# Patient Record
Sex: FEMALE | Race: WHITE | NOT HISPANIC OR LATINO | Employment: UNEMPLOYED | URBAN - METROPOLITAN AREA
[De-identification: names, ages, dates, MRNs, and addresses within clinical notes are randomized per-mention and may not be internally consistent; named-entity substitution may affect disease eponyms.]

---

## 2017-01-06 DIAGNOSIS — F33.1 MAJOR DEPRESSIVE DISORDER, RECURRENT EPISODE, MODERATE (H): Primary | ICD-10-CM

## 2017-01-06 RX ORDER — LORAZEPAM 0.5 MG/1
TABLET ORAL
Qty: 30 TABLET | Refills: 0
Start: 2017-01-06 | End: 2017-07-12

## 2017-05-09 DIAGNOSIS — Z12.39 SCREENING FOR BREAST CANCER: Primary | ICD-10-CM

## 2017-05-16 ENCOUNTER — RADIANT APPOINTMENT (OUTPATIENT)
Dept: MAMMOGRAPHY | Facility: CLINIC | Age: 52
End: 2017-05-16
Attending: INTERNAL MEDICINE
Payer: COMMERCIAL

## 2017-05-16 DIAGNOSIS — Z12.39 SCREENING FOR BREAST CANCER: ICD-10-CM

## 2017-05-16 PROCEDURE — G0202 SCR MAMMO BI INCL CAD: HCPCS

## 2017-05-24 DIAGNOSIS — F33.1 MAJOR DEPRESSIVE DISORDER, RECURRENT EPISODE, MODERATE (H): ICD-10-CM

## 2017-05-24 NOTE — TELEPHONE ENCOUNTER
Medication is being filled for 1 time refill only due to:  Pt due for annual visit late August, will need PHQ9 and GAD7 then also

## 2017-07-08 ENCOUNTER — HOSPITAL ENCOUNTER (EMERGENCY)
Facility: CLINIC | Age: 52
Discharge: HOME | End: 2017-07-08
Attending: EMERGENCY MEDICINE | Admitting: EMERGENCY MEDICINE
Payer: COMMERCIAL

## 2017-07-08 VITALS
HEART RATE: 66 BPM | BODY MASS INDEX: 29.75 KG/M2 | RESPIRATION RATE: 9 BRPM | WEIGHT: 184.3 LBS | DIASTOLIC BLOOD PRESSURE: 79 MMHG | OXYGEN SATURATION: 99 % | SYSTOLIC BLOOD PRESSURE: 129 MMHG | TEMPERATURE: 98.2 F

## 2017-07-08 DIAGNOSIS — R53.83 FATIGUE, UNSPECIFIED TYPE: ICD-10-CM

## 2017-07-08 LAB
ALBUMIN SERPL-MCNC: 3.6 G/DL (ref 3.4–5)
ALBUMIN UR-MCNC: NEGATIVE MG/DL
ALP SERPL-CCNC: 64 U/L (ref 40–150)
ALT SERPL W P-5'-P-CCNC: 28 U/L (ref 0–50)
ANION GAP SERPL CALCULATED.3IONS-SCNC: 6 MMOL/L (ref 3–14)
APPEARANCE UR: CLEAR
AST SERPL W P-5'-P-CCNC: 26 U/L (ref 0–45)
BASOPHILS # BLD AUTO: 0 10E9/L (ref 0–0.2)
BASOPHILS NFR BLD AUTO: 0.4 %
BILIRUB DIRECT SERPL-MCNC: <0.1 MG/DL (ref 0–0.2)
BILIRUB SERPL-MCNC: 0.3 MG/DL (ref 0.2–1.3)
BILIRUB UR QL STRIP: NEGATIVE
BUN SERPL-MCNC: 14 MG/DL (ref 7–30)
CALCIUM SERPL-MCNC: 8.6 MG/DL (ref 8.5–10.1)
CHLORIDE SERPL-SCNC: 107 MMOL/L (ref 94–109)
CO2 SERPL-SCNC: 25 MMOL/L (ref 20–32)
COLOR UR AUTO: YELLOW
CREAT SERPL-MCNC: 0.58 MG/DL (ref 0.52–1.04)
DIFFERENTIAL METHOD BLD: NORMAL
EOSINOPHIL # BLD AUTO: 0.2 10E9/L (ref 0–0.7)
EOSINOPHIL NFR BLD AUTO: 2.2 %
ERYTHROCYTE [DISTWIDTH] IN BLOOD BY AUTOMATED COUNT: 13.8 % (ref 10–15)
GFR SERPL CREATININE-BSD FRML MDRD: NORMAL ML/MIN/1.7M2
GLUCOSE SERPL-MCNC: 80 MG/DL (ref 70–99)
GLUCOSE UR STRIP-MCNC: NEGATIVE MG/DL
HCT VFR BLD AUTO: 36.6 % (ref 35–47)
HGB BLD-MCNC: 11.8 G/DL (ref 11.7–15.7)
HGB UR QL STRIP: NEGATIVE
IMM GRANULOCYTES # BLD: 0 10E9/L (ref 0–0.4)
IMM GRANULOCYTES NFR BLD: 0.3 %
KETONES UR STRIP-MCNC: NEGATIVE MG/DL
LEUKOCYTE ESTERASE UR QL STRIP: ABNORMAL
LYMPHOCYTES # BLD AUTO: 2.1 10E9/L (ref 0.8–5.3)
LYMPHOCYTES NFR BLD AUTO: 26.6 %
MCH RBC QN AUTO: 30.1 PG (ref 26.5–33)
MCHC RBC AUTO-ENTMCNC: 32.2 G/DL (ref 31.5–36.5)
MCV RBC AUTO: 93 FL (ref 78–100)
MONOCYTES # BLD AUTO: 0.5 10E9/L (ref 0–1.3)
MONOCYTES NFR BLD AUTO: 6.7 %
MUCOUS THREADS #/AREA URNS LPF: PRESENT /LPF
NEUTROPHILS # BLD AUTO: 5 10E9/L (ref 1.6–8.3)
NEUTROPHILS NFR BLD AUTO: 63.8 %
NITRATE UR QL: NEGATIVE
NRBC # BLD AUTO: 0 10*3/UL
NRBC BLD AUTO-RTO: 0 /100
PH UR STRIP: 6.5 PH (ref 5–7)
PLATELET # BLD AUTO: 274 10E9/L (ref 150–450)
POTASSIUM SERPL-SCNC: 4.2 MMOL/L (ref 3.4–5.3)
PROT SERPL-MCNC: 7.1 G/DL (ref 6.8–8.8)
RBC # BLD AUTO: 3.92 10E12/L (ref 3.8–5.2)
RBC #/AREA URNS AUTO: 3 /HPF (ref 0–2)
SODIUM SERPL-SCNC: 138 MMOL/L (ref 133–144)
SP GR UR STRIP: 1.02 (ref 1–1.03)
SQUAMOUS #/AREA URNS AUTO: 3 /HPF (ref 0–1)
TROPONIN I SERPL-MCNC: NORMAL UG/L (ref 0–0.04)
TSH SERPL DL<=0.005 MIU/L-ACNC: 1.9 MU/L (ref 0.4–4)
URN SPEC COLLECT METH UR: ABNORMAL
UROBILINOGEN UR STRIP-MCNC: 2 MG/DL (ref 0–2)
WBC # BLD AUTO: 7.9 10E9/L (ref 4–11)
WBC #/AREA URNS AUTO: 1 /HPF (ref 0–2)

## 2017-07-08 PROCEDURE — 96360 HYDRATION IV INFUSION INIT: CPT | Performed by: EMERGENCY MEDICINE

## 2017-07-08 PROCEDURE — 84484 ASSAY OF TROPONIN QUANT: CPT | Performed by: EMERGENCY MEDICINE

## 2017-07-08 PROCEDURE — 93010 ELECTROCARDIOGRAM REPORT: CPT | Mod: Z6 | Performed by: EMERGENCY MEDICINE

## 2017-07-08 PROCEDURE — 25000128 H RX IP 250 OP 636: Performed by: EMERGENCY MEDICINE

## 2017-07-08 PROCEDURE — 93005 ELECTROCARDIOGRAM TRACING: CPT | Performed by: EMERGENCY MEDICINE

## 2017-07-08 PROCEDURE — 96361 HYDRATE IV INFUSION ADD-ON: CPT | Performed by: EMERGENCY MEDICINE

## 2017-07-08 PROCEDURE — 85025 COMPLETE CBC W/AUTO DIFF WBC: CPT | Performed by: EMERGENCY MEDICINE

## 2017-07-08 PROCEDURE — 81001 URINALYSIS AUTO W/SCOPE: CPT | Performed by: EMERGENCY MEDICINE

## 2017-07-08 PROCEDURE — 84443 ASSAY THYROID STIM HORMONE: CPT | Performed by: EMERGENCY MEDICINE

## 2017-07-08 PROCEDURE — 80048 BASIC METABOLIC PNL TOTAL CA: CPT | Performed by: EMERGENCY MEDICINE

## 2017-07-08 PROCEDURE — 99283 EMERGENCY DEPT VISIT LOW MDM: CPT | Mod: 25 | Performed by: EMERGENCY MEDICINE

## 2017-07-08 PROCEDURE — 80076 HEPATIC FUNCTION PANEL: CPT | Performed by: EMERGENCY MEDICINE

## 2017-07-08 PROCEDURE — 99284 EMERGENCY DEPT VISIT MOD MDM: CPT | Mod: Z6 | Performed by: EMERGENCY MEDICINE

## 2017-07-08 RX ADMIN — SODIUM CHLORIDE, POTASSIUM CHLORIDE, SODIUM LACTATE AND CALCIUM CHLORIDE 1000 ML: 600; 310; 30; 20 INJECTION, SOLUTION INTRAVENOUS at 18:24

## 2017-07-08 ASSESSMENT — ENCOUNTER SYMPTOMS
DYSURIA: 0
WEAKNESS: 0
FATIGUE: 1
DIZZINESS: 0
ABDOMINAL PAIN: 0

## 2017-07-08 NOTE — ED NOTES
"Arrived to ED d/t c/o fatigue and dizziness, has been feeling fatigued since yesterday, felt a little better after eating yesterday but fatigue has persisted, c/o her arms \"feeling so weak\", VSS upon arrival to triage  "

## 2017-07-08 NOTE — ED AVS SNAPSHOT
[facility], [place], Emergency Department    [address] [place] [address]    Phone:  [phone]                                       Jeanna Steel   MRN: [id number]    Department:  [facility], Emergency Department   Date of Visit:  7/8/2017           After Visit Summary Signature Page     I have received my discharge instructions, and my questions have been answered. I have discussed any challenges I see with this plan with the nurse or doctor.    ..........................................................................................................................................  Patient/Patient Representative Signature      ..........................................................................................................................................  Patient Representative Print Name and Relationship to Patient    ..................................................               ................................................  Date                                            Time    ..........................................................................................................................................  Reviewed by Signature/Title    ...................................................              ..............................................  Date                                                            Time

## 2017-07-08 NOTE — ED AVS SNAPSHOT
[facility], Emergency Department    [address] [address]    Phone:  [phone]                                       Jeanna Steel   MRN: [id number]    Department:  [facility], Emergency Department   Date of Visit:  7/8/2017           Patient Information     Date Of Birth          1965        Your diagnoses for this visit were:     Fatigue, unspecified type        You were seen by Rafiq Radford MD.        Discharge Instructions       Please make an appointment to follow up with Your Primary Care Provider if not improving.    Rest, stay well-hydrated.    Return to the emergency Department for any problems.      24 Hour Appointment Hotline       To make an appointment at any [facility] clinic, call [phone] ([phone]). If you don't have a family doctor or clinic, we will help you find one. [facility] clinics are conveniently located to serve the needs of you and your family.             Review of your medicines      Our records show that you are taking the medicines listed below. If these are incorrect, please call your family doctor or clinic.        Dose / Directions Last dose taken    LORazepam 0.5 MG tablet   Commonly known as:  ATIVAN   Quantity:  30 tablet        Take 1-2 tabs by mouth as needed for anxiety -   Refills:  0        order for DME   Quantity:  1 Device        Equipment being ordered: double strap ankle brace, med   Refills:  0        sertraline 50 MG tablet   Commonly known as:  ZOLOFT   Quantity:  225 tablet        Take 2.5 tablets po daily.  Will be due for clinic visit and updated PHQ9/GAD7 assessments for further refills   Refills:  0                Procedures and tests performed during your visit     Basic metabolic panel    CBC with platelets differential    EKG 12 lead    Hepatic panel    TSH with free T4 reflex    Troponin I    UA with Microscopic reflex to Culture      Orders Needing Specimen Collection     None      Pending Results  "    Date and Time Order Name Status Description    2017 1804 EKG 12 lead Preliminary             Pending Culture Results     No orders found from 2017 to 2017.            Pending Results Instructions     If you had any lab results that were not finalized at the time of your Discharge, you can call the ED Lab Result RN at [phone]. You will be contacted by this team for any positive Lab results or changes in treatment. The nurses are available 7 days a week from 10A to 6:30P.  You can leave a message 24 hours per day and they will return your call.        Thank you for choosing [facility]       Thank you for choosing [facility] for your care. Our goal is always to provide you with excellent care. Hearing back from our patients is one way we can continue to improve our services. Please take a few minutes to complete the written survey that you may receive in the mail after you visit with us. Thank you!        [employer] Assistant Information     [email or web address] lets you send messages to your doctor, view your test results, renew your prescriptions, schedule appointments and more. To sign up, go to [email or web address] . Click on \"Log in\" on the left side of the screen, which will take you to the Welcome page. Then click on \"Sign up Now\" on the right side of the page.     You will be asked to enter the access code listed below, as well as some personal information. Please follow the directions to create your username and password.     Your access code is: [email or web address]  Expires: 2017  8:03 AM     Your access code will  in 90 days. If you need help or a new code, please call your [facility] clinic or [phone].        Care EveryWhere ID     This is your Care EveryWhere ID. This could be used by other organizations to access your [facility] medical records  [id number]        Equal Access to Services     LAKSHMI MAST AH: Cheyenne Gusman, lenora montoya, willow andres " viviana ziegler ah. So [facility] [phone].    ATENCIÓN: Si habla español, tiene a hill disposición servicios gratuitos de asistencia lingüística. Llame al [phone].    We comply with applicable federal civil rights laws and [place] laws. We do not discriminate on the basis of race, color, national origin, age, disability sex, sexual orientation or gender identity.            After Visit Summary       This is your record. Keep this with you and show to your community pharmacist(s) and doctor(s) at your next visit.

## 2017-07-08 NOTE — ED PROVIDER NOTES
"  History     Chief Complaint   Patient presents with     Fatigue     HPI  Jeanna Steel is a 51 year old female with a history of anxiety and depression who presents for evaluation of fatigue. Patient complains of intermittent fatigue described as \"like I want to lay down and sleep\" since 5 PM yesterday. She denies dizziness or near-syncope, but rather just feels \"weak and exhausted\". She notes she has had similar symptoms in the past, but typically they resolve after eating, however, this episode has lasted longer and worsened after eating food. She did use her father's glucometer to check her blood sugar and found it was in the 160s. She denies shortness of breath, abdominal pain, dysuria, or changes in urination. She does note a new rash on her right foot which did improve with cortisone. Additionally, she reports increased stressors in her life as she is currently working four jobs, caring for both of her parents, and is having financial difficulties.     I have reviewed the Medications, Allergies, Past Medical and Surgical History, and Social History in the [facility] system.  Past Medical History:   Diagnosis Date     Anxiety      Depressive disorder      Infertility, female        Past Surgical History:   Procedure Laterality Date     BACK SURGERY  2006     ECTOPIC PREGNANCY SURGERY  2003     EYE SURGERY  1969       Family History   Problem Relation Age of Onset     Alcohol/Drug Brother      Breast Cancer Mother      Breast Cancer Maternal Grandmother 92     Breast Cancer Paternal Aunt 65     CANCER Paternal Aunt      Endometrial cancer     DIABETES Father      DIABETES Paternal Grandfather      Schizophrenia Brother      suicide in 1992     Other - See Comments Brother      Multiple Sclerosis       Social History   Substance Use Topics     Smoking status: Former Smoker     Types: Cigarettes     Smokeless tobacco: Never Used     Alcohol use 7.0 oz/week     14 drink(s) per week       No current " facility-administered medications for this encounter.      Current Outpatient Prescriptions   Medication     sertraline (ZOLOFT) 50 MG tablet     LORazepam (ATIVAN) 0.5 MG tablet     order for DME      No Known Allergies    Review of Systems   Constitutional: Positive for fatigue.   Gastrointestinal: Negative for abdominal pain.   Genitourinary: Negative for dysuria.   Neurological: Negative for dizziness, syncope and weakness.   All other systems reviewed and are negative.      Physical Exam   BP: 134/71  Pulse: 66  Temp: 98.2  F (36.8  C)  Resp: 18  Weight: 83.6 kg (184 lb 4.9 oz)  SpO2: 97 %  Physical Exam   Constitutional: She is oriented to person, place, and time. Vital signs are normal. She appears well-developed and well-nourished.  Non-toxic appearance. She does not appear ill. No distress.   HENT:   Head: Normocephalic and atraumatic.   Mouth/Throat: Oropharynx is clear and moist. No oropharyngeal exudate.   Eyes: Conjunctivae and EOM are normal. Pupils are equal, round, and reactive to light. No scleral icterus.   Neck: Normal range of motion. Neck supple. No JVD present. No tracheal deviation present. No thyromegaly present.   Cardiovascular: Normal rate, regular rhythm, normal heart sounds and intact distal pulses.  Exam reveals no gallop and no friction rub.    No murmur heard.  Pulmonary/Chest: Effort normal and breath sounds normal. No respiratory distress.   Abdominal: Soft. Bowel sounds are normal. She exhibits no distension and no mass. There is no tenderness.   Musculoskeletal: Normal range of motion. She exhibits no edema or tenderness.   Lymphadenopathy:     She has no cervical adenopathy.   Neurological: She is alert and oriented to person, place, and time. She has normal strength. No cranial nerve deficit or sensory deficit.   Skin: Skin is warm and dry. No rash noted. No erythema. No pallor.   Psychiatric: She has a normal mood and affect. Her behavior is normal.   Nursing note and vitals  reviewed.      ED Course     ED Course     Procedures             EKG Interpretation:      Interpreted by Rafiq Radford    Symptoms at time of EKG: Fatigue   Rhythm: sinus bradycardia  Rate: 58  Ectopy: none  Conduction: normal  ST Segments/ T Waves: No ST-T wave changes  Q Waves: none  Comparison to prior: No old EKG available    Clinical Impression: sinus bradycardia                Results for orders placed or performed during the hospital encounter of 07/08/17   CBC with platelets differential   Result Value Ref Range    WBC 7.9 4.0 - 11.0 10e9/L    RBC Count 3.92 3.8 - 5.2 10e12/L    Hemoglobin 11.8 11.7 - 15.7 g/dL    Hematocrit 36.6 35.0 - 47.0 %    MCV 93 78 - 100 fl    MCH 30.1 26.5 - 33.0 pg    MCHC 32.2 31.5 - 36.5 g/dL    RDW 13.8 10.0 - 15.0 %    Platelet Count 274 150 - 450 10e9/L    Diff Method Automated Method     % Neutrophils 63.8 %    % Lymphocytes 26.6 %    % Monocytes 6.7 %    % Eosinophils 2.2 %    % Basophils 0.4 %    % Immature Granulocytes 0.3 %    Nucleated RBCs 0 0 /100    Absolute Neutrophil 5.0 1.6 - 8.3 10e9/L    Absolute Lymphocytes 2.1 0.8 - 5.3 10e9/L    Absolute Monocytes 0.5 0.0 - 1.3 10e9/L    Absolute Eosinophils 0.2 0.0 - 0.7 10e9/L    Absolute Basophils 0.0 0.0 - 0.2 10e9/L    Abs Immature Granulocytes 0.0 0 - 0.4 10e9/L    Absolute Nucleated RBC 0.0    Basic metabolic panel   Result Value Ref Range    Sodium 138 133 - 144 mmol/L    Potassium 4.2 3.4 - 5.3 mmol/L    Chloride 107 94 - 109 mmol/L    Carbon Dioxide 25 20 - 32 mmol/L    Anion Gap 6 3 - 14 mmol/L    Glucose 80 70 - 99 mg/dL    Urea Nitrogen 14 7 - 30 mg/dL    Creatinine 0.58 0.52 - 1.04 mg/dL    GFR Estimate >90  Non  GFR Calc   >60 mL/min/1.7m2    GFR Estimate If Black >90   GFR Calc   >60 mL/min/1.7m2    Calcium 8.6 8.5 - 10.1 mg/dL   UA with Microscopic reflex to Culture   Result Value Ref Range    Color Urine Yellow     Appearance Urine Clear     Glucose Urine Negative  NEG mg/dL    Bilirubin Urine Negative NEG    Ketones Urine Negative NEG mg/dL    Specific Gravity Urine 1.019 1.003 - 1.035    Blood Urine Negative NEG    pH Urine 6.5 5.0 - 7.0 pH    Protein Albumin Urine Negative NEG mg/dL    Urobilinogen mg/dL 2.0 0.0 - 2.0 mg/dL    Nitrite Urine Negative NEG    Leukocyte Esterase Urine Trace (A) NEG    Source Clean catch urine     WBC Urine 1 0 - 2 /HPF    RBC Urine 3 (H) 0 - 2 /HPF    Squamous Epithelial /HPF Urine 3 (H) 0 - 1 /HPF    Mucous Urine Present (A) NEG /LPF   Hepatic panel   Result Value Ref Range    Bilirubin Direct <0.1 0.0 - 0.2 mg/dL    Bilirubin Total 0.3 0.2 - 1.3 mg/dL    Albumin 3.6 3.4 - 5.0 g/dL    Protein Total 7.1 6.8 - 8.8 g/dL    Alkaline Phosphatase 64 40 - 150 U/L    ALT 28 0 - 50 U/L    AST 26 0 - 45 U/L   Troponin I   Result Value Ref Range    Troponin I ES  0.000 - 0.045 ug/L     <0.015  The 99th percentile for upper reference range is 0.045 ug/L.  Troponin values in   the range of 0.045 - 0.120 ug/L may be associated with risks of adverse   clinical events.     TSH with free T4 reflex   Result Value Ref Range    TSH 1.90 0.40 - 4.00 mU/L   EKG 12 lead   Result Value Ref Range    Interpretation ECG Click View Image link to view waveform and result             Assessments & Plan (with Medical Decision Making)   This patient presented to the Emergency Department complaining of fatigue.  Vital signs were within normal limits. 12 lead EKG was obtained which demonstrated no acute ischemic changes and she has a negative troponin which decreases suspicion that this is an atypical presentation for acute coronary syndrome.  No evidence of urinary tract infection and the patient is otherwise afebrile, without leukocytosis or any other symptoms that would point towards infectious etiology.  Normal renal and hepatic function are noted.  Patient is not anemic and at this point it is unclear what caused her symptoms.  She feels improved, vital signs have  remained stable and at this point I m comfortable discharging her home and having her follow up with her clinic if needed.  She was discharged in good condition.      I have reviewed the nursing notes.    I have reviewed the findings, diagnosis, plan and need for follow up with the patient.    Discharge Medication List as of 7/8/2017  8:01 PM          Final diagnoses:   Fatigue, unspecified type   I, Jenniffer Maynard, am serving as a trained medical scribe to document services personally performed by Santiago Radford MD, based on the provider's statements to me.   I, Santiago Radford MD, was physically present and have reviewed and verified the accuracy of this note documented by Jenniffer Maynard.      7/8/2017   [facility], [place], EMERGENCY DEPARTMENT     Rafiq Radford MD  07/09/17 [id number]

## 2017-07-09 NOTE — DISCHARGE INSTRUCTIONS
Please make an appointment to follow up with Your Primary Care Provider if not improving.    Rest, stay well-hydrated.    Return to the emergency Department for any problems.

## 2017-07-10 ENCOUNTER — NURSE TRIAGE (OUTPATIENT)
Dept: NURSING | Facility: CLINIC | Age: 52
End: 2017-07-10

## 2017-07-10 LAB — INTERPRETATION ECG - MUSE: NORMAL

## 2017-07-10 NOTE — TELEPHONE ENCOUNTER
"  Reason for Disposition    [1] MODERATE dizziness (e.g., interferes with normal activities) AND [2] has been evaluated by physician for this    Additional Information    Negative: Severe difficulty breathing (e.g., struggling for each breath, speaks in single words)    Negative: [1] Difficulty breathing or swallowing AND [2] started suddenly after medicine, an allergic food or bee sting    Negative: Shock suspected (e.g., cold/pale/clammy skin, too weak to stand, low BP, rapid pulse)    Negative: Difficult to awaken or acting confused  (e.g., disoriented, slurred speech)    Negative: [1] Weakness (i.e., paralysis, loss of muscle strength) of the face, arm or leg on one side of the body AND [2] sudden onset AND [3] present now    Negative: [1] Numbness (i.e., loss of sensation) of the face, arm or leg on one side of the body AND [2] sudden onset AND [3] present now    Negative: [1] Loss of speech or garbled speech AND [2] sudden onset AND [3] present now    Negative: Overdose (accidental or intentional) of medications    Negative: [1] Fainted > 15 minutes ago AND [2] still feels too weak or dizzy to stand    Negative: Heart beating < 50 beats per minute OR > 140 beats per minute    Negative: Sounds like a life-threatening emergency to the triager    Negative: Chest pain    Negative: Rectal bleeding, bloody stool, or tarry-black stool    Negative: [1] Vomiting AND [2] contains red blood or black (\"coffee ground\") material    Negative: Vomiting is main symptom    Negative: Diarrhea is main symptom    Negative: Headache is main symptom    Negative: Patient states that he/she is having an anxiety/panic attack    Negative: Dizziness from low blood sugar (i.e., < 60 mg/dl or 3.5 mmol/l)    Negative: Dizziness is described as a spinning sensation (i.e., vertigo)    Negative: Heat exhaustion suspected    Negative: Difficulty breathing    Negative: SEVERE dizziness (e.g., unable to stand, requires support to walk, feels like " "passing out now)    Negative: Extra heart beats OR irregular heart beating  (i.e., \"palpitations\")    Negative: [1] Drinking very little AND [2] dehydration suspected (e.g., no urine > 12 hours, very dry mouth, very lightheaded)    Negative: Patient sounds very sick or weak to the triager    Negative: [1] Dizziness caused by heat exposure, sudden standing, or poor fluid intake AND [2] no improvement after 2 hours of rest and fluids    Negative: [1] Fever > 103 F (39.4 C) AND [2] not able to get the fever down using Fever Care Advice    Negative: [1] Fever > 101 F (38.3 C) AND [2] age > 60    Negative: [1] Fever > 101 F (38.3 C) AND [2] bedridden (e.g., nursing home patient, CVA, chronic illness, recovering from surgery)    Negative: [1] Fever > 100.5 F (38.1 C) AND [2] diabetes mellitus or weak immune system (e.g., HIV positive, cancer chemo, splenectomy, organ transplant, chronic steroids)    Negative: [1] MODERATE dizziness (e.g., interferes with normal activities) AND [2] has NOT been evaluated by physician for this  (Exception: dizziness caused by heat exposure, sudden standing, or poor fluid intake)    Negative: Fever present > 3 days (72 hours)    Negative: Taking a medicine that could cause dizziness (e.g., blood pressure medications, diuretics)    Protocols used: DIZZINESS - LIGHTHEADEDNESS-ADULT-AH    I left a voice message on the [facility] answering machine for them to call patient with availability. I then talked with Jeanna and advised the ER if her breathing worsens or something else developed because there's obviously something going on.  Cristin Mota RN-[facility]    "

## 2017-07-12 ENCOUNTER — OFFICE VISIT (OUTPATIENT)
Dept: FAMILY MEDICINE | Facility: CLINIC | Age: 52
End: 2017-07-12

## 2017-07-12 VITALS
HEART RATE: 65 BPM | OXYGEN SATURATION: 97 % | HEIGHT: 66 IN | WEIGHT: 184 LBS | BODY MASS INDEX: 29.57 KG/M2 | TEMPERATURE: 98.4 F | DIASTOLIC BLOOD PRESSURE: 83 MMHG | SYSTOLIC BLOOD PRESSURE: 124 MMHG
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
## 2017-07-12 DIAGNOSIS — R31.29 MICROSCOPIC HEMATURIA: ICD-10-CM

## 2017-07-12 DIAGNOSIS — R53.83 OTHER FATIGUE: Primary | ICD-10-CM

## 2017-07-12 DIAGNOSIS — R31.29 MICROSCOPIC HEMATURIA: Primary | ICD-10-CM

## 2017-07-12 DIAGNOSIS — F33.1 MAJOR DEPRESSIVE DISORDER, RECURRENT EPISODE, MODERATE (H): ICD-10-CM

## 2017-07-12 DIAGNOSIS — R53.83 OTHER FATIGUE: ICD-10-CM

## 2017-07-12 LAB
ALBUMIN UR-MCNC: NEGATIVE MG/DL
APPEARANCE UR: CLEAR
BILIRUB UR QL STRIP: NEGATIVE
COLOR UR AUTO: YELLOW
GLUCOSE UR STRIP-MCNC: NEGATIVE MG/DL
HCG SERPL QL: NEGATIVE
HGB UR QL STRIP: NEGATIVE
KETONES UR STRIP-MCNC: NEGATIVE MG/DL
LEUKOCYTE ESTERASE UR QL STRIP: NEGATIVE
NITRATE UR QL: NEGATIVE
PH UR STRIP: 7 PH (ref 5–7)
RBC #/AREA URNS AUTO: 5 /HPF (ref 0–2)
SP GR UR STRIP: 1.02 (ref 1–1.03)
SQUAMOUS #/AREA URNS AUTO: 1 /HPF (ref 0–1)
URN SPEC COLLECT METH UR: ABNORMAL
UROBILINOGEN UR STRIP-MCNC: 0 MG/DL (ref 0–2)
WBC #/AREA URNS AUTO: 2 /HPF (ref 0–2)

## 2017-07-12 RX ORDER — LORAZEPAM 0.5 MG/1
TABLET ORAL
Qty: 30 TABLET | Refills: 0 | Status: SHIPPED | OUTPATIENT
Start: 2017-07-12 | End: 2018-02-09

## 2017-07-12 ASSESSMENT — PAIN SCALES - GENERAL: PAINLEVEL: NO PAIN (0)

## 2017-07-12 NOTE — NURSING NOTE
"51 year old  Chief Complaint   Patient presents with     Fatigue     feeling tired and body just weak     Derm Problem     rash on foot has spreaded tp her leg for 3 weeks now       Blood pressure 124/83, pulse 65, temperature 98.4  F (36.9  C), temperature source Oral, height 5' 6.14\" (168 cm), weight 184 lb (83.5 kg), last menstrual period 07/01/2017, SpO2 97 %, not currently breastfeeding. Body mass index is 29.57 kg/(m^2).  Patient Active Problem List   Diagnosis     Major depressive disorder, recurrent episode, moderate (H)     Generalized anxiety disorder     Vitamin D deficiency       Wt Readings from Last 2 Encounters:   07/12/17 184 lb (83.5 kg)   07/08/17 184 lb 4.9 oz (83.6 kg)     BP Readings from Last 3 Encounters:   07/12/17 124/83   07/08/17 129/79   08/25/16 118/77         Current Outpatient Prescriptions   Medication     sertraline (ZOLOFT) 50 MG tablet     LORazepam (ATIVAN) 0.5 MG tablet     No current facility-administered medications for this visit.        Social History   Substance Use Topics     Smoking status: Former Smoker     Types: Cigarettes     Smokeless tobacco: Never Used     Alcohol use 7.0 oz/week     14 drink(s) per week       Health Maintenance Due   Topic Date Due     DEPRESSION ACTION PLAN Q1 YR  1965     COLON CANCER SCREEN (SYSTEM ASSIGNED)  08/13/2014     PHQ-9 Q3 MONTHS  08/02/2016       Lab Results   Component Value Date    PAP NIL 05/02/2016 July 12, 2017 10:57 AM    "

## 2017-07-12 NOTE — PROGRESS NOTES
"Jeanna Steel is a 51 year old female here for the following issues:    Fatigue  Jeanna boyce is 50 yo woman here for evaluation of fatigue. It started 5 days ago while walking her dog. She felt hypoglycemic and returned to go home, it was dinner time but she had eaten lunch. Ate some cheese. Had to lie down on the couch, \"had to rest\". Breathing felt shallow so she found herself sighing but she was not short of breath. She felt drained,  hardly able to lift her arms. She was not sleepy, just felt sheer exhaustion. Went to bed about 8 pm. Awoke 4 am and felt \"normal\" voided. Then slept till 9am. When she awoke, she still felt sleepy, kept yawning. Tried drinking coffee, had eaten breakfast and lunch. By mid afternoon felt shaky, hypoglycemic, then noted shallow breathing again and had to take deep breaths. Felt some pressure on her chest. Felt like she needed to lie down from shakiness and sheer exhaustion. Felt like she was walking stiffly. Blood sugar 168. Her mom drove her home. Family recommended she go to the ER.     Was seen in the ER on 7/8. She felt exhausted, had to lie down, fluids started. She fell asleep in the ER. EKG normal. Blood counts all normal. UA showed a few RBC and some leukocyte esterase but was not cultured. She denied urinary symptoms.     Three days ago was the worse day. She slept all day. Two days ago she returned to work but felt exhausted . Yesterday she had the day off but was able to run errands. Today she felt almost back to normal upon awakening, but as morning progresses she is feeling more fatigued. Appetite has been good. .     Rash  She has bug bites on lower right shin and foot , very itchy x 3 wk, now healing. She is asking about Lyme disease. She has no fever or headache, no swollen joints.     ROS  Gen: appetite is normal. Sometimes she overeats .no chills, fever. Sweats due to menopause  HEENT: no URI symptoms. Some itchy eyes  Cor: no palpitations, sometime chest " pressure, has to take deep breaths  Lung: mild congestion, no cough  GI: soft stools lately.   : sometimes notes that after period has urethral pain. Feels like a bladder infection and then dissipates  GYN; LMP 7/1/17, normal cycles, hx of infertility, seemed shorter than normal cycle  Some breast tenderness with menses. Growing larger  MS: metatarsal pain on R>L, started last August 2016, worse in the past 6 wks.  Now has new knee pain with going down hills, x 6 months, swelling. She is new shoes with metatarsal lift.     Situational stress, hx of anxiety and depression  She is currently working 4 jobs while trying to care for her elderly parents who have memory loss. She and her brother will need to move them to a facility but she has little time. She describes her mother as a hoarder.  Her father has demential that  Has progressed. She would like med refills today. She would like to see a counselor but is financially stressed. She previously saw a provider at the [facility] but they moved.    PHQ9 score = 4  JESSICA 7 score = 0      Current jobs   3-4 d a week--longstanding.  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  Will be due for clinic visit and updated PHQ9/GAD7 assessments for further refills 225 tablet 0     LORazepam (ATIVAN) 0.5 MG tablet Take 1-2 tabs by mouth as needed for anxiety - 30 tablet 0     order for DME Equipment being ordered: double strap ankle brace, med 1 Device 0       No Known Allergies     EXAM  BP  "124/83 (BP Location: Left arm, Patient Position: Sitting, Cuff Size: Adult Regular)  Pulse 65  Temp 98.4  F (36.9  C) (Oral)  Ht 5' 6.14\" (168 cm)  Wt 184 lb (83.5 kg)  LMP 07/01/2017  SpO2 97%  BMI 29.57 kg/m2  Gen: Appears fatigued, wishes to lie down during visit  HEENT:  Conjunctiva nl, TM normal bilaterally, OP clear, no posterior erythema  Neck : no LAD or TM  COR: S1,S2, no murmur  Lungs: CTA bilaterally, no rhonchi, wheezes or rales  Abdomen: Soft, mild tenderness with palpation over the right mid and lower abdomen, no palpable mass, normal bowel sounds, no HSM or mass  Ext: no peripheral edema, pulses full  Skin: excoriated papules over right lower shin and foot, healing, no surrounding redness      Assessment:  (R53.83) Other fatigue  (primary encounter diagnosis)  Comment: profound fatigue, exhaustion, some shallow breathing, no weight loss or constitutional symptoms. DDX is post viral infection/fatigue. She has normal comprehensive panel and CBC, negative cardiac workup, she is feeling slightly better today  Plan: Urine Culture Aerobic Bacterial, Urinalysis         ([facility]), HCG Qualitative Urine ([facility]),        Lyme Disease Lea with reflex to WB Serum        Recommend rest, healthy diet, adequate sleep, gave note to work no more than 4 hr days for the next 2 wks. I will see her in clinic on 7/21  Addendum: negative HCG, UA with microhematuria, await culture, if negative, refer to urology. Consider CT of abdomen without contrast to rule out stone given right sided abdominal discomfort    (F33.1) Major depressive disorder, recurrent episode, moderate (H)  Comment: mood has been stable on medication. She would like to find a counselor but is financially limited  Plan: LORazepam (ATIVAN) 0.5 MG tablet, sertraline         (ZOLOFT) 50 MG tablet        Will look into low cost resources for her.    (W57.XXXA) Bug bites, initial encounter  Comment: right lower shin and foot, suspect chigger bites, " no evidence for cellulitis  Plan: recommend using cortisone ointment on lesions until healed.     (R31.29) Microscopic hematuria  Comment: right sided abdominal discomfort and microscopic hematuria.  Plan: CT Abdomen pelvis w/o contrast        Obtain CT to rule out stone, also await culture.   I called and gave pt phone number to call for CT scan, she agrees with plan      Caitlyn Mcadams MD  Internal Medicine/Pediatrics

## 2017-07-12 NOTE — MR AVS SNAPSHOT
"              After Visit Summary   2017    Jeanna Steel    MRN: [id number]           Patient Information     Date Of Birth          1965        Visit Information        Provider Department      2017 11:00 AM Caitlyn Mcadams MD [facility]         Follow-ups after your visit        Who to contact     Please call your clinic at [phone] to:    Ask questions about your health    Make or cancel appointments    Discuss your medicines    Learn about your test results    Speak to your doctor   If you have compliments or concerns about an experience at your clinic, or if you wish to file a complaint, please contact [facility] Patient Relations at [phone] or email us at [email or web address]         Additional Information About Your Visit        MyChart Information     [email or web address] is an electronic gateway that provides easy, online access to your medical records. With [email or web address], you can request a clinic appointment, read your test results, renew a prescription or communicate with your care team.     To sign up for CareCentrixt visit the website at [email or web address]   You will be asked to enter the access code listed below, as well as some personal information. Please follow the directions to create your username and password.     Your access code is: [email or web address]  Expires: 2017  8:03 AM     Your access code will  in 90 days. If you need help or a new code, please contact your [facility] or call [phone] for assistance.        Care EveryWhere ID     This is your Care EveryWhere ID. This could be used by other organizations to access your [facility] medical records  [id number]        Your Vitals Were     Pulse Temperature Height Last Period Pulse Oximetry BMI (Body Mass Index)    65 98.4  F (36.9  C) (Oral) 5' 6.14\" (168 cm) 2017 97% 29.57 kg/m2       Blood Pressure from Last 3 Encounters: "   07/12/17 124/83   07/08/17 129/79   08/25/16 118/77    Weight from Last 3 Encounters:   07/12/17 184 lb (83.5 kg)   07/08/17 184 lb 4.9 oz (83.6 kg)   08/23/16 180 lb (81.6 kg)              Today, you had the following     No orders found for display       Primary Care Provider Office Phone # Fax #    Caitlyn Adeline Mcadams [phone]       [address] [address]        Equal Access to Services     [facility] KEZIA : Hadii saritha waters Sobrenda, warah montoya, qaybjuan kaalmapierre jacobson, willow ziegler . So [facility] [phone].    ATENCIÓN: Si habla español, tiene a hill disposición servicios gratuitos de asistencia lingüística. [facility] [phone].    We comply with applicable federal civil rights laws and [place] laws. We do not discriminate on the basis of race, color, national origin, age, disability sex, sexual orientation or gender identity.            Thank you!     Thank you for choosing [facility]  for your care. Our goal is always to provide you with excellent care. Hearing back from our patients is one way we can continue to improve our services. Please take a few minutes to complete the written survey that you may receive in the mail after your visit with us. Thank you!             Your Updated Medication List - Protect others around you: Learn how to safely use, store and throw away your medicines at www.disposemymeds.org.          This list is accurate as of: 7/12/17 11:37 AM.  Always use your most recent med list.                   Brand Name Dispense Instructions for use Diagnosis    LORazepam 0.5 MG tablet    ATIVAN    30 tablet    Take 1-2 tabs by mouth as needed for anxiety -    Major depressive disorder, recurrent episode, moderate (H)       sertraline 50 MG tablet    ZOLOFT    225 tablet    Take 2.5 tablets po daily.  Will be due for clinic visit and updated PHQ9/GAD7 assessments for further refills    Major  depressive disorder, recurrent episode, moderate (H)

## 2017-07-12 NOTE — LETTER
July 12, 2017    RE: Jeanna Steel  [address]  [place] [address]      To whom it may concern,    Jeanna Steel was seen at [facility] today for illness. She should not return to work until Monday, July 24.    Sincerely,          Caitlyn Mcadams MD  Internal Medicine/Pediatrics

## 2017-07-12 NOTE — LETTER
[facility]  [address]  [facility] [address]  Phone: [phone]  Fax: [phone]       Date: July 15, 2017      Jeanna Steel  [address]  [place] [facility] [address]        Dear Jeanna,    Enclosed are your recent lab results.      Your test for Lymes is negative.  Your pregnancy test is negative.      Your urine test had red blood cells present but the culture grew mixed bacteria (not a true infection).  As discussed, I would recommend you call the Urology clinic for evaluation. The number is [phone].    Sincerely,    Caitlyn Mcadams MD  Internal Medicine/Pediatrics    Resulted Orders   Routine UA with micro reflex to culture   Result Value Ref Range    Color Urine Yellow     Appearance Urine Clear     Glucose Urine Negative NEG mg/dL    Bilirubin Urine Negative NEG    Ketones Urine Negative NEG mg/dL    Specific Gravity Urine 1.016 1.003 - 1.035    Blood Urine Negative NEG    pH Urine 7.0 5.0 - 7.0 pH    Protein Albumin Urine Negative NEG mg/dL    Urobilinogen mg/dL 0.0 0.0 - 2.0 mg/dL    Nitrite Urine Negative NEG    Leukocyte Esterase Urine Negative NEG    Source Midstream Urine     WBC Urine 2 0 - 2 /HPF    RBC Urine 5 (H) 0 - 2 /HPF    Squamous Epithelial /HPF Urine 1 0 - 1 /HPF   Urine Culture Aerobic Bacterial   Result Value Ref Range    Specimen Description Midstream Urine     Special Requests Specimen received in preservative     Culture Micro       10,000 to 50,000 colonies/mL mixed urogenital jose alfredo Susceptibility testing not   routinely done      Micro Report Status FINAL 07/13/2017    Lyme Disease Lea with reflex to WB Serum   Result Value Ref Range    Lyme Disease Antibodies Serum 0.09 0.00 - 0.89      Comment:      Negative, Absence of detectable Borrelia burdorferi antibodies. A negative   result does not exclude the possibility of Borrelia burgdorferi infection. If   early Lyme  disease is suspected, a second sample should be collected and   tested   2 to 4 weeks later.     HCG qualitative   Result Value Ref Range    HCG Qualitative Serum Negative NEG

## 2017-07-12 NOTE — LETTER
July 12, 2017    RE: Jeanna Steel  [address]  [place] [address]      To whom it may concern.    Jeanna Steel was seen at the [facility] today for illness. She may return to work for no more than 4 hr a day for the next 2 wks. She will follow up in clinic next week to discuss any need for further restrictions.    Sincerely,          Caitlyn Mcadams MD  Internal Medicine/Pediatrics

## 2017-07-13 LAB
B BURGDOR IGG+IGM SER QL: 0.09 (ref 0–0.89)
BACTERIA SPEC CULT: NORMAL
Lab: NORMAL
MICRO REPORT STATUS: NORMAL
SPECIMEN SOURCE: NORMAL

## 2017-07-13 ASSESSMENT — ANXIETY QUESTIONNAIRES
7. FEELING AFRAID AS IF SOMETHING AWFUL MIGHT HAPPEN: NOT AT ALL
6. BECOMING EASILY ANNOYED OR IRRITABLE: NOT AT ALL
5. BEING SO RESTLESS THAT IT IS HARD TO SIT STILL: NOT AT ALL
1. FEELING NERVOUS, ANXIOUS, OR ON EDGE: NOT AT ALL
IF YOU CHECKED OFF ANY PROBLEMS ON THIS QUESTIONNAIRE, HOW DIFFICULT HAVE THESE PROBLEMS MADE IT FOR YOU TO DO YOUR WORK, TAKE CARE OF THINGS AT HOME, OR GET ALONG WITH OTHER PEOPLE: NOT DIFFICULT AT ALL
3. WORRYING TOO MUCH ABOUT DIFFERENT THINGS: NOT AT ALL
2. NOT BEING ABLE TO STOP OR CONTROL WORRYING: NOT AT ALL
GAD7 TOTAL SCORE: 0

## 2017-07-13 ASSESSMENT — PATIENT HEALTH QUESTIONNAIRE - PHQ9: 5. POOR APPETITE OR OVEREATING: NOT AT ALL

## 2017-07-14 ASSESSMENT — PATIENT HEALTH QUESTIONNAIRE - PHQ9: SUM OF ALL RESPONSES TO PHQ QUESTIONS 1-9: 4

## 2017-07-14 ASSESSMENT — ANXIETY QUESTIONNAIRES: GAD7 TOTAL SCORE: 0

## 2017-07-21 ENCOUNTER — OFFICE VISIT (OUTPATIENT)
Dept: FAMILY MEDICINE | Facility: CLINIC | Age: 52
End: 2017-07-21

## 2017-07-21 ENCOUNTER — OFFICE VISIT (OUTPATIENT)
Dept: UROLOGY | Facility: CLINIC | Age: 52
End: 2017-07-21

## 2017-07-21 VITALS
DIASTOLIC BLOOD PRESSURE: 76 MMHG | SYSTOLIC BLOOD PRESSURE: 123 MMHG | HEART RATE: 74 BPM | WEIGHT: 186.1 LBS | BODY MASS INDEX: 29.91 KG/M2 | HEIGHT: 66 IN

## 2017-07-21 VITALS
SYSTOLIC BLOOD PRESSURE: 124 MMHG | DIASTOLIC BLOOD PRESSURE: 74 MMHG | TEMPERATURE: 98.2 F | HEART RATE: 75 BPM | WEIGHT: 185.75 LBS | HEIGHT: 66 IN | BODY MASS INDEX: 29.85 KG/M2 | OXYGEN SATURATION: 95 %

## 2017-07-21 DIAGNOSIS — R31.9 HEMATURIA: Primary | ICD-10-CM

## 2017-07-21 DIAGNOSIS — R53.83 OTHER FATIGUE: Primary | ICD-10-CM

## 2017-07-21 LAB
ALBUMIN UR-MCNC: NEGATIVE MG/DL
APPEARANCE UR: ABNORMAL
BILIRUB UR QL STRIP: NEGATIVE
COLOR UR AUTO: YELLOW
GLUCOSE UR STRIP-MCNC: NEGATIVE MG/DL
HGB UR QL STRIP: NEGATIVE
KETONES UR STRIP-MCNC: NEGATIVE MG/DL
LEUKOCYTE ESTERASE UR QL STRIP: ABNORMAL
NITRATE UR QL: NEGATIVE
PH UR STRIP: 5 PH (ref 5–7)
RBC #/AREA URNS AUTO: 2 /HPF (ref 0–2)
SP GR UR STRIP: 1.02 (ref 1–1.03)
SQUAMOUS #/AREA URNS AUTO: 4 /HPF (ref 0–1)
URN SPEC COLLECT METH UR: ABNORMAL
UROBILINOGEN UR STRIP-MCNC: 0 MG/DL (ref 0–2)
WBC #/AREA URNS AUTO: 4 /HPF (ref 0–2)

## 2017-07-21 ASSESSMENT — PAIN SCALES - GENERAL
PAINLEVEL: NO PAIN (0)

## 2017-07-21 NOTE — PATIENT INSTRUCTIONS
Please follow up in 6 months with a  CT Urogram     It was a pleasure meeting with you today.  Thank you for allowing me and my team the privilege of caring for you today.  YOU are the reason we are here, and I truly hope we provided you with the excellent service you deserve.  Please let us know if there is anything else we can do for you so that we can be sure you are leaving completely satisfied with your care experience.

## 2017-07-21 NOTE — LETTER
[facility]  [address]  [facility] [address]  Phone: [phone]  Fax: [phone]       Date: July 27, 2017      Jeanna Caro  [address]  [place] [place] [facility] [address]      Ge Meeks are your recent lab results.       Good news in that the urine test was negative for cancer cells. Follow up with your urologist, as discussed at your last clinic visit.     Sincerely,     Sesar Mcadams MD   Internal Medicine/Pediatrics     Resulted Orders   Cytology non gyn   Result Value Ref Range    Copath Report       Patient Name: JEANNA CARO  MR#: [id number]  Specimen #: [id number]  Collected: 7/21/2017  Received: 7/21/2017  Reported: 7/25/2017 11:55  Ordering Phy(s): JEANA DE LEON  Additional Phy(s): SESAR MCADAMS    For improved result formatting, select 'View Enhanced Report Format'  under Linked Documents section.    SPECIMEN/STAIN PROCESS:  Urine-voided       Pap-Cyto x 1    ----------------------------------------------------------------    CYTOLOGIC INTERPRETATION:    Voided urine:   Negative for high grade urothelial carcinoma  Specimen Adequacy: Satisfactory for evaluation.    I have personally reviewed all specimens and/or slides, including the  listed special stains, and used them with my medical judgment to  determine the final diagnosis.    Electronically signed out by:  Christo Chacon M.D., Physicians    Processed and screened at [facility],  [facility]    CLINICAL HISTORY:  51 year old woman with microscopic hem aturia.    ,    GROSS:  Urine-voided:  Received 15 ml of yellow, clear fluid, processed as 1 Pap  stained Autocyte..    MICROSCOPIC:  Microscopic examination is performed.    Lou Lao MD, PGY3, and Christo Chacon MD    CPT Codes:  A: 02566-[id number]    TESTING LAB LOCATION:  [facility]  [facility], [address]   [address]  [phone]    COLLECTION SITE:  Client:  [facility]  Location:  [facility] ([facility])

## 2017-07-21 NOTE — NURSING NOTE
Invasive Procedure Safety Checklist:    Procedure:cysto      Action: Complete sections and checkboxes as appropriate.    Pre-procedure:  1. Patient ID Verified with 2 identifiers (Anita and  or MRN) : YES    2. Procedure and site verified with patient/designee (when able) : YES    3. Accurate consent documentation in medical record : YES    4. H&P (or appropriate assessment) documented in medical record : NO  H&P must be up to 30 days prior to procedure an updated within 24 hours of                 Procedure as applicable.     5. Relevant diagnostic and radiology test results appropriately labeled and displayed as applicable : YES    6. Blood products, implants, devices, and/or special equipment available for the procedure as applicable : YES    7. Procedure site(s) marked with provider initials [Exclusions: none] : NO    8. Marking not required. Reason : Yes  Procedure does not require site marking    Time Out:     Time-Out performed immediately prior to starting procedure, including verbal and active participation of all team members addressing: YES    1. Correct patient identity.  2. Confirmed that the correct side and site are marked.  3. An accurate procedure to be done.  4. Agreement on the procedure to be done.  5. Correct patient position.  6. Relevant images and results are properly labeled and appropriately displayed.  7. The need to administer antibiotics or fluids for irrigation purposes during the procedure as applicable.  8. Safety precautions based on patient history or medication use.    During Procedure: Verification of correct person, site, and procedure occurs any time the responsibility for care of the patient is transferred to another member of the care team.

## 2017-07-21 NOTE — MR AVS SNAPSHOT
After Visit Summary   7/21/2017    Jeanna Steel    MRN: [id number]           Patient Information     Date Of Birth          1965        Visit Information        Provider Department      7/21/2017 3:40 PM Caitlyn Mcadams MD [facility]        Today's Diagnoses     Other fatigue    -  1       Follow-ups after your visit        Your next 10 appointments already scheduled     Jan 26, 2018  9:00 AM CST   (Arrive by 8:45 AM)   Return Visit with Kelly Main MD   [facility] and [facility] ([facility])    [address] [place] [address] [phone]              Future tests that were ordered for you today     Open Future Orders        Priority Expected Expires Ordered    CT Urogram Routine  7/21/2018 7/21/2017            Who to contact     Please call your clinic at [phone] to:    Ask questions about your health    Make or cancel appointments    Discuss your medicines    Learn about your test results    Speak to your doctor   If you have compliments or concerns about an experience at your clinic, or if you wish to file a complaint, please contact [facility] Patient Relations at [phone] or email us at [email or web address]         Additional Information About Your Visit        MyChart Information     gamigo is an electronic gateway that provides easy, online access to your medical records. With gamigo, you can request a clinic appointment, read your test results, renew a prescription or communicate with your care team.     To sign up for Guzut visit the website at [email or web address]   You will be asked to enter the access code listed below, as well as some personal information. Please follow the directions to create your username and password.     Your access code is: [email or web address]  Expires: 7/24/2017  8:03 AM     Your access  "code will  in 90 days. If you need help or a new code, please contact your [facility] or call [phone] for assistance.        Care EveryWhere ID     This is your Care EveryWhere ID. This could be used by other organizations to access your [facility] medical records  [id number]        Your Vitals Were     Pulse Temperature Height Last Period Pulse Oximetry BMI (Body Mass Index)    75 98.2  F (36.8  C) (Oral) 5' 6.14\" (168 cm) 2017 95% 29.85 kg/m2       Blood Pressure from Last 3 Encounters:   17 124/74   17 123/76   17 124/83    Weight from Last 3 Encounters:   17 185 lb 12 oz (84.3 kg)   17 186 lb 1.6 oz (84.4 kg)   17 184 lb (83.5 kg)              We Performed the Following     Antinuclear antibody screen by EIA     Comprehensive metabolic panel     CRP inflammation     Ferritin     Iron and iron binding capacity     Vitamin B12     Vitamin D Deficiency        Primary Care Provider Office Phone # Fax #    Caitlyn Mcadams [phone]       [place] [address]        Equal Access to Services     LAKSHMI MAST : Hadii saritha miranda hadasho Soomaali, waaxda luqadaha, qaybta kaalmada adeegyada, willow hardin. So [facility] [phone].    ATENCIÓN: Si habla español, tiene a hill disposición servicios gratuitos de asistencia lingüística. Llame al [phone].    We comply with applicable federal civil rights laws and [place] laws. We do not discriminate on the basis of race, color, national origin, age, disability sex, sexual orientation or gender identity.            Thank you!     Thank you for choosing [facility]  for your care. Our goal is always to provide you with excellent care. Hearing back from our patients is one way we can continue to improve our services. Please take a few minutes to complete the written survey that you may receive in the mail " after your visit with us. Thank you!             Your Updated Medication List - Protect others around you: Learn how to safely use, store and throw away your medicines at www.disposemymeds.org.          This list is accurate as of: 7/21/17  4:37 PM.  Always use your most recent med list.                   Brand Name Dispense Instructions for use Diagnosis    LORazepam 0.5 MG tablet    ATIVAN    30 tablet    Take 1-2 tabs by mouth as needed for anxiety -    Major depressive disorder, recurrent episode, moderate (H)       sertraline 50 MG tablet    ZOLOFT    75 tablet    Take 2.5 tablets po daily.    Major depressive disorder, recurrent episode, moderate (H)

## 2017-07-21 NOTE — LETTER
[facility]  [address]  [facility] [address]  Phone: [phone]  Fax: [phone]       Date: July 25, 2017      Jeanna Steel  [address]  [facility] [address]      Ge Meeks are results of your recent labs.     Your vitamin D level is very low. Goal is a number >40.  I'd recommend you take 6432-7854 IU of Vitamin D daily for 2 months, then drop down to 1000 IU daily after that. I would recheck your level in 3-4 months.     Your ARLENE (test for connective tissue diseases) is negative. B12 is at the low end of normal so I'd recommend you take 1000mcg of B12 daily.     Your glucose, kidney and liver tests are normal.     Lets follow up in the next 6 wks or so to see how you are doing.     Sincerely,     Caitlyn Mcadams MD   Internal Medicine/Pediatrics     Resulted Orders   Vitamin B12   Result Value Ref Range    Vitamin B12 288 193 - 986 pg/mL   Vitamin D Deficiency   Result Value Ref Range    Vitamin D Deficiency screening 17 (L) 20 - 75 ug/L      Comment:      Season, race, dietary intake, and treatment affect the concentration of   25-hydroxy-Vitamin D. Values may decrease during winter months and increase   during summer months. Values 20-29 ug/L may indicate Vitamin D insufficiency   and values <20 ug/L may indicate Vitamin D deficiency.   Vitamin D determination is routinely performed by an immunoassay specific for   25 hydroxyvitamin D3.  If an individual is on vitamin D2 (ergocalciferol)   supplementation, please specify 25 OH vitamin D2 and D3 level determination   by   LCMSMS test VITD23.     Iron and iron binding capacity   Result Value Ref Range    Iron 85 35 - 180 ug/dL    Iron Binding Cap 431 (H) 240 - 430 ug/dL    Iron Saturation Index 20 15 - 46 %   Ferritin   Result Value Ref Range    Ferritin 14 8 - 252 ng/mL   CRP inflammation   Result Value Ref Range    CRP Inflammation  <2.9 0.0 - 8.0 mg/L   Antinuclear antibody screen by EIA   Result Value Ref Range    ARLENE Screen by EIA <1.0  Interpretation:  Negative   <1.0   Comprehensive metabolic panel   Result Value Ref Range    Sodium 138 133 - 144 mmol/L    Potassium 4.3 3.4 - 5.3 mmol/L    Chloride 107 94 - 109 mmol/L    Carbon Dioxide 27 20 - 32 mmol/L    Anion Gap 4 3 - 14 mmol/L    Glucose 92 70 - 99 mg/dL    Urea Nitrogen 16 7 - 30 mg/dL    Creatinine 0.62 0.52 - 1.04 mg/dL    GFR Estimate >90  Non  GFR Calc   >60 mL/min/1.7m2    GFR Estimate If Black >90   GFR Calc   >60 mL/min/1.7m2    Calcium 9.2 8.5 - 10.1 mg/dL    Bilirubin Total 0.2 0.2 - 1.3 mg/dL    Albumin 3.9 3.4 - 5.0 g/dL    Protein Total 7.7 6.8 - 8.8 g/dL    Alkaline Phosphatase 65 40 - 150 U/L    ALT 27 0 - 50 U/L    AST 16 0 - 45 U/L

## 2017-07-21 NOTE — LETTER
7/21/2017       RE: Jeanna Steel  [address]  [facility] [address]     Dear Colleague,    Thank you for referring your patient, Jeanna Steel, to the [facility] UROLOGY AND INST FOR PROSTATE AND UROLOGIC CANCERS at [facility]. Please see a copy of my visit note below.    SUBJECTIVE:                                                    Jeanna Steel is a 51 year old female who presents to clinic today for the following health issues: Microscopic Hematuria    Ms. Steel present to clinic today for microscopic hematuria. She presented to the ED on 7/8 for fatigue and a UA at that time was significant for trace leukocyte esterase and 3 RBC. No urine culture was performed at that time. On follow up with her PCP on 7/12, repeat UA was significant for 5 RBC. Urine culture at that time was negative. At that time, she had intermittent RLQ abdominal pain and underwent CT non-contrast which showed no hydronephrosis or stones. She presents today for further work up for her hematuria.    Today, she reports feeling well. Her fatigue symptoms have resolved along with her RLQ pain. She has no previous microscopic hematuria and never had an episode of gross hematuria. She reports 1-2 UTIs in her lifetime, none within the last 10 years. She does not endorse dysuria, flank pain, suprapubic pain, increased urinary urgency or frequency. She reports some mild, intermittent leakage that has been gradual in onset since her late 40s but does not interfere with her daily life or require pad use. She denies nocturia. She denies a history of kidney stones. She denies any  trauma but does report a previous D&C.    She denies any family history of  malignancy.    She is an intermittent smoker and goes through around 1 pack per month and has been smoking that much for the last 35 years. She endorses no new sexual partners and has been  to her  for the  "last 10 years.    ROS:  CONSTITUTIONAL:POSITIVE for fatigue and dizziness following meals  INTEGUMENTARY/SKIN: NEGATIVE for new rash  EYES: NEGATIVE for vision changes or irritation  ENT/MOUTH: NEGATIVE for ear, mouth and throat problems  RESP:NEGATIVE for significant cough or SOB  CV: chest pain/pressure following meals  GI: NEGATIVE for nausea, abdominal pain, heartburn, or change in bowel habits  : as above  MUSCULOSKELETAL: NEGATIVE for significant arthralgias or myalgia  PSYCHIATRIC: HX anxiety and HX depression    Past Medical History:   Diagnosis Date     Anxiety      Depressive disorder      Infertility, female      Past Surgical History:   Procedure Laterality Date     BACK SURGERY  2006     ECTOPIC PREGNANCY SURGERY  2003     EYE SURGERY  1969     Family History   Problem Relation Age of Onset     Alcohol/Drug Brother      Breast Cancer Mother      Breast Cancer Maternal Grandmother 92     Breast Cancer Paternal Aunt 65     CANCER Paternal Aunt      Endometrial cancer     DIABETES Father      DIABETES Paternal Grandfather      Schizophrenia Brother      suicide in 1992     Other - See Comments Brother      Multiple Sclerosis     Social History   Substance Use Topics     Smoking status: Former Smoker     Types: Cigarettes     Smokeless tobacco: Never Used     Alcohol use 7.0 oz/week     14 drink(s) per week     OBJECTIVE:                                                    /76  Pulse 74  Ht 1.676 m (5' 6\")  Wt 84.4 kg (186 lb 1.6 oz)  LMP 07/01/2017  BMI 30.04 kg/m2  Body mass index is 30.04 kg/(m^2).    EXAM:    GENERAL APPEARANCE: healthy, alert and no distress  RESP: lungs clear to auscultation - no rales, rhonchi or wheezes  CV: regular rates and rhythm, normal S1 S2, no S3 or S4 and no murmur, click or rub  Abdomen: Abdomen soft, non-tender.  BS normal.  No masses, organomegaly  CVAT: absent  : Deferred  SKIN: no suspicious lesions or rashes    U/A:  7/21/2017  2 RBC, 4 WBC  Small leuk " negative nitrites    7/12/2017  5 RBC, 2 WBC  Negative Leuk/nitrites    Diagnostic test results: I personally reviewed:  CT A/P 7/13/2017 without Contrast  IMPRESSION: No renal stones or hydronephrosis bilaterally. Partially  distended urinary bladder.    CYSTO FEMALE    Discussed with patient the alternatives, risks, and procedure. Questions were answered. Informed consent was obtained for cystoscopy.    July 21, 2017 CYSTOSCOPY:  The patient was brought to the procedures room where she was placed in the supine position.  She was prepped and draped in a sterile fashion.  A #16-French flexible cystoscope was introduced into the urinary bladder.  The anterior urethra, membranous urethra, and bladder neck were negative.   Within the urinary bladder, there was no evidence of stones, tumors, or growths.  The ureteral orifices were well visualized bilaterally and found to have clear efflux of urine. The patient had no trabeculation.  On retroflex view, no lesions were seen.                 ASSESSMENT/PLAN:                                                    No diagnosis found.     Jeanna Steel is a 51 year old woman presenting with microscopic hematuria. Current work up including CT A/P, repeat UA and urine culture were not significant for etiology of hematuria. Work up to be completed today.    Cystoscopy today-no abnormalities seen  Urine Cytology    Follow up in 6 months with CT Urogram        Francis Lynne, MS4    Scribe Disclosure:   I, Francis Lynne, am serving as a scribe; to document services personally performed by Dr. Main- -based on data collection and the provider's statements to me.     Provider Disclosure:  I agree with above History, Review of Systems, Physical exam and Plan.  I have reviewed the content of the documentation and have edited it as needed. I have personally performed the services documented here and the documentation accurately represents those services and the decisions I have  made.      Electronically signed by:    Kelly Main MD  [facility] UROLOGY AND [facility] FOR PROSTATE AND UROLOGIC CANCERS    I spent over 30 minutes with the patient.  Over half this time was spent on counseling for hematuria, this was separate from time spent performing cystoscopy.      Again, thank you for allowing me to participate in the care of your patient.      Sincerely,    Kelly Main MD

## 2017-07-21 NOTE — NURSING NOTE
"51 year old  Chief Complaint   Patient presents with     Fatigue     seems to happen about 30 mins after eating.      Physical       Blood pressure 124/74, pulse 75, temperature 98.2  F (36.8  C), temperature source Oral, height 5' 6.14\" (168 cm), weight 185 lb 12 oz (84.3 kg), last menstrual period 07/01/2017, SpO2 95 %, not currently breastfeeding. Body mass index is 29.85 kg/(m^2).  Patient Active Problem List   Diagnosis     Major depressive disorder, recurrent episode, moderate (H)     Generalized anxiety disorder     Vitamin D deficiency     Microscopic hematuria       Wt Readings from Last 2 Encounters:   07/21/17 185 lb 12 oz (84.3 kg)   07/21/17 186 lb 1.6 oz (84.4 kg)     BP Readings from Last 3 Encounters:   07/21/17 124/74   07/21/17 123/76   07/12/17 124/83         Current Outpatient Prescriptions   Medication     LORazepam (ATIVAN) 0.5 MG tablet     sertraline (ZOLOFT) 50 MG tablet     No current facility-administered medications for this visit.        Social History   Substance Use Topics     Smoking status: Former Smoker     Types: Cigarettes     Smokeless tobacco: Never Used     Alcohol use 7.0 oz/week     14 drink(s) per week       Health Maintenance Due   Topic Date Due     DEPRESSION ACTION PLAN Q1 YR  08/13/1982     COLON CANCER SCREEN (SYSTEM ASSIGNED)  08/13/2014       Lab Results   Component Value Date    PAP NIL 05/02/2016 July 21, 2017 3:49 PM    "

## 2017-07-21 NOTE — MR AVS SNAPSHOT
After Visit Summary   7/21/2017    Jeanna Steel    MRN: [id number]           Patient Information     Date Of Birth          1965        Visit Information        Provider Department      7/21/2017 8:00 AM Kelly Main MD  [facility] and [facility] for Prostate and Urologic Cancers        Today's Diagnoses     Hematuria    -  1      Care Instructions    Please follow up in 6 months with a  CT Urogram     It was a pleasure meeting with you today.  Thank you for allowing me and my team the privilege of caring for you today.  YOU are the reason we are here, and I truly hope we provided you with the excellent service you deserve.  Please let us know if there is anything else we can do for you so that we can be sure you are leaving completely satisfied with your care experience.                Follow-ups after your visit        Your next 10 appointments already scheduled     Jul 21, 2017  3:40 PM CDT   Return Visit with Caitlyn Mcadams MD   [facility] ([facility])    [address], [address]  [facility] [address]   [phone]            Jan 26, 2018  9:00 AM CST   (Arrive by 8:45 AM)   Return Visit with Kelly Main MD   [facility] Urology and [facility] for Prostate and Urologic Cancers ([facility])    [address] [place] [address] [phone]              Future tests that were ordered for you today     Open Future Orders        Priority Expected Expires Ordered    CT Urogram Routine  7/21/2018 7/21/2017            Who to contact     Please call your clinic at [phone] to:    Ask questions about your health    Make or cancel appointments    Discuss your medicines    Learn about your test results    Speak to your doctor   If you have compliments or concerns about an experience at your clinic, or if you wish to file a complaint, please contact [facility]  "[place] [facility] Patient Relations at [phone] or email us at [email or web address]         Additional Information About Your Visit        Intralignhart Information     [employer] is an electronic gateway that provides easy, online access to your medical records. With [employer]ule Game, you can request a clinic appointment, read your test results, renew a prescription or communicate with your care team.     To sign up for [employer] Game visit the website at [email or web address]   You will be asked to enter the access code listed below, as well as some personal information. Please follow the directions to create your username and password.     Your access code is: [email or web address]  Expires: 2017  8:03 AM     Your access code will  in 90 days. If you need help or a new code, please contact your [facility] or call [phone] for assistance.        Care EveryWhere ID     This is your Care EveryWhere ID. This could be used by other organizations to access your [place] medical records  [id number]        Your Vitals Were     Pulse Height Last Period BMI (Body Mass Index)          74 1.676 m (5' 6\") 2017 30.04 kg/m2         Blood Pressure from Last 3 Encounters:   17 123/76   17 124/83   17 129/79    Weight from Last 3 Encounters:   17 84.4 kg (186 lb 1.6 oz)   17 83.5 kg (184 lb)   17 83.6 kg (184 lb 4.9 oz)              We Performed the Following     Cytology non gyn     Routine UA with microscopic - No culture     Urine Culture Aerobic Bacterial        Primary Care Provider Office Phone # Fax #    Caitlyn Mcadams -[phone]       [address] [address]        Equal Access to Services     LAKSHMI MAST : Cheyenne Gusman, lenora montoya, willow andres. [facility] [phone].    ATENCIÓN: Si isabella smith " a hill disposición servicios gratuitos de asistencia lingüística. Laura drummond [phone].    We comply with applicable federal civil rights laws and [place] laws. We do not discriminate on the basis of race, color, national origin, age, disability sex, sexual orientation or gender identity.            Thank you!     Thank you for choosing [facility] UROLOGY AND [facility] FOR PROSTATE AND UROLOGIC CANCERS  for your care. Our goal is always to provide you with excellent care. Hearing back from our patients is one way we can continue to improve our services. Please take a few minutes to complete the written survey that you may receive in the mail after your visit with us. Thank you!             Your Updated Medication List - Protect others around you: Learn how to safely use, store and throw away your medicines at www.disposemymeds.org.          This list is accurate as of: 7/21/17  9:28 AM.  Always use your most recent med list.                   Brand Name Dispense Instructions for use Diagnosis    LORazepam 0.5 MG tablet    ATIVAN    30 tablet    Take 1-2 tabs by mouth as needed for anxiety -    Major depressive disorder, recurrent episode, moderate (H)       sertraline 50 MG tablet    ZOLOFT    75 tablet    Take 2.5 tablets po daily.    Major depressive disorder, recurrent episode, moderate (H)

## 2017-07-21 NOTE — PROGRESS NOTES
"Jeanna Steel is a 51 year old female here for the following issues:    Fatigue  Jeanna is a 50 yo woman, here for follow up of fatigue. I last saw her on 7/12, when she described a 5 day hx of intermittent hypoglycemia, then after eating profound fatigue, to the point of needing to lie down. She was in the ER for her symptoms at one point but nothing appeared out of line on her labs. She reports weakness in middle of her chest after eating then feeling run down. She also feels it going down her arms associated with some shortness of breath.symptoms are a somewhat less severe since last time but still happening.     She was on vacation last week and awoke from sleep feeling completely refreshed but had to nap after eating. .   Having more loose stools after eating, almost explosive and watery after first meal of the day. She has not lost weight.     Menses heavy x 1-2 days  Patient's last menstrual period was 07/01/2017.  Monthly cycle    Mental health  She reports she is doing OK. She takes Zoloft 125mg daily.     Patient Active Problem List   Diagnosis     Major depressive disorder, recurrent episode, moderate (H)     Generalized anxiety disorder     Vitamin D deficiency     Microscopic hematuria       Current Outpatient Prescriptions   Medication Sig Dispense Refill     LORazepam (ATIVAN) 0.5 MG tablet Take 1-2 tabs by mouth as needed for anxiety - 30 tablet 0     sertraline (ZOLOFT) 50 MG tablet Take 2.5 tablets po daily. 75 tablet 11       No Known Allergies     EXAM  /74 (BP Location: Right arm, Patient Position: Sitting, Cuff Size: Adult Regular)  Pulse 75  Temp 98.2  F (36.8  C) (Oral)  Ht 5' 6.14\" (168 cm)  Wt 185 lb 12 oz (84.3 kg)  LMP 07/01/2017  SpO2 95%  BMI 29.85 kg/m2  Gen: Alert, pleasant, NAD  HEENT:  Conjunctiva nl, TM normal bilaterally, OP clear, no posterior erythema  COR: S1,S2, no murmur  Lungs: CTA bilaterally, no rhonchi, wheezes or rales  Abdomen: Soft, non tender, " normal bowel sounds, no HSM or mass  Ext: no peripheral edema, pulses full      Assessment:  (R53.83) Other fatigue  (primary encounter diagnosis)  Comment: Jeanna describes postprandial fatigue, out of proportion to normal symptoms, etiology is unclear  Plan: Vitamin B12, Vitamin D Deficiency, Iron and         iron binding capacity, Ferritin, CRP         inflammation, Antinuclear antibody screen by         EIA, Comprehensive metabolic panel, CANCELED:         Comprehensive Metabolic Panel ([place])  Observe for any changes over the next 2-4 wks, consider abdominal CT vs gastric emptying study for this patient.             Caitlyn Mcadams MD  Internal Medicine/Pediatrics

## 2017-07-22 LAB
ALBUMIN SERPL-MCNC: 3.9 G/DL (ref 3.4–5)
ALP SERPL-CCNC: 65 U/L (ref 40–150)
ALT SERPL W P-5'-P-CCNC: 27 U/L (ref 0–50)
ANION GAP SERPL CALCULATED.3IONS-SCNC: 4 MMOL/L (ref 3–14)
AST SERPL W P-5'-P-CCNC: 16 U/L (ref 0–45)
BACTERIA SPEC CULT: NO GROWTH
BILIRUB SERPL-MCNC: 0.2 MG/DL (ref 0.2–1.3)
BUN SERPL-MCNC: 16 MG/DL (ref 7–30)
CALCIUM SERPL-MCNC: 9.2 MG/DL (ref 8.5–10.1)
CHLORIDE SERPL-SCNC: 107 MMOL/L (ref 94–109)
CO2 SERPL-SCNC: 27 MMOL/L (ref 20–32)
CREAT SERPL-MCNC: 0.62 MG/DL (ref 0.52–1.04)
CRP SERPL-MCNC: <2.9 MG/L (ref 0–8)
FERRITIN SERPL-MCNC: 14 NG/ML (ref 8–252)
GFR SERPL CREATININE-BSD FRML MDRD: NORMAL ML/MIN/1.7M2
GLUCOSE SERPL-MCNC: 92 MG/DL (ref 70–99)
IRON SATN MFR SERPL: 20 % (ref 15–46)
IRON SERPL-MCNC: 85 UG/DL (ref 35–180)
Lab: NORMAL
MICRO REPORT STATUS: NORMAL
POTASSIUM SERPL-SCNC: 4.3 MMOL/L (ref 3.4–5.3)
PROT SERPL-MCNC: 7.7 G/DL (ref 6.8–8.8)
SODIUM SERPL-SCNC: 138 MMOL/L (ref 133–144)
SPECIMEN SOURCE: NORMAL
TIBC SERPL-MCNC: 431 UG/DL (ref 240–430)
VIT B12 SERPL-MCNC: 288 PG/ML (ref 193–986)

## 2017-07-24 LAB
ANA SER QL IA: NORMAL
DEPRECATED CALCIDIOL+CALCIFEROL SERPL-MC: 17 UG/L (ref 20–75)

## 2017-07-25 LAB — COPATH REPORT: NORMAL

## 2018-01-16 ENCOUNTER — PRE VISIT (OUTPATIENT)
Dept: UROLOGY | Facility: CLINIC | Age: 53
End: 2018-01-16

## 2018-01-26 ENCOUNTER — RADIANT APPOINTMENT (OUTPATIENT)
Dept: CT IMAGING | Facility: CLINIC | Age: 53
End: 2018-01-26
Attending: UROLOGY
Payer: COMMERCIAL

## 2018-01-26 ENCOUNTER — OFFICE VISIT (OUTPATIENT)
Dept: UROLOGY | Facility: CLINIC | Age: 53
End: 2018-01-26
Payer: COMMERCIAL

## 2018-01-26 VITALS
WEIGHT: 184 LBS | SYSTOLIC BLOOD PRESSURE: 126 MMHG | BODY MASS INDEX: 29.57 KG/M2 | HEART RATE: 68 BPM | HEIGHT: 66 IN | DIASTOLIC BLOOD PRESSURE: 75 MMHG

## 2018-01-26 DIAGNOSIS — R31.29 MICROSCOPIC HEMATURIA: Primary | ICD-10-CM

## 2018-01-26 DIAGNOSIS — R31.9 HEMATURIA: ICD-10-CM

## 2018-01-26 LAB
ALBUMIN UR-MCNC: NEGATIVE MG/DL
APPEARANCE UR: CLEAR
BILIRUB UR QL STRIP: NEGATIVE
COLOR UR AUTO: ABNORMAL
CREAT BLD-MCNC: 0.7 MG/DL (ref 0.52–1.04)
GFR SERPL CREATININE-BSD FRML MDRD: 88 ML/MIN/1.7M2
GLUCOSE UR STRIP-MCNC: NEGATIVE MG/DL
HGB UR QL STRIP: NEGATIVE
KETONES UR STRIP-MCNC: NEGATIVE MG/DL
LEUKOCYTE ESTERASE UR QL STRIP: ABNORMAL
MUCOUS THREADS #/AREA URNS LPF: PRESENT /LPF
NITRATE UR QL: NEGATIVE
PH UR STRIP: 6 PH (ref 5–7)
RBC #/AREA URNS AUTO: 2 /HPF (ref 0–2)
SOURCE: ABNORMAL
SP GR UR STRIP: 1.01 (ref 1–1.03)
SQUAMOUS #/AREA URNS AUTO: 6 /HPF (ref 0–1)
UROBILINOGEN UR STRIP-MCNC: 0 MG/DL (ref 0–2)
WBC #/AREA URNS AUTO: 5 /HPF (ref 0–2)

## 2018-01-26 RX ORDER — IOPAMIDOL 755 MG/ML
114 INJECTION, SOLUTION INTRAVASCULAR ONCE
Status: COMPLETED | OUTPATIENT
Start: 2018-01-26 | End: 2018-01-26

## 2018-01-26 RX ADMIN — IOPAMIDOL 114 ML: 755 INJECTION, SOLUTION INTRAVASCULAR at 08:57

## 2018-01-26 ASSESSMENT — PAIN SCALES - GENERAL: PAINLEVEL: NO PAIN (0)

## 2018-01-26 NOTE — PROGRESS NOTES
UROLOGIC DIAGNOSES:        CURRENT INTERVENTIONS:        History:      Ms. Steel present to clinic today for microscopic hematuria. She presented to the ED on 7/8 for fatigue and a UA at that time was significant for trace leukocyte esterase and 3 RBC. No urine culture was performed at that time. On follow up with her PCP on 7/12, repeat UA was significant for 5 RBC. Urine culture at that time was negative. At that time, she had intermittent RLQ abdominal pain and underwent CT non-contrast which showed no hydronephrosis or stones. She presents today for further work up for her hematuria.     Today, she reports feeling well. Her fatigue symptoms have resolved along with her RLQ pain. She has no previous microscopic hematuria and never had an episode of gross hematuria. She reports 1-2 UTIs in her lifetime, none within the last 10 years. She does not endorse dysuria, flank pain, suprapubic pain, increased urinary urgency or frequency. She reports some mild, intermittent leakage that has been gradual in onset since her late 40s but does not interfere with her daily life or require pad use. She denies nocturia. She denies a history of kidney stones. She denies any  trauma but does report a previous D&C.     She denies any family history of  malignancy.     She is an intermittent smoker and goes through around 1 pack per month and has been smoking that much for the last 35 years.      Patient had cystoscopy, non-contrast CT scan at last visit that were negative.   We completed the work up today with CT Urogram that preliminary.   We discussed previous results and results from today as well.     Noted that should she have further hematuria it may warrant repeat work up but at present her work up is negative.     Imaging:      Labs:      MEDICATIONS:    Current Outpatient Prescriptions:      LORazepam (ATIVAN) 0.5 MG tablet, Take 1-2 tabs by mouth as needed for anxiety -, Disp: 30 tablet, Rfl: 0     sertraline  "(ZOLOFT) 50 MG tablet, Take 2.5 tablets po daily., Disp: 75 tablet, Rfl: 11  No current facility-administered medications for this visit.     ALLERGIES:   No Known Allergies    REVIEW OF SYSTEMS: Ten point review of systems without change as outlined in HPI    SURGICAL HISTORY:    Past Surgical History:   Procedure Laterality Date     BACK SURGERY  2006     ECTOPIC PREGNANCY SURGERY  2003     EYE SURGERY  1969         PHYSICAL EXAM:    /75  Pulse 68  Ht 1.676 m (5' 6\")  Wt 83.5 kg (184 lb)  BMI 29.7 kg/m2    HEENT: Normocephalic and atraumatic    Cardiac: Not done    Back/Flank: Not done    CNS/PNS: Alert and oriented    Respiratory: Normal nonlabored breathing    Abdomen: Soft nontender and nondistended    Peripheral Vascular: No peripheral edema    Mental Status: Normal    External Genitalia: Not done    Bladder: Not done    Urethra: Not done     Vagina: Not done    Cystoscopy:  Not Done      Urinalysis:  UA RESULTS:  Recent Labs   Lab Test  07/21/17   0838   COLOR  Yellow   APPEARANCE  Slightly Cloudy   URINEGLC  Negative   URINEBILI  Negative   URINEKETONE  Negative   SG  1.019   UBLD  Negative   URINEPH  5.0   PROTEIN  Negative   NITRITE  Negative   LEUKEST  Small*   RBCU  2   WBCU  4*         IMPRESSION:    53 y/o F with history of microhematuria     PLAN:    CT urogram preliminary negative, will contact patient if pathology noted on final reading   Will send u/a, urine culture, urine cytology today   If all the above negative, may return PRN     Total Time:  15 minutes   Time in Consultation: greater than 50%    "

## 2018-01-26 NOTE — DISCHARGE INSTRUCTIONS

## 2018-01-26 NOTE — MR AVS SNAPSHOT
"              After Visit Summary   2018    Jeanna Steel    MRN: [id number]           Patient Information     Date Of Birth          1965        Visit Information        Provider Department      2018 9:00 AM Kelly Main MD [facility] Urology and [facility] for Prostate and Urologic Cancers        Today's Diagnoses     Microscopic hematuria    -  1       Follow-ups after your visit        Who to contact     Please call your clinic at [phone] to:    Ask questions about your health    Make or cancel appointments    Discuss your medicines    Learn about your test results    Speak to your doctor   If you have compliments or concerns about an experience at your clinic, or if you wish to file a complaint, please contact [facility] Patient Relations at [phone] or email us at [email or web address]         Additional Information About Your Visit        MyChart Information     [employer] is an electronic gateway that provides easy, online access to your medical records. With [employer], you can request a clinic appointment, read your test results, renew a prescription or communicate with your care team.     To sign up for [employer] visit the website at [email or web address]   You will be asked to enter the access code listed below, as well as some personal information. Please follow the directions to create your username and password.     Your access code is: [email or web address]  Expires: 2018  6:30 AM     Your access code will  in 90 days. If you need help or a new code, please contact your [facility] Clinic or call [phone] for assistance.        Care EveryWhere ID     This is your Care EveryWhere ID. This could be used by other organizations to access your [place] medical records  [id number]        Your Vitals Were     Pulse Height BMI (Body Mass Index)             68 1.676 m (5' 6\") 29.7 kg/m2          Blood " Pressure from Last 3 Encounters:   01/26/18 126/75   07/21/17 124/74   07/21/17 123/76    Weight from Last 3 Encounters:   01/26/18 83.5 kg (184 lb)   07/21/17 84.3 kg (185 lb 12 oz)   07/21/17 84.4 kg (186 lb 1.6 oz)              We Performed the Following     Cytology non gyn     Routine UA with microscopic - No culture     Urine Culture Aerobic Bacterial        Primary Care Provider Office Phone # Fax #    Caitlyn Adeline Mcadams [phone]       [address] [place] [address]        Equal Access to Services     [facility]: Hadii saritha Gusman, warah montoya, kiarra jacobson, willow ziegler . So [facility] [phone].    ATENCIÓN: Si habla español, tiene a hill disposición servicios gratuitos de asistencia lingüística. [facility] [phone].    We comply with applicable federal civil rights laws and [place] laws. We do not discriminate on the basis of race, color, national origin, age, disability, sex, sexual orientation, or gender identity.            Thank you!     Thank you for choosing [facility] UROLOGY AND [facility] FOR PROSTATE AND UROLOGIC CANCERS  for your care. Our goal is always to provide you with excellent care. Hearing back from our patients is one way we can continue to improve our services. Please take a few minutes to complete the written survey that you may receive in the mail after your visit with us. Thank you!             Your Updated Medication List - Protect others around you: Learn how to safely use, store and throw away your medicines at www.disposemymeds.org.          This list is accurate as of 1/26/18 10:08 AM.  Always use your most recent med list.                   Brand Name Dispense Instructions for use Diagnosis    LORazepam 0.5 MG tablet    ATIVAN    30 tablet    Take 1-2 tabs by mouth as needed for anxiety -    Major depressive disorder, recurrent episode, moderate (H)       sertraline 50 MG tablet    ZOLOFT     75 tablet    Take 2.5 tablets po daily.    Major depressive disorder, recurrent episode, moderate (H)

## 2018-01-26 NOTE — LETTER
1/26/2018       RE: Jeanna Steel  [address]  [place] [address]     Dear Colleague,    Thank you for referring your patient, Jeanna Steel, to the [facility] UROLOGY AND INST FOR PROSTATE AND UROLOGIC CANCERS at [facility] Please see a copy of my visit note below.    UROLOGIC DIAGNOSES:        CURRENT INTERVENTIONS:        History:      Ms. Steel present to clinic today for microscopic hematuria. She presented to the ED on 7/8 for fatigue and a UA at that time was significant for trace leukocyte esterase and 3 RBC. No urine culture was performed at that time. On follow up with her PCP on 7/12, repeat UA was significant for 5 RBC. Urine culture at that time was negative. At that time, she had intermittent RLQ abdominal pain and underwent CT non-contrast which showed no hydronephrosis or stones. She presents today for further work up for her hematuria.     Today, she reports feeling well. Her fatigue symptoms have resolved along with her RLQ pain. She has no previous microscopic hematuria and never had an episode of gross hematuria. She reports 1-2 UTIs in her lifetime, none within the last 10 years. She does not endorse dysuria, flank pain, suprapubic pain, increased urinary urgency or frequency. She reports some mild, intermittent leakage that has been gradual in onset since her late 40s but does not interfere with her daily life or require pad use. She denies nocturia. She denies a history of kidney stones. She denies any  trauma but does report a previous D&C.     She denies any family history of  malignancy.     She is an intermittent smoker and goes through around 1 pack per month and has been smoking that much for the last 35 years.      Patient had cystoscopy, non-contrast CT scan at last visit that were negative.   We completed the work up today with CT Urogram that preliminary.   We discussed previous results and results from  "today as well.     Noted that should she have further hematuria it may warrant repeat work up but at present her work up is negative.     Imaging:      Labs:      MEDICATIONS:    Current Outpatient Prescriptions:      LORazepam (ATIVAN) 0.5 MG tablet, Take 1-2 tabs by mouth as needed for anxiety -, Disp: 30 tablet, Rfl: 0     sertraline (ZOLOFT) 50 MG tablet, Take 2.5 tablets po daily., Disp: 75 tablet, Rfl: 11  No current facility-administered medications for this visit.     ALLERGIES:   No Known Allergies    REVIEW OF SYSTEMS: Ten point review of systems without change as outlined in HPI    SURGICAL HISTORY:    Past Surgical History:   Procedure Laterality Date     BACK SURGERY  2006     ECTOPIC PREGNANCY SURGERY  2003     EYE SURGERY  1969         PHYSICAL EXAM:    /75  Pulse 68  Ht 1.676 m (5' 6\")  Wt 83.5 kg (184 lb)  BMI 29.7 kg/m2    HEENT: Normocephalic and atraumatic    Cardiac: Not done    Back/Flank: Not done    CNS/PNS: Alert and oriented    Respiratory: Normal nonlabored breathing    Abdomen: Soft nontender and nondistended    Peripheral Vascular: No peripheral edema    Mental Status: Normal    External Genitalia: Not done    Bladder: Not done    Urethra: Not done     Vagina: Not done    Cystoscopy:  Not Done      Urinalysis:  UA RESULTS:  Recent Labs   Lab Test  07/21/17   0838   COLOR  Yellow   APPEARANCE  Slightly Cloudy   URINEGLC  Negative   URINEBILI  Negative   URINEKETONE  Negative   SG  1.019   UBLD  Negative   URINEPH  5.0   PROTEIN  Negative   NITRITE  Negative   LEUKEST  Small*   RBCU  2   WBCU  4*         IMPRESSION:    51 y/o F with history of microhematuria     PLAN:    CT urogram preliminary negative, will contact patient if pathology noted on final reading   Will send u/a, urine culture, urine cytology today   If all the above negative, may return PRN     Total Time:  15 minutes   Time in Consultation: greater than 50%      Again, thank you for allowing me to participate in " the care of your patient.      Sincerely,    Kelly Main MD

## 2018-01-27 LAB
BACTERIA SPEC CULT: NORMAL
Lab: NORMAL
RADIOLOGIST FLAGS: NORMAL
SPECIMEN SOURCE: NORMAL

## 2018-01-29 LAB — COPATH REPORT: NORMAL

## 2018-02-09 DIAGNOSIS — F33.1 MAJOR DEPRESSIVE DISORDER, RECURRENT EPISODE, MODERATE (H): ICD-10-CM

## 2018-02-09 NOTE — TELEPHONE ENCOUNTER
Last seen 7/21/17,  Last filled for # 30 on 7/14/17    To MD to lillie Mcgregor RN  February 9, 2018 2:02 PM

## 2018-02-10 RX ORDER — LORAZEPAM 0.5 MG/1
TABLET ORAL
Qty: 30 TABLET | Refills: 0
Start: 2018-02-10 | End: 2018-04-02

## 2018-04-02 DIAGNOSIS — F33.1 MAJOR DEPRESSIVE DISORDER, RECURRENT EPISODE, MODERATE (H): ICD-10-CM

## 2018-04-02 RX ORDER — SERTRALINE HYDROCHLORIDE 25 MG/1
25 TABLET, FILM COATED ORAL DAILY
Qty: 30 TABLET | Refills: 1 | Status: SHIPPED | OUTPATIENT
Start: 2018-04-02 | End: 2018-07-05

## 2018-04-02 RX ORDER — SERTRALINE HYDROCHLORIDE 100 MG/1
100 TABLET, FILM COATED ORAL DAILY
Qty: 30 TABLET | Refills: 1 | Status: SHIPPED | OUTPATIENT
Start: 2018-04-02 | End: 2018-07-05

## 2018-04-02 RX ORDER — LORAZEPAM 0.5 MG/1
TABLET ORAL
Qty: 30 TABLET | Refills: 0
Start: 2018-04-02 | End: 2018-05-17

## 2018-04-02 NOTE — TELEPHONE ENCOUNTER
Last visit 7/12/17, no future visit    Medication is being filled for 1 time refill only due to:  Patient needs to be seen because needs updated PHQ9. Had been on sertraline 50 mg - take 2 and 1/2 tabs daily - insurance will not pay for more than 2 of the 50 mg tabs daily. Switching to 100 mg tab and 25 mg tab daily per Dr Mcadams's suggestion.   Regine Hemphill RN  Care Coordinator  [facility]

## 2018-04-23 ENCOUNTER — TELEPHONE (OUTPATIENT)
Dept: OTHER | Facility: CLINIC | Age: 53
End: 2018-04-23

## 2018-04-23 NOTE — TELEPHONE ENCOUNTER
4/23/2018    Call Regarding Onboarding [employer] Choices    Attempt 1    Message left with male    Comments: no dep      Outreach   SV

## 2018-05-17 DIAGNOSIS — F33.1 MAJOR DEPRESSIVE DISORDER, RECURRENT EPISODE, MODERATE (H): ICD-10-CM

## 2018-05-17 RX ORDER — LORAZEPAM 0.5 MG/1
TABLET ORAL
Qty: 30 TABLET | Refills: 0 | Status: SHIPPED | OUTPATIENT
Start: 2018-05-17 | End: 2018-07-20

## 2018-05-18 NOTE — TELEPHONE ENCOUNTER
I called this medication into [facility] on [address] [place], [place]  Approved per Dr. Mcadams.  Jayna BAIG  [facility]  May 18, 2018 9:49 AM

## 2018-06-12 NOTE — TELEPHONE ENCOUNTER
6/12/2018    Call Regarding Onboarding are Choices    Attempt 3    Message on voicemail     Comments:       Outreach   Oriana Polanco

## 2018-07-05 DIAGNOSIS — F33.1 MAJOR DEPRESSIVE DISORDER, RECURRENT EPISODE, MODERATE (H): ICD-10-CM

## 2018-07-05 RX ORDER — SERTRALINE HYDROCHLORIDE 25 MG/1
25 TABLET, FILM COATED ORAL DAILY
Qty: 30 TABLET | Refills: 0 | Status: SHIPPED | OUTPATIENT
Start: 2018-07-05 | End: 2018-07-20

## 2018-07-05 RX ORDER — SERTRALINE HYDROCHLORIDE 100 MG/1
100 TABLET, FILM COATED ORAL DAILY
Qty: 30 TABLET | Refills: 0 | Status: SHIPPED | OUTPATIENT
Start: 2018-07-05 | End: 2018-07-20

## 2018-07-05 NOTE — LETTER
Jeanna Steel  [address]  [place] [address]      Dear Jeanna,    We recently received a refill request from your pharmacy requesting a refill of :     Pending Prescriptions:                       Disp   Refills    sertraline (ZOLOFT) 100 MG tablet         30 tab*1            Sig: Take 1 tablet (100 mg) by mouth daily Take with           25 mg daily for total dose 125 mg daily    sertraline (ZOLOFT) 25 MG tablet          30 tab*1            Sig: Take 1 tablet (25 mg) by mouth daily Take with           100 mg daily for total dose 125 mg daily      A review of your chart indicates that your last office visit was on 7/21/17.  An appointment is required every 6 months to a year with your provider. We have authorized one time 30 day refill of your medication to allow time for you to schedule.     Please call the clinic at [phone], or log in to your [employer] account at [email or web address] to schedule your appointment within that time frame so there is no delay in next month's refill.    Thank you for taking an active role in your healthcare.    Sincerely,    CASIMIRO Her     If you need assistance with your [employer] log in, please call [phone].

## 2018-07-20 ENCOUNTER — OFFICE VISIT (OUTPATIENT)
Dept: FAMILY MEDICINE | Facility: CLINIC | Age: 53
End: 2018-07-20
Payer: COMMERCIAL

## 2018-07-20 DIAGNOSIS — F33.1 MAJOR DEPRESSIVE DISORDER, RECURRENT EPISODE, MODERATE (H): ICD-10-CM

## 2018-07-20 RX ORDER — SERTRALINE HYDROCHLORIDE 100 MG/1
100 TABLET, FILM COATED ORAL DAILY
Qty: 30 TABLET | Refills: 0 | Status: SHIPPED | OUTPATIENT
Start: 2018-07-20 | End: 2018-08-20

## 2018-07-20 RX ORDER — SERTRALINE HYDROCHLORIDE 25 MG/1
25 TABLET, FILM COATED ORAL DAILY
Qty: 30 TABLET | Refills: 0 | Status: SHIPPED | OUTPATIENT
Start: 2018-07-20 | End: 2018-08-20

## 2018-07-20 RX ORDER — LORAZEPAM 0.5 MG/1
TABLET ORAL
Qty: 30 TABLET | Refills: 0 | Status: SHIPPED | OUTPATIENT
Start: 2018-07-20 | End: 2018-08-20

## 2018-07-20 NOTE — MR AVS SNAPSHOT
After Visit Summary   2018    Jeanna Steel    MRN: [id number]           Patient Information     Date Of Birth          1965        Visit Information        Provider Department      2018 4:00 PM Caitlyn Mcadams MD [facility]        Today's Diagnoses     Major depressive disorder, recurrent episode, moderate (H)           Follow-ups after your visit        Your next 10 appointments already scheduled     Oct 02, 2018  8:40 AM CDT   PHYSICAL with Caitlyn Mcadams MD   [facility] ([facility])    [address]  [facility] [address]   [phone]              Who to contact     Please call your clinic at [phone] to:    Ask questions about your health    Make or cancel appointments    Discuss your medicines    Learn about your test results    Speak to your doctor            Additional Information About Your Visit        MyChart Information     [employer] is an electronic gateway that provides easy, online access to your medical records. With [employer], you can request a clinic appointment, read your test results, renew a prescription or communicate with your care team.     To sign up for [employer] visit the website at [email or web address]   You will be asked to enter the access code listed below, as well as some personal information. Please follow the directions to create your username and password.     Your access code is: [email or web address]  Expires: 10/18/2018  4:05 PM     Your access code will  in 90 days. If you need help or a new code, please contact your [facility] or call [phone] for assistance.        Care EveryWhere ID     This is your Care EveryWhere ID. This could be used by other organizations to access your [place] medical records  [id number]         Blood Pressure from Last 3 Encounters:   18 126/75   17 124/74   17 123/76    Weight  from Last 3 Encounters:   01/26/18 184 lb (83.5 kg)   07/21/17 185 lb 12 oz (84.3 kg)   07/21/17 186 lb 1.6 oz (84.4 kg)              Today, you had the following     No orders found for display         Where to get your medicines      [facility] [address] [place] [address] AT [facility]  [address] [place] [address]    Hours:  24-hours Phone:  [phone]     sertraline 100 MG tablet    sertraline 25 MG tablet         Some of these will need a paper prescription and others can be bought over the counter.  Ask your nurse if you have questions.     Bring a paper prescription for each of these medications     LORazepam 0.5 MG tablet          Primary Care Provider Office Phone # Fax #    Caitlyn Mcadams [phone]       [address] [place] [address]        Equal Access to Services     [facility]: Hadii saritha sandyo Sobrenda, waaxda luqadaha, qaybta kaalmada adeegyada, willow ziegler . So [facility] [phone].    ATENCIÓN: Si habla español, tiene a hill disposición servicios gratuitos de asistencia lingüística. Llame al [phone].    We comply with applicable federal civil rights laws and [place] laws. We do not discriminate on the basis of race, color, national origin, age, disability, sex, sexual orientation, or gender identity.            Thank you!     Thank you for choosing [facility]  for your care. Our goal is always to provide you with excellent care. Hearing back from our patients is one way we can continue to improve our services. Please take a few minutes to complete the written survey that you may receive in the mail after your visit with us. Thank you!             Your Updated Medication List - Protect others around you: Learn how to safely use, store and throw away your medicines at www.disposemymeds.org.          This list is accurate as of 7/20/18  4:05 PM.  Always  use your most recent med list.                   Brand Name Dispense Instructions for use Diagnosis    LORazepam 0.5 MG tablet    ATIVAN    30 tablet    Take 1-2 tabs by mouth as needed for anxiety -    Major depressive disorder, recurrent episode, moderate (H)       * sertraline 100 MG tablet    ZOLOFT    30 tablet    Take 1 tablet (100 mg) by mouth daily Take with 25 mg daily for total dose 125 mg daily    Major depressive disorder, recurrent episode, moderate (H)       * sertraline 25 MG tablet    ZOLOFT    30 tablet    Take 1 tablet (25 mg) by mouth daily Take with 100 mg daily for total dose 125 mg daily    Major depressive disorder, recurrent episode, moderate (H)       * Notice:  This list has 2 medication(s) that are the same as other medications prescribed for you. Read the directions carefully, and ask your doctor or other care provider to review them with you.

## 2018-07-20 NOTE — PROGRESS NOTES
Jeanna's insurance is not accepted at our clinic    Refilled meds x 1 month,  Recommend she establish with [facility] for her care as this is under her plan.    Caitlyn Mcadams MD  Internal Medicine/Pediatrics

## 2018-08-20 ENCOUNTER — OFFICE VISIT (OUTPATIENT)
Dept: FAMILY MEDICINE | Facility: CLINIC | Age: 53
End: 2018-08-20
Payer: COMMERCIAL

## 2018-08-20 VITALS
HEIGHT: 66 IN | TEMPERATURE: 98.6 F | HEART RATE: 67 BPM | BODY MASS INDEX: 29.09 KG/M2 | RESPIRATION RATE: 14 BRPM | SYSTOLIC BLOOD PRESSURE: 125 MMHG | DIASTOLIC BLOOD PRESSURE: 83 MMHG | OXYGEN SATURATION: 97 % | WEIGHT: 181 LBS

## 2018-08-20 DIAGNOSIS — Z23 NEED FOR TD VACCINE: ICD-10-CM

## 2018-08-20 DIAGNOSIS — F33.1 MAJOR DEPRESSIVE DISORDER, RECURRENT EPISODE, MODERATE (H): ICD-10-CM

## 2018-08-20 DIAGNOSIS — M25.522 PAIN IN JOINT, UPPER ARM, LEFT: ICD-10-CM

## 2018-08-20 DIAGNOSIS — N93.8 DUB (DYSFUNCTIONAL UTERINE BLEEDING): ICD-10-CM

## 2018-08-20 DIAGNOSIS — Z00.00 ROUTINE GENERAL MEDICAL EXAMINATION AT A HEALTH CARE FACILITY: Primary | ICD-10-CM

## 2018-08-20 DIAGNOSIS — M25.579 PAIN IN JOINT, ANKLE AND FOOT, UNSPECIFIED LATERALITY: ICD-10-CM

## 2018-08-20 LAB
ERYTHROCYTE [DISTWIDTH] IN BLOOD BY AUTOMATED COUNT: 13.6 % (ref 10–15)
ERYTHROCYTE [SEDIMENTATION RATE] IN BLOOD BY WESTERGREN METHOD: 9 MM/H (ref 0–30)
HCT VFR BLD AUTO: 39.4 % (ref 35–47)
HGB BLD-MCNC: 12.8 G/DL (ref 11.7–15.7)
MCH RBC QN AUTO: 30.7 PG (ref 26.5–33)
MCHC RBC AUTO-ENTMCNC: 32.5 G/DL (ref 31.5–36.5)
MCV RBC AUTO: 95 FL (ref 78–100)
PLATELET # BLD AUTO: 269 10E9/L (ref 150–450)
RBC # BLD AUTO: 4.17 10E12/L (ref 3.8–5.2)
WBC # BLD AUTO: 7.5 10E9/L (ref 4–11)

## 2018-08-20 PROCEDURE — 90714 TD VACC NO PRESV 7 YRS+ IM: CPT | Performed by: FAMILY MEDICINE

## 2018-08-20 PROCEDURE — 85027 COMPLETE CBC AUTOMATED: CPT | Performed by: FAMILY MEDICINE

## 2018-08-20 PROCEDURE — 85652 RBC SED RATE AUTOMATED: CPT | Performed by: FAMILY MEDICINE

## 2018-08-20 PROCEDURE — 86431 RHEUMATOID FACTOR QUANT: CPT | Performed by: FAMILY MEDICINE

## 2018-08-20 PROCEDURE — 84443 ASSAY THYROID STIM HORMONE: CPT | Performed by: FAMILY MEDICINE

## 2018-08-20 PROCEDURE — 99213 OFFICE O/P EST LOW 20 MIN: CPT | Mod: 25 | Performed by: FAMILY MEDICINE

## 2018-08-20 PROCEDURE — 86200 CCP ANTIBODY: CPT | Performed by: FAMILY MEDICINE

## 2018-08-20 PROCEDURE — 99396 PREV VISIT EST AGE 40-64: CPT | Mod: 25 | Performed by: FAMILY MEDICINE

## 2018-08-20 PROCEDURE — 36415 COLL VENOUS BLD VENIPUNCTURE: CPT | Performed by: FAMILY MEDICINE

## 2018-08-20 PROCEDURE — 86038 ANTINUCLEAR ANTIBODIES: CPT | Performed by: FAMILY MEDICINE

## 2018-08-20 PROCEDURE — 90471 IMMUNIZATION ADMIN: CPT | Performed by: FAMILY MEDICINE

## 2018-08-20 RX ORDER — SERTRALINE HYDROCHLORIDE 100 MG/1
100 TABLET, FILM COATED ORAL DAILY
Qty: 90 TABLET | Refills: 3 | Status: SHIPPED | OUTPATIENT
Start: 2018-08-20 | End: 2019-05-16

## 2018-08-20 RX ORDER — SERTRALINE HYDROCHLORIDE 25 MG/1
25 TABLET, FILM COATED ORAL DAILY
Qty: 90 TABLET | Refills: 3 | Status: SHIPPED | OUTPATIENT
Start: 2018-08-20 | End: 2019-04-24

## 2018-08-20 RX ORDER — LORAZEPAM 0.5 MG/1
TABLET ORAL
Qty: 30 TABLET | Refills: 0 | Status: SHIPPED | OUTPATIENT
Start: 2018-08-20 | End: 2018-11-27

## 2018-08-20 NOTE — NURSING NOTE
Chief Complaint   Patient presents with     Physical     Screening Questionnaire for Adult Immunization    Are you sick today?   No   Do you have allergies to medications, food, a vaccine component or latex?   No   Have you ever had a serious reaction after receiving a vaccination?   No   Do you have a long-term health problem with heart disease, lung disease, asthma, kidney disease, metabolic disease (e.g. diabetes), anemia, or other blood disorder?   No   Do you have cancer, leukemia, HIV/AIDS, or any other immune system problem?   No   In the past 3 months, have you taken medications that affect  your immune system, such as prednisone, other steroids, or anticancer drugs; drugs for the treatment of rheumatoid arthritis, Crohn s disease, or psoriasis; or have you had radiation treatments?   No   Have you had a seizure, or a brain or other nervous system problem?   No   During the past year, have you received a transfusion of blood or blood     products, or been given immune (gamma) globulin or antiviral drug?   No   For women: Are you pregnant or is there a chance you could become        pregnant during the next month?   No   Have you received any vaccinations in the past 4 weeks?   No     Immunization questionnaire answers were all negative.        Per orders of Dr. Queen, injection of Td given by Maryjane Zavala CMA. Patient instructed to remain in clinic for 15 minutes afterwards, and to report any adverse reaction to me immediately.       Screening performed by patient on 8/20/2018 at 1:43 PM.

## 2018-08-20 NOTE — PROGRESS NOTES
"   SUBJECTIVE:   CC: Jeanna Steel is an 53 year old woman who presents for preventive health visit.     HPI      {additional problems to add (Optional):310715}    Today's PHQ-2 Score:   PHQ-2 ( 1999 Pfizer) 8/25/2016   Q1: Little interest or pleasure in doing things 0   Q2: Feeling down, depressed or hopeless 1   PHQ-2 Score 1       Abuse: Current or Past(Physical, Sexual or Emotional)- No  Do you feel safe in your environment - Yes    Social History   Substance Use Topics     Smoking status: Former Smoker     Types: Cigarettes     Smokeless tobacco: Never Used     Alcohol use 7.0 oz/week     14 drink(s) per week     No flowsheet data found.{add AUDIT responses (Optional) (A score of 7 for adult men is an indication of hazardous drinking; a score of 8 or more is an indication of an alcohol use disorder.  A score of 7 or more for adult women is an indication of hazardous drinking or an alchohol use disorder):151206}    Reviewed orders with patient.  Reviewed health maintenance and updated orders accordingly - {Yes/No:038873::\"Yes\"}  {Chronicprobdata (Optional):046576}    {Mammo Decision Support (Optional):333134}    Pertinent mammograms are reviewed under the imaging tab.  History of abnormal Pap smear: {PAP HX:132519}  PAP / HPV Latest Ref Rng & Units 5/2/2016 5/31/2012 10/6/2009   PAP - NIL NIL NIL   HPV 16 DNA NEG Negative - -   HPV 18 DNA NEG Negative - -   OTHER HR HPV NEG Negative - -     Reviewed and updated as needed this visit by clinical staff  Tobacco  Allergies  Meds  Med Hx  Surg Hx  Fam Hx  Soc Hx        Reviewed and updated as needed this visit by Provider        {HISTORY OPTIONS (Optional):617366}    Review of Systems  {FEMALE ROS (Optional):939622}     OBJECTIVE:   There were no vitals taken for this visit.  Physical Exam  {Exam Choices (Optional):077979}    {Diagnostic Test Results (Optional):568112::\"Diagnostic Test Results:\",\"none \"}    ASSESSMENT/PLAN:   {Diag " "Picklist:941314}    COUNSELING:  {FEMALE COUNSELING MESSAGES:379146::\"Reviewed preventive health counseling, as reflected in patient instructions\"}    BP Readings from Last 1 Encounters:   01/26/18 126/75     Estimated body mass index is 29.7 kg/(m^2) as calculated from the following:    Height as of 1/26/18: 5' 6\" (1.676 m).    Weight as of 1/26/18: 184 lb (83.5 kg).    {BP Counseling- Complete if BP >= 120/80  (Optional):437415}  {Weight Management Plan (ACO) Complete if BMI is abnormal-  Ages 18-64  BMI >24.9.  Age 65+ with BMI <23 or >30 (Optional):626504}     reports that she has quit smoking. Her smoking use included Cigarettes. She has never used smokeless tobacco.  {Tobacco Cessation -- Complete if patient is a smoker (Optional):675920}    Counseling Resources:  ATP IV Guidelines  Pooled Cohorts Equation Calculator  Breast Cancer Risk Calculator  FRAX Risk Assessment  ICSI Preventive Guidelines  Dietary Guidelines for Americans, 2010  USDA's MyPlate  ASA Prophylaxis  Lung CA Screening    Karie Queen MD  [facility]  "

## 2018-08-20 NOTE — MR AVS SNAPSHOT
After Visit Summary   8/20/2018    Jeanna Steel    MRN: [id number]           Patient Information     Date Of Birth          1965        Visit Information        Provider Department      8/20/2018 12:30 PM Karie Queen MD [facility]        Today's Diagnoses     Routine general medical examination at a health care facility    -  1    Major depressive disorder, recurrent episode, moderate (H)        Need for TD vaccine        DUB (dysfunctional uterine bleeding)        Pain in joint, upper arm, left        Pain in joint, ankle and foot, unspecified laterality          Care Instructions      Preventive Health Recommendations  Female Ages 50 - 64    Yearly exam: See your health care provider every year in order to  o Review health changes.   o Discuss preventive care.    o Review your medicines if your doctor has prescribed any.      Get a Pap test every three years (unless you have an abnormal result and your provider advises testing more often).    If you get Pap tests with HPV test, you only need to test every 5 years, unless you have an abnormal result.     You do not need a Pap test if your uterus was removed (hysterectomy) and you have not had cancer.    You should be tested each year for STDs (sexually transmitted diseases) if you're at risk.     Have a mammogram every 1 to 2 years.    Have a colonoscopy at age 50, or have a yearly FIT test (stool test). These exams screen for colon cancer.      Have a cholesterol test every 5 years, or more often if advised.    Have a diabetes test (fasting glucose) every three years. If you are at risk for diabetes, you should have this test more often.     If you are at risk for osteoporosis (brittle bone disease), think about having a bone density scan (DEXA).    Shots: Get a flu shot each year. Get a tetanus shot every 10 years.    Nutrition:     Eat at least 5 servings of fruits and vegetables each day.    Eat whole-grain  bread, whole-wheat pasta and brown rice instead of white grains and rice.    Get adequate Calcium and Vitamin D.     Lifestyle    Exercise at least 150 minutes a week (30 minutes a day, 5 days a week). This will help you control your weight and prevent disease.    Limit alcohol to one drink per day.    No smoking.     Wear sunscreen to prevent skin cancer.     See your dentist every six months for an exam and cleaning.    See your eye doctor every 1 to 2 years.      PLEASE CALL TO SCHEDULE YOUR MAMMOGRAM  [facility] [place] [phone]  [facility] [place] [phone]  [place] [facility]   [phone]    Call insurance or check with pharmacy about SHINGRIX vaccine             Follow-ups after your visit        Additional Services     GASTROENTEROLOGY ADULT REF PROCEDURE ONLY Other; [facility] [phone] (Dr. Fiona Ramires)       Last Lab Result: Creatinine (mg/dL)       Date                     Value                 07/21/2017               0.62             ----------  Body mass index is 29.21 kg/(m^2).      Patient will be contacted to schedule procedure.     Please be aware that coverage of these services is subject to the terms and limitations of your health insurance plan.  Call member services at your health plan with any benefit or coverage questions.  Any procedures must be performed at a [place] facility OR coordinated by your clinic's referral office.    Please bring the following with you to your appointment:    (1) Any X-Rays, CTs or MRIs which have been performed.  Contact the facility where they were done to arrange for  prior to your scheduled appointment.    (2) List of current medications   (3) This referral request   (4) Any documents/labs given to you for this referral                  Future tests that were ordered for you today     Open Future Orders        Priority Expected Expires Ordered    US Pelvic Complete w Transvaginal Routine   "2019            Who to contact     If you have questions or need follow up information about today's clinic visit or your schedule please contact [facility] directly at [phone].  Normal or non-critical lab and imaging results will be communicated to you by [facility], letter or phone within 4 business days after the clinic has received the results. If you do not hear from us within 7 days, please contact the clinic through [facility] or phone. If you have a critical or abnormal lab result, we will notify you by phone as soon as possible.  Submit refill requests through [facility] or call your pharmacy and they will forward the refill request to us. Please allow 3 business days for your refill to be completed.          Additional Information About Your Visit        Secret Labhart Information     [facility] lets you send messages to your doctor, view your test results, renew your prescriptions, schedule appointments and more. To sign up, go to [email or web address] . Click on \"Log in\" on the left side of the screen, which will take you to the Welcome page. Then click on \"Sign up Now\" on the right side of the page.     You will be asked to enter the access code listed below, as well as some personal information. Please follow the directions to create your username and password.     Your access code is: [email or web address]  Expires: 2018 12:27 PM     Your access code will  in 90 days. If you need help or a new code, please call your [place] clinic or [phone].        Care EveryWhere ID     This is your Care EveryWhere ID. This could be used by other organizations to access your [place] medical records  [id number]        Your Vitals Were     Pulse Temperature Respirations Height Pulse Oximetry Breastfeeding?    67 98.6  F (37  C) (Oral) 14 5' 6\" (1.676 m) 97% No    BMI (Body Mass Index)                   29.21 kg/m2            Blood Pressure from Last 3 Encounters:   18 125/83   18 " 126/75   07/21/17 124/74    Weight from Last 3 Encounters:   08/20/18 181 lb (82.1 kg)   01/26/18 184 lb (83.5 kg)   07/21/17 185 lb 12 oz (84.3 kg)              We Performed the Following     Anti Nuclear Lea IgG by IFA with Reflex     CBC with platelets     Cyclic Citrullinated Peptide Antibody IgG     ESR: Erythrocyte sedimentation rate     GASTROENTEROLOGY ADULT REF PROCEDURE ONLY Other; [facility] [phone] (Dr. Fiona Ramires)     Rheumatoid factor     TD (ADULT, 7+) PRESERVE FREE     TSH with free T4 reflex          Today's Medication Changes          These changes are accurate as of 8/20/18  1:27 PM.  If you have any questions, ask your nurse or doctor.               These medicines have changed or have updated prescriptions.        Dose/Directions    LORazepam 0.5 MG tablet   Commonly known as:  ATIVAN   This may have changed:  additional instructions   Used for:  Major depressive disorder, recurrent episode, moderate (H)   Changed by:  Karie Queen MD        Take 1-2 tabs by mouth as needed for anxiety - limit to twice per week   Quantity:  30 tablet   Refills:  0            Where to get your medicines      These medications were sent to [facility] [address] [place] [address] AT [address] [address]    Hours:  24-hours Phone:  [phone]     sertraline 100 MG tablet    sertraline 25 MG tablet         Some of these will need a paper prescription and others can be bought over the counter.  Ask your nurse if you have questions.     Bring a paper prescription for each of these medications     LORazepam 0.5 MG tablet                Primary Care Provider Office Phone # Fax #    Caitlyn Mcadams [phone] [address] [place] [id number]        Equal Access to Services     LAKSHMI MAST AH: Cheyenne Gusman, lenora montoya, willow andres. So [facility]  [phone].    ATENCIÓN: Si srikanth ramirez, tiene a hill disposición servicios gratuitos de asistencia lingüística. Laura drummond [phone].    We comply with applicable federal civil rights laws and [place] laws. We do not discriminate on the basis of race, color, national origin, age, disability, sex, sexual orientation, or gender identity.            Thank you!     Thank you for choosing [facility]  for your care. Our goal is always to provide you with excellent care. Hearing back from our patients is one way we can continue to improve our services. Please take a few minutes to complete the written survey that you may receive in the mail after your visit with us. Thank you!             Your Updated Medication List - Protect others around you: Learn how to safely use, store and throw away your medicines at www.disposemymeds.org.          This list is accurate as of 8/20/18  1:27 PM.  Always use your most recent med list.                   Brand Name Dispense Instructions for use Diagnosis    LORazepam 0.5 MG tablet    ATIVAN    30 tablet    Take 1-2 tabs by mouth as needed for anxiety - limit to twice per week    Major depressive disorder, recurrent episode, moderate (H)       * sertraline 25 MG tablet    ZOLOFT    90 tablet    Take 1 tablet (25 mg) by mouth daily Take with 100 mg daily for total dose 125 mg daily    Major depressive disorder, recurrent episode, moderate (H)       * sertraline 100 MG tablet    ZOLOFT    90 tablet    Take 1 tablet (100 mg) by mouth daily Take with 25 mg daily for total dose 125 mg daily    Major depressive disorder, recurrent episode, moderate (H)       * Notice:  This list has 2 medication(s) that are the same as other medications prescribed for you. Read the directions carefully, and ask your doctor or other care provider to review them with you.

## 2018-08-20 NOTE — PROGRESS NOTES
SUBJECTIVE:   CC: Jeanna Steel is an 53 year old woman who presents for preventive health visit.     Healthy Habits:    Do you get at least three servings of calcium containing foods daily (dairy, green leafy vegetables, etc.)? yes    Amount of exercise or daily activities, outside of work: 7 day(s) per week    Problems taking medications regularly No    Medication side effects: No    Have you had an eye exam in the past two years? no    Do you see a dentist twice per year? no    Do you have sleep apnea, excessive snoring or daytime drowsiness?no    (Z00.00) Routine general medical examination at a health care facility  (primary encounter diagnosis)  Comment:   Plan: GASTROENTEROLOGY ADULT REF PROCEDURE ONLY         Other; [facility] [phone] (Dr. Fiona Ramires),         ADMIN 1st VACCINE            (F33.1) Major depressive disorder, recurrent episode, moderate (H)  Comment: meds have worked well for her  PHQ-9 SCORE 5/31/2016 7/13/2017 8/20/2018   Total Score - - -   Total Score 1 4 2       Plan: LORazepam (ATIVAN) 0.5 MG tablet, sertraline         (ZOLOFT) 25 MG tablet, sertraline (ZOLOFT) 100         MG tablet        Uses lorazepam sparingly - at most 1-2  poer month usually for stress induced by work  Has tried some stress reduction which helps as well    (Z23) Need for TD vaccine  Comment:   Plan: TD (ADULT, 7+) PRESERVE FREE, ADMIN 1st VACCINE            (N93.8) DUB (dysfunctional uterine bleeding)  Comment: heavy and painful periods persisting after age of 49 has not had evaluat for this  Is not sure when her mother went through menopause  Plan: US Pelvic Complete w Transvaginal, CBC with         platelets, TSH with free T4 reflex            (M25.522) Pain in joint, upper arm, left  Comment: wrist pain and ankle pain and swelling had a nodule here earlier in the month which was tender  Now resolving  Has had some arthritic conditions in family  Plan: ESR: Erythrocyte sedimentation rate, Cyclic          Citrullinated Peptide Antibody IgG, Anti         Nuclear Lea IgG by IFA with Reflex, Rheumatoid         factor, CBC with platelets            (M25.579) Pain in joint, ankle and foot, unspecified laterality  Comment:   Plan: ESR: Erythrocyte sedimentation rate, Cyclic         Citrullinated Peptide Antibody IgG, Anti         Nuclear Lea IgG by IFA with Reflex, Rheumatoid         factor                 Today's PHQ-2 Score:   PHQ-2 ( 1999 Pfizer) 8/25/2016 5/2/2016   Q1: Little interest or pleasure in doing things 0 0   Q2: Feeling down, depressed or hopeless 1 0   PHQ-2 Score 1 0       Abuse: Current or Past(Physical, Sexual or Emotional)- No  Do you feel safe in your environment - Yes    Social History   Substance Use Topics     Smoking status: Former Smoker     Types: Cigarettes     Smokeless tobacco: Never Used     Alcohol use 7.0 oz/week     14 Standard drinks or equivalent per week      Comment: daily -     If you drink alcohol do you typically have >3 drinks per day or >7 drinks per week? No                     Reviewed orders with patient.  Reviewed health maintenance and updated orders accordingly - Yes  Patient Active Problem List   Diagnosis     Major depressive disorder, recurrent episode, moderate (H)     Generalized anxiety disorder     Vitamin D deficiency     Microscopic hematuria     Past Surgical History:   Procedure Laterality Date     BACK SURGERY  2006     ECTOPIC PREGNANCY SURGERY  2003     EYE SURGERY  1969       Social History   Substance Use Topics     Smoking status: Former Smoker     Types: Cigarettes     Smokeless tobacco: Never Used     Alcohol use 7.0 oz/week     14 Standard drinks or equivalent per week      Comment: daily -     Family History   Problem Relation Age of Onset     Breast Cancer Mother      Diabetes Father      Cerebrovascular Disease Father      Alzheimer Disease Father      Arthritis Father      Breast Cancer Maternal Grandmother 92     Diabetes Maternal Grandfather       Diabetes Paternal Grandfather      Alcohol/Drug Brother      Breast Cancer Paternal Aunt 65     Cancer Paternal Aunt      Endometrial cancer     Schizophrenia Brother      suicide in 1992     Other - See Comments Brother      Multiple Sclerosis         Current Outpatient Prescriptions   Medication Sig Dispense Refill     LORazepam (ATIVAN) 0.5 MG tablet Take 1-2 tabs by mouth as needed for anxiety - limit to twice per week 30 tablet 0     sertraline (ZOLOFT) 100 MG tablet Take 1 tablet (100 mg) by mouth daily Take with 25 mg daily for total dose 125 mg daily 90 tablet 3     sertraline (ZOLOFT) 25 MG tablet Take 1 tablet (25 mg) by mouth daily Take with 100 mg daily for total dose 125 mg daily 90 tablet 3       Patient over age 50, mutual decision to screen reflected in health maintenance.    Pertinent mammograms are reviewed under the imaging tab.  History of abnormal Pap smear: NO - age 30-65 PAP every 5 years with negative HPV co-testing recommended  PAP / HPV Latest Ref Rng & Units 5/2/2016 5/31/2012 10/6/2009   PAP - NIL NIL NIL   HPV 16 DNA NEG Negative - -   HPV 18 DNA NEG Negative - -   OTHER HR HPV NEG Negative - -     Reviewed and updated as needed this visit by clinical staff  Tobacco  Allergies  Meds  Med Hx  Surg Hx  Fam Hx  Soc Hx        Reviewed and updated as needed this visit by Provider        Past Medical History:   Diagnosis Date     Anxiety      Depressive disorder      Infertility, female       Past Surgical History:   Procedure Laterality Date     BACK SURGERY  2006     ECTOPIC PREGNANCY SURGERY  2003     EYE SURGERY  1969       ROS:  CONSTITUTIONAL: NEGATIVE for fever, chills, change in weight  INTEGUMENTARU/SKIN: NEGATIVE for worrisome rashes, moles or lesions  EYES: NEGATIVE for vision changes or irritation  ENT: NEGATIVE for ear, mouth and throat problems  RESP: NEGATIVE for significant cough or SOB  BREAST: NEGATIVE for masses, tenderness or discharge  CV: NEGATIVE for chest pain,  "palpitations or peripheral edema  GI: NEGATIVE for nausea, abdominal pain, heartburn, or change in bowel habits  : NEGATIVE for unusual urinary or vaginal symptoms. Periods are regular.  MUSCULOSKELETAL: NEGATIVE for significant arthralgias or myalgia  NEURO: NEGATIVE for weakness, dizziness or paresthesias  PSYCHIATRIC: NEGATIVE for changes in mood or affect    OBJECTIVE:   /83  Pulse 67  Temp 98.6  F (37  C) (Oral)  Resp 14  Ht 5' 6\" (1.676 m)  Wt 181 lb (82.1 kg)  SpO2 97%  Breastfeeding? No  BMI 29.21 kg/m2  EXAM:  GENERAL: healthy, alert and no distress  EYES: Eyes grossly normal to inspection, PERRL and conjunctivae and sclerae normal  HENT: ear canals and TM's normal, nose and mouth without ulcers or lesions  NECK: no adenopathy, no asymmetry, masses, or scars and thyroid normal to palpation  RESP: lungs clear to auscultation - no rales, rhonchi or wheezes  BREAST: normal without masses, tenderness or nipple discharge and no palpable axillary masses or adenopathy  CV: regular rate and rhythm, normal S1 S2, no S3 or S4, no murmur, click or rub, no peripheral edema and peripheral pulses strong  ABDOMEN: soft, nontender, no hepatosplenomegaly, no masses and bowel sounds normal   (female): normal female external genitalia, normal urethral meatus, vaginal mucosa pink, moist, well rugated, and normal cervix/adnexa/uterus without masses or discharge  MS: no gross musculoskeletal defects noted, no edema  SKIN: no suspicious lesions or rashes  NEURO: Normal strength and tone, mentation intact and speech normal  PSYCH: mentation appears normal, affect normal/bright    Diagnostic Test Results:  none     ASSESSMENT/PLAN:   1. Routine general medical examination at a health care facility  See AVS  - GASTROENTEROLOGY ADULT REF PROCEDURE ONLY Other; [facility] [phone] (Dr. Fiona Ramires)  - ADMIN 1st VACCINE    2. Major depressive disorder, recurrent episode, moderate (H)    - LORazepam (ATIVAN) 0.5 MG " "tablet; Take 1-2 tabs by mouth as needed for anxiety - limit to twice per week  Dispense: 30 tablet; Refill: 0  - sertraline (ZOLOFT) 25 MG tablet; Take 1 tablet (25 mg) by mouth daily Take with 100 mg daily for total dose 125 mg daily  Dispense: 90 tablet; Refill: 3  - sertraline (ZOLOFT) 100 MG tablet; Take 1 tablet (100 mg) by mouth daily Take with 25 mg daily for total dose 125 mg daily  Dispense: 90 tablet; Refill: 3    3. Need for TD vaccine    - TD (ADULT, 7+) PRESERVE FREE  - ADMIN 1st VACCINE    4. DUB (dysfunctional uterine bleeding)  Prolonged and DUB after age of 49  Referred for US and f/u with ON in case EMB is needed  - US Pelvic Complete w Transvaginal; Future  - CBC with platelets  - TSH with free T4 reflex    5. Pain in joint, upper arm, left  Concern for possible CTD - has affected ankle and wrists - will draw labs to evaluate  nsaids can help at this point  - ESR: Erythrocyte sedimentation rate  - Cyclic Citrullinated Peptide Antibody IgG  - Anti Nuclear Lea IgG by IFA with Reflex  - Rheumatoid factor  - CBC with platelets    6. Pain in joint, ankle and foot, unspecified laterality    - ESR: Erythrocyte sedimentation rate  - Cyclic Citrullinated Peptide Antibody IgG  - Anti Nuclear Lea IgG by IFA with Reflex  - Rheumatoid factor    In addition to the preventive visit, 15 minutes was spent face to face with (50% or 8 min) counseling and/or coordination of care regarding addition concerns and symptoms.   COUNSELING:   Reviewed preventive health counseling, as reflected in patient instructions       Regular exercise       Healthy diet/nutrition    BP Readings from Last 1 Encounters:   01/26/18 126/75     Estimated body mass index is 29.7 kg/(m^2) as calculated from the following:    Height as of 1/26/18: 5' 6\" (1.676 m).    Weight as of 1/26/18: 184 lb (83.5 kg).           reports that she has quit smoking. Her smoking use included Cigarettes. She has never used smokeless tobacco.      Counseling " Resources:  ATP IV Guidelines  Pooled Cohorts Equation Calculator  Breast Cancer Risk Calculator  FRAX Risk Assessment  ICSI Preventive Guidelines  Dietary Guidelines for Americans, 2010  Melinta's MyPlate  ASA Prophylaxis  Lung CA Screening    Karie Queen MD  [facility]  Answers for HPI/ROS submitted by the patient on 8/20/2018   Annual Exam:  PHQ-2 Score: Incomplete

## 2018-08-20 NOTE — LETTER
August 28, 2018      Jeanna Steel  [address]  [facility] [address]        Dear ,    We are writing to inform you of your test results.    All of the labs showed no inflammation or signs of connective tissue disorder.  If you are still having symptoms we could talk about some dietary changes to try.     Resulted Orders   ESR: Erythrocyte sedimentation rate   Result Value Ref Range    Sed Rate 9 0 - 30 mm/h   Cyclic Citrullinated Peptide Antibody IgG   Result Value Ref Range    Cyclic Citrullinated Peptide Antibody, IgG 2 <7 U/mL      Comment:      Negative   Anti Nuclear Lea IgG by IFA with Reflex   Result Value Ref Range    ARLENE interpretation Negative NEG^Negative      Comment:                                         Reference range:  <1:40  NEGATIVE  1:40 - 1:80  BORDERLINE POSITIVE  >1:80 POSITIVE     Rheumatoid factor   Result Value Ref Range    Rheumatoid Factor <20 <20 IU/mL   CBC with platelets   Result Value Ref Range    WBC 7.5 4.0 - 11.0 10e9/L    RBC Count 4.17 3.8 - 5.2 10e12/L    Hemoglobin 12.8 11.7 - 15.7 g/dL    Hematocrit 39.4 35.0 - 47.0 %    MCV 95 78 - 100 fl    MCH 30.7 26.5 - 33.0 pg    MCHC 32.5 31.5 - 36.5 g/dL    RDW 13.6 10.0 - 15.0 %    Platelet Count 269 150 - 450 10e9/L   TSH with free T4 reflex   Result Value Ref Range    TSH 1.68 0.40 - 4.00 mU/L       If you have any questions or concerns, please call the clinic at the number listed above.       Sincerely,        Karie Queen MD/ms

## 2018-08-20 NOTE — NURSING NOTE
"Chief Complaint   Patient presents with     Physical       Initial /83  Pulse 67  Temp 98.6  F (37  C) (Oral)  Resp 14  Ht 5' 6\" (1.676 m)  Wt 181 lb (82.1 kg)  SpO2 97%  Breastfeeding? No  BMI 29.21 kg/m2 Estimated body mass index is 29.21 kg/(m^2) as calculated from the following:    Height as of this encounter: 5' 6\" (1.676 m).    Weight as of this encounter: 181 lb (82.1 kg).  BP completed using cuff size: large    Health Maintenance that is potentially due pending provider review:  Health Maintenance Due   Topic Date Due     DEPRESSION ACTION PLAN Q1 YR  08/13/1982     HIV SCREEN (SYSTEM ASSIGNED)  08/13/1982     COLON CANCER SCREEN (SYSTEM ASSIGNED)  08/13/2014     PHQ-9 Q3 MONTHS  10/12/2017     TETANUS IMMUNIZATION (SYSTEM ASSIGNED)  06/27/2018         PHQ/ACT/JESSICA--Gave pt questionnare  "

## 2018-08-20 NOTE — PATIENT INSTRUCTIONS
Preventive Health Recommendations  Female Ages 50 - 64    Yearly exam: See your health care provider every year in order to  o Review health changes.   o Discuss preventive care.    o Review your medicines if your doctor has prescribed any.      Get a Pap test every three years (unless you have an abnormal result and your provider advises testing more often).    If you get Pap tests with HPV test, you only need to test every 5 years, unless you have an abnormal result.     You do not need a Pap test if your uterus was removed (hysterectomy) and you have not had cancer.    You should be tested each year for STDs (sexually transmitted diseases) if you're at risk.     Have a mammogram every 1 to 2 years.    Have a colonoscopy at age 50, or have a yearly FIT test (stool test). These exams screen for colon cancer.      Have a cholesterol test every 5 years, or more often if advised.    Have a diabetes test (fasting glucose) every three years. If you are at risk for diabetes, you should have this test more often.     If you are at risk for osteoporosis (brittle bone disease), think about having a bone density scan (DEXA).    Shots: Get a flu shot each year. Get a tetanus shot every 10 years.    Nutrition:     Eat at least 5 servings of fruits and vegetables each day.    Eat whole-grain bread, whole-wheat pasta and brown rice instead of white grains and rice.    Get adequate Calcium and Vitamin D.     Lifestyle    Exercise at least 150 minutes a week (30 minutes a day, 5 days a week). This will help you control your weight and prevent disease.    Limit alcohol to one drink per day.    No smoking.     Wear sunscreen to prevent skin cancer.     See your dentist every six months for an exam and cleaning.    See your eye doctor every 1 to 2 years.      PLEASE CALL TO SCHEDULE YOUR MAMMOGRAM  [facility] [phone]  [facility] [phone]  [facility]    [phone]    Call insurance or check with pharmacy about SHINGRIX vaccine

## 2018-08-21 LAB
CCP AB SER IA-ACNC: 2 U/ML
RHEUMATOID FACT SER NEPH-ACNC: <20 IU/ML (ref 0–20)
TSH SERPL DL<=0.005 MIU/L-ACNC: 1.68 MU/L (ref 0.4–4)

## 2018-08-21 ASSESSMENT — PATIENT HEALTH QUESTIONNAIRE - PHQ9: SUM OF ALL RESPONSES TO PHQ QUESTIONS 1-9: 2

## 2018-08-23 LAB — ANA SER QL IF: NEGATIVE

## 2018-08-27 NOTE — PROGRESS NOTES
Please send this patient a normal results letter.  All of the labs showed no inflammation or signs of connective tissue disorder.  If you are still having symptoms we could talk about some dietary changes to try.    Thank you!    Karie

## 2018-10-12 ENCOUNTER — RADIANT APPOINTMENT (OUTPATIENT)
Dept: MAMMOGRAPHY | Facility: CLINIC | Age: 53
End: 2018-10-12
Payer: COMMERCIAL

## 2018-10-12 DIAGNOSIS — Z12.31 VISIT FOR SCREENING MAMMOGRAM: ICD-10-CM

## 2019-01-10 DIAGNOSIS — F33.1 MAJOR DEPRESSIVE DISORDER, RECURRENT EPISODE, MODERATE (H): ICD-10-CM

## 2019-01-10 DIAGNOSIS — F41.1 GENERALIZED ANXIETY DISORDER: ICD-10-CM

## 2019-01-10 RX ORDER — LORAZEPAM 0.5 MG/1
TABLET ORAL
Qty: 30 TABLET | Refills: 0 | Status: CANCELLED | OUTPATIENT
Start: 2019-01-10

## 2019-01-11 ENCOUNTER — MYC MEDICAL ADVICE (OUTPATIENT)
Dept: FAMILY MEDICINE | Facility: CLINIC | Age: 54
End: 2019-01-11

## 2019-01-11 NOTE — TELEPHONE ENCOUNTER
FS  Please address due to SN's absence.    Controlled Substance Refill Request for Lorazepam 0.5 mg    Last refill: 11/27/2018 - #30.  1-2 TABLETS BY MOUTH AS NEEDED FOR ANXIETY. LIMIT TO TWICE PER WEEK    Last clinic visit: 8/20/2018    Clinic visit frequency required: Not documented  Next appt: no future OV scheduled    Controlled substance agreement on file: No.    Documentation in problem list reviewed:  Partial    Processing:  Fax Rx to [facility]    [place] reviewed:  reviewed- no concerns  [place]:  [email or web address]

## 2019-01-11 NOTE — TELEPHONE ENCOUNTER
Patient called.  Detailed comments: PT called to follow up refill/ she est care with Dr Tiwari in 08/18  Would like refill / please call to advise     [facility] [employer] [id number] [place] [address] [address] [place] [address] AT [facility]

## 2019-01-11 NOTE — TELEPHONE ENCOUNTER
Reason for Call:  Other prescription    Detailed comments: PT called to follow up refill/ she est care with Dr Tiwari in 08/18  Would like refill / please call to advise    [facility] [employer] [address] [place] [address] AT [facility]      Phone Number Patient can be reached at: Cell number on file:    Telephone Information:   Mobile [phone]       Best Time:     Can we leave a detailed message on this number? YES    Call taken on 1/11/2019 at 12:03 PM by Vince Mcdaniel

## 2019-01-11 NOTE — TELEPHONE ENCOUNTER
I do not feel comfortable prescribing controlled substances without a face-to-face evaluation at least once every 3 months.  Please inform the patient to schedule a face-to-face visit either with me or any other provider.    Salim  _____________________________________________________________  FYI to team  -Controlled substance agreement needs to be signed.  Random urine toxicology needs to be done. Problem list needs to updated.

## 2019-01-11 NOTE — TELEPHONE ENCOUNTER
Last Written Prescription Date:  11/27/2018  Last Fill Quantity: 30 tablet,  # refills: 0   Last Office Visit: 8/20/2018   Future Office Visit:         Routing refill request to provider for review/approval because:  Drug not on the [facility], [facility] or [facility] refill protocol or controlled substance

## 2019-01-15 NOTE — TELEPHONE ENCOUNTER
Reason for Call:  Other returning call    Detailed comments: patient returned call and scheduled appt with dr adkins for Friday 1/18 at 10 am.     Call taken on 1/15/2019 at 3:50 PM by Jayne Londono

## 2019-01-18 ENCOUNTER — OFFICE VISIT (OUTPATIENT)
Dept: FAMILY MEDICINE | Facility: CLINIC | Age: 54
End: 2019-01-18
Payer: COMMERCIAL

## 2019-01-18 VITALS
RESPIRATION RATE: 17 BRPM | OXYGEN SATURATION: 100 % | WEIGHT: 186.5 LBS | SYSTOLIC BLOOD PRESSURE: 128 MMHG | HEIGHT: 66 IN | BODY MASS INDEX: 29.97 KG/M2 | TEMPERATURE: 98.4 F | DIASTOLIC BLOOD PRESSURE: 88 MMHG | HEART RATE: 75 BPM

## 2019-01-18 DIAGNOSIS — F41.1 GENERALIZED ANXIETY DISORDER: ICD-10-CM

## 2019-01-18 DIAGNOSIS — F33.1 MAJOR DEPRESSIVE DISORDER, RECURRENT EPISODE, MODERATE (H): Primary | ICD-10-CM

## 2019-01-18 LAB
AMPHETAMINES UR QL: NOT DETECTED NG/ML
BARBITURATES UR QL SCN: NOT DETECTED NG/ML
BENZODIAZ UR QL SCN: NOT DETECTED NG/ML
BUPRENORPHINE UR QL: NOT DETECTED NG/ML
CANNABINOIDS UR QL: NOT DETECTED NG/ML
COCAINE UR QL SCN: NOT DETECTED NG/ML
D-METHAMPHET UR QL: NOT DETECTED NG/ML
METHADONE UR QL SCN: NOT DETECTED NG/ML
OPIATES UR QL SCN: NOT DETECTED NG/ML
OXYCODONE UR QL SCN: NOT DETECTED NG/ML
PCP UR QL SCN: NOT DETECTED NG/ML
PROPOXYPH UR QL: NOT DETECTED NG/ML
TRICYCLICS UR QL SCN: NOT DETECTED NG/ML

## 2019-01-18 PROCEDURE — 99214 OFFICE O/P EST MOD 30 MIN: CPT | Performed by: FAMILY MEDICINE

## 2019-01-18 PROCEDURE — 80306 DRUG TEST PRSMV INSTRMNT: CPT | Performed by: FAMILY MEDICINE

## 2019-01-18 RX ORDER — LORAZEPAM 0.5 MG/1
TABLET ORAL
Qty: 24 TABLET | Refills: 0 | Status: SHIPPED | OUTPATIENT
Start: 2019-01-18 | End: 2019-04-24

## 2019-01-18 ASSESSMENT — PATIENT HEALTH QUESTIONNAIRE - PHQ9
SUM OF ALL RESPONSES TO PHQ QUESTIONS 1-9: 8
5. POOR APPETITE OR OVEREATING: NOT AT ALL

## 2019-01-18 ASSESSMENT — ANXIETY QUESTIONNAIRES
3. WORRYING TOO MUCH ABOUT DIFFERENT THINGS: SEVERAL DAYS
GAD7 TOTAL SCORE: 4
IF YOU CHECKED OFF ANY PROBLEMS ON THIS QUESTIONNAIRE, HOW DIFFICULT HAVE THESE PROBLEMS MADE IT FOR YOU TO DO YOUR WORK, TAKE CARE OF THINGS AT HOME, OR GET ALONG WITH OTHER PEOPLE: SOMEWHAT DIFFICULT
6. BECOMING EASILY ANNOYED OR IRRITABLE: NOT AT ALL
5. BEING SO RESTLESS THAT IT IS HARD TO SIT STILL: NOT AT ALL
7. FEELING AFRAID AS IF SOMETHING AWFUL MIGHT HAPPEN: NOT AT ALL
1. FEELING NERVOUS, ANXIOUS, OR ON EDGE: MORE THAN HALF THE DAYS
2. NOT BEING ABLE TO STOP OR CONTROL WORRYING: SEVERAL DAYS

## 2019-01-18 ASSESSMENT — MIFFLIN-ST. JEOR: SCORE: 1462.71

## 2019-01-18 NOTE — PROGRESS NOTES
SUBJECTIVE:   Jeanna Steel is a 53 year old female who presents to clinic today for the following health issues:      Depression and Anxiety Follow-Up    Status since last visit: Worsened    Other associated symptoms:None    Complicating factors:     Significant life event: Yes-  Parents moved into assisted living, still grieving about divorce     Current substance abuse: None    PHQ 7/13/2017 8/20/2018 1/18/2019   PHQ-9 Total Score 4 2 8   Q9: Suicide Ideation Not at all Not at all Not at all     JESSICA-7 SCORE 5/31/2016 7/13/2017 1/18/2019   Total Score - - -   Total Score 3 0 4       Amount of exercise or physical activity: no routine    Problems taking medications regularly: No    Medication side effects: none    Diet: regular (no restrictions)    The patient presents to clinic for follow-up visit regarding depression and anxiety.  She has been taking benzodiazepines for an extended period of time for episodes of severe anxiety/severe depressive episodes.  She is currently taking 1 tablet 2 times a week.  The patient reports a history of infertility which resulted in current depression.  She also states that both of her parents are elderly and are currently living in an assisted living facility.  Continues to feel that she is taking care of her parents and her brothers.  The patient states that one of her brothers had a liver transplant but he restarted drinking again.  Also states that one of her other brothers was thinking about suicide  Problem list and histories reviewed & adjusted, as indicated.  Additional history: as documented    Patient Active Problem List   Diagnosis     Major depressive disorder, recurrent episode, moderate (H)     Generalized anxiety disorder     Vitamin D deficiency     Microscopic hematuria     Past Surgical History:   Procedure Laterality Date     BACK SURGERY  2006     ECTOPIC PREGNANCY SURGERY  2003     EYE SURGERY  1969       Social History     Tobacco Use     Smoking  "status: Former Smoker     Types: Cigarettes     Smokeless tobacco: Never Used   Substance Use Topics     Alcohol use: Yes     Alcohol/week: 7.0 oz     Types: 14 Standard drinks or equivalent per week     Comment: daily -     Family History   Problem Relation Age of Onset     Breast Cancer Mother      Diabetes Father      Cerebrovascular Disease Father      Alzheimer Disease Father      Arthritis Father      Breast Cancer Maternal Grandmother 92     Diabetes Maternal Grandfather      Diabetes Paternal Grandfather      Alcohol/Drug Brother      Breast Cancer Paternal Aunt 65     Cancer Paternal Aunt         Endometrial cancer     Schizophrenia Brother         suicide in 1992     Other - See Comments Brother         Multiple Sclerosis           Reviewed and updated as needed this visit by clinical staff  Tobacco  Allergies  Meds  Med Hx  Surg Hx  Fam Hx  Soc Hx      Reviewed and updated as needed this visit by Provider         ROS:  Constitutional, HEENT, cardiovascular, pulmonary, gi and gu systems are negative, except as otherwise noted.    OBJECTIVE:     /88   Pulse 75   Temp 98.4  F (36.9  C) (Oral)   Resp 17   Ht 1.676 m (5' 6\")   Wt 84.6 kg (186 lb 8 oz)   SpO2 100%   BMI 30.10 kg/m    Body mass index is 30.1 kg/m .  GENERAL: healthy, alert and no distress  RESP: lungs clear to auscultation - no rales, rhonchi or wheezes  CV: regular rate and rhythm, normal S1 S2, no S3 or S4, no murmur, click or rub, no peripheral edema and peripheral pulses strong  PSYCH: Patient was very emotional crying excessively.     Diagnostic Test Results:  No results found for this or any previous visit (from the past 24 hour(s)).    ASSESSMENT/PLAN:       ICD-10-CM    1. Major depressive disorder, recurrent episode, moderate (H) F33.1 LORazepam (ATIVAN) 0.5 MG tablet     Urine Drugs of Abuse Screen Panel 13     MENTAL HEALTH REFERRAL  - Adult; Outpatient Treatment; Individual/Couples/Family/Group Therapy/Health " Psychology; [facility] [phone]; We will contact you to schedule the appointment or please call with any questions   2. Generalized anxiety disorder F41.1 LORazepam (ATIVAN) 0.5 MG tablet     Urine Drugs of Abuse Screen Panel 13     MENTAL HEALTH REFERRAL  - Adult; Outpatient Treatment; Individual/Couples/Family/Group Therapy/Health Psychology; [facility] [phone]; We will contact you to schedule the appointment or please call with any questions     The patient is very emotional crying excessively.  We had a discussion regarding her current medications and she did state that the current dose of sertraline seems to be sufficient.  She verbalized that she needs to see a counselor for counseling sessions which is something I strongly recommend as well.  We had a discussion regarding benzodiazepine use.  She states that she currently takes 1 tablet up to 2 times per week.  The patient was informed that we will need a controlled substance agreement and a random urine drug screen.  She did verbalize that she used coconut paste that had some marijuana in it.  Denies any regular use of marijuana.  Denies any other drug use.  Number of tablets prescribed today are 24.  The number of tablets were verified with the patient prior to printing the prescription and after printing the prescription. She agreed.     - Patient was sent to lab for random urine drug screening.   - She was advised to schedule follow-up visit with her PCP after 3 months.  - She was also advised to schedule a visit with psychiatrist.    Mustapha Johnson MD  [facility]

## 2019-01-18 NOTE — LETTER
[facility]  01/18/19    Patient: Jeanna Steel  YOB: 1965  Medical Record Number: [id number]  CSN: [id number]                                                                              Non-opioid Controlled Substance Agreement    I understand that my care provider has prescribed a controlled substance to help manage my condition(s). I am taking this medicine to help me function or work. I know this is strong medicine, and that it can cause serious side effects. Controlled substances can be sedating, addicting and may cause a dependency on the drug. They can affect my ability to drive or think, and cause depression. They need to be taken exactly as prescribed. Combining controlled substances with certain medicines or chemicals (such as cocaine, sedatives and tranquilizers, sleeping pills, meth) can be dangerous or even fatal. Also, if I stop controlled substances suddenly, I may have severe withdrawal symptoms.  If not helpful, I may be asked to stop them.    The risks, benefits, and side effects of these medicine(s) were explained to me. I agree that:    1. I will take part in other treatments as advised by my care team. This may be psychiatry or counseling, physical therapy, behavioral therapy, group treatment or a referral to a pain clinic. I will reduce or stop my medicine when my care team tells me to do so.  2. I will take my medicines as prescribed. I will not change the dose or schedule unless my care team tells me to. There will be no refills if I  run out early.   I may be contactedwithout warning and asked to complete a urine drug test or pill count at any time.   3. I will keep all my appointments, and understand this is part of the monitoring of controlled substances. My care team may require an office visit for EVERY controlled substance refill. If I miss appointments or don t follow instructions, my care team may stop my medicine.  4. I will not ask other providers to  prescribe controlled substances, and I will not accept controlled substances from other people. If I need another prescribed controlled substance for a new reason, I will tell my care team within 1 business day.  5. I will use one pharmacy to fill all of my controlled substance prescriptions, and it is up to me to make sure that I do not run out of my medicines on weekends or holidays. If my care team is willing to refill my controlled substance prescription without a visit, I must request refills only during office hours, refills may take up to 3 days to process, and it may take up to 5 to 7 days for my medicine to be mailed and ready at my pharmacy. Prescriptions will not be mailed anywhere except my pharmacy.    6. I am responsible for my prescriptions. If the medicine/prescription is lost or stolen, it will not be replaced. I also agree not to share controlled substance medicines with anyone.              [facility]  01/18/19  Patient:  Jeanna Steel  YOB: 1965  Medical Record Number: [id number]  CSN: [id number]    7. I agree to not use ANY illegal or recreational drugs. This includes marijuana, cocaine, bath salts or other drugs. I agree not to use alcohol unless my care team says I may. I agree to give urine samples whenever asked. If I don t give a urine sample, the care team may stop my medicine.    8. If I enroll in the [place] Medical Marijuana program, I will tell my care team. I will also sign an agreement to share my medical records with my care team.    9. I will bring in my list of medicines (or my medicine bottles) each time I come to the clinic.   10. I will tell my care team right away if I become pregnant or have a new medical problem treated outside of my regular clinic.  11. I understand that this medicine can affect my thinking and judgment. It may be unsafe for me to drive, use machinery and do dangerous tasks. I will not do any of these things until I know how  the medicine affects me. If my dose changes, I will wait to see how it affects me. I will contact my care team if I have concerns about medicine side effects.    I understand that if I do not follow any of the conditions above, my prescriptions or treatment may be stopped.      I agree that my provider, clinic care team, and pharmacy may work with any city, state or federal law enforcement agency that investigates the misuse, sale, or other diversion of my controlled medicine. I will allow my provider to discuss my care with or share a copy of this agreement with any other treating provider, pharmacy or emergency room where I receive care. I agree to give up (waive) any right of privacy or confidentiality with respect to these consents.   I have read this agreement and have asked questions about anything I did not understand.    ____________________________________________________    ____________  ________  Patient signature                                                         Date      Time    ____________________________________________________     ____________  ________  Witness                                                          Date      Time    ____________________________________________________  Provider signature

## 2019-01-19 ASSESSMENT — ANXIETY QUESTIONNAIRES: GAD7 TOTAL SCORE: 4

## 2019-03-08 ENCOUNTER — OFFICE VISIT (OUTPATIENT)
Dept: PSYCHOLOGY | Facility: CLINIC | Age: 54
End: 2019-03-08
Attending: FAMILY MEDICINE
Payer: COMMERCIAL

## 2019-03-08 DIAGNOSIS — F41.1 GENERALIZED ANXIETY DISORDER: ICD-10-CM

## 2019-03-08 DIAGNOSIS — F43.23 ADJUSTMENT DISORDER WITH MIXED ANXIETY AND DEPRESSED MOOD: ICD-10-CM

## 2019-03-08 DIAGNOSIS — F33.1 MAJOR DEPRESSIVE DISORDER, RECURRENT EPISODE, MODERATE (H): Primary | ICD-10-CM

## 2019-03-08 PROCEDURE — 90791 PSYCH DIAGNOSTIC EVALUATION: CPT | Performed by: SOCIAL WORKER

## 2019-03-08 ASSESSMENT — ANXIETY QUESTIONNAIRES
3. WORRYING TOO MUCH ABOUT DIFFERENT THINGS: SEVERAL DAYS
GAD7 TOTAL SCORE: 7
5. BEING SO RESTLESS THAT IT IS HARD TO SIT STILL: NOT AT ALL
2. NOT BEING ABLE TO STOP OR CONTROL WORRYING: SEVERAL DAYS
7. FEELING AFRAID AS IF SOMETHING AWFUL MIGHT HAPPEN: NOT AT ALL
6. BECOMING EASILY ANNOYED OR IRRITABLE: MORE THAN HALF THE DAYS
IF YOU CHECKED OFF ANY PROBLEMS ON THIS QUESTIONNAIRE, HOW DIFFICULT HAVE THESE PROBLEMS MADE IT FOR YOU TO DO YOUR WORK, TAKE CARE OF THINGS AT HOME, OR GET ALONG WITH OTHER PEOPLE: SOMEWHAT DIFFICULT
1. FEELING NERVOUS, ANXIOUS, OR ON EDGE: MORE THAN HALF THE DAYS

## 2019-03-08 ASSESSMENT — PATIENT HEALTH QUESTIONNAIRE - PHQ9
SUM OF ALL RESPONSES TO PHQ QUESTIONS 1-9: 14
5. POOR APPETITE OR OVEREATING: SEVERAL DAYS
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
## 2019-03-08 NOTE — Clinical Note
I saw this client for an intake assessment today and will continue seeing them for individual therapy.

## 2019-03-09 ASSESSMENT — ANXIETY QUESTIONNAIRES: GAD7 TOTAL SCORE: 7

## 2019-03-15 ENCOUNTER — OFFICE VISIT (OUTPATIENT)
Dept: PSYCHOLOGY | Facility: CLINIC | Age: 54
End: 2019-03-15
Attending: FAMILY MEDICINE
Payer: COMMERCIAL

## 2019-03-15 DIAGNOSIS — F33.1 MAJOR DEPRESSIVE DISORDER, RECURRENT EPISODE, MODERATE (H): Primary | ICD-10-CM

## 2019-03-15 DIAGNOSIS — F41.1 GENERALIZED ANXIETY DISORDER: ICD-10-CM

## 2019-03-15 DIAGNOSIS — F43.23 ADJUSTMENT DISORDER WITH MIXED ANXIETY AND DEPRESSED MOOD: ICD-10-CM

## 2019-03-15 PROCEDURE — 90834 PSYTX W PT 45 MINUTES: CPT | Performed by: SOCIAL WORKER

## 2019-03-15 NOTE — PROGRESS NOTES
Progress Note    Client Name: Jeanna Steel  Date: 3/15/2019         Service Type: Individual  Video Visit: No     Session Start Time: 8:05  Session End Time: 8:45     Session Length: 40 min    Session #: 2    Attendees: Client attended alone     Treatment Plan Last Reviewed: 3/15/2019  PHQ-9 / JESSICA-7 : 3/15/2019    DATA  Interactive Complexity: No  Crisis: No       Progress Since Last Session (Related to Symptoms / Goals / Homework):   Symptoms: No change client reports symptoms remain the same    Homework: Completed in session      Episode of Care Goals: Minimal progress - PREPARATION (Decided to change - considering how); Intervened by negotiating a change plan and determining options / strategies for behavior change, identifying triggers, exploring social supports, and working towards setting a date to begin behavior change     Current / Ongoing Stressors and Concerns:   Ongoing: Client reports increased depression symptoms while caring for elderly parents.              Current: Client reports that she has continued to feel stress while providing care for her parents. Discussed client's goals for therapy. Client identified wanting to improve her ability to cope with stress and reduce her use of alcohol. She identified wanting to improve her depression symptoms and have acceptance towards the fact that she does not have children.     Treatment Objective(s) Addressed in This Session:   Improve concentration, focus, and mindfulness in daily activities        Intervention:   Motivational Interviewing    MI Intervention: Co-Developed Goal: identified goals for therapy, Expressed Empathy/Understanding, Open-ended questions and Change talk (evoked)     Change Talk Expressed by the Patient: Ability to change Reasons to change Need to change    Provider Response to Change Talk: E - Evoked more info from patient about behavior change, A - Affirmed patient's thoughts,  decisions, or attempts at behavior change, R - Reflected patient's change talk and S - Summarized patient's change talk statements          ASSESSMENT: Current Emotional / Mental Status (status of significant symptoms):   Risk status (Self / Other harm or suicidal ideation)   Client denies current fears or concerns for personal safety.   Client denies current or recent suicidal ideation or behaviors.   Client denies current or recent homicidal ideation or behaviors.   Client denies current or recent self injurious behavior or ideation.   Client denies other safety concerns.   Client Client reports there has been no change in risk factors since their last session.     Client Client reports there has been no change in protective factors since their last session.     A safety and risk management plan has not been developed at this time, however client was given the after-hours number / 911 should there be a change in any of these risk factors.     Appearance:   Appropriate    Eye Contact:   Good    Psychomotor Behavior: Normal    Attitude:   Cooperative    Orientation:   All   Speech    Rate / Production: Normal     Volume:  Normal    Mood:    Depressed client presented with low mood   Affect:    Appropriate    Thought Content:  Clear    Thought Form:  Coherent  Logical    Insight:    Good      Medication Review:   No changes to current psychiatric medication(s)     Medication Compliance:   Yes     Changes in Health Issues:   None reported     Chemical Use Review:   Substance Use: Problem use continues with no change since last session, Contemplation  Reviewed concerns related to health related substance abuse risk  Provided encouragement towards sobriety  Provided support and affirmation for steps taken towards sobriety         Tobacco Use: No change in amount of tobacco use since last session.  Pre-contemplation  Provided encouragement to quit   Provided support and affirmation for steps taken towards quiting      Diagnosis:  1. Major depressive disorder, recurrent episode, moderate (H)    2. Generalized anxiety disorder    3. Adjustment disorder with mixed anxiety and depressed mood        Collateral Reports Completed:   Not Applicable    PLAN: (Client Tasks / Therapist Tasks / Other)  Client will replace one alcoholic drink an evening with carbonated water, practice paced breathing when feeling overwhelmed and return for therapy in two weeks.        Maite Cheyenne SIMMONS, [facility] 3/15/2019    Note reviewed and clinical supervision by TROY Cohen [facility] 3/20/2019 _____________________________________________________________________    Treatment Plan    Client's Name: Jeanna Steel  YOB: 1965    Date: 3/15/2019    DSM-V Diagnoses: 296.32 (F33.1) Major Depressive Disorder, Recurrent Episode, Moderate _, 300.02 (F41.1) Generalized Anxiety Disorder or Adjustment Disorders  309.28 (F43.23) With mixed anxiety and depressed mood  Psychosocial / Contextual Factors: Client reports increased symptoms of depression and anxiety while caring for her elderly parents  WHODAS: see intake    Referral / Collaboration:  Referral to another professional/service is not indicated at this time..    Anticipated number of session or this episode of care: 8-12      MeasurableTreatment Goal(s) related to diagnosis / functional impairment(s)  Goal 1: Client will reduce alcohol use from 5 standard drinks a day to 2 standard drinks a week in the next three months and client reporting use of three new coping skills to manage stress in the evenings.    I will know I've met my goal when I'm only drinking two drinks and going to bed earlier.      Objective #A (Client Action)    Client will identify at least three consequences of maladaptive drinking.  Status: New - Date: 3/15/2019     Intervention(s)  Therapist will assign homework to identify impact of drinking  provide support.    Objective #B  Client will identify three coping  skills to replace drinking .  Status: New - Date: 3/15/2019     Intervention(s)  Therapist will assign homework to practice coping skills  teach coping skills.    Objective #C  Client will identify whether she needs further support to reduce alcohol use.  Status: New - Date: 3/15/2019     Intervention(s)  Therapist will provide support and referrals as needed, education on alcohol use.      Goal 2: Client will reduce symptoms of depression as evidenced by PHQ-9 reducing from 14 to 7 and client reporting decreased hopelessness and helplessness.    I will know I've met my goal when I feel like I have my own life again.      Objective #A (Client Action)    Status: New - Date: 3/15/2019     Client will Increase interest, engagement, and pleasure in doing things  Decrease frequency and intensity of feeling down, depressed, hopeless.    Intervention(s)  Therapist will teach behavioral activation.    Objective #B  Client will Identify negative self-talk and behaviors: challenge core beliefs, myths, and actions.    Status: New - Date: 3/15/2019     Intervention(s)  Therapist will teach CBT skills.    Objective #C  Client will Improve concentration, focus, and mindfulness in daily activities .  Status: New - Date: 3/15/2019     Intervention(s)  Therapist will teach mindfulness skills.      Goal 3: Client will report increased acceptance towards her decision about having children as evidenced by client reporting use of effective coping skills when feeling distress or regret.    I will know I've met my goal when I can accept .      Objective #A (Client Action)    Status: New - Date: 3/15/2019     Client will use acceptance skills when feeling willful.    Intervention(s)  Therapist will teach acceptance DBT skills.    Objective #B  Client will identify coping skills that reduce distress when grieving.    Status: New - Date: 3/15/2019     Intervention(s)  Therapist will teach coping skills, self-soothing.      Client has  reviewed and agreed to the above plan.      Maite SIMMONS, LGSW March 15, 2019  Note reviewed and clinical supervision by TROY Cohen LICSW 3/20/2019

## 2019-03-29 ENCOUNTER — OFFICE VISIT (OUTPATIENT)
Dept: PSYCHOLOGY | Facility: CLINIC | Age: 54
End: 2019-03-29
Payer: COMMERCIAL

## 2019-03-29 DIAGNOSIS — F33.1 MAJOR DEPRESSIVE DISORDER, RECURRENT EPISODE, MODERATE (H): Primary | ICD-10-CM

## 2019-03-29 DIAGNOSIS — F41.1 GENERALIZED ANXIETY DISORDER: ICD-10-CM

## 2019-03-29 DIAGNOSIS — F43.23 ADJUSTMENT DISORDER WITH MIXED ANXIETY AND DEPRESSED MOOD: ICD-10-CM

## 2019-03-29 PROCEDURE — 90834 PSYTX W PT 45 MINUTES: CPT | Performed by: SOCIAL WORKER

## 2019-03-29 ASSESSMENT — PATIENT HEALTH QUESTIONNAIRE - PHQ9: SUM OF ALL RESPONSES TO PHQ QUESTIONS 1-9: 5
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
## 2019-04-05 DIAGNOSIS — F33.1 MAJOR DEPRESSIVE DISORDER, RECURRENT EPISODE, MODERATE (H): ICD-10-CM

## 2019-04-05 DIAGNOSIS — F41.1 GENERALIZED ANXIETY DISORDER: ICD-10-CM

## 2019-04-05 RX ORDER — LORAZEPAM 0.5 MG/1
TABLET ORAL
Qty: 24 TABLET | Refills: 0 | Status: CANCELLED | OUTPATIENT
Start: 2019-04-05

## 2019-04-08 NOTE — TELEPHONE ENCOUNTER
Note from 1/18/19 OV:  Patient was sent to lab for random urine drug screening.   - She was advised to schedule follow-up visit with her PCP after 3 months.  - She was also advised to schedule a visit with psychiatrist.    Get[employer] message sent to patient asking her to schedule an appointment.  Anastacia Pizarro RN    Requested Prescriptions   Pending Prescriptions Disp Refills     LORazepam (ATIVAN) 0.5 MG tablet 24 tablet 0     Sig: TAKE 1 TABLET BY MOUTH AS NEEDED FOR ANXIETY. LIMIT TO TWICE PER WEEK. This script is for the next 60 days.       There is no refill protocol information for this order

## 2019-04-12 ENCOUNTER — OFFICE VISIT (OUTPATIENT)
Dept: PSYCHOLOGY | Facility: CLINIC | Age: 54
End: 2019-04-12
Payer: COMMERCIAL

## 2019-04-12 DIAGNOSIS — F33.1 MAJOR DEPRESSIVE DISORDER, RECURRENT EPISODE, MODERATE (H): Primary | ICD-10-CM

## 2019-04-12 DIAGNOSIS — F41.1 GENERALIZED ANXIETY DISORDER: ICD-10-CM

## 2019-04-12 DIAGNOSIS — F43.23 ADJUSTMENT DISORDER WITH MIXED ANXIETY AND DEPRESSED MOOD: ICD-10-CM

## 2019-04-12 PROCEDURE — 90834 PSYTX W PT 45 MINUTES: CPT | Performed by: SOCIAL WORKER

## 2019-04-12 ASSESSMENT — PATIENT HEALTH QUESTIONNAIRE - PHQ9: SUM OF ALL RESPONSES TO PHQ QUESTIONS 1-9: 5

## 2019-04-24 ENCOUNTER — OFFICE VISIT (OUTPATIENT)
Dept: FAMILY MEDICINE | Facility: CLINIC | Age: 54
End: 2019-04-24
Payer: COMMERCIAL

## 2019-04-24 VITALS
OXYGEN SATURATION: 97 % | WEIGHT: 182 LBS | SYSTOLIC BLOOD PRESSURE: 114 MMHG | HEART RATE: 74 BPM | DIASTOLIC BLOOD PRESSURE: 69 MMHG | TEMPERATURE: 98.2 F | BODY MASS INDEX: 29.38 KG/M2 | RESPIRATION RATE: 12 BRPM

## 2019-04-24 DIAGNOSIS — F41.1 GENERALIZED ANXIETY DISORDER: ICD-10-CM

## 2019-04-24 DIAGNOSIS — F33.1 MAJOR DEPRESSIVE DISORDER, RECURRENT EPISODE, MODERATE (H): ICD-10-CM

## 2019-04-24 PROCEDURE — 99214 OFFICE O/P EST MOD 30 MIN: CPT | Performed by: FAMILY MEDICINE

## 2019-04-24 RX ORDER — SERTRALINE HYDROCHLORIDE 25 MG/1
50 TABLET, FILM COATED ORAL DAILY
Qty: 90 TABLET | Refills: 3 | Status: SHIPPED | OUTPATIENT
Start: 2019-04-24 | End: 2019-05-16

## 2019-04-24 RX ORDER — LORAZEPAM 0.5 MG/1
TABLET ORAL
Qty: 12 TABLET | Refills: 1 | Status: SHIPPED | OUTPATIENT
Start: 2019-04-24 | End: 2019-09-27

## 2019-04-24 NOTE — PROGRESS NOTES
SUBJECTIVE:   Jeanna Steel is a 53 year old female who presents to clinic today for the following   health issues:      Depression and Anxiety Follow-Up    Taking care of her parents - both with dementia. Has had difficulty with anxiety - is tearful  Sees a therapist - this is helpful    Has been increased alcohol use and is working on reducing this  Has been on zoloft for years - varying doses  Will see psychaitry  Has started taking melatonin  Has used lorazepam sparingly in the past has been off this for 6 weeks and feels she was able to use lifestyle changes and therapy to address panic fairly well,    Also grieving that she never had a child - still wonders about whether she can get pregnant and if this would be a healthy pregnancy  Has had recurrent loss in the past      Status since last visit: Improved     Other associated symptoms:panic attack (SOB)    Complicating factors:     Significant life event: Yes-  Both parents have dementia      Current substance abuse: Alcohol but working with therapist      PHQ 3/8/2019 3/29/2019 4/12/2019   PHQ-9 Total Score 14 5 5   Q9: Thoughts of better off dead/self-harm past 2 weeks Not at all Not at all Not at all     JESSICA-7 SCORE 7/13/2017 1/18/2019 3/8/2019   Total Score - - -   Total Score 0 4 7       PHQ-9  English  PHQ-9   Any Language  JESSICA-7  Suicide Assessment Five-step Evaluation and Treatment (SAFE-T)    Amount of exercise or physical activity: 2-3 days/week for an average of 30-45 minutes    Problems taking medications regularly: No    Medication side effects: none    Diet: regular (no restrictions)          Additional history: as documented    Reviewed  and updated as needed this visit by clinical staff  Tobacco  Allergies  Meds         Reviewed and updated as needed this visit by Provider         Patient Active Problem List   Diagnosis     Major depressive disorder, recurrent episode, moderate (H)     Generalized anxiety disorder     Vitamin D  deficiency     Microscopic hematuria     Past Surgical History:   Procedure Laterality Date     BACK SURGERY  2006     ECTOPIC PREGNANCY SURGERY  2003     EYE SURGERY  1969       Social History     Tobacco Use     Smoking status: Former Smoker     Types: Cigarettes     Smokeless tobacco: Never Used   Substance Use Topics     Alcohol use: Yes     Alcohol/week: 7.0 oz     Types: 14 Standard drinks or equivalent per week     Comment: daily -     Family History   Problem Relation Age of Onset     Breast Cancer Mother      Diabetes Father      Cerebrovascular Disease Father      Alzheimer Disease Father      Arthritis Father      Breast Cancer Maternal Grandmother 92     Diabetes Maternal Grandfather      Diabetes Paternal Grandfather      Alcohol/Drug Brother      Breast Cancer Paternal Aunt 65     Cancer Paternal Aunt         Endometrial cancer     Schizophrenia Brother         suicide in 1992     Other - See Comments Brother         Multiple Sclerosis         Current Outpatient Medications   Medication Sig Dispense Refill     LORazepam (ATIVAN) 0.5 MG tablet TAKE 1 TABLET BY MOUTH AS NEEDED FOR ANXIETY. LIMIT TO TWICE PER WEEK. 12 tablet 1     sertraline (ZOLOFT) 100 MG tablet Take 1 tablet (100 mg) by mouth daily Take with 25 mg daily for total dose 125 mg daily 90 tablet 3     sertraline (ZOLOFT) 25 MG tablet Take 2 tablets (50 mg) by mouth daily Take with 100 mg daily for total dose 150 mg daily 90 tablet 3       ROS:  Constitutional, HEENT, cardiovascular, pulmonary, gi and gu systems are negative, except as otherwise noted.    OBJECTIVE:                                                    /69 (BP Location: Left arm, Patient Position: Sitting, Cuff Size: Adult Large)   Pulse 74   Temp 98.2  F (36.8  C) (Oral)   Resp 12   Wt 82.6 kg (182 lb)   SpO2 97%   Breastfeeding? No   BMI 29.38 kg/m    Body mass index is 29.38 kg/m .  GENERAL APPEARANCE: healthy, alert, mild distress and crying  PSYCH: mentation  appears normal, crying and worried    Diagnostic test results:  Diagnostic Test Results:  none      ASSESSMENT/PLAN:                                                    1. Major depressive disorder, recurrent episode, moderate (H)  Will increase zoloft to 150  Continue therapy  Add exercise  OK for melatonin at night  She has plans to see psychiatry - if this is no better perhaps consider another agent  - LORazepam (ATIVAN) 0.5 MG tablet; TAKE 1 TABLET BY MOUTH AS NEEDED FOR ANXIETY. LIMIT TO TWICE PER WEEK.  Dispense: 12 tablet; Refill: 1  - sertraline (ZOLOFT) 25 MG tablet; Take 2 tablets (50 mg) by mouth daily Take with 100 mg daily for total dose 150 mg daily  Dispense: 90 tablet; Refill: 3    2. Generalized anxiety disorder   as noted above  Reviewed precautions to take with lorazepam - discussed drug drug interactions and interaction with alcohol - advised to avoid use together.  OK for spring use PRN  She has been learning self calming techniques which have been helping greatly   - LORazepam (ATIVAN) 0.5 MG tablet; TAKE 1 TABLET BY MOUTH AS NEEDED FOR ANXIETY. LIMIT TO TWICE PER WEEK.  Dispense: 12 tablet; Refill: 1      Follow up with Provider - will see psychaitry and then can see me afterwards     Karie Queen MD  [facility]

## 2019-04-24 NOTE — NURSING NOTE
"Chief Complaint   Patient presents with     Recheck Medication     /69 (BP Location: Left arm, Patient Position: Sitting, Cuff Size: Adult Large)   Pulse 74   Temp 98.2  F (36.8  C) (Oral)   Resp 12   Wt 82.6 kg (182 lb)   SpO2 97%   Breastfeeding? No   BMI 29.38 kg/m   Estimated body mass index is 29.38 kg/m  as calculated from the following:    Height as of 1/18/19: 1.676 m (5' 6\").    Weight as of this encounter: 82.6 kg (182 lb).  bp completed using cuff size: large       Health Maintenance addressed:  Colonoscopy/FIT    Pt will schedule colonoscopy appt.    Liban Pate MA     "

## 2019-04-26 ENCOUNTER — OFFICE VISIT (OUTPATIENT)
Dept: PSYCHOLOGY | Facility: CLINIC | Age: 54
End: 2019-04-26
Payer: COMMERCIAL

## 2019-04-26 DIAGNOSIS — F41.1 GENERALIZED ANXIETY DISORDER: ICD-10-CM

## 2019-04-26 DIAGNOSIS — F33.1 MAJOR DEPRESSIVE DISORDER, RECURRENT EPISODE, MODERATE (H): Primary | ICD-10-CM

## 2019-04-26 DIAGNOSIS — F43.23 ADJUSTMENT DISORDER WITH MIXED ANXIETY AND DEPRESSED MOOD: ICD-10-CM

## 2019-04-26 PROCEDURE — 90834 PSYTX W PT 45 MINUTES: CPT | Performed by: SOCIAL WORKER

## 2019-04-26 ASSESSMENT — PATIENT HEALTH QUESTIONNAIRE - PHQ9: SUM OF ALL RESPONSES TO PHQ QUESTIONS 1-9: 5

## 2019-04-26 NOTE — PROGRESS NOTES
Progress Note    Client Name: Jeanna Steel  Date: 4/26/2019         Service Type: Individual  Video Visit: No     Session Start Time: 12:00  Session End Time: 12:45     Session Length: 45 min    Session #: 5    Attendees: Client attended alone     Treatment Plan Last Reviewed: 3/15/2019  PHQ-9 / JESSICA-7 : 4/12/2019    DATA  Interactive Complexity: No  Crisis: No       Progress Since Last Session (Related to Symptoms / Goals / Homework):   Symptoms: No change client reports mood is the same    Homework: Partially completed client practiced effective communication skills      Episode of Care Goals: Satisfactory progress - ACTION (Actively working towards change); Intervened by reinforcing change plan / affirming steps taken     Current / Ongoing Stressors and Concerns:   Ongoing: Client reports increased depression symptoms while caring for elderly parents.              Current: Client reported that she had another family meeting with her parents and rather than arguing they were able to be productive. She explained that instead of trying to convince her parents that they could no longer live in their home, she talked to them about planning for just the next six months and they agreed they needed to be in assisted living for that time period. Client stated that she was also working to reduce her drinking but that she had a really hard time not drinking at all for an evening. Discussed how the client can begin using mindfulness techniques to better understand her drinking, be present to track how much she is drinking and understand when she has had enough that her craving is fulfilled.     Treatment Objective(s) Addressed in This Session:   Identify negative self-talk and behaviors: challenge core beliefs, myths, and actions  Improve concentration, focus, and mindfulness in daily activities        Intervention:   DBT:Reviewed how the client can be more mindful in her  consumption of drinking   Motivational Interviewing    MI Intervention: Expressed Empathy/Understanding, Supported Autonomy, Collaboration, Evocation, Permission to raise concern or advise, Open-ended questions, Reflections: simple and complex, Change talk (evoked) and Reframe     Change Talk Expressed by the Patient: Ability to change Reasons to change Need to change Committment to change    Provider Response to Change Talk: E - Evoked more info from patient about behavior change, A - Affirmed patient's thoughts, decisions, or attempts at behavior change, R - Reflected patient's change talk and S - Summarized patient's change talk statements          ASSESSMENT: Current Emotional / Mental Status (status of significant symptoms):   Risk status (Self / Other harm or suicidal ideation)   Client denies current fears or concerns for personal safety.   Client denies current or recent suicidal ideation or behaviors.   Client denies current or recent homicidal ideation or behaviors.   Client denies current or recent self injurious behavior or ideation.   Client denies other safety concerns.   Client Client reports there has been no change in risk factors since their last session.     Client Client reports there has been no change in protective factors since their last session.     A safety and risk management plan has not been developed at this time, however client was given the after-hours number / 911 should there be a change in any of these risk factors.     Appearance:   Appropriate    Eye Contact:   Good    Psychomotor Behavior: Normal    Attitude:   Cooperative    Orientation:   All   Speech    Rate / Production: Normal     Volume:  Normal    Mood:    Normalclient presented with a good mood   Affect:    Appropriate    Thought Content:  Clear    Thought Form:  Coherent  Logical    Insight:    Good      Medication Review:   No changes to current psychiatric medication(s)     Medication Compliance:   Yes     Changes in  Health Issues:   None reported     Chemical Use Review:   Substance Use: Problem use continues with no change since last session, Preparatory  Provided encouragement towards sobriety  Provided support and affirmation for steps taken towards sobriety         Tobacco Use: No change in amount of tobacco use since last session.  Contemplation  Provided encouragement to quit   Provided support and affirmation for steps taken towards quiting     Diagnosis:  1. Major depressive disorder, recurrent episode, moderate (H)    2. Generalized anxiety disorder    3. Adjustment disorder with mixed anxiety and depressed mood        Collateral Reports Completed:   Not Applicable    PLAN: (Client Tasks / Therapist Tasks / Other)  Client will practice observe and describe, have two drinks Mon-Thurs and then see what she needs and return for therapy in two weeks.        Maite SIMMONS, SW 4/26/2019   Note reviewed and clinical supervision [facility]      _____________________________________________________________________    Treatment Plan    Client's Name: Jeanna Steel  YOB: 1965    Date: 3/15/2019    DSM-V Diagnoses: 296.32 (F33.1) Major Depressive Disorder, Recurrent Episode, Moderate _, 300.02 (F41.1) Generalized Anxiety Disorder or Adjustment Disorders  309.28 (F43.23) With mixed anxiety and depressed mood  Psychosocial / Contextual Factors: Client reports increased symptoms of depression and anxiety while caring for her elderly parents  WHODAS: see intake    Referral / Collaboration:  Referral to another professional/service is not indicated at this time..    Anticipated number of session or this episode of care: 8-12      MeasurableTreatment Goal(s) related to diagnosis / functional impairment(s)  Goal 1: Client will reduce alcohol use from 5 standard drinks a day to 2 standard drinks a week in the next three months and client reporting use of three new coping skills to  manage stress in the evenings.    I will know I've met my goal when I'm only drinking two drinks and going to bed earlier.      Objective #A (Client Action)    Client will identify at least three consequences of maladaptive drinking.  Status: New - Date: 3/15/2019     Intervention(s)  Therapist will assign homework to identify impact of drinking  provide support.    Objective #B  Client will identify three coping skills to replace drinking .  Status: New - Date: 3/15/2019     Intervention(s)  Therapist will assign homework to practice coping skills  teach coping skills.    Objective #C  Client will identify whether she needs further support to reduce alcohol use.  Status: New - Date: 3/15/2019     Intervention(s)  Therapist will provide support and referrals as needed, education on alcohol use.      Goal 2: Client will reduce symptoms of depression as evidenced by PHQ-9 reducing from 14 to 7 and client reporting decreased hopelessness and helplessness.    I will know I've met my goal when I feel like I have my own life again.      Objective #A (Client Action)    Status: New - Date: 3/15/2019     Client will Increase interest, engagement, and pleasure in doing things  Decrease frequency and intensity of feeling down, depressed, hopeless.    Intervention(s)  Therapist will teach behavioral activation.    Objective #B  Client will Identify negative self-talk and behaviors: challenge core beliefs, myths, and actions.    Status: New - Date: 3/15/2019     Intervention(s)  Therapist will teach CBT skills.    Objective #C  Client will Improve concentration, focus, and mindfulness in daily activities .  Status: New - Date: 3/15/2019     Intervention(s)  Therapist will teach mindfulness skills.      Goal 3: Client will report increased acceptance towards her decision about having children as evidenced by client reporting use of effective coping skills when feeling distress or regret.    I will know I've met my goal when I can  accept .      Objective #A (Client Action)    Status: New - Date: 3/15/2019     Client will use acceptance skills when feeling willful.    Intervention(s)  Therapist will teach acceptance DBT skills.    Objective #B  Client will identify coping skills that reduce distress when grieving.    Status: New - Date: 3/15/2019     Intervention(s)  Therapist will teach coping skills, self-soothing.      Client has reviewed and agreed to the above plan.      Maite SIMMONS, LGSW March 15, 2019  Note reviewed and clinical supervision by TROY Cohen [facility] 3/20/2019

## 2019-05-10 ENCOUNTER — OFFICE VISIT (OUTPATIENT)
Dept: PSYCHOLOGY | Facility: CLINIC | Age: 54
End: 2019-05-10
Payer: COMMERCIAL

## 2019-05-10 DIAGNOSIS — F33.1 MAJOR DEPRESSIVE DISORDER, RECURRENT EPISODE, MODERATE (H): Primary | ICD-10-CM

## 2019-05-10 DIAGNOSIS — F43.23 ADJUSTMENT DISORDER WITH MIXED ANXIETY AND DEPRESSED MOOD: ICD-10-CM

## 2019-05-10 DIAGNOSIS — F41.1 GENERALIZED ANXIETY DISORDER: ICD-10-CM

## 2019-05-10 PROCEDURE — 90834 PSYTX W PT 45 MINUTES: CPT | Performed by: SOCIAL WORKER

## 2019-05-10 NOTE — PROGRESS NOTES
Progress Note    Client Name: Jeanna Steel  Date: 5/10/2019         Service Type: Individual  Video Visit: No     Session Start Time: 1:00  Session End Time: 1:45     Session Length: 45 min    Session #: 6    Attendees: Client attended alone     Treatment Plan Last Reviewed: 3/15/2019  PHQ-9 / JESSICA-7 : 5/10/2019    DATA  Interactive Complexity: No  Crisis: No       Progress Since Last Session (Related to Symptoms / Goals / Homework):   Symptoms: Worsening client reports increased anxiety    Homework: Partially completed client reported practicing mindful drinking      Episode of Care Goals: Satisfactory progress - ACTION (Actively working towards change); Intervened by reinforcing change plan / affirming steps taken     Current / Ongoing Stressors and Concerns:   Ongoing: Client reports increased depression symptoms while caring for elderly parents.              Current: Client reported that she had been practicing observing herself while she was drinking or craving a drink. She stated that by remaining curious and observing herself she did not feel the need to drink as much. She identified that she would want to drink when she was feeling lonely, bored or wanting help getting to sleep. Client discussed feeling lonely in her marriage and like her life has become very separate from her . Explored how client can increase sense of connection in her marriage. She identified having continued rumination on her relationship with her sister-in-law and feeling like she is upset with the client. Discussed how the client can maintain healthy internal boundaries to keep herself from getting overly impacted by the actions of others.     Treatment Objective(s) Addressed in This Session:   Identify negative self-talk and behaviors: challenge core beliefs, myths, and actions  Improve concentration, focus, and mindfulness in daily activities        Intervention:   DBT:Reviewed  client's use of mindfulness, how to increase connection in her marriage, how to create internal boundaries   Motivational Interviewing    MI Intervention: Expressed Empathy/Understanding, Supported Autonomy, Collaboration, Evocation, Permission to raise concern or advise, Open-ended questions, Reflections: simple and complex, Change talk (evoked) and Reframe     Change Talk Expressed by the Patient: Need to change Committment to change Activation    Provider Response to Change Talk: E - Evoked more info from patient about behavior change, A - Affirmed patient's thoughts, decisions, or attempts at behavior change, R - Reflected patient's change talk and S - Summarized patient's change talk statements          ASSESSMENT: Current Emotional / Mental Status (status of significant symptoms):   Risk status (Self / Other harm or suicidal ideation)   Client denies current fears or concerns for personal safety.   Client denies current or recent suicidal ideation or behaviors.   Client denies current or recent homicidal ideation or behaviors.   Client denies current or recent self injurious behavior or ideation.   Client denies other safety concerns.   Client Client reports there has been no change in risk factors since their last session.     Client Client reports there has been no change in protective factors since their last session.     A safety and risk management plan has not been developed at this time, however client was given the after-hours number / 911 should there be a change in any of these risk factors.     Appearance:   Appropriate    Eye Contact:   Good    Psychomotor Behavior: Normal    Attitude:   Cooperative    Orientation:   All   Speech    Rate / Production: Normal     Volume:  Normal    Mood:    Anxious client appeared anxious and tearful at times talking about her interpersonal relationships   Affect:    Appropriate    Thought Content:  Clear    Thought Form:  Coherent  Logical    Insight:    Good       Medication Review:   No changes to current psychiatric medication(s)     Medication Compliance:   Yes     Changes in Health Issues:   None reported     Chemical Use Review:   Substance Use: Problem use continues with no change since last session, Preparatory  Provided encouragement towards sobriety  Provided support and affirmation for steps taken towards sobriety         Tobacco Use: Yes, decrease.  Client reports frequency of use occasional. Preparatory  Provided encouragement to quit   Provided support and affirmation for steps taken towards quiting     Diagnosis:  1. Major depressive disorder, recurrent episode, moderate (H)    2. Generalized anxiety disorder    3. Adjustment disorder with mixed anxiety and depressed mood        Collateral Reports Completed:   Not Applicable    PLAN: (Client Tasks / Therapist Tasks / Other)  Client will continue practicing observing and describing with her alcohol consumption, challenge herself to cope with loneliness, boredom or need to sleep without alcohol, practice acceptance towards sister-in-law and differentiate her feelings from the client's and return for therapy in two weeks.        Maite SIMMONS, [facility] 5/10/2019  Note reviewed and clinical supervision by TROY Cohen [facility] 5/16/2019      _____________________________________________________________________    Treatment Plan    Client's Name: Jeanna Steel  YOB: 1965    Date: 3/15/2019    DSM-V Diagnoses: 296.32 (F33.1) Major Depressive Disorder, Recurrent Episode, Moderate _, 300.02 (F41.1) Generalized Anxiety Disorder or Adjustment Disorders  309.28 (F43.23) With mixed anxiety and depressed mood  Psychosocial / Contextual Factors: Client reports increased symptoms of depression and anxiety while caring for her elderly parents  WHODAS: see intake    Referral / Collaboration:  Referral to another professional/service is not indicated at this time..    Anticipated number of  session or this episode of care: 8-12      MeasurableTreatment Goal(s) related to diagnosis / functional impairment(s)  Goal 1: Client will reduce alcohol use from 5 standard drinks a day to 2 standard drinks a week in the next three months and client reporting use of three new coping skills to manage stress in the evenings.    I will know I've met my goal when I'm only drinking two drinks and going to bed earlier.      Objective #A (Client Action)    Client will identify at least three consequences of maladaptive drinking.  Status: New - Date: 3/15/2019     Intervention(s)  Therapist will assign homework to identify impact of drinking  provide support.    Objective #B  Client will identify three coping skills to replace drinking .  Status: New - Date: 3/15/2019     Intervention(s)  Therapist will assign homework to practice coping skills  teach coping skills.    Objective #C  Client will identify whether she needs further support to reduce alcohol use.  Status: New - Date: 3/15/2019     Intervention(s)  Therapist will provide support and referrals as needed, education on alcohol use.      Goal 2: Client will reduce symptoms of depression as evidenced by PHQ-9 reducing from 14 to 7 and client reporting decreased hopelessness and helplessness.    I will know I've met my goal when I feel like I have my own life again.      Objective #A (Client Action)    Status: New - Date: 3/15/2019     Client will Increase interest, engagement, and pleasure in doing things  Decrease frequency and intensity of feeling down, depressed, hopeless.    Intervention(s)  Therapist will teach behavioral activation.    Objective #B  Client will Identify negative self-talk and behaviors: challenge core beliefs, myths, and actions.    Status: New - Date: 3/15/2019     Intervention(s)  Therapist will teach CBT skills.    Objective #C  Client will Improve concentration, focus, and mindfulness in daily activities .  Status: New - Date: 3/15/2019      Intervention(s)  Therapist will teach mindfulness skills.      Goal 3: Client will report increased acceptance towards her decision about having children as evidenced by client reporting use of effective coping skills when feeling distress or regret.    I will know I've met my goal when I can accept .      Objective #A (Client Action)    Status: New - Date: 3/15/2019     Client will use acceptance skills when feeling willful.    Intervention(s)  Therapist will teach acceptance DBT skills.    Objective #B  Client will identify coping skills that reduce distress when grieving.    Status: New - Date: 3/15/2019     Intervention(s)  Therapist will teach coping skills, self-soothing.      Client has reviewed and agreed to the above plan.      Maite SIMMONS, [facility] March 15, 2019  Note reviewed and clinical supervision by TROY Cohen [facility] 3/20/2019

## 2019-05-16 ENCOUNTER — OFFICE VISIT (OUTPATIENT)
Dept: PSYCHIATRY | Facility: CLINIC | Age: 54
End: 2019-05-16
Attending: FAMILY MEDICINE
Payer: COMMERCIAL

## 2019-05-16 VITALS
DIASTOLIC BLOOD PRESSURE: 76 MMHG | BODY MASS INDEX: 29.21 KG/M2 | HEART RATE: 74 BPM | WEIGHT: 181 LBS | OXYGEN SATURATION: 95 % | TEMPERATURE: 98.5 F | SYSTOLIC BLOOD PRESSURE: 118 MMHG | RESPIRATION RATE: 12 BRPM

## 2019-05-16 DIAGNOSIS — F33.1 MAJOR DEPRESSIVE DISORDER, RECURRENT EPISODE, MODERATE (H): Primary | ICD-10-CM

## 2019-05-16 DIAGNOSIS — F41.1 GENERALIZED ANXIETY DISORDER: ICD-10-CM

## 2019-05-16 PROCEDURE — 90792 PSYCH DIAG EVAL W/MED SRVCS: CPT | Performed by: NURSE PRACTITIONER

## 2019-05-16 RX ORDER — SERTRALINE HYDROCHLORIDE 100 MG/1
TABLET, FILM COATED ORAL
Qty: 60 TABLET | Refills: 1 | Status: SHIPPED | OUTPATIENT
Start: 2019-05-16 | End: 2019-07-18

## 2019-05-16 ASSESSMENT — ANXIETY QUESTIONNAIRES
GAD7 TOTAL SCORE: 5
4. TROUBLE RELAXING: SEVERAL DAYS
7. FEELING AFRAID AS IF SOMETHING AWFUL MIGHT HAPPEN: NOT AT ALL
3. WORRYING TOO MUCH ABOUT DIFFERENT THINGS: SEVERAL DAYS
GAD7 TOTAL SCORE: 5
6. BECOMING EASILY ANNOYED OR IRRITABLE: SEVERAL DAYS
GAD7 TOTAL SCORE: 5
2. NOT BEING ABLE TO STOP OR CONTROL WORRYING: SEVERAL DAYS
1. FEELING NERVOUS, ANXIOUS, OR ON EDGE: SEVERAL DAYS
7. FEELING AFRAID AS IF SOMETHING AWFUL MIGHT HAPPEN: NOT AT ALL
5. BEING SO RESTLESS THAT IT IS HARD TO SIT STILL: NOT AT ALL

## 2019-05-16 ASSESSMENT — PATIENT HEALTH QUESTIONNAIRE - PHQ9
SUM OF ALL RESPONSES TO PHQ QUESTIONS 1-9: 9
10. IF YOU CHECKED OFF ANY PROBLEMS, HOW DIFFICULT HAVE THESE PROBLEMS MADE IT FOR YOU TO DO YOUR WORK, TAKE CARE OF THINGS AT HOME, OR GET ALONG WITH OTHER PEOPLE: SOMEWHAT DIFFICULT
SUM OF ALL RESPONSES TO PHQ QUESTIONS 1-9: 9

## 2019-05-16 NOTE — PROGRESS NOTES
"                                                         Outpatient Psychiatric Evaluation - Standard Adult    Name:  Jeanna Steel  : 1965    Source of Referral:  Primary Care Provider: Karie Queen MD   Last visit: 2019  Current Psychotherapist: Maite Quinn   Last visit: 5/10/2019    Identifying Data:  Patient is a 53 year old,   White American female  who presents for initial visit with me.  Patient is currently 2 part-times jobs. Patient attended the session alone. Consent to communicate signed for Danny Mccrary patient's Spouse/Partner. Consent for treatment signed and included in electronic medical record. Discussed limits of confidentiality today. My Practice Policy was reviewed and signed.     Patient prefers to be called: \"Jeanna\"    Chief Complaint:    Patient reports: \"Feeling depressed and hard to motivate.\"      HPI:    Patient reports a gradually worsening depression of the past 3 years which has been compounded by her parents' neurocognitive decline of the past couple of years, and being the primary caregiver. Found out they weren't paying bills and house was in disarray. She was successfully able to get them to move into assisted living last year, but was an arduous process. States it has felt as though she is taking care of children, and this has brought lots of unresolved grief from not having children herself earlier in life. Endorses a persistent low mood, easy crying, guilt, sense of lack of purpose, would rather stay in bed most days; \"don't understand the point of being alive\" but adamantly denies any active SI. 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 Will routinely nap in the afternoon, have drinks in the evening, and stay up late to watch TV after " " has gone to bed. Realizes this pattern is perpetuating itself.    Patient states she sought psychiatric treatment 2005 prior to her divorce from an emotional abusive . Saw a psychiatrist in [facility] at the time. Was prescribed Zoloft as was thought to be safest agent while she was still trying to conceive. Worked pretty well at first. Though at some point in 2006 Prozac and Wellbutrin were separately trialed. States Prozac worked quite well, but she had anorgasmia; discontinued after 3 months. States she did not like Wellbutrin for some reason; perhaps increased anxiety.     So she returned to Zoloft and had been taking thatconsistently from 2006 to 2011. In 2011 she weaned herself off and did not take for 1 year. States anxiety/depression \"finally caught up with me\" and she reinitiated to good benefit. Continued with Zoloft up to present. Has historically averaged 125 to 150 mg daily; at minimum taking 75 mg daily; and at maximum taking 175 mg daily. She denies any noticeable side effects. PCP recently increased dosage from 125 to 150 mg daily; not sure of additive effect. She is also prescribed Ativan 0.5 mg, but uses this rarely.    She has been seeing Maite Quinn of [facility] since 03/2019. States it's a good fit and has been very helpful. They've been working on behavioral improvements, among other things. Patient states she has been \"drinking too much\". Was drinking 5-6 drinks a night, and has through mindfulness (reading \"Mindful Drinking\") brought this down to 3-4 drinks nightly. States goal is to reduce to 1-2 drinks. Aware of destabilizing/depressing effect this has on her; happy she's been able to reduce. Denies co-use with Ativan; aware of dangers. Also efforting at earlier bedtime; having mixed success with melatonin.    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 Had very successful art career in [place] and later on in " [place]. First marriage from early 90's to mid-2000's; was emotionally wracked in part from her infertility issues. Moved to [place] in 2007; med second  who is love of life; very supportive. States she thought about adopting, but timing wasn't write;  now age 64. 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 States her art career was last active in 2012.    Gained 40 lbs since move to [place]. No medical issues otherwise.      Psychiatric Review of Symptoms:     PHQ-9 scores:   PHQ-9 SCORE 4/12/2019 4/26/2019 5/16/2019   PHQ-9 Total Score - - -   PHQ-9 Total Score MyChart - - 9 (Mild depression)   PHQ-9 Total Score 5 5 9        JESSICA-7 scores:    JESSICA-7 SCORE 1/18/2019 3/8/2019 5/16/2019   Total Score - - -   Total Score - - 5 (mild anxiety)   Total Score 4 7 5     Answers for HPI/ROS submitted by the patient on 5/16/2019   If you checked off any problems, how difficult have these problems made it for you to do your work, take care of things at home, or get along with other people?: Somewhat difficult  PHQ9 TOTAL SCORE: 9  JESSICA 7 TOTAL SCORE: 5        Psychiatric History:   No hospitalizations  Denies past suicide attempts      Substance Use History:  Reduced drinking to 3-4 drinks nightly  Edible cannabis [id number]  Quit smoking in 01/2019; nicotine lozenze    Past Medical History:  Past Medical History:   Diagnosis Date     Anxiety      Depressive disorder      Infertility, female       Surgery:   Past Surgical History:   Procedure Laterality Date     BACK SURGERY  2006     ECTOPIC PREGNANCY SURGERY  2003     EYE SURGERY  1969     Allergies:   No Known Allergies  Primary Care Provider: Karie Queen MD  Seizures or Head Injury: No      Current Medications:    Current Outpatient Medications:      LORazepam (ATIVAN) 0.5 MG tablet, TAKE 1 TABLET BY MOUTH AS NEEDED FOR ANXIETY. LIMIT TO TWICE PER WEEK., Disp: 12 tablet, Rfl: 1     sertraline (ZOLOFT) 100 MG tablet, Take 1 tablet (100 mg) by mouth daily Take  with 25 mg daily for total dose 125 mg daily, Disp: 90 tablet, Rfl: 3     sertraline (ZOLOFT) 25 MG tablet, Take 2 tablets (50 mg) by mouth daily Take with 100 mg daily for total dose 150 mg daily, Disp: 90 tablet, Rfl: 3    The [place] Prescription Monitoring Program has been reviewed and there are no concerns about diversionary activity for controlled substances at this time.    Vital Signs:  Vitals: /76 (BP Location: Right arm, Patient Position: Chair, Cuff Size: Adult Large)   Pulse 74   Temp 98.5  F (36.9  C) (Oral)   Resp 12   Wt 82.1 kg (181 lb)   SpO2 95%   BMI 29.21 kg/m      Labs:  Most recent labs reviewed and no new labs.       Review of Systems:  10 systems (general, cardiovascular, respiratory, eyes, ENT, endocrine, GI, , M/S, neurological) were reviewed. Most pertinent finding(s) is/are:  all unremarkable.    Family History:   Patient reported family history includes:   Family History   Problem Relation Age of Onset     Breast Cancer Mother      Diabetes Father      Cerebrovascular Disease Father      Alzheimer Disease Father      Arthritis Father      Breast Cancer Maternal Grandmother 92     Diabetes Maternal Grandfather      Diabetes Paternal Grandfather      Alcohol/Drug Brother      Breast Cancer Paternal Aunt 65     Cancer Paternal Aunt         Endometrial cancer     Schizophrenia Brother         suicide in      Other - See Comments Brother         Multiple Sclerosis     Brother = alcoholism, liver transplant and now drinking again  Half-bother = sertraline, naltrexone; suicide attempt in 2018; was hospitalized; now outpatient  Half-brother = schizophrenia,  by suicide    Social History:   See HPI!      Mental Status Examination:     Appearance:  awake, alert and adequately groomed  Attitude:  cooperative   Eye Contact:  good  Gait and Station: Normal  Psychomotor Behavior:  intact station, gait and muscle tone  Oriented to:  time, person, and place  Attention Span and  Concentration:  Normal  Speech:  clear, coherent  Mood:  depressed  Affect:  appropriate and in normal range  Associations:  no loose associations  Thought Process:  logical, linear and goal oriented  Thought Content:  Appropriate to Interview  Recent and Remote Memory:  intact Not formally assessed. No amnesia.  Fund of Knowledge: appropriate  Insight:  good  Judgment:  intact  Impulse Control:  intact    Suicide Risk Assessment:  Today Jeanna Steel reports occasional passive suicidal ideatoin In addition, there are notable risk factors for self-harm, including anxiety, substance abuse, suicidal ideation and purposelessness/no reason for living. However, risk is mitigated by commitment to family, cultural beliefs, absence of past attempts, ability to volunteer a safety plan, history of seeking help when needed, future oriented, identifies reasons to live including  and denies suicidal intent or plan. Therefore, based on all available evidence including the factors cited above, Jeanna Steel does not appear to be at imminent risk for self-harm, does not meet criteria for a 72-hr hold, and therefore remains appropriate for ongoing outpatient level of care.  A thorough assessment of risk factors related to suicide and self-harm have been reviewed and are noted above. The patient convincingly denies acute suicidality on several occasions. Local community safety resources reviewed and printed for patient to use if needed. There was no deceit detected, and the patient presented in a manner that was believable.     DSM5  Diagnosis:  296.32 (F33.1) Major Depressive Disorder, Recurrent Episode, Moderate _  300.00 (F41.9) Unspecified Anxiety Disorder  Substance-Related & Addictive Disorders Alcohol Use Disorder   303.90 (F10.20) Moderate active    Medical Comorbidities Include:   Patient Active Problem List    Diagnosis Date Noted     Microscopic hematuria 07/12/2017     Priority: Medium     Vitamin D  deficiency 05/19/2015     Priority: Medium     Major depressive disorder, recurrent episode, moderate (H) 06/25/2012     Priority: Medium     Generalized anxiety disorder 06/25/2012     Priority: Medium     Patient is followed by KEARA FRANCO for ongoing prescription of benzodiazepines.  All refills should be approved by this provider, or covering partner.    Medication(s): Ativan. 0.5mg   Maximum quantity per month: 8   TAKE 1 TABLETS BY MOUTH AS NEEDED FOR ANXIETY. LIMIT TO TWICE PER WEEK          Clinic visit frequency required:  Q 6 months    Controlled substance agreement on file: Yes       Date(s): 01/18/2019  Benzodiazepine use reviewed by psychiatry:  None, referral placed on 1/18/2019    Last [facility] website verification:  done on 1/18/2019   [email or web address]               A 12-item WHODAS 2.0 assessment was completed by the patient today and recorded in EPIC.    WHODAS 2.0 Total Score 3/8/2019 5/16/2019   Total Score 17 16   Total Score MyChart - 16       The Patient Activation Measure (INEZ) score was completed and recorded in [facility] [employer] [facility]. This assesses patient knowledge, skill, and confidence for self-management. No flowsheet data found.               Impression:  Jeanna Steel is a 53 year old female with a history of depression, anxiety and alcohol depenence. She has been taking Zoloft for the vast majority of the past 13 years, with variable dosages depending on the need and circumstances. 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 We agree to focus on the basics of her self-care, including continuing to reduce her alcohol use and work on her sleep hygiene. She is to continue psychotherapy with Maite Quinn.    As per her medication, we agree to  trial a maximized dosage of Zoloft, at 200 mg daily. If she is finding some added partial relief, but depressive apathy is residual, we can consider augmentation with Wellbutrin, which she might better tolerate with a full dosage of an serotonin specific reuptake inhibitor. If this were not showing promise, we could consider Prozac, thought sexual side effects have a chance of representing.    Medication side effects and alternatives reviewed. Health promotion activities recommended and reviewed today. All questions addressed. Education and counseling completed regarding risks and benefits of medications and psychotherapy options. Collaborative Care Psychiatry Service model reviewed today. Recommend therapy for additional support.       Treatment Plan:    Increase Zoloft to 200 mg daily    Continue psychotherapy with Maite Quinn at [facility]    Continue all other medical directions per primary care provider.     Continue all other medications as reviewed per electronic medical record today.     Safety plan reviewed. To the Emergency Department as needed or call after hours crisis line at [phone] or [phone]. [place] Crisis Text Line: Text [place] to 732724  or  Suicide LifeLine Chat: suicidepreventionlifeline.org/chat/    Schedule an appointment with me in 4 weeks or sooner as needed.  Call [facility] at [phone] to schedule.    Follow up with primary care provider as planned or for acute medical concerns.    Call the psychiatric nurse line with medication questions or concerns at [phone].    [employer] may be used to communicate with your provider, but this is not intended to be used for emergencies.      Community Resources:    National Suicide Prevention Lifeline: 459.831.9469 (TTY: 353.935.9695). Call anytime for help.  (www.suicidepreventionlifeline.org)  National [place] on Mental Illness (www.ruby.org): 406.682.6822 or 236-225-3170.   Mental Health Association  (www.mentalhealth.org): 290.627.6176 or 002-586-9851.  [place] Crisis Text Line: Text [place] to 932417  Suicide LifeLine Chat: suicidepreventionLombardi Softwareline.org/chat    Administrative Billing:   Time spent with patient was 60 minutes and greater than 50% of time or 40 minutes was spent in counseling and coordination of care regarding above diagnoses and treatment plan.    Patient Status:  Patient will continue to be seen for ongoing consultation and stabilization.    Signed:   Clayton Hale CNP  Psychiatry

## 2019-05-17 ASSESSMENT — PATIENT HEALTH QUESTIONNAIRE - PHQ9: SUM OF ALL RESPONSES TO PHQ QUESTIONS 1-9: 9

## 2019-05-17 ASSESSMENT — ANXIETY QUESTIONNAIRES: GAD7 TOTAL SCORE: 5

## 2019-06-13 ENCOUNTER — OFFICE VISIT (OUTPATIENT)
Dept: PSYCHIATRY | Facility: CLINIC | Age: 54
End: 2019-06-13
Payer: COMMERCIAL

## 2019-06-13 VITALS
SYSTOLIC BLOOD PRESSURE: 117 MMHG | OXYGEN SATURATION: 98 % | WEIGHT: 181 LBS | HEART RATE: 80 BPM | RESPIRATION RATE: 12 BRPM | DIASTOLIC BLOOD PRESSURE: 70 MMHG | BODY MASS INDEX: 29.21 KG/M2

## 2019-06-13 DIAGNOSIS — F41.1 GENERALIZED ANXIETY DISORDER: ICD-10-CM

## 2019-06-13 DIAGNOSIS — F33.1 MAJOR DEPRESSIVE DISORDER, RECURRENT EPISODE, MODERATE (H): Primary | ICD-10-CM

## 2019-06-13 PROCEDURE — 99214 OFFICE O/P EST MOD 30 MIN: CPT | Performed by: NURSE PRACTITIONER

## 2019-06-13 RX ORDER — SERTRALINE HYDROCHLORIDE 25 MG/1
TABLET, FILM COATED ORAL
COMMUNITY
Start: 2019-06-12 | End: 2019-07-18

## 2019-06-13 RX ORDER — NALTREXONE HYDROCHLORIDE 50 MG/1
50 TABLET, FILM COATED ORAL DAILY
Qty: 30 TABLET | Refills: 1 | Status: SHIPPED | OUTPATIENT
Start: 2019-06-13 | End: 2019-09-27

## 2019-06-13 ASSESSMENT — ANXIETY QUESTIONNAIRES
7. FEELING AFRAID AS IF SOMETHING AWFUL MIGHT HAPPEN: NOT AT ALL
1. FEELING NERVOUS, ANXIOUS, OR ON EDGE: NOT AT ALL
3. WORRYING TOO MUCH ABOUT DIFFERENT THINGS: NOT AT ALL
5. BEING SO RESTLESS THAT IT IS HARD TO SIT STILL: NOT AT ALL
4. TROUBLE RELAXING: NOT AT ALL
GAD7 TOTAL SCORE: 0
6. BECOMING EASILY ANNOYED OR IRRITABLE: NOT AT ALL
7. FEELING AFRAID AS IF SOMETHING AWFUL MIGHT HAPPEN: NOT AT ALL
GAD7 TOTAL SCORE: 0
2. NOT BEING ABLE TO STOP OR CONTROL WORRYING: NOT AT ALL
GAD7 TOTAL SCORE: 0

## 2019-06-13 ASSESSMENT — PATIENT HEALTH QUESTIONNAIRE - PHQ9
10. IF YOU CHECKED OFF ANY PROBLEMS, HOW DIFFICULT HAVE THESE PROBLEMS MADE IT FOR YOU TO DO YOUR WORK, TAKE CARE OF THINGS AT HOME, OR GET ALONG WITH OTHER PEOPLE: SOMEWHAT DIFFICULT
SUM OF ALL RESPONSES TO PHQ QUESTIONS 1-9: 6
SUM OF ALL RESPONSES TO PHQ QUESTIONS 1-9: 6

## 2019-06-13 NOTE — PROGRESS NOTES
"    Outpatient Psychiatric Progress Note    Name: Jeanna Steel   : 1965                    Primary Care Provider: Karie Queen MD   Therapist: Maite Quinn     PHQ-9 scores:  PHQ-9 SCORE 2019   PHQ-9 Total Score - - -   PHQ-9 Total Score MyChart - 9 (Mild depression) 6 (Mild depression)   PHQ-9 Total Score 5 9 6       JESSICA-7 scores:  JESSICA-7 SCORE 3/8/2019 2019 2019   Total Score - - -   Total Score - 5 (mild anxiety) 0 (minimal anxiety)   Total Score 7 5 0         tPatient Identification:  Patient is a 53 year old year old, partnered / significant other  White American female  who presents for return visit with me.  Patient is currently 2 part time jobs. Patient attended the session alone. Patient prefers to be called: \"Jeanna\".    Interim History:  I last saw Jeanna Steel for outpatient psychiatry Consultation on 19.     During that appointment, we reviewed her psychiatric history, and ongoing depression. We agreed to increase her Zoloft dosage from 150 mg to 200 mg daily, in concert with having her continue to reducer her alcohol use, and continue therapy.      Current medications include:   Current Outpatient Medications   Medication Sig     LORazepam (ATIVAN) 0.5 MG tablet TAKE 1 TABLET BY MOUTH AS NEEDED FOR ANXIETY. LIMIT TO TWICE PER WEEK.     sertraline (ZOLOFT) 100 MG tablet 2 tabs daily     sertraline (ZOLOFT) 25 MG tablet      No current facility-administered medications for this visit.        The [place] Prescription Monitoring Program has been reviewed and there are no concerns about diversionary activity for controlled substances at this time.      I was able to review most recent Primary Care Provider, specialty provider, and therapy visit notes that I have access to.     Today, patient reports she did increase Zoloft to 200 mg daily and did not like the feeling; states she felt like she was \"shaking\" though observationally " "she was not visibly shaking. No improvement to mood, so she decreased it back down to 150 mg daily. States on the whole, her mood hs gradually improved in the past month. 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 While doing so she found her old prescription bottles and now recalls when she took Wellbutrin in past it made her feel quite anxious and agitated. She's thinking she'll just continue with Zoloft at 150 mg daily. Thinks her sense of anhedonia and directionlessness are more existential in nature, and not fully responsive to medication. Has taken Ativan 0.5 mg twice in past month for episodic anxiety.    She's making more of an effort to get in a walk outdoors, such as today. 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    She has continued her nightly 2-4 ETOH drinks; \"just feels like a habit\". Asked herself about medications that can help her curb this habit.    She'll be seeing her [facility] therapist next week.          Past Medical History:   Diagnosis Date     Anxiety      Depressive disorder      Infertility, female       has a past medical history of Anxiety, Depressive disorder, and Infertility, female.    Social history updates:  Applied to a residency    Substance use updates:  Reduced drinking to 3-4 drinks nightly  Edible cannabis [id number]  Quit smoking in 01/2019; nicotine lozenze      Vital Signs:   /70 (BP Location: Right arm, Patient Position: Chair, Cuff Size: Adult Regular)   Pulse 80   Resp 12   Wt 82.1 kg (181 lb)   LMP 06/11/2019   SpO2 98%   Breastfeeding? No   BMI 29.21 kg/m      Labs:  Most recent laboratory results reviewed and no new labs.    Review of Systems:  10 systems (general, cardiovascular, respiratory, eyes, ENT, endocrine, GI, , M/S, neurological) were reviewed. Most pertinent finding(s) is/are:  " all unremarkable.    Mental Status Examination:  Appearance:  awake, alert and adequately groomed  Attitude:  cooperative   Eye Contact:  good  Gait and Station: Normal  Psychomotor Behavior:  intact station, gait and muscle tone  Oriented to:  time, person, and place  Attention Span and Concentration:  Normal  Speech:   clear, coherent  Mood:  better  Affect:  appropriate and in normal range  Associations:  no loose associations  Thought Process:  logical, linear and goal oriented  Thought Content:  Appropriate to Interview  Recent and Remote Memory:  intact Not formally assessed. No amnesia.  Fund of Knowledge: appropriate  Insight:  good  Judgment:  intact  Impulse Control:  intact    Suicide Risk Assessment:  Today Jeanna Steel denies suicidal ideation or self-harm impulses. Therefore, based on all available evidence including the factors cited above, Jeanna Steel does not appear to be at imminent risk for self-harm, does not meet criteria for a 72-hr hold, and therefore remains appropriate for ongoing outpatient level of care.  A thorough assessment of risk factors related to suicide and self-harm have been reviewed and are noted above. The patient convincingly denies suicidality on several occasions. Local community safety resources printed and reviewed for patient to use if needed. There was no deceit detected, and the patient presented in a manner that was believable.     DSM5  Diagnosis:  296.32 (F33.1) Major Depressive Disorder, Recurrent Episode, Moderate _  300.00 (F41.9) Unspecified Anxiety Disorder  Substance-Related & Addictive Disorders Alcohol Use Disorder   303.90 (F10.20) Moderate active      Medical comorbidities include:   Patient Active Problem List    Diagnosis Date Noted     Microscopic hematuria 07/12/2017     Priority: Medium     Vitamin D deficiency 05/19/2015     Priority: Medium     Major depressive disorder, recurrent episode, moderate (H) 06/25/2012     Priority: Medium      Generalized anxiety disorder 06/25/2012     Priority: Medium     Patient is followed by KEARA FRANCO for ongoing prescription of benzodiazepines.  All refills should be approved by this provider, or covering partner.    Medication(s): Ativan. 0.5mg   Maximum quantity per month: 8   TAKE 1 TABLETS BY MOUTH AS NEEDED FOR ANXIETY. LIMIT TO TWICE PER WEEK          Clinic visit frequency required:  Q 6 months    Controlled substance agreement on file: Yes       Date(s): 01/18/2019  Benzodiazepine use reviewed by psychiatry:  None, referral placed on 1/18/2019    Last [facility] website verification:  done on 1/18/2019   [email or web address]               Assessment:  Jeanna Steel reports an intolerance of maximum dosage Zoloft -- perhaps 150 mg daily is the limit. After our first visit I was thinking about possible augmentation with Wellbutrin, but given her clearer recall of her past experience with this agent, this does not seem worthwhile to pursue. We did conclude some good portion of her ongoing depression is an existential ennui. She is still quite motivated to reduce her alcohol use and improve other aspects of her health, like sleep hygiene. We discussed a trial of Naltrexone to see if this can help curb her alcohol habit and she was quite interested to pursue. She is encouraged to continue psychotherapy.    Medication side effects and alternatives were reviewed. Health promotion activities recommended and reviewed today. All questions addressed. Education and counseling completed regarding risks and benefits of medications and psychotherapy options.    Treatment Plan:    Continue Zoloft 150 mg daily    Start Naltrexone: 25 mg nightly for 3 nights, then increase to 50 mg nightly    Continue psychotherapy with Maite Quinn at [facility]    Continue all other medical directions per primary care provider.     Continue all other medications as reviewed per electronic medical record today.      Safety plan reviewed. To the Emergency Department as needed or call after hours crisis line at [phone] or [phone]. [place] Crisis Text Line: Text [place] to 589667  or  Suicide LifeLine Chat: suicide[place]ican[place].org/chat/    Schedule an appointment with me in 4 weeks or sooner as needed.  Call [facility] at [phone] to schedule.    Follow up with primary care provider as planned or for acute medical concerns.    Call the psychiatric nurse line with medication questions or concerns at [phone].    [employer] may be used to communicate with your provider, but this is not intended to be used for emergencies.          Crisis Resources:    National Suicide Prevention Lifeline: 419.327.2215 (TTY: 736.679.9892). Call anytime for help.  (www.suicidepreventionlifeline.org)  National [place] [employer] Mental Illness (www.ruby.org): 489.131.8043 or 317-426-6074.   Mental Health Association (www.mentalhealth.org): 826.481.9151 or 310-163-4967.  [place] Crisis Text Line: Text [place] to 613812  Suicide LifeLine Chat: suicide[place].org/chat    Administrative Billing:   Time spent with patient was 30 minutes and greater than 50% of time or 20 minutes was spent in counseling and coordination of care regarding above diagnoses and treatment plan.    Patient Status:  Patient will continue to be seen for ongoing consultation and stabilization.    Signed:   Clayton Hale CNP   Psychiatry

## 2019-06-14 ASSESSMENT — ANXIETY QUESTIONNAIRES: GAD7 TOTAL SCORE: 0

## 2019-06-14 ASSESSMENT — PATIENT HEALTH QUESTIONNAIRE - PHQ9: SUM OF ALL RESPONSES TO PHQ QUESTIONS 1-9: 6

## 2019-06-21 ENCOUNTER — OFFICE VISIT (OUTPATIENT)
Dept: PSYCHOLOGY | Facility: CLINIC | Age: 54
End: 2019-06-21
Payer: COMMERCIAL

## 2019-06-21 DIAGNOSIS — F41.1 GENERALIZED ANXIETY DISORDER: ICD-10-CM

## 2019-06-21 DIAGNOSIS — F33.1 MAJOR DEPRESSIVE DISORDER, RECURRENT EPISODE, MODERATE (H): Primary | ICD-10-CM

## 2019-06-21 DIAGNOSIS — F43.23 ADJUSTMENT DISORDER WITH MIXED ANXIETY AND DEPRESSED MOOD: ICD-10-CM

## 2019-06-21 PROCEDURE — 90834 PSYTX W PT 45 MINUTES: CPT | Performed by: SOCIAL WORKER

## 2019-06-21 NOTE — PROGRESS NOTES
Progress Note    Client Name: Jeanna Steel  Date: 6/21/2019         Service Type: Individual  Video Visit: No     Session Start Time: 1:00  Session End Time: 1:45     Session Length: 45 min    Session #: 7    Attendees: Client attended alone     Treatment Plan Last Reviewed: 3/15/2019; 6/21/2019  PHQ-9 / JESSICA-7 : 5/10/2019    DATA  Interactive Complexity: No  Crisis: No       Progress Since Last Session (Related to Symptoms / Goals / Homework):   Symptoms: No change client reported mood is stable    Homework: Partially completed client reported continued mindfulness      Episode of Care Goals: Satisfactory progress - ACTION (Actively working towards change); Intervened by reinforcing change plan / affirming steps taken     Current / Ongoing Stressors and Concerns:   Ongoing: Client reports increased depression symptoms while caring for elderly parents.              Current: Client reported that she had continued practicing mindful drinking and that she has decreased to 2-3 drinks a night. She stated that by observing how she feels around drinking it helps her to not drink more than she would like. Client reported that she started taking naltrexone and that she found it to be helpful in reducing her drinking and cravings. She said that she had been feeling stressed with her parent's care lately and that she is supposed to take two road trips with them regarding some of their property. Discussed whether the client's parents are able to make the trips and whether it was necessary for her parents to go. Identified possible resources client can use at her parent's assisted living facility to help her with them. Reviewed and updated treatment plan.     Treatment Objective(s) Addressed in This Session:   Identify negative self-talk and behaviors: challenge core beliefs, myths, and actions  Improve concentration, focus, and mindfulness in daily activities         Intervention:   Motivational Interviewing    MI Intervention: Expressed Empathy/Understanding, Supported Autonomy, Collaboration, Evocation, Permission to raise concern or advise, Open-ended questions, Reflections: simple and complex, Change talk (evoked) and Reframe     Change Talk Expressed by the Patient: Need to change Committment to change Activation    Provider Response to Change Talk: E - Evoked more info from patient about behavior change, A - Affirmed patient's thoughts, decisions, or attempts at behavior change, R - Reflected patient's change talk and S - Summarized patient's change talk statements          ASSESSMENT: Current Emotional / Mental Status (status of significant symptoms):   Risk status (Self / Other harm or suicidal ideation)   Client denies current fears or concerns for personal safety.   Client denies current or recent suicidal ideation or behaviors.   Client denies current or recent homicidal ideation or behaviors.   Client denies current or recent self injurious behavior or ideation.   Client denies other safety concerns.   Client Patient reports there has been no change in risk factors since their last session.     Client Patient reports there has been no change in protective factors since their last session.     Recommended that patient call 911 or go to the local ED should there be a change in any of these risk factors.     Appearance:   Appropriate    Eye Contact:   Good    Psychomotor Behavior: Normal    Attitude:   Cooperative    Orientation:   All   Speech    Rate / Production: Normal     Volume:  Normal    Mood:    Anxious client presented as anxious talking about her parents   Affect:    Appropriate    Thought Content:  Clear    Thought Form:  Coherent  Logical    Insight:    Good      Medication Review:   No changes to current psychiatric medication(s)     Medication Compliance:   Yes     Changes in Health Issues:   None reported     Chemical Use Review:   Substance Use:  Problem use continues with no change since last session, Preparatory  Provided encouragement towards sobriety  Provided support and affirmation for steps taken towards sobriety         Tobacco Use: No current tobacco use.      Diagnosis:  1. Major depressive disorder, recurrent episode, moderate (H)    2. Generalized anxiety disorder    3. Adjustment disorder with mixed anxiety and depressed mood        Collateral Reports Completed:   Not Applicable    PLAN: (Client Tasks / Therapist Tasks / Other)  Client will continue practicing mindful drinking, talk with  at her parent's assisted living facility and return for therapy in two weeks.        Maite SIMMONS, [facility] 6/21/2019  Note reviewed and clinical supervision by TROY Cohen [facility] 6/26/2019      _____________________________________________________________________    Treatment Plan    Client's Name: Jeanna Steel  YOB: 1965    Date: 3/15/2019; 6/21/2019    DSM-V Diagnoses: 296.32 (F33.1) Major Depressive Disorder, Recurrent Episode, Moderate _, 300.02 (F41.1) Generalized Anxiety Disorder or Adjustment Disorders  309.28 (F43.23) With mixed anxiety and depressed mood  Psychosocial / Contextual Factors: Client reports increased symptoms of depression and anxiety while caring for her elderly parents  WHODAS: see intake    Referral / Collaboration:  Referral to another professional/service is not indicated at this time..    Anticipated number of session or this episode of care: 8-12      MeasurableTreatment Goal(s) related to diagnosis / functional impairment(s)  Goal 1: Client will reduce alcohol use from 5 standard drinks a day to 2 standard drinks a week in the next three months and client reporting use of three new coping skills to manage stress in the evenings.    I will know I've met my goal when I'm only drinking two drinks and going to bed earlier.      Objective #A (Client Action)    Client will identify at  least three consequences of maladaptive drinking.  Status: Continued - Date(s):6/21/2019     Intervention(s)  Therapist will assign homework to identify impact of drinking  provide support.    Objective #B  Client will identify three coping skills to replace drinking .  Status: Continued - Date(s): 6/21/2019      Intervention(s)  Therapist will assign homework to practice coping skills  teach coping skills.    Objective #C  Client will identify whether she needs further support to reduce alcohol use.  Status: Continued - Date(s): 6/21/2019      Intervention(s)  Therapist will provide support and referrals as needed, education on alcohol use.      Goal 2: Client will reduce symptoms of depression as evidenced by PHQ-9 reducing from 6 to 3 and client reporting increased motivation.    I will know I've met my goal when I feel like I have my own life again.      Objective #A (Client Action)    Status: Continued - Date(s): 6/21/2019      Client will Increase interest, engagement, and pleasure in doing things  Decrease frequency and intensity of feeling down, depressed, hopeless.    Intervention(s)  Therapist will teach behavioral activation.    Objective #B  Client will Identify negative self-talk and behaviors: challenge core beliefs, myths, and actions.    Status: Continued - Date(s): 6/21/2019      Intervention(s)  Therapist will teach CBT skills.    Objective #C  Client will Improve concentration, focus, and mindfulness in daily activities .  Status: Continued - Date(s): 6/21/2019      Intervention(s)  Therapist will teach mindfulness skills.      Goal 3: Client will report increased acceptance towards her decision about having children as evidenced by client reporting use of effective coping skills when feeling distress or regret.    I will know I've met my goal when I can accept .      Objective #A (Client Action)    Status: Continued - Date(s):  6/21/2019     Client will use acceptance skills when feeling  willful.    Intervention(s)  Therapist will teach acceptance DBT skills.    Objective #B  Client will identify coping skills that reduce distress when grieving.    Status: Continued - Date(s): 6/21/2019      Intervention(s)  Therapist will teach coping skills, self-soothing.      Client has reviewed and agreed to the above plan.      Maite SIMMONS, LGSW June 21, 2019  Note reviewed and clinical supervision by TROY Cohen [facility] 6/26/2019

## 2019-06-24 ASSESSMENT — COLUMBIA-SUICIDE SEVERITY RATING SCALE - C-SSRS
2. HAVE YOU ACTUALLY HAD ANY THOUGHTS OF KILLING YOURSELF IN THE PAST MONTH?: NO
1. IN THE PAST MONTH, HAVE YOU WISHED YOU WERE DEAD OR WISHED YOU COULD GO TO SLEEP AND NOT WAKE UP?: NO
6. HAVE YOU EVER DONE ANYTHING, STARTED TO DO ANYTHING, OR PREPARED TO DO ANYTHING TO END YOUR LIFE?: NO

## 2019-07-02 ENCOUNTER — OFFICE VISIT (OUTPATIENT)
Dept: PSYCHOLOGY | Facility: CLINIC | Age: 54
End: 2019-07-02
Payer: COMMERCIAL

## 2019-07-02 DIAGNOSIS — F33.1 MAJOR DEPRESSIVE DISORDER, RECURRENT EPISODE, MODERATE (H): Primary | ICD-10-CM

## 2019-07-02 DIAGNOSIS — F41.1 GENERALIZED ANXIETY DISORDER: ICD-10-CM

## 2019-07-02 DIAGNOSIS — F43.23 ADJUSTMENT DISORDER WITH MIXED ANXIETY AND DEPRESSED MOOD: ICD-10-CM

## 2019-07-02 PROCEDURE — 90834 PSYTX W PT 45 MINUTES: CPT | Performed by: SOCIAL WORKER

## 2019-07-02 ASSESSMENT — PATIENT HEALTH QUESTIONNAIRE - PHQ9: SUM OF ALL RESPONSES TO PHQ QUESTIONS 1-9: 6

## 2019-07-02 NOTE — PROGRESS NOTES
Progress Note    Client Name: Jeanna Steel  Date: 7/2/2019         Service Type: Individual  Video Visit: No     Session Start Time: 11:00  Session End Time: 11:45     Session Length: 45 min    Session #: 8    Attendees: Client attended alone     Treatment Plan Last Reviewed: 3/15/2019; 6/21/2019  PHQ-9 / JESSICA-7 : 5/10/2019    DATA  Interactive Complexity: No  Crisis: No       Progress Since Last Session (Related to Symptoms / Goals / Homework):   Symptoms: Improving client reported slight improved mood    Homework: Partially completed client reported using mindful drinking      Episode of Care Goals: Satisfactory progress - ACTION (Actively working towards change); Intervened by reinforcing change plan / affirming steps taken     Current / Ongoing Stressors and Concerns:   Ongoing: Client reports increased depression symptoms while caring for elderly parents.              Current: Client reported that she has continued to be mindful about her drinking and that while she is no longer taking Naltrexone she feels it was beneficial. She stated that she is still drinking more than she would like she is feeling more in control. Client discussed getting more involved in her art and feeling like she is more motivated. She reported continued difficulty in getting along with her parents but is feeling more acceptance about having to on trips with them. Client described recent interactions with her sister-in-law and her anxiety about having her brother and his wife over for dinner in the coming week. Explored how the client can identify and address cognitive distortions that she has when interacting with her sister-in-law.     Treatment Objective(s) Addressed in This Session:   Identify negative self-talk and behaviors: challenge core beliefs, myths, and actions  Improve concentration, focus, and mindfulness in daily activities        Intervention:   Motivational  Interviewing    MI Intervention: Expressed Empathy/Understanding, Supported Autonomy, Collaboration, Evocation, Permission to raise concern or advise, Open-ended questions, Reflections: simple and complex, Change talk (evoked) and Reframe     Change Talk Expressed by the Patient: Need to change Committment to change Activation    Provider Response to Change Talk: E - Evoked more info from patient about behavior change, A - Affirmed patient's thoughts, decisions, or attempts at behavior change, R - Reflected patient's change talk and S - Summarized patient's change talk statements          ASSESSMENT: Current Emotional / Mental Status (status of significant symptoms):   Risk status (Self / Other harm or suicidal ideation)   Client denies current fears or concerns for personal safety.   Client denies current or recent suicidal ideation or behaviors.   Client denies current or recent homicidal ideation or behaviors.   Client denies current or recent self injurious behavior or ideation.   Client denies other safety concerns.   Client Patient reports there has been no change in risk factors since their last session.     Client Patient reports there has been no change in protective factors since their last session.     Recommended that patient call 911 or go to the local ED should there be a change in any of these risk factors.     Appearance:   Appropriate    Eye Contact:   Good    Psychomotor Behavior: Normal    Attitude:   Cooperative    Orientation:   All   Speech    Rate / Production: Normal     Volume:  Normal    Mood:    Anxious  client presented as anxious talking about her sister-in-law   Affect:    Appropriate    Thought Content:  Clear    Thought Form:  Coherent  Logical    Insight:    Good      Medication Review:   No changes to current psychiatric medication(s)     Medication Compliance:   Yes     Changes in Health Issues:   None reported     Chemical Use Review:   Substance Use: Problem use continues with no  change since last session, Preparatory  Provided encouragement towards sobriety  Provided support and affirmation for steps taken towards sobriety         Tobacco Use: No current tobacco use.      Diagnosis:  1. Major depressive disorder, recurrent episode, moderate (H)    2. Generalized anxiety disorder    3. Adjustment disorder with mixed anxiety and depressed mood        Collateral Reports Completed:   Not Applicable    PLAN: (Client Tasks / Therapist Tasks / Other)  Client will identify when engaging in cognitive distortions, practice acceptance and holding boundaries with family and return for therapy in two weeks.        Maite Quinn MSW, [facility] 7/2/2019  Note reviewed and clinical supervision by TROY Cohen [facility] 7/15/2019      _____________________________________________________________________    Treatment Plan    Client's Name: Jeanna Steel  YOB: 1965    Date: 3/15/2019; 6/21/2019    DSM-V Diagnoses: 296.32 (F33.1) Major Depressive Disorder, Recurrent Episode, Moderate _, 300.02 (F41.1) Generalized Anxiety Disorder or Adjustment Disorders  309.28 (F43.23) With mixed anxiety and depressed mood  Psychosocial / Contextual Factors: Client reports increased symptoms of depression and anxiety while caring for her elderly parents  WHODAS: see intake    Referral / Collaboration:  Referral to another professional/service is not indicated at this time..    Anticipated number of session or this episode of care: 8-12      MeasurableTreatment Goal(s) related to diagnosis / functional impairment(s)  Goal 1: Client will reduce alcohol use from 5 standard drinks a day to 2 standard drinks a week in the next three months and client reporting use of three new coping skills to manage stress in the evenings.    I will know I've met my goal when I'm only drinking two drinks and going to bed earlier.      Objective #A (Client Action)    Client will identify at least three consequences of  maladaptive drinking.  Status: Continued - Date(s):6/21/2019     Intervention(s)  Therapist will assign homework to identify impact of drinking  provide support.    Objective #B  Client will identify three coping skills to replace drinking .  Status: Continued - Date(s): 6/21/2019      Intervention(s)  Therapist will assign homework to practice coping skills  teach coping skills.    Objective #C  Client will identify whether she needs further support to reduce alcohol use.  Status: Continued - Date(s): 6/21/2019      Intervention(s)  Therapist will provide support and referrals as needed, education on alcohol use.      Goal 2: Client will reduce symptoms of depression as evidenced by PHQ-9 reducing from 6 to 3 and client reporting increased motivation.    I will know I've met my goal when I feel like I have my own life again.      Objective #A (Client Action)    Status: Continued - Date(s): 6/21/2019      Client will Increase interest, engagement, and pleasure in doing things  Decrease frequency and intensity of feeling down, depressed, hopeless.    Intervention(s)  Therapist will teach behavioral activation.    Objective #B  Client will Identify negative self-talk and behaviors: challenge core beliefs, myths, and actions.    Status: Continued - Date(s): 6/21/2019      Intervention(s)  Therapist will teach CBT skills.    Objective #C  Client will Improve concentration, focus, and mindfulness in daily activities .  Status: Continued - Date(s): 6/21/2019      Intervention(s)  Therapist will teach mindfulness skills.      Goal 3: Client will report increased acceptance towards her decision about having children as evidenced by client reporting use of effective coping skills when feeling distress or regret.    I will know I've met my goal when I can accept .      Objective #A (Client Action)    Status: Continued - Date(s):  6/21/2019     Client will use acceptance skills when feeling  willful.    Intervention(s)  Therapist will teach acceptance DBT skills.    Objective #B  Client will identify coping skills that reduce distress when grieving.    Status: Continued - Date(s): 6/21/2019      Intervention(s)  Therapist will teach coping skills, self-soothing.      Client has reviewed and agreed to the above plan.      Maite SIMMONS, LGSW June 21, 2019  Note reviewed and clinical supervision by TROY Cohen [facility] 6/26/2019

## 2019-07-07 ENCOUNTER — APPOINTMENT (OUTPATIENT)
Dept: CT IMAGING | Facility: CLINIC | Age: 54
End: 2019-07-07
Attending: EMERGENCY MEDICINE
Payer: COMMERCIAL

## 2019-07-07 ENCOUNTER — NURSE TRIAGE (OUTPATIENT)
Dept: NURSING | Facility: CLINIC | Age: 54
End: 2019-07-07

## 2019-07-07 ENCOUNTER — HOSPITAL ENCOUNTER (EMERGENCY)
Facility: CLINIC | Age: 54
Discharge: HOME | End: 2019-07-07
Attending: EMERGENCY MEDICINE | Admitting: EMERGENCY MEDICINE
Payer: COMMERCIAL

## 2019-07-07 VITALS
HEART RATE: 75 BPM | BODY MASS INDEX: 29.81 KG/M2 | WEIGHT: 184.7 LBS | TEMPERATURE: 98.4 F | OXYGEN SATURATION: 98 % | RESPIRATION RATE: 18 BRPM | SYSTOLIC BLOOD PRESSURE: 117 MMHG | DIASTOLIC BLOOD PRESSURE: 82 MMHG

## 2019-07-07 DIAGNOSIS — N83.202 CYST OF LEFT OVARY: ICD-10-CM

## 2019-07-07 DIAGNOSIS — R10.32 ABDOMINAL PAIN, LEFT LOWER QUADRANT: ICD-10-CM

## 2019-07-07 LAB
ALBUMIN SERPL-MCNC: 3.5 G/DL (ref 3.4–5)
ALBUMIN UR-MCNC: NEGATIVE MG/DL
ALP SERPL-CCNC: 64 U/L (ref 40–150)
ALT SERPL W P-5'-P-CCNC: 27 U/L (ref 0–50)
ANION GAP SERPL CALCULATED.3IONS-SCNC: 7 MMOL/L (ref 3–14)
APPEARANCE UR: CLEAR
AST SERPL W P-5'-P-CCNC: 18 U/L (ref 0–45)
BACTERIA #/AREA URNS HPF: ABNORMAL /HPF
BASOPHILS # BLD AUTO: 0.1 10E9/L (ref 0–0.2)
BASOPHILS NFR BLD AUTO: 0.8 %
BILIRUB SERPL-MCNC: 0.1 MG/DL (ref 0.2–1.3)
BILIRUB UR QL STRIP: NEGATIVE
BUN SERPL-MCNC: 14 MG/DL (ref 7–30)
CALCIUM SERPL-MCNC: 8.3 MG/DL (ref 8.5–10.1)
CHLORIDE SERPL-SCNC: 108 MMOL/L (ref 94–109)
CO2 SERPL-SCNC: 25 MMOL/L (ref 20–32)
COLOR UR AUTO: YELLOW
CREAT SERPL-MCNC: 0.64 MG/DL (ref 0.52–1.04)
DIFFERENTIAL METHOD BLD: ABNORMAL
EOSINOPHIL # BLD AUTO: 0.2 10E9/L (ref 0–0.7)
EOSINOPHIL NFR BLD AUTO: 2.4 %
ERYTHROCYTE [DISTWIDTH] IN BLOOD BY AUTOMATED COUNT: 13.8 % (ref 10–15)
GFR SERPL CREATININE-BSD FRML MDRD: >90 ML/MIN/{1.73_M2}
GLUCOSE SERPL-MCNC: 116 MG/DL (ref 70–99)
GLUCOSE UR STRIP-MCNC: NEGATIVE MG/DL
HCG UR QL: NEGATIVE
HCT VFR BLD AUTO: 37.8 % (ref 35–47)
HGB BLD-MCNC: 11.7 G/DL (ref 11.7–15.7)
HGB UR QL STRIP: NEGATIVE
IMM GRANULOCYTES # BLD: 0 10E9/L (ref 0–0.4)
IMM GRANULOCYTES NFR BLD: 0.2 %
KETONES UR STRIP-MCNC: NEGATIVE MG/DL
LEUKOCYTE ESTERASE UR QL STRIP: ABNORMAL
LYMPHOCYTES # BLD AUTO: 1.9 10E9/L (ref 0.8–5.3)
LYMPHOCYTES NFR BLD AUTO: 22.5 %
MCH RBC QN AUTO: 29.1 PG (ref 26.5–33)
MCHC RBC AUTO-ENTMCNC: 31 G/DL (ref 31.5–36.5)
MCV RBC AUTO: 94 FL (ref 78–100)
MONOCYTES # BLD AUTO: 0.6 10E9/L (ref 0–1.3)
MONOCYTES NFR BLD AUTO: 7.3 %
MUCOUS THREADS #/AREA URNS LPF: PRESENT /LPF
NEUTROPHILS # BLD AUTO: 5.7 10E9/L (ref 1.6–8.3)
NEUTROPHILS NFR BLD AUTO: 66.8 %
NITRATE UR QL: NEGATIVE
NRBC # BLD AUTO: 0 10*3/UL
NRBC BLD AUTO-RTO: 0 /100
PH UR STRIP: 5.5 PH (ref 5–7)
PLATELET # BLD AUTO: 290 10E9/L (ref 150–450)
POTASSIUM SERPL-SCNC: 3.8 MMOL/L (ref 3.4–5.3)
PROT SERPL-MCNC: 7 G/DL (ref 6.8–8.8)
RBC # BLD AUTO: 4.02 10E12/L (ref 3.8–5.2)
RBC #/AREA URNS AUTO: 1 /HPF (ref 0–2)
SODIUM SERPL-SCNC: 139 MMOL/L (ref 133–144)
SOURCE: ABNORMAL
SP GR UR STRIP: 1.02 (ref 1–1.03)
SQUAMOUS #/AREA URNS AUTO: 4 /HPF (ref 0–1)
UROBILINOGEN UR STRIP-MCNC: NORMAL MG/DL (ref 0–2)
WBC # BLD AUTO: 8.5 10E9/L (ref 4–11)
WBC #/AREA URNS AUTO: 1 /HPF (ref 0–5)

## 2019-07-07 PROCEDURE — 74177 CT ABD & PELVIS W/CONTRAST: CPT

## 2019-07-07 PROCEDURE — 81001 URINALYSIS AUTO W/SCOPE: CPT | Performed by: EMERGENCY MEDICINE

## 2019-07-07 PROCEDURE — 81025 URINE PREGNANCY TEST: CPT | Performed by: EMERGENCY MEDICINE

## 2019-07-07 PROCEDURE — 99283 EMERGENCY DEPT VISIT LOW MDM: CPT | Mod: Z6 | Performed by: EMERGENCY MEDICINE

## 2019-07-07 PROCEDURE — 85025 COMPLETE CBC W/AUTO DIFF WBC: CPT | Performed by: EMERGENCY MEDICINE

## 2019-07-07 PROCEDURE — 25000128 H RX IP 250 OP 636: Performed by: STUDENT IN AN ORGANIZED HEALTH CARE EDUCATION/TRAINING PROGRAM

## 2019-07-07 PROCEDURE — 80053 COMPREHEN METABOLIC PANEL: CPT | Performed by: EMERGENCY MEDICINE

## 2019-07-07 PROCEDURE — 99284 EMERGENCY DEPT VISIT MOD MDM: CPT | Mod: 25 | Performed by: EMERGENCY MEDICINE

## 2019-07-07 RX ORDER — IOPAMIDOL 755 MG/ML
112 INJECTION, SOLUTION INTRAVASCULAR ONCE
Status: COMPLETED | OUTPATIENT
Start: 2019-07-07 | End: 2019-07-07

## 2019-07-07 RX ADMIN — IOPAMIDOL 112 ML: 755 INJECTION, SOLUTION INTRAVENOUS at 18:53

## 2019-07-07 ASSESSMENT — ENCOUNTER SYMPTOMS
NECK STIFFNESS: 0
CONFUSION: 0
EYE REDNESS: 0
COLOR CHANGE: 0
HEADACHES: 0
SHORTNESS OF BREATH: 0
FEVER: 0
DIFFICULTY URINATING: 0
DIARRHEA: 1
ARTHRALGIAS: 0
BACK PAIN: 1
ABDOMINAL PAIN: 1

## 2019-07-07 NOTE — ED PROVIDER NOTES
History     Chief Complaint   Patient presents with     Abdominal Pain     HPI  Jeanna Steel is a 53 year old female with a history of anxiety and depression who presents to the Emergency Department today for evaluation of abdominal pain. The patient reports that she developed pain in her lower left quadrant of her abdomen yesterday. She states that her pain came on suddenly. This pain is described to be intense and radiates into her left back. She states that her pain is off and on and will usually last a few seconds at a time. The patient states that today she has been experiencing her pain every 10 minutes. She does report that she did golf on Thursday for the first time in a long time. Her pain does not radiate into her legs. The patient notes that she has had some looser stools the past few days. She also reports that for the past month she states that she will have a sudden need to have a bowel movement after her first meal of the day in the morning and states that 2 times she was not able to make it to the bathroom before having a bowel movement. She states that she has had some darker than normal urine. The patient denies having urinary frequency or dysuria. Her last menstrual period was reported to be on June 11th.  She denies a history of kidney stone or diverticulitis.    I have reviewed the Medications, Allergies, Past Medical and Surgical History, and Social History in the [employer] system.    Past Medical History:   Diagnosis Date     Anxiety      Depressive disorder      Infertility, female      Past Surgical History:   Procedure Laterality Date     BACK SURGERY  2006     ECTOPIC PREGNANCY SURGERY  2003     EYE SURGERY  1969     Family History   Problem Relation Age of Onset     Breast Cancer Mother      Diabetes Father      Cerebrovascular Disease Father      Alzheimer Disease Father      Arthritis Father      Hypertension Father      Breast Cancer Maternal Grandmother 92     Diabetes Maternal  Grandfather      Diabetes Paternal Grandfather      Alcohol/Drug Brother      Breast Cancer Paternal Aunt 65     Cancer Paternal Aunt         Endometrial cancer     Schizophrenia Brother         suicide in 1992     Other - See Comments Brother         Multiple Sclerosis     Schizophrenia Maternal Half-Brother      Social History     Tobacco Use     Smoking status: Former Smoker     Types: Cigarettes     Smokeless tobacco: Never Used   Substance Use Topics     Alcohol use: Yes     Alcohol/week: 7.0 oz     Types: 14 Standard drinks or equivalent per week     Comment: daily - 2-4     No current facility-administered medications for this encounter.      Current Outpatient Medications   Medication     LORazepam (ATIVAN) 0.5 MG tablet     naltrexone (DEPADE/REVIA) 50 MG tablet     sertraline (ZOLOFT) 100 MG tablet     sertraline (ZOLOFT) 25 MG tablet      No Known Allergies     Review of Systems   Constitutional: Negative for fever.   HENT: Negative for congestion.    Eyes: Negative for redness.   Respiratory: Negative for shortness of breath.    Cardiovascular: Negative for chest pain.   Gastrointestinal: Positive for abdominal pain (LLQ) and diarrhea.   Genitourinary: Negative for difficulty urinating.   Musculoskeletal: Positive for back pain. Negative for arthralgias and neck stiffness.   Skin: Negative for color change.   Neurological: Negative for headaches.   Psychiatric/Behavioral: Negative for confusion.     Physical Exam   BP: 125/71  Pulse: 84  Temp: 98.4  F (36.9  C)  Resp: 14  Weight: 83.8 kg (184 lb 11.2 oz)  SpO2: 94 %    Physical Exam   Constitutional: No distress.   HENT:   Head: Atraumatic.   Mouth/Throat: Oropharynx is clear and moist. No oropharyngeal exudate.   Eyes: Pupils are equal, round, and reactive to light. No scleral icterus.   Cardiovascular: Normal heart sounds and intact distal pulses.   Pulmonary/Chest: Breath sounds normal. No respiratory distress.   Abdominal: Soft. Bowel sounds are  normal. She exhibits no distension and no mass. There is tenderness in the left lower quadrant. No hernia.       Musculoskeletal: She exhibits no edema or tenderness.   Skin: Skin is warm. No rash noted. She is not diaphoretic.       ED Course   4:49 PM  The patient was seen and examined by Dr. Daigle in Room 11.       Procedures             Critical Care time:  none             Labs Ordered and Resulted from Time of ED Arrival Up to the Time of Departure from the ED   CBC WITH PLATELETS DIFFERENTIAL - Abnormal; Notable for the following components:       Result Value    MCHC 31.0 (*)     All other components within normal limits   COMPREHENSIVE METABOLIC PANEL - Abnormal; Notable for the following components:    Glucose 116 (*)     Calcium 8.3 (*)     Bilirubin Total 0.1 (*)     All other components within normal limits   ROUTINE UA WITH MICROSCOPIC - Abnormal; Notable for the following components:    Leukocyte Esterase Urine Small (*)     Bacteria Urine Few (*)     Squamous Epithelial /HPF Urine 4 (*)     Mucous Urine Present (*)     All other components within normal limits   HCG QUALITATIVE URINE   PERIPHERAL IV CATHETER            Assessments & Plan (with Medical Decision Making)   Although the patient's pain is intermittent and gone in between episodes she does have mild tenderness in the left lower quadrant.  It is concerning for diverticulitis or ovarian etiology.  She is still having regular periods but has a negative pregnancy test here.  Urinalysis is normal.  As her pain is more reproducible in the front and the back I do not think this is a kidney stone or related to her prior back pain issues.  She was therefore sent for CT scan which appears normal except for a simple cyst in the left ovary consistent with follicular cyst.  I spoke with the radiologist who did not feel that an ultrasound today would .  She will need a follow-up ultrasound to ensure that this resolves.  There is no  enlargement of the ovary in general to suggest torsion and no free fluid.  The patient will call her primary doctor to arrange for the follow-up ultrasound and agrees to return if she has any worse pain or nausea or fever.  At this time she will use ibuprofen to help manage the cyst.    I have reviewed the nursing notes.    I have reviewed the findings, diagnosis, plan and need for follow up with the patient.     Medication List      There are no discharge medications for this visit.       Final diagnoses:   Abdominal pain, left lower quadrant   Cyst of left ovary   IPhil, am serving as a trained medical scribe to document services personally performed by Harry Daigle MD, based on the provider's statements to me.   I, Harry Daigle MD, was physically present and have reviewed and verified the accuracy of this note documented by Phil Melgar.     7/7/2019   [facility], [place], EMERGENCY DEPARTMENT     Chidi Daigle MD  07/08/19 113

## 2019-07-07 NOTE — ED NOTES
"Patient reports that her pain is now \"throbbing\" instead of stabbing.  Patient states that it is tolerable at this time.  "

## 2019-07-07 NOTE — TELEPHONE ENCOUNTER
"Pt started yesterday with a sharp pain in her lower left abdomen, that would occur about once per hour, and then go away. Today now has  occurs about 3 times an hour still sharp and has a lingering dull awarness of the area. Denies vomiting. Passing stool, some looser. Able to carry out ADL's.   No fever. Will see if it progresses or new symptoms start. Will be seen in ED.     Puja Mendieta RN, [facility]      Reason for Disposition    [1] MODERATE pain (e.g., interferes with normal activities) AND [2] pain comes and goes (cramps) AND [3] present > 24 hours  (Exception: pain with Vomiting or Diarrhea - see that Guideline)    Additional Information    Negative: Shock suspected (e.g., cold/pale/clammy skin, too weak to stand, low BP, rapid pulse)    Negative: Difficult to awaken or acting confused (e.g., disoriented, slurred speech)    Negative: Passed out (i.e., lost consciousness, collapsed and was not responding)    Negative: Sounds like a life-threatening emergency to the triager    Negative: Chest pain    Negative: Pain is mainly in upper abdomen  (if needed ask: \"is it mainly above the belly button?\")    Negative: Followed an abdomen (stomach) injury    Negative: [1] Abdominal pain AND [2] pregnant < 20 weeks    Negative: [1] Abdominal pain AND [2] pregnant > 20 weeks    Negative: [1] Abdominal pain AND [2] postpartum < 1 month since delivery    Negative: [1] SEVERE pain (e.g., excruciating) AND [2] present > 1 hour    Negative: [1] SEVERE pain AND [2] age > 60    Negative: [1] Vomiting AND [2] contains red blood or black (\"coffee ground\") material  (Exception: few red streaks in vomit that only happened once)    Negative: Blood in bowel movements   (Exception: blood on surface of BM with constipation)    Negative: Black or tarry bowel movements  (Exception: chronic-unchanged  black-grey bowel movements AND is taking iron pills or Pepto-bismol)    Negative: Patient sounds very sick or weak to the " triager    Negative: [1] MILD-MODERATE pain AND [2] constant AND [3] present > 2 hours    Negative: [1] Vomiting AND [2] abdomen looks much more swollen than usual    Negative: [1] Vomiting AND [2] contains bile (green color)    Negative: White of the eyes have turned yellow (i.e., jaundice)    Negative: [1] Fever > 100.0 F (37.8 C) AND [2] bedridden (e.g., nursing home patient, CVA, chronic illness, recovering from surgery)    Negative: [1] Fever > 100.0 F (37.8 C) AND [2] diabetes mellitus or weak immune system (e.g., HIV positive, cancer chemo, splenectomy, chronic steroids)    Negative: Fever > 103 F (39.4 C)    Negative: [1] Fever > 101 F (38.3 C) AND [2] age > 60    Negative: [1] SEVERE pain AND [2] present < 1 hour    Protocols used: ABDOMINAL PAIN - FEMALE-A-AH

## 2019-07-07 NOTE — ED NOTES
Patient states that she has had stabbing left abdominal pain that would arrive intermittently for the last couple days.  Starting this morning has increased in occurences and severity.  Patient states that with the stabbing pain there is a dull ache that is associated.  Palpable tenderness to the LLQ as well.  Patient states that bowel movements have been normal along with no urinary symptoms.   Patient states that she has been drinking socially on a regular basis with two glasses of wine earlier today.  She is alert and oriented with no other complaints at this time

## 2019-07-07 NOTE — ED TRIAGE NOTES
Lower left abd pain, has had loose stools x 2 day    Pain comes in momentary stabs     VSS    Denies N +V

## 2019-07-07 NOTE — ED AVS SNAPSHOT
[facility], [place], Emergency Department  [address] [place] [id number]  Phone:  [phone]                                    Jeanna Steel   MRN: [id number]    Department:  [facility], Emergency Department   Date of Visit:  7/7/2019           After Visit Summary Signature Page    I have received my discharge instructions, and my questions have been answered. I have discussed any challenges I see with this plan with the nurse or doctor.    ..........................................................................................................................................  Patient/Patient Representative Signature      ..........................................................................................................................................  Patient Representative Print Name and Relationship to Patient    ..................................................               ................................................  Date                                   Time    ..........................................................................................................................................  Reviewed by Signature/Title    ...................................................              ..............................................  Date                                               Time          22EPIC Rev 08/18

## 2019-07-08 NOTE — ED NOTES
Patient discharged with instructions to follow up with primary care for a pelvic ultrasound.  Patient understands need for follow-up and has no questions about whom to contact.  Patient states that her pain is manageable upon discharge and leaves ambulatory and VSS

## 2019-07-08 NOTE — DISCHARGE INSTRUCTIONS
Please make an appointment to follow up with Your Primary Care Provider as soon as possible if not improving.    Follow up with your doctor in the next few weeks for a pelvic ultrasound to evaluate for resolution of the cyst.    Return if worse pain, fever or unable to keep anything down.  Tylenol or ibuprofen for pain.

## 2019-07-18 ENCOUNTER — OFFICE VISIT (OUTPATIENT)
Dept: PSYCHIATRY | Facility: CLINIC | Age: 54
End: 2019-07-18
Payer: COMMERCIAL

## 2019-07-18 VITALS
OXYGEN SATURATION: 96 % | DIASTOLIC BLOOD PRESSURE: 81 MMHG | RESPIRATION RATE: 12 BRPM | HEART RATE: 75 BPM | BODY MASS INDEX: 30.02 KG/M2 | WEIGHT: 186 LBS | SYSTOLIC BLOOD PRESSURE: 117 MMHG

## 2019-07-18 DIAGNOSIS — F41.1 GENERALIZED ANXIETY DISORDER: Primary | ICD-10-CM

## 2019-07-18 DIAGNOSIS — F33.1 MAJOR DEPRESSIVE DISORDER, RECURRENT EPISODE, MODERATE (H): ICD-10-CM

## 2019-07-18 PROCEDURE — 99214 OFFICE O/P EST MOD 30 MIN: CPT | Performed by: NURSE PRACTITIONER

## 2019-07-18 RX ORDER — SERTRALINE HYDROCHLORIDE 100 MG/1
100 TABLET, FILM COATED ORAL DAILY
Qty: 90 TABLET | Refills: 0 | Status: SHIPPED | OUTPATIENT
Start: 2019-07-18 | End: 2019-09-27

## 2019-07-18 RX ORDER — BUPROPION HYDROCHLORIDE 100 MG/1
TABLET, EXTENDED RELEASE ORAL
Qty: 30 TABLET | Refills: 2 | Status: SHIPPED | OUTPATIENT
Start: 2019-07-18 | End: 2019-09-27

## 2019-07-18 ASSESSMENT — ANXIETY QUESTIONNAIRES
5. BEING SO RESTLESS THAT IT IS HARD TO SIT STILL: NOT AT ALL
7. FEELING AFRAID AS IF SOMETHING AWFUL MIGHT HAPPEN: NOT AT ALL
GAD7 TOTAL SCORE: 0
2. NOT BEING ABLE TO STOP OR CONTROL WORRYING: NOT AT ALL
1. FEELING NERVOUS, ANXIOUS, OR ON EDGE: NOT AT ALL
6. BECOMING EASILY ANNOYED OR IRRITABLE: NOT AT ALL
GAD7 TOTAL SCORE: 0
4. TROUBLE RELAXING: NOT AT ALL
7. FEELING AFRAID AS IF SOMETHING AWFUL MIGHT HAPPEN: NOT AT ALL
3. WORRYING TOO MUCH ABOUT DIFFERENT THINGS: NOT AT ALL
GAD7 TOTAL SCORE: 0

## 2019-07-18 ASSESSMENT — PATIENT HEALTH QUESTIONNAIRE - PHQ9
SUM OF ALL RESPONSES TO PHQ QUESTIONS 1-9: 8
SUM OF ALL RESPONSES TO PHQ QUESTIONS 1-9: 8
10. IF YOU CHECKED OFF ANY PROBLEMS, HOW DIFFICULT HAVE THESE PROBLEMS MADE IT FOR YOU TO DO YOUR WORK, TAKE CARE OF THINGS AT HOME, OR GET ALONG WITH OTHER PEOPLE: SOMEWHAT DIFFICULT

## 2019-07-18 NOTE — PROGRESS NOTES
"    Outpatient Psychiatric Progress Note    Name: Jeanna Steel   : 1965                    Primary Care Provider: Karie Queen MD   Therapist: Maite Quinn     PHQ-9 scores:  PHQ-9 SCORE 2019   PHQ-9 Total Score - - -   PHQ-9 Total Score MyChart 6 (Mild depression) - 8 (Mild depression)   PHQ-9 Total Score 6 6 8       JESSICA-7 scores:  JESSICA-7 SCORE 2019   Total Score - - -   Total Score 5 (mild anxiety) 0 (minimal anxiety) 0 (minimal anxiety)   Total Score 5 0 0         Patient Identification:  Patient is a 53 year old year old, partnered / significant other  White American female  who presents for return visit with me.  Patient is currently 2 part times jobs. Patient attended the session alone. Patient prefers to be called: \"Jeanna\".    Interim History:  I last saw Jeanna Steel for outpatient psychiatry Return Visit on 19.     During that appointment, we maintained Zoloft at 150 mg daily, and agreed to a trial of Naltrexone to see if this would help her reduce her evening ETOH use. She was going to try going to walks more, and would continue psychotherapy at [facility].    Current medications include:   Current Outpatient Medications   Medication Sig     LORazepam (ATIVAN) 0.5 MG tablet TAKE 1 TABLET BY MOUTH AS NEEDED FOR ANXIETY. LIMIT TO TWICE PER WEEK.     naltrexone (DEPADE/REVIA) 50 MG tablet Take 1 tablet (50 mg) by mouth daily     sertraline (ZOLOFT) 100 MG tablet 2 tabs daily (Patient taking differently: 2 tabs daily)     sertraline (ZOLOFT) 25 MG tablet      No current facility-administered medications for this visit.        The [place] Prescription Monitoring Program has been reviewed and there are no concerns about diversionary activity for controlled substances at this time.      I was able to review most recent Primary Care Provider, specialty provider, and therapy visit notes that I have access to.     Today, patient " reports she had a mixed experience with Naltrexone, but overall glad she tried it. Took 25 mg qEvening; didn't proceed to 50 mg, as just wanted to see what lower dosage would do. States it did allow her to limit her alcohol to 1 drink with dinner; didn't feel need to continue; would just have seltzer; was shannon blown away by this. However, she felt Naltrexone made her feel a bit subdued; was also oversleeping, and feeling fatigued during day. She stopped Nalterxone, but still felt fatigued. So restarted Naltrexone 25 mg, then felt her sleep was more poor quality/light. Stopped again 10 days ago. Notes her evening drinking has gone back up again.    Patient tearfully relayed ongoing stress with her parents. 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 Sees parents daily in assisted living home, and in their neurocognitive state still believe they're returning back to their home. States she's only one taking care of this. Tends to be everyone's support system and task rabbit; feels she gets burdened with this as she has more of a free schedule. 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 She has been dutiful about [place], sometimes with friends; feels good about that. She talked about this funnel organizer calendar she's elvira using for years to organize her tasks/schedule; works really well for her.    In the end, still feels sense of directionlessness and emptiness, which leads to self-pity, which leads to shame.    She continues Zoloft 150 mg qAM. Used Ativan 0.5 mg twice in past month for episodic anxiety (usually when dealing with parents).        Past Medical History:   Diagnosis Date     Anxiety      Depressive disorder      Infertility, female       has a past medical history of Anxiety, Depressive  disorder, and Infertility, female.    Social history updates:  See above    Substance use updates:  Able to reduce EtOH with Naltrexone  Now back to 3-4 drinks nightly  Tobacco use: No      Vital Signs:   /81 (BP Location: Right arm, Patient Position: Chair, Cuff Size: Adult Large)   Pulse 75   Resp 12   Wt 84.4 kg (186 lb)   LMP 07/09/2019   SpO2 96%   Breastfeeding? No   BMI 30.02 kg/m      Labs:  Most recent laboratory results reviewed and no new labs.     Review of Systems:  10 systems (general, cardiovascular, respiratory, eyes, ENT, endocrine, GI, , M/S, neurological) were reviewed. Most pertinent finding(s) is/are:  all unremarkable.    Mental Status Examination:  Appearance:  awake, alert and adequately groomed  Attitude:  cooperative   Eye Contact:  good  Gait and Station: Normal  Psychomotor Behavior:  intact station, gait and muscle tone  Oriented to:  time, person, and place  Attention Span and Concentration:  Normal  Speech:   clear, coherent  Mood:  depressed  Affect:  appropriate and in normal range  Associations:  no loose associations  Thought Process:  logical, linear and goal oriented  Thought Content:  Appropriate to Interview  Recent and Remote Memory:  intact Not formally assessed. No amnesia.  Fund of Knowledge: appropriate  Insight:  good  Judgment:  intact  Impulse Control:  intact     Suicide Risk Assessment:  Today Jeanna Steel denies suicidal ideation or self-harm impulses. Therefore, based on all available evidence including the factors cited above, Jeanna Steel does not appear to be at imminent risk for self-harm, does not meet criteria for a 72-hr hold, and therefore remains appropriate for ongoing outpatient level of care.  A thorough assessment of risk factors related to suicide and self-harm have been reviewed and are noted above. The patient convincingly denies suicidality on several occasions. Local community safety resources printed and reviewed for  patient to use if needed. There was no deceit detected, and the patient presented in a manner that was believable.      DSM5  Diagnosis:  296.32 (F33.1) Major Depressive Disorder, Recurrent Episode, Moderate _  300.00 (F41.9) Unspecified Anxiety Disorder  Substance-Related & Addictive Disorders Alcohol Use Disorder   303.90 (F10.20) Moderate active      Medical comorbidities include:   Patient Active Problem List    Diagnosis Date Noted     Microscopic hematuria 07/12/2017     Priority: Medium     Vitamin D deficiency 05/19/2015     Priority: Medium     Major depressive disorder, recurrent episode, moderate (H) 06/25/2012     Priority: Medium     Generalized anxiety disorder 06/25/2012     Priority: Medium     Patient is followed by KEARA FRANCO for ongoing prescription of benzodiazepines.  All refills should be approved by this provider, or covering partner.    Medication(s): Ativan. 0.5mg   Maximum quantity per month: 8   TAKE 1 TABLETS BY MOUTH AS NEEDED FOR ANXIETY. LIMIT TO TWICE PER WEEK          Clinic visit frequency required:  Q 6 months    Controlled substance agreement on file: Yes       Date(s): 01/18/2019  Benzodiazepine use reviewed by psychiatry:  None, referral placed on 1/18/2019    Last [facility] website verification:  done on 1/18/2019   [email or web address]               Assessment:  Jeanna Steel reports some good utility in use of Naltrexone to reduce her evening drinking, but seems to have contributed in some ill-defined way towards fatigue and sleep disturbances. We agree, given promise of this medication, to try a quarter tablet (12.5 mg) nightly. We'll maintain Zoloft at 150 mg daily. Given her mood and motivation issues, I did reapproach Wellbutrin idea. She had used this many years ago as a sole agent and felt anxious on it, but if we were to start with a minimal dosage, in conjunction with Zoloft, she might find some therapeutic effect -- she's interested to trial.  She continues to use Ativan appropriately and responsibly.    We discussed boundaries, daily activity, daily structure. She plans to look into some social work assistance with her parents. Additionally, she might look into some professional assistance managing parents estate.    She'll be continuing psychotherapy at [facility].    Medication side effects and alternatives were reviewed. Health promotion activities recommended and reviewed today. All questions addressed. Education and counseling completed regarding risks and benefits of medications and psychotherapy options.      Treatment Plan:    Continue Zoloft 150 mg qAM    Try Naltrexone 12.5 mg qEvening    Start Wellbutrin  mg qAM    May use Ativan 0.5 mg PRN acute anxiety. Do not use with EtOH.    Continue psychotherapy with Maite Quinn at [facility]    Continue all other medical directions per primary care provider.     Continue all other medications as reviewed per electronic medical record today.     Safety plan reviewed. To the Emergency Department as needed or call after hours crisis line at [phone] or [phone]. [place] Crisis Text [place]: Text [place] to 980248  or  Suicide LifeLine Chat: suicidepreventionlifeline.org/chat/    Schedule an appointment with me in 8 weeks or sooner as needed.  Call [facility] at [phone] to schedule.    Follow up with primary care provider as planned or for acute medical concerns.    Call the psychiatric nurse line with medication questions or concerns at [phone].    Therma-Wavehart may be used to communicate with your provider, but this is not intended to be used for emergencies.      Crisis Resources:    National Suicide Prevention Lifeline: 503.145.5806 (TTY: 863.987.5201). Call anytime for help.  (www.suicidepreventionlifeline.org)  National [place] [employer] [place]ness (www.ruby.org): 239.715.2402 or 864-473-5859.   Mental Health Association (www.mentalhealth.org): 202.157.1907 or  681.878.3672.  [place] Crisis Text [place]: Text [place] to 314579  Suicide LifeLine Chat: suicidepreventionlifeline.org/chat      Administrative Billing:   Time spent with patient was 30 minutes and greater than 50% of time or 20 minutes was spent in counseling and coordination of care regarding above diagnoses and treatment plan.    Patient Status:  Patient will continue to be seen for ongoing consultation and stabilization.    Signed:   Clayton Hale CNP   Psychiatry

## 2019-07-19 ASSESSMENT — ANXIETY QUESTIONNAIRES: GAD7 TOTAL SCORE: 0

## 2019-07-19 ASSESSMENT — PATIENT HEALTH QUESTIONNAIRE - PHQ9: SUM OF ALL RESPONSES TO PHQ QUESTIONS 1-9: 8

## 2019-07-29 NOTE — PROGRESS NOTES
SUBJECTIVE:                                                    Jeanna Steel is a 51 year old female who presents to clinic today for the following health issues: Microscopic Hematuria    Ms. Steel present to clinic today for microscopic hematuria. She presented to the ED on 7/8 for fatigue and a UA at that time was significant for trace leukocyte esterase and 3 RBC. No urine culture was performed at that time. On follow up with her PCP on 7/12, repeat UA was significant for 5 RBC. Urine culture at that time was negative. At that time, she had intermittent RLQ abdominal pain and underwent CT non-contrast which showed no hydronephrosis or stones. She presents today for further work up for her hematuria.    Today, she reports feeling well. Her fatigue symptoms have resolved along with her RLQ pain. She has no previous microscopic hematuria and never had an episode of gross hematuria. She reports 1-2 UTIs in her lifetime, none within the last 10 years. She does not endorse dysuria, flank pain, suprapubic pain, increased urinary urgency or frequency. She reports some mild, intermittent leakage that has been gradual in onset since her late 40s but does not interfere with her daily life or require pad use. She denies nocturia. She denies a history of kidney stones. She denies any  trauma but does report a previous D&C.    She denies any family history of  malignancy.    She is an intermittent smoker and goes through around 1 pack per month and has been smoking that much for the last 35 years. She endorses no new sexual partners and has been  to her  for the last 10 years.    ROS:  CONSTITUTIONAL:POSITIVE for fatigue and dizziness following meals  INTEGUMENTARY/SKIN: NEGATIVE for new rash  EYES: NEGATIVE for vision changes or irritation  ENT/MOUTH: NEGATIVE for ear, mouth and throat problems  RESP:NEGATIVE for significant cough or SOB  CV: chest pain/pressure following meals  GI: NEGATIVE for  "nausea, abdominal pain, heartburn, or change in bowel habits  : as above  MUSCULOSKELETAL: NEGATIVE for significant arthralgias or myalgia  PSYCHIATRIC: HX anxiety and HX depression    Past Medical History:   Diagnosis Date     Anxiety      Depressive disorder      Infertility, female      Past Surgical History:   Procedure Laterality Date     BACK SURGERY  2006     ECTOPIC PREGNANCY SURGERY  2003     EYE SURGERY  1969     Family History   Problem Relation Age of Onset     Alcohol/Drug Brother      Breast Cancer Mother      Breast Cancer Maternal Grandmother 92     Breast Cancer Paternal Aunt 65     CANCER Paternal Aunt      Endometrial cancer     DIABETES Father      DIABETES Paternal Grandfather      Schizophrenia Brother      suicide in 1992     Other - See Comments Brother      Multiple Sclerosis     Social History   Substance Use Topics     Smoking status: Former Smoker     Types: Cigarettes     Smokeless tobacco: Never Used     Alcohol use 7.0 oz/week     14 drink(s) per week     OBJECTIVE:                                                    /76  Pulse 74  Ht 1.676 m (5' 6\")  Wt 84.4 kg (186 lb 1.6 oz)  LMP 07/01/2017  BMI 30.04 kg/m2  Body mass index is 30.04 kg/(m^2).    EXAM:    GENERAL APPEARANCE: healthy, alert and no distress  RESP: lungs clear to auscultation - no rales, rhonchi or wheezes  CV: regular rates and rhythm, normal S1 S2, no S3 or S4 and no murmur, click or rub  Abdomen: Abdomen soft, non-tender.  BS normal.  No masses, organomegaly  CVAT: absent  : Deferred  SKIN: no suspicious lesions or rashes    U/A:  7/21/2017  2 RBC, 4 WBC  Small leuk negative nitrites    7/12/2017  5 RBC, 2 WBC  Negative Leuk/nitrites    Diagnostic test results: I personally reviewed:  CT A/P 7/13/2017 without Contrast  IMPRESSION: No renal stones or hydronephrosis bilaterally. Partially  distended urinary bladder.    CYSTO FEMALE    Discussed with patient the alternatives, risks, and procedure. " Questions were answered. Informed consent was obtained for cystoscopy.    July 21, 2017 CYSTOSCOPY:  The patient was brought to the procedures room where she was placed in the supine position.  She was prepped and draped in a sterile fashion.  A #16-French flexible cystoscope was introduced into the urinary bladder.  The anterior urethra, membranous urethra, and bladder neck were negative.   Within the urinary bladder, there was no evidence of stones, tumors, or growths.  The ureteral orifices were well visualized bilaterally and found to have clear efflux of urine. The patient had no trabeculation.  On retroflex view, no lesions were seen.                 ASSESSMENT/PLAN:                                                    No diagnosis found.     Jeanna Steel is a 51 year old woman presenting with microscopic hematuria. Current work up including CT A/P, repeat UA and urine culture were not significant for etiology of hematuria. Work up to be completed today.    Cystoscopy today-no abnormalities seen  Urine Cytology    Follow up in 6 months with CT Urogram        Francis Lynne, MS4    Scribe Disclosure:   I, Francis Lynne, am serving as a scribe; to document services personally performed by Dr. Main- -based on data collection and the provider's statements to me.     Provider Disclosure:  I agree with above History, Review of Systems, Physical exam and Plan.  I have reviewed the content of the documentation and have edited it as needed. I have personally performed the services documented here and the documentation accurately represents those services and the decisions I have made.      Electronically signed by:    Kelly Main MD  [facility] UROLOGY AND [facility] PROSTATE [facility] UROLOGIC [facility]CERS    I spent over 30 minutes with the patient.  Over half this time was spent on counseling for hematuria, this was separate from time spent performing cystoscopy.     Normal rate, regular rhythm.  Heart sounds S1, S2.  No murmurs, rubs or gallops.

## 2019-08-23 ENCOUNTER — OFFICE VISIT (OUTPATIENT)
Dept: PSYCHOLOGY | Facility: CLINIC | Age: 54
End: 2019-08-23
Payer: COMMERCIAL

## 2019-08-23 DIAGNOSIS — F33.1 MAJOR DEPRESSIVE DISORDER, RECURRENT EPISODE, MODERATE (H): Primary | ICD-10-CM

## 2019-08-23 DIAGNOSIS — F41.1 GENERALIZED ANXIETY DISORDER: ICD-10-CM

## 2019-08-23 DIAGNOSIS — F43.23 ADJUSTMENT DISORDER WITH MIXED ANXIETY AND DEPRESSED MOOD: ICD-10-CM

## 2019-08-23 PROCEDURE — 90834 PSYTX W PT 45 MINUTES: CPT | Performed by: SOCIAL WORKER

## 2019-08-23 ASSESSMENT — PATIENT HEALTH QUESTIONNAIRE - PHQ9: SUM OF ALL RESPONSES TO PHQ QUESTIONS 1-9: 9

## 2019-08-23 NOTE — PROGRESS NOTES
Progress Note    Client Name: Jeanna Steel  Date: 8/23/2019         Service Type: Individual  Video Visit: No     Session Start Time: 9:05  Session End Time: 9:45     Session Length: 45 min    Session #: 8    Attendees: Client attended alone     Treatment Plan Last Reviewed: 3/15/2019; 6/21/2019  PHQ-9 / JESSICA-7 : 8/23/2019    DATA  Interactive Complexity: No  Crisis: No       Progress Since Last Session (Related to Symptoms / Goals / Homework):   Symptoms: No change client reported that her mood has been stable    Homework: Partially completed client reported practicing being patient with her parents      Episode of Care Goals: Satisfactory progress - ACTION (Actively working towards change); Intervened by reinforcing change plan / affirming steps taken     Current / Ongoing Stressors and Concerns:   Ongoing: Client reports increased depression symptoms while caring for elderly parents.              Current: Client reported that she had been doing pretty much the same, that she had maintained her level of drinking but that she wasn't feeling less loss in life. She discussed feeling dissastified with her job and wondering what steps she can take to feel more fulfilled in life. 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 She identified that she could work full time for her parents, who are paying her to take care of their home after they moved into assisted living. Client reported that she will be going on road trip with her parents to wrap up the handling of some land they own. Identified how client can get along with her parents and reduce irritability if she becomes frustrated.     Treatment Objective(s) Addressed in This Session:   Identify negative self-talk and behaviors: challenge core beliefs, myths, and actions  Improve concentration, focus, and mindfulness in daily activities         Intervention:   Motivational Interviewing    MI Intervention: Expressed Empathy/Understanding, Supported Autonomy, Collaboration, Evocation, Permission to raise concern or advise, Open-ended questions, Reflections: simple and complex, Change talk (evoked) and Reframe     Change Talk Expressed by the Patient: Need to change Committment to change Activation Taking steps    Provider Response to Change Talk: E - Evoked more info from patient about behavior change, A - Affirmed patient's thoughts, decisions, or attempts at behavior change, R - Reflected patient's change talk and S - Summarized patient's change talk statements          ASSESSMENT: Current Emotional / Mental Status (status of significant symptoms):   Risk status (Self / Other harm or suicidal ideation)   Client denies current fears or concerns for personal safety.   Client denies current or recent suicidal ideation or behaviors.   Client denies current or recent homicidal ideation or behaviors.   Client denies current or recent self injurious behavior or ideation.   Client denies other safety concerns.   Client Patient reports there has been no change in risk factors since their last session.     Client Patient reports there has been no change in protective factors since their last session.     Recommended that patient call 911 or go to the local ED should there be a change in any of these risk factors.     Appearance:   Appropriate    Eye Contact:   Fair    Psychomotor Behavior: Normal    Attitude:   Cooperative    Orientation:   All   Speech    Rate / Production: Normal     Volume:  Normal    Mood:    Anxious  client presented with anxiety considering getting along with her parents   Affect:    Appropriate    Thought Content:  Clear    Thought Form:  Coherent  Logical    Insight:    Good      Medication Review:   No changes to current psychiatric medication(s)     Medication Compliance:   Yes     Changes in Health Issues:   None  reported     Chemical Use Review:   Substance Use: Problem use continues with no change since last session, Preparatory  Provided encouragement towards sobriety  Provided support and affirmation for steps taken towards sobriety         Tobacco Use: No current tobacco use.      Diagnosis:  1. Major depressive disorder, recurrent episode, moderate (H)    2. Generalized anxiety disorder    3. Adjustment disorder with mixed anxiety and depressed mood        Collateral Reports Completed:   Not Applicable    PLAN: (Client Tasks / Therapist Tasks / Other)  Client will identify if she is beginning to demonstrate irritability, practice paced breathing to find patience, identify a date to do her sober 28 days and return for therapy in two weeks.        Maite SIMMONS, [facility] 8/23/2019  Note reviewed and clinical supervision by TROY Hunter [facility] 8/30/2019    _____________________________________________________________________    Treatment Plan    Client's Name: Jeanna Steel  YOB: 1965    Date: 3/15/2019; 6/21/2019    DSM-V Diagnoses: 296.32 (F33.1) Major Depressive Disorder, Recurrent Episode, Moderate _, 300.02 (F41.1) Generalized Anxiety Disorder or Adjustment Disorders  309.28 (F43.23) With mixed anxiety and depressed mood  Psychosocial / Contextual Factors: Client reports increased symptoms of depression and anxiety while caring for her elderly parents  WHODAS: see intake    Referral / Collaboration:  Referral to another professional/service is not indicated at this time..    Anticipated number of session or this episode of care: 8-12      MeasurableTreatment Goal(s) related to diagnosis / functional impairment(s)  Goal 1: Client will reduce alcohol use from 5 standard drinks a day to 2 standard drinks a week in the next three months and client reporting use of three new coping skills to manage stress in the evenings.    I will know I've met my goal when I'm only drinking two drinks and  going to bed earlier.      Objective #A (Client Action)    Client will identify at least three consequences of maladaptive drinking.  Status: Continued - Date(s):6/21/2019     Intervention(s)  Therapist will assign homework to identify impact of drinking  provide support.    Objective #B  Client will identify three coping skills to replace drinking .  Status: Continued - Date(s): 6/21/2019      Intervention(s)  Therapist will assign homework to practice coping skills  teach coping skills.    Objective #C  Client will identify whether she needs further support to reduce alcohol use.  Status: Continued - Date(s): 6/21/2019      Intervention(s)  Therapist will provide support and referrals as needed, education on alcohol use.      Goal 2: Client will reduce symptoms of depression as evidenced by PHQ-9 reducing from 6 to 3 and client reporting increased motivation.    I will know I've met my goal when I feel like I have my own life again.      Objective #A (Client Action)    Status: Continued - Date(s): 6/21/2019      Client will Increase interest, engagement, and pleasure in doing things  Decrease frequency and intensity of feeling down, depressed, hopeless.    Intervention(s)  Therapist will teach behavioral activation.    Objective #B  Client will Identify negative self-talk and behaviors: challenge core beliefs, myths, and actions.    Status: Continued - Date(s): 6/21/2019      Intervention(s)  Therapist will teach CBT skills.    Objective #C  Client will Improve concentration, focus, and mindfulness in daily activities .  Status: Continued - Date(s): 6/21/2019      Intervention(s)  Therapist will teach mindfulness skills.      Goal 3: Client will report increased acceptance towards her decision about having children as evidenced by client reporting use of effective coping skills when feeling distress or regret.    I will know I've met my goal when I can accept .      Objective #A (Client Action)    Status:  Continued - Date(s):  6/21/2019     Client will use acceptance skills when feeling willful.    Intervention(s)  Therapist will teach acceptance DBT skills.    Objective #B  Client will identify coping skills that reduce distress when grieving.    Status: Continued - Date(s): 6/21/2019      Intervention(s)  Therapist will teach coping skills, self-soothing.      Client has reviewed and agreed to the above plan.      Maite SIMMONS, LGSW June 21, 2019  Note reviewed and clinical supervision by TROY Cohen [facility] 6/26/2019

## 2019-09-03 ENCOUNTER — OFFICE VISIT (OUTPATIENT)
Dept: PSYCHOLOGY | Facility: CLINIC | Age: 54
End: 2019-09-03
Payer: COMMERCIAL

## 2019-09-03 DIAGNOSIS — F33.1 MAJOR DEPRESSIVE DISORDER, RECURRENT EPISODE, MODERATE (H): Primary | ICD-10-CM

## 2019-09-03 DIAGNOSIS — F43.23 ADJUSTMENT DISORDER WITH MIXED ANXIETY AND DEPRESSED MOOD: ICD-10-CM

## 2019-09-03 DIAGNOSIS — F41.1 GENERALIZED ANXIETY DISORDER: ICD-10-CM

## 2019-09-03 PROCEDURE — 90834 PSYTX W PT 45 MINUTES: CPT | Performed by: SOCIAL WORKER

## 2019-09-03 ASSESSMENT — PATIENT HEALTH QUESTIONNAIRE - PHQ9: SUM OF ALL RESPONSES TO PHQ QUESTIONS 1-9: 6

## 2019-09-03 NOTE — PROGRESS NOTES
Progress Note    Client Name: Jeanna Steel  Date: 9/3/2019         Service Type: Individual  Video Visit: No     Session Start Time: 10:00  Session End Time: 10:45     Session Length: 45 min    Session #: 9    Attendees: Client attended alone     Treatment Plan Last Reviewed: 3/15/2019; 6/21/2019  PHQ-9 / JESSICA-7 : 9/3/2019    DATA  Interactive Complexity: No  Crisis: No       Progress Since Last Session (Related to Symptoms / Goals / Homework):   Symptoms: Improving client reported less overall emotional distress    Homework: Partially completed client reported using skills while on trip with her parents, did not change drinking habits      Episode of Care Goals: Satisfactory progress - ACTION (Actively working towards change); Intervened by reinforcing change plan / affirming steps taken     Current / Ongoing Stressors and Concerns:   Ongoing: Client reports increased depression symptoms while caring for elderly parents.              Current: Client reported that she had been feeling okay lately and like she had been able to maintain regulated. She described the trip she took with her parents and how at first she had a hard time being patient, but that after the first night she was able to find a balance. 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 She described having a hard time coping with the power dynamic with her . Discussed how client can challenge irritability and be mindful when with him. Client reported continued work towards accepting that she doesn't have children and realizing that she has spent a lot of time being distressed over this fact. Identified how the client can grieve the loss of her dream while finding meaning in her life without children.     Treatment Objective(s) Addressed in This Session:   Identify negative  self-talk and behaviors: challenge core beliefs, myths, and actions  Improve concentration, focus, and mindfulness in daily activities        Intervention:   CBT: Identified cognitive distortions client was engaging in regarding her  and her lack of children. Practiced re-framing distorted thoughts.   Motivational Interviewing    MI Intervention: Expressed Empathy/Understanding, Supported Autonomy, Collaboration, Evocation, Permission to raise concern or advise, Open-ended questions, Reflections: simple and complex, Change talk (evoked) and Reframe     Change Talk Expressed by the Patient: Need to change Committment to change Activation Taking steps    Provider Response to Change Talk: E - Evoked more info from patient about behavior change, A - Affirmed patient's thoughts, decisions, or attempts at behavior change, R - Reflected patient's change talk and S - Summarized patient's change talk statements          ASSESSMENT: Current Emotional / Mental Status (status of significant symptoms):   Risk status (Self / Other harm or suicidal ideation)   Client denies current fears or concerns for personal safety.   Client denies current or recent suicidal ideation or behaviors.   Client denies current or recent homicidal ideation or behaviors.   Client denies current or recent self injurious behavior or ideation.   Client denies other safety concerns.   Client Patient reports there has been no change in risk factors since their last session.     Client Patient reports there has been no change in protective factors since their last session.     Recommended that patient call 911 or go to the local ED should there be a change in any of these risk factors.     Appearance:   Appropriate    Eye Contact:   Fair    Psychomotor Behavior: Normal    Attitude:   Cooperative    Orientation:   All   Speech    Rate / Production: Normal     Volume:  Normal    Mood:    Normal Sad  client appeared to be in a overall good mood,  sadness and tearfulness talking about lack of children   Affect:    Appropriate    Thought Content:  Clear    Thought Form:  Coherent  Logical    Insight:    Good      Medication Review:   No changes to current psychiatric medication(s)     Medication Compliance:   Yes     Changes in Health Issues:   None reported     Chemical Use Review:   Substance Use: Problem use continues with no change since last session, Contemplation  Provided encouragement towards sobriety  Provided support and affirmation for steps taken towards sobriety    Client reported that she would take a week without drinking and revisit whether she wants to continue without drinking     Tobacco Use: No current tobacco use.      Diagnosis:  1. Major depressive disorder, recurrent episode, moderate (H)    2. Generalized anxiety disorder    3. Adjustment disorder with mixed anxiety and depressed mood        Collateral Reports Completed:   Not Applicable    PLAN: (Client Tasks / Therapist Tasks / Other)  Client will practice mindfulness when with her , identify how her life is meaningful even without children, not drink for a week  and return for therapy in two weeks.        Maite SIMMONS, [facility] 9/3/2019  Note reviewed and clinical supervision by TROY Hunter [facility] 9/5/2019    _____________________________________________________________________    Treatment Plan    Client's Name: Jeanna Steel  YOB: 1965    Date: 3/15/2019; 6/21/2019    DSM-V Diagnoses: 296.32 (F33.1) Major Depressive Disorder, Recurrent Episode, Moderate _, 300.02 (F41.1) Generalized Anxiety Disorder or Adjustment Disorders  309.28 (F43.23) With mixed anxiety and depressed mood  Psychosocial / Contextual Factors: Client reports increased symptoms of depression and anxiety while caring for her elderly parents  WHODAS: see intake    Referral / Collaboration:  Referral to another professional/service is not indicated at this  time..    Anticipated number of session or this episode of care: 8-12      MeasurableTreatment Goal(s) related to diagnosis / functional impairment(s)  Goal 1: Client will reduce alcohol use from 5 standard drinks a day to 2 standard drinks a week in the next three months and client reporting use of three new coping skills to manage stress in the evenings.    I will know I've met my goal when I'm only drinking two drinks and going to bed earlier.      Objective #A (Client Action)    Client will identify at least three consequences of maladaptive drinking.  Status: Continued - Date(s):6/21/2019     Intervention(s)  Therapist will assign homework to identify impact of drinking  provide support.    Objective #B  Client will identify three coping skills to replace drinking .  Status: Continued - Date(s): 6/21/2019      Intervention(s)  Therapist will assign homework to practice coping skills  teach coping skills.    Objective #C  Client will identify whether she needs further support to reduce alcohol use.  Status: Continued - Date(s): 6/21/2019      Intervention(s)  Therapist will provide support and referrals as needed, education on alcohol use.      Goal 2: Client will reduce symptoms of depression as evidenced by PHQ-9 reducing from 6 to 3 and client reporting increased motivation.    I will know I've met my goal when I feel like I have my own life again.      Objective #A (Client Action)    Status: Continued - Date(s): 6/21/2019      Client will Increase interest, engagement, and pleasure in doing things  Decrease frequency and intensity of feeling down, depressed, hopeless.    Intervention(s)  Therapist will teach behavioral activation.    Objective #B  Client will Identify negative self-talk and behaviors: challenge core beliefs, myths, and actions.    Status: Continued - Date(s): 6/21/2019      Intervention(s)  Therapist will teach CBT skills.    Objective #C  Client will Improve concentration, focus, and  mindfulness in daily activities .  Status: Continued - Date(s): 6/21/2019      Intervention(s)  Therapist will teach mindfulness skills.      Goal 3: Client will report increased acceptance towards her decision about having children as evidenced by client reporting use of effective coping skills when feeling distress or regret.    I will know I've met my goal when I can accept .      Objective #A (Client Action)    Status: Continued - Date(s):  6/21/2019     Client will use acceptance skills when feeling willful.    Intervention(s)  Therapist will teach acceptance DBT skills.    Objective #B  Client will identify coping skills that reduce distress when grieving.    Status: Continued - Date(s): 6/21/2019      Intervention(s)  Therapist will teach coping skills, self-soothing.      Client has reviewed and agreed to the above plan.      Maite SIMMONS, SW June 21, 2019  Note reviewed and clinical supervision by TROY Cohen [facility] 6/26/2019

## 2019-09-06 ENCOUNTER — TELEPHONE (OUTPATIENT)
Dept: FAMILY MEDICINE | Facility: CLINIC | Age: 54
End: 2019-09-06

## 2019-09-06 NOTE — TELEPHONE ENCOUNTER
PN,   Please see below in SN's absence  No concerns for rabies, right?  So just monitor for s/s of infection?  Patient had TD 8/20/2018  Please advise  Thanks,  Lara OBRIEN RN

## 2019-09-06 NOTE — TELEPHONE ENCOUNTER
No risk for rabies    I think just monitor for any signs or symptoms for infection.  Redness, pus or other other symptoms  Thanks  PN

## 2019-09-06 NOTE — TELEPHONE ENCOUNTER
Reason for call:  Other   Patient called regarding (reason for call): Pt received a scratch from a turtle at 1pm today and her finger bled a little. Does she need any vaccines for this injury?  Additional comments:     Phone number to reach patient:  Cell number on file:    Telephone Information:   Mobile [phone]       Best Time:      Can we leave a detailed message on this number?  YES

## 2019-09-25 ENCOUNTER — OFFICE VISIT (OUTPATIENT)
Dept: PSYCHOLOGY | Facility: CLINIC | Age: 54
End: 2019-09-25
Payer: COMMERCIAL

## 2019-09-25 DIAGNOSIS — F41.1 GENERALIZED ANXIETY DISORDER: ICD-10-CM

## 2019-09-25 DIAGNOSIS — F43.23 ADJUSTMENT DISORDER WITH MIXED ANXIETY AND DEPRESSED MOOD: ICD-10-CM

## 2019-09-25 DIAGNOSIS — F33.1 MAJOR DEPRESSIVE DISORDER, RECURRENT EPISODE, MODERATE (H): Primary | ICD-10-CM

## 2019-09-25 PROCEDURE — 90834 PSYTX W PT 45 MINUTES: CPT | Performed by: SOCIAL WORKER

## 2019-09-25 ASSESSMENT — ANXIETY QUESTIONNAIRES
3. WORRYING TOO MUCH ABOUT DIFFERENT THINGS: NOT AT ALL
2. NOT BEING ABLE TO STOP OR CONTROL WORRYING: NOT AT ALL
5. BEING SO RESTLESS THAT IT IS HARD TO SIT STILL: NOT AT ALL
1. FEELING NERVOUS, ANXIOUS, OR ON EDGE: SEVERAL DAYS
7. FEELING AFRAID AS IF SOMETHING AWFUL MIGHT HAPPEN: NOT AT ALL
6. BECOMING EASILY ANNOYED OR IRRITABLE: SEVERAL DAYS
GAD7 TOTAL SCORE: 2

## 2019-09-25 ASSESSMENT — PATIENT HEALTH QUESTIONNAIRE - PHQ9
SUM OF ALL RESPONSES TO PHQ QUESTIONS 1-9: 5
5. POOR APPETITE OR OVEREATING: NOT AT ALL

## 2019-09-25 NOTE — PROGRESS NOTES
"                                           Progress Note    Client Name: Jeanna Steel  Date: 9/25/2019         Service Type: Individual  Video Visit: No     Session Start Time: 10:00  Session End Time: 10:45     Session Length: 45 min    Session #: 10    Attendees: Client attended alone     Treatment Plan Last Reviewed: 3/15/2019; 6/21/2019; 9/25/2019  PHQ-9 / JESSICA-7 : 9/25/2019    DATA  Interactive Complexity: No  Crisis: No       Progress Since Last Session (Related to Symptoms / Goals / Homework):   Symptoms: Improving client reported reduced depression and anxiety    Homework: Achieved / completed to satisfaction client reported not drinking for 7 days      Episode of Care Goals: Satisfactory progress - ACTION (Actively working towards change); Intervened by reinforcing change plan / affirming steps taken     Current / Ongoing Stressors and Concerns:   Ongoing: Client reports increased depression symptoms while caring for elderly parents.              Current: Client reported that she went a full week without drinking and she found it to be really helpful. She identified feeling more energized, having clearer skin, less puffiness in her face, more motivated and less irritable. Client described struggling a few times at night with not drinking and feeling angry as she felt she was \"not allowed\" to do what she wants. She reported wanting to work towards only drinking three days of the week and not having more than 7 drinks in one week. Discussed how client can maintain her motivation to reduce her overall alcohol consumption. Client reported that she and her  were having conflict while considering how to address issues they are having when intimate. Reviewed client's treatment plan and updated goals.     Treatment Objective(s) Addressed in This Session:   Identify negative self-talk and behaviors: challenge core beliefs, myths, and actions  Improve concentration, focus, and mindfulness in daily " activities        Intervention:   DBT: Identified how client's self-respect increased when she upheld her values when she wasn't drinking; discussed how client can identify new coping skills to meet her needs rather than using alcohol; Emphasized how client's physical well-being is connected to her mental health   Motivational Interviewing    MI Intervention: Expressed Empathy/Understanding, Supported Autonomy, Collaboration, Evocation, Permission to raise concern or advise, Open-ended questions, Reflections: simple and complex, Change talk (evoked) and Reframe     Change Talk Expressed by the Patient: Need to change Committment to change Activation Taking steps    Provider Response to Change Talk: E - Evoked more info from patient about behavior change, A - Affirmed patient's thoughts, decisions, or attempts at behavior change, R - Reflected patient's change talk and S - Summarized patient's change talk statements          ASSESSMENT: Current Emotional / Mental Status (status of significant symptoms):   Risk status (Self / Other harm or suicidal ideation)   Client denies current fears or concerns for personal safety.   Client denies current or recent suicidal ideation or behaviors.   Client denies current or recent homicidal ideation or behaviors.   Client denies current or recent self injurious behavior or ideation.   Client denies other safety concerns.   Client Patient reports there has been no change in risk factors since their last session.     Client Patient reports there has been a chance in protective factors since their last session.  not drinking for 7 days   Recommended that patient call 911 or go to the local ED should there be a change in any of these risk factors.     Appearance:   Appropriate    Eye Contact:   Fair    Psychomotor Behavior: Normal    Attitude:   Cooperative    Orientation:   All   Speech    Rate / Production: Normal     Volume:  Normal    Mood:    Irritable  Normal client appeared to  be hopeful, presented with some irritability talking about conflict in relationship   Affect:    Appropriate    Thought Content:  Clear    Thought Form:  Coherent  Logical    Insight:    Fair      Medication Review:   No changes to current psychiatric medication(s)     Medication Compliance:   Yes     Changes in Health Issues:   None reported     Chemical Use Review:   Substance Use: decrease in alcohol .  Patient reports frequency of use two drinks a day.  Preparatory  Patient assessed present costs and future losses as a result of substance use  Provided encouragement towards sobriety  Provided support and affirmation for steps taken towards sobriety     Client reported wanting to engage in drinking on Friday, Saturday and Sundays, not to drink the rest of the week.     Tobacco Use: No current tobacco use.      Diagnosis:  1. Major depressive disorder, recurrent episode, moderate (H)    2. Generalized anxiety disorder    3. Adjustment disorder with mixed anxiety and depressed mood        Collateral Reports Completed:   Not Applicable    PLAN: (Client Tasks / Therapist Tasks / Other)  Client will not drink for four days of the week, talk with her  about how the two of them can address the problem and return for therapy in two weeks.        Maite SIMMONS, [facility] 9/25/2019  Note reviewed and clinical supervision by TROY Hunter [facility] 10/1/2019  _____________________________________________________________________    Treatment Plan    Client's Name: Jeanna Steel  YOB: 1965    Date: 3/15/2019; 6/21/2019; 9/25/2019    DSM-V Diagnoses: 296.32 (F33.1) Major Depressive Disorder, Recurrent Episode, Moderate _, 300.02 (F41.1) Generalized Anxiety Disorder or Adjustment Disorders  309.28 (F43.23) With mixed anxiety and depressed mood  Psychosocial / Contextual Factors: Client reports continued symptoms of depression and anxiety while caring for her elderly parents and drinking on a daily  basis.  WHODAS: see intake    Referral / Collaboration:  Referral to another professional/service is not indicated at this time..    Anticipated number of session or this episode of care: 8-12      MeasurableTreatment Goal(s) related to diagnosis / functional impairment(s)  Goal 1: Client will reduce alcohol use from 5 standard drinks a day to 2 standard drinks a week in the next three months and client reporting use of three new coping skills to manage stress in the evenings.    I will know I've met my goal when I'm only drinking two drinks and going to bed earlier.      Objective #A (Client Action)    Client will identify at least three consequences of maladaptive drinking.  Status: Continued - Date(s):  9/25/2019     Intervention(s)  Therapist will assign homework to identify impact of drinking  provide support.    Objective #B  Client will identify three coping skills to replace drinking .  Status: Continued - Date(s): 9/25/2019    Intervention(s)  Therapist will assign homework to practice coping skills  teach coping skills.    Objective #C  Client will identify whether she needs further support to reduce alcohol use.  Status: Continued - Date(s): 9/25/2019     Intervention(s)  Therapist will provide support and referrals as needed, education on alcohol use.      Goal 2: Client will maintain reduced symptoms of depression as evidenced by PHQ-9 remaining under 5 and client reporting continued motivation.    I will know I've met my goal when I feel like I have my own life again.      Objective #A (Client Action)    Status: Continued - Date(s): 9/25/2019      Client will Increase interest, engagement, and pleasure in doing things  Decrease frequency and intensity of feeling down, depressed, hopeless.    Intervention(s)  Therapist will teach behavioral activation.    Objective #B  Client will Identify negative self-talk and behaviors: challenge core beliefs, myths, and actions.    Status: Continued - Date(s):  9/25/2019       Intervention(s)  Therapist will teach CBT skills.    Objective #C  Client will Improve concentration, focus, and mindfulness in daily activities .  Status: Continued - Date(s): 9/25/2019     Intervention(s)  Therapist will teach mindfulness skills.      Goal 3: Client will report increased acceptance towards her decision about having children as evidenced by client reporting use of effective coping skills when feeling distress or regret.    I will know I've met my goal when I can accept .      Objective #A (Client Action)    Status: Continued - Date(s):  9/25/2019     Client will use acceptance skills when feeling willful.    Intervention(s)  Therapist will teach acceptance DBT skills.    Objective #B  Client will identify coping skills that reduce distress when grieving.    Status: Continued - Date(s): 9/25/2019       Intervention(s)  Therapist will teach coping skills, self-soothing.      Client has reviewed and agreed to the above plan.      Maite SIMMONS, [facility] September 25, 2019  Note reviewed and clinical supervision by TROY Hunter [facility] 10/1/2019

## 2019-09-26 ASSESSMENT — ANXIETY QUESTIONNAIRES: GAD7 TOTAL SCORE: 2

## 2019-09-27 ENCOUNTER — OFFICE VISIT (OUTPATIENT)
Dept: PSYCHIATRY | Facility: CLINIC | Age: 54
End: 2019-09-27
Payer: COMMERCIAL

## 2019-09-27 VITALS
WEIGHT: 183 LBS | HEART RATE: 78 BPM | BODY MASS INDEX: 29.54 KG/M2 | DIASTOLIC BLOOD PRESSURE: 83 MMHG | RESPIRATION RATE: 12 BRPM | OXYGEN SATURATION: 98 % | SYSTOLIC BLOOD PRESSURE: 147 MMHG

## 2019-09-27 DIAGNOSIS — F41.1 GENERALIZED ANXIETY DISORDER: Primary | ICD-10-CM

## 2019-09-27 DIAGNOSIS — F33.1 MAJOR DEPRESSIVE DISORDER, RECURRENT EPISODE, MODERATE (H): ICD-10-CM

## 2019-09-27 PROCEDURE — 99213 OFFICE O/P EST LOW 20 MIN: CPT | Performed by: NURSE PRACTITIONER

## 2019-09-27 RX ORDER — BUPROPION HYDROCHLORIDE 100 MG/1
TABLET, EXTENDED RELEASE ORAL
Qty: 90 TABLET | Refills: 0 | Status: SHIPPED | OUTPATIENT
Start: 2019-09-27 | End: 2020-04-23

## 2019-09-27 RX ORDER — SERTRALINE HYDROCHLORIDE 100 MG/1
100 TABLET, FILM COATED ORAL DAILY
Qty: 90 TABLET | Refills: 0 | Status: SHIPPED | OUTPATIENT
Start: 2019-09-27 | End: 2020-02-25

## 2019-09-27 RX ORDER — LORAZEPAM 0.5 MG/1
TABLET ORAL
Qty: 30 TABLET | Refills: 0 | Status: SHIPPED | OUTPATIENT
Start: 2019-09-27 | End: 2020-01-24

## 2019-09-27 ASSESSMENT — ANXIETY QUESTIONNAIRES
4. TROUBLE RELAXING: SEVERAL DAYS
5. BEING SO RESTLESS THAT IT IS HARD TO SIT STILL: NOT AT ALL
7. FEELING AFRAID AS IF SOMETHING AWFUL MIGHT HAPPEN: NOT AT ALL
2. NOT BEING ABLE TO STOP OR CONTROL WORRYING: NOT AT ALL
GAD7 TOTAL SCORE: 2
6. BECOMING EASILY ANNOYED OR IRRITABLE: NOT AT ALL
7. FEELING AFRAID AS IF SOMETHING AWFUL MIGHT HAPPEN: NOT AT ALL
GAD7 TOTAL SCORE: 2
GAD7 TOTAL SCORE: 2
1. FEELING NERVOUS, ANXIOUS, OR ON EDGE: SEVERAL DAYS
3. WORRYING TOO MUCH ABOUT DIFFERENT THINGS: NOT AT ALL

## 2019-09-27 ASSESSMENT — PATIENT HEALTH QUESTIONNAIRE - PHQ9
SUM OF ALL RESPONSES TO PHQ QUESTIONS 1-9: 1
SUM OF ALL RESPONSES TO PHQ QUESTIONS 1-9: 1
10. IF YOU CHECKED OFF ANY PROBLEMS, HOW DIFFICULT HAVE THESE PROBLEMS MADE IT FOR YOU TO DO YOUR WORK, TAKE CARE OF THINGS AT HOME, OR GET ALONG WITH OTHER PEOPLE: NOT DIFFICULT AT ALL

## 2019-09-27 NOTE — PROGRESS NOTES
"    Outpatient Psychiatric Progress Note    Name: Jeanna Steel   : 1965                    Primary Care Provider: Karie Queen MD   Therapist: Maite Quinn     PHQ-9 scores:  PHQ-9 SCORE 9/3/2019 2019 2019   PHQ-9 Total Score - - -   PHQ-9 Total Score MyChart - - 1 (Minimal depression)   PHQ-9 Total Score 6 5 1       JESSICA-7 scores:  JESSICA-7 SCORE 2019   Total Score - - -   Total Score 0 (minimal anxiety) - 2 (minimal anxiety)   Total Score 0 2 2         Patient Identification:  Patient is a 54 year old year old, partnered / significant other  White American female  who presents for return visit with me.  Patient is currently 2 employed part time. Patient attended the session alone. Patient prefers to be called: \"Jeanna\".    Interim History:  I last saw Jeanna Steel for outpatient psychiatry Return Visit on 19.     During that appointment, we reviewed her mixed results with Naltrexone towards alcohol use reduction -- we agreed to try a very low dose of 6.25 mg nightly and see if there was still a practical application. We agreed to continue Zoloft 150 mg daily, and cautiously augment this with low-dose Wellbutrin. She was permitted to continue to use Ativan 0.5 mg PRN. She would continue psychotherapy.    Current medications include:   Current Outpatient Medications   Medication Sig     buPROPion (WELLBUTRIN SR) 100 MG 12 hr tablet 1 tab daily     LORazepam (ATIVAN) 0.5 MG tablet TAKE 1 TABLET BY MOUTH AS NEEDED FOR ANXIETY. LIMIT TO TWICE PER WEEK.     naltrexone (DEPADE/REVIA) 50 MG tablet Take 1 tablet (50 mg) by mouth daily     sertraline (ZOLOFT) 100 MG tablet Take 1 tablet (100 mg) by mouth daily Take with 50 mg daily     sertraline (ZOLOFT) 50 MG tablet Take 1 tablet (50 mg) by mouth daily Take with 100 mg     No current facility-administered medications for this visit.        The [place] Prescription Monitoring Program has been " reviewed and there are no concerns about diversionary activity for controlled substances at this time.      I was able to review most recent Primary Care Provider, specialty provider, and therapy visit notes that I have access to.     Today, patient reports she didn't really find a place to use Naltrexone. However is happy to report she's made real strides towards harm reduction with alcohol. Went an entire week without alcohol as something of an experiment. Realized how much better she felt; mood improved; physically felt better; felt more purposeful with her days; and skin cleared up. She noticed challenges of occupying self during evenings, but feels she made the adjustment.    Had since returned to ETOH use, but has come up with very practical plan to not drink Monday through Thursdays, as does not feel she can entirely abstain. So far, so good. She's feeling optimistic about this.    In decreasing her alcohol use, she's felt that her antidepressants are working more optimally as the should. She did start Wellbutrin  mg qAM 2 months ago. Felt it has helped with mood/energy, and hasn't been anxiogenic like it has in past. Using alongside Zoloft 150 mg daily. She is using Ativan quite infrequently; 1-2 times a month; helpful for episodic anxiety.    Overall, patient feeling good to conclude further psychopharm adjustments. She'll return to PCP. And continue psychotherapy at [facility] which she's find very valuable.      Past Medical History:   Diagnosis Date     Anxiety      Depressive disorder      Infertility, female       has a past medical history of Anxiety, Depressive disorder, and Infertility, female.    Social history updates:  See above    Substance use updates:  See above      Vital Signs:   BP (!) 147/83 (BP Location: Right arm, Patient Position: Chair, Cuff Size: Adult Regular)   Pulse 78   Resp 12   Wt 83 kg (183 lb)   LMP 09/09/2019   SpO2 98%   Breastfeeding? No   BMI 29.54 kg/m       Labs:  Most recent laboratory results reviewed and no new labs.    Review of Systems:  10 systems (general, cardiovascular, respiratory, eyes, ENT, endocrine, GI, , M/S, neurological) were reviewed. Most pertinent finding(s) is/are: escema. The remaining systems are all unremarkable.    Mental Status Examination:  Appearance:  awake, alert and adequately groomed  Attitude:  cooperative   Eye Contact:  good  Gait and Station: Normal  Psychomotor Behavior:  intact station, gait and muscle tone  Oriented to:  time, person, and place  Attention Span and Concentration:  Normal  Speech:   clear, coherent  Mood: good  Affect:  appropriate and in normal range  Associations:  no loose associations  Thought Process:  logical, linear and goal oriented  Thought Content:  Appropriate to Interview  Recent and Remote Memory:  intact Not formally assessed. No amnesia.  Fund of Knowledge: appropriate  Insight:  good  Judgment:  intact  Impulse Control:  intact     Suicide Risk Assessment:  Today Jeanna Steel denies suicidal ideation or self-harm impulses. Therefore, based on all available evidence including the factors cited above, Jeanna Steel does not appear to be at imminent risk for self-harm, does not meet criteria for a 72-hr hold, and therefore remains appropriate for ongoing outpatient level of care.  A thorough assessment of risk factors related to suicide and self-harm have been reviewed and are noted above. The patient convincingly denies suicidality on several occasions. Local community safety resources printed and reviewed for patient to use if needed. There was no deceit detected, and the patient presented in a manner that was believable.      DSM5  Diagnosis:  296.32 (F33.1) Major Depressive Disorder, Recurrent Episode, Moderate _  300.00 (F41.9) Unspecified Anxiety Disorder  Substance-Related & Addictive Disorders Alcohol Use Disorder   303.90 (F10.20) Moderate active      Medical comorbidities  include:   Patient Active Problem List    Diagnosis Date Noted     Microscopic hematuria 07/12/2017     Priority: Medium     Vitamin D deficiency 05/19/2015     Priority: Medium     Major depressive disorder, recurrent episode, moderate (H) 06/25/2012     Priority: Medium     Generalized anxiety disorder 06/25/2012     Priority: Medium     Patient is followed by KEARA FRANCO for ongoing prescription of benzodiazepines.  All refills should be approved by this provider, or covering partner.    Medication(s): Ativan. 0.5mg   Maximum quantity per month: 8   TAKE 1 TABLETS BY MOUTH AS NEEDED FOR ANXIETY. LIMIT TO TWICE PER WEEK          Clinic visit frequency required:  Q 6 months    Controlled substance agreement on file: Yes       Date(s): 01/18/2019  Benzodiazepine use reviewed by psychiatry:  None, referral placed on 1/18/2019    Last [facility] website verification:  done on 1/18/2019   [email or web address]               Assessment:  Jeanna Steel reports making good strides her her harm reduction with alcohol use, and has consequently noted solid benefits to her mental and physical health. She is commended in her efforts and encouraged to continue. In the end, we didn't find a practical application with Naltrexone towards these effects, so this can remain discontinued.    We'll maintain Zoloft 150 mg daily and Wellbutrin  mg daily -- they appear to be serving her well without complication. Further dosage adjustments can be made as warranted in the future. She is permitted to continue to use Ativan PRN. She has historically used this judiciously, appropriately, safely and responsibly, and I trust she will continue to do so.    She is to continue psychotherapy.    The patient is aware of my impending departure from [facility]. He'll continue follow-up with this PCP, with an opportunity to be referred back to [facility] to see one of my colleagues if the circumstances warrant.    Medication side  effects and alternatives were reviewed. Health promotion activities recommended and reviewed today. All questions addressed. Education and counseling completed regarding risks and benefits of medications and psychotherapy options.      Treatment Plan:    Continue Zoloft 150 mg qAM    Continue Wellbutrin  mg qAM    May use Ativan 0.5 mg PRN acute anxiety. Do not use with EtOH.    Continue psychotherapy with Maite Quinn at [facility]    Continue all other medical directions per primary care provider.     Continue all other medications as reviewed per electronic medical record today.     Safety plan reviewed. To the Emergency Department as needed or call after hours crisis line at [phone] or [phone]. [place] Crisis Text Line: Text [place] to 149014  or  Suicide LifeLine Chat: suicideklinify.org/chat/    Follow up with primary care provider as planned or for acute medical concerns.      Crisis Resources:    National Suicide Prevention Lifeline: 326.979.1128 (TTY: 875.575.9129). Call anytime for help.  (www.suicidepreventionlifeline.org)  National [place] on Mental Illness (www.ruby.org): 395.177.7797 or 796-864-9022.   Mental Health Association (www.mentalhealth.org): 164.242.1079 or 369-175-9231.  [place] Crisis Text Line: Text [place] to 616288  Suicide LifeLine Chat: suicideklinify.org/chat      Administrative Billing:   Time spent with patient was 30 minutes and greater than 50% of time or 20 minutes was spent in counseling and coordination of care regarding above diagnoses and treatment plan.    Patient Status:  The patient is being returned to the referring provider for ongoing care and medication prescribing.  The patient can be referred back to this service for further consultation as needed.    Signed:   Clayton Hale [facility]   Psychiatry

## 2019-09-28 ASSESSMENT — ANXIETY QUESTIONNAIRES: GAD7 TOTAL SCORE: 2

## 2019-09-28 ASSESSMENT — PATIENT HEALTH QUESTIONNAIRE - PHQ9: SUM OF ALL RESPONSES TO PHQ QUESTIONS 1-9: 1

## 2019-10-08 ENCOUNTER — OFFICE VISIT (OUTPATIENT)
Dept: PSYCHOLOGY | Facility: CLINIC | Age: 54
End: 2019-10-08
Payer: COMMERCIAL

## 2019-10-08 DIAGNOSIS — F33.1 MAJOR DEPRESSIVE DISORDER, RECURRENT EPISODE, MODERATE (H): Primary | ICD-10-CM

## 2019-10-08 DIAGNOSIS — F41.1 GENERALIZED ANXIETY DISORDER: ICD-10-CM

## 2019-10-08 PROCEDURE — 90834 PSYTX W PT 45 MINUTES: CPT | Performed by: SOCIAL WORKER

## 2019-10-08 NOTE — PROGRESS NOTES
Progress Note    Client Name: Jeanna Steel  Date: 10/8/2019         Service Type: Individual  Video Visit: No     Session Start Time: 10:00  Session End Time: 10:45     Session Length: 45 min    Session #: 10    Attendees: Client attended alone     Treatment Plan Last Reviewed: 3/15/2019; 6/21/2019; 9/25/2019  PHQ-9 / JESSICA-7 : 9/25/2019    DATA  Interactive Complexity: No  Crisis: No       Progress Since Last Session (Related to Symptoms / Goals / Homework):   Symptoms: Improving client reported continued reduced anxiety and depression    Homework: Achieved / completed to satisfaction client reported not drinking for 3-4 days during the week      Episode of Care Goals: Satisfactory progress - ACTION (Actively working towards change); Intervened by reinforcing change plan / affirming steps taken     Current / Ongoing Stressors and Concerns:   Ongoing: Client reports increased depression symptoms while caring for elderly parents.              Current: Client reported that she had continued to have reduced alcohol consumption and feeling the benefits of not drinking as much. She described having frustration at times and wanting to be able to drink as much as she wants, but stated that she has been able to validate her feelings and then accept them. She reported that she has found a new book that has helped her with this frustration. Client stated that she had set a boundary with her parents regarding a trip she is taking for her anniversary with her . She reported that at first her parents were going to join so they could check on a property they have in the area, but that the client said she wanted to spend the time celebrating with her . She reported that she was generally able to reduce personalization with people in her life rather than feeling upset or like she's done something wrong. She identified having a moment of insight when she realized maybe  she hadn't really wanted kids when she was younger, as she hadn't taken any steps towards having them until she wasn't able to anymore.       Treatment Objective(s) Addressed in This Session:   Identify negative self-talk and behaviors: challenge core beliefs, myths, and actions  Improve concentration, focus, and mindfulness in daily activities        Intervention:   DBT: Identified client's successful use of boundary setting and effective communication with her parents and    ACT: Regarding client's grief with not having children, discussed how client can feel her sadness rather than fearing the intensity of her sadness   Motivational Interviewing    MI Intervention: Expressed Empathy/Understanding, Supported Autonomy, Collaboration, Evocation, Permission to raise concern or advise, Open-ended questions, Reflections: simple and complex, Change talk (evoked) and Reframe     Change Talk Expressed by the Patient: Need to change Committment to change Activation Taking steps    Provider Response to Change Talk: E - Evoked more info from patient about behavior change, A - Affirmed patient's thoughts, decisions, or attempts at behavior change, R - Reflected patient's change talk and S - Summarized patient's change talk statements          ASSESSMENT: Current Emotional / Mental Status (status of significant symptoms):   Risk status (Self / Other harm or suicidal ideation)   Client denies current fears or concerns for personal safety.   Client denies current or recent suicidal ideation or behaviors.   Client denies current or recent homicidal ideation or behaviors.   Client denies current or recent self injurious behavior or ideation.   Client denies other safety concerns.   Client Patient reports there has been no change in risk factors since their last session.     Client Patient reports there has been no change in protective factors since their last session.     Recommended that patient call 911 or go to the local  ED should there be a change in any of these risk factors.     Appearance:   Appropriate    Eye Contact:   Fair    Psychomotor Behavior: Normal    Attitude:   Cooperative    Orientation:   All   Speech    Rate / Production: Hyperverbal Client was talkative today    Volume:  Normal    Mood:    Normal Sad  client presented with sadness around not having children   Affect:    Appropriate    Thought Content:  Clear    Thought Form:  Coherent  Logical    Insight:    Fair      Medication Review:   No changes to current psychiatric medication(s)     Medication Compliance:   Yes     Changes in Health Issues:   None reported     Chemical Use Review:   Substance Use: decrease in alcohol .  Patient reports frequency of use not drinking for three to four days a week.  Stage of Change: Preparatory  Provided encouragement towards sobriety  Provided support and affirmation for steps taken towards sobriety     Client reported she was able to reduce her drinking to 3-4 days a week     Tobacco Use: No current tobacco use.      Diagnosis:  1. Major depressive disorder, recurrent episode, moderate (H)    2. Generalized anxiety disorder        Collateral Reports Completed:   Not Applicable    PLAN: (Client Tasks / Therapist Tasks / Other)  Client will continue reading book, be curious about her emotions and return for therapy in two weeks. Therapist will prepare to address discrepancy in wanting children but not having taken steps to have a child.        Maite SIMMONS, [facility] 10/8/2019    Note reviewed and clinical supervision by TROY Hunter [facility] 10/11/2019 ___________________________________________________________________    Treatment Plan    Client's Name: Jeanna Steel  YOB: 1965    Date: 3/15/2019; 6/21/2019; 9/25/2019    DSM-V Diagnoses: 296.32 (F33.1) Major Depressive Disorder, Recurrent Episode, Moderate _, 300.02 (F41.1) Generalized Anxiety Disorder or Adjustment Disorders  309.28 (F43.23) With  mixed anxiety and depressed mood  Psychosocial / Contextual Factors: Client reports continued symptoms of depression and anxiety while caring for her elderly parents and drinking on a daily basis.  WHODAS: see intake    Referral / Collaboration:  Referral to another professional/service is not indicated at this time..    Anticipated number of session or this episode of care: 8-12      MeasurableTreatment Goal(s) related to diagnosis / functional impairment(s)  Goal 1: Client will reduce alcohol use from 5 standard drinks a day to 2 standard drinks a week in the next three months and client reporting use of three new coping skills to manage stress in the evenings.    I will know I've met my goal when I'm only drinking two drinks and going to bed earlier.      Objective #A (Client Action)    Client will identify at least three consequences of maladaptive drinking.  Status: Continued - Date(s):  9/25/2019     Intervention(s)  Therapist will assign homework to identify impact of drinking  provide support.    Objective #B  Client will identify three coping skills to replace drinking .  Status: Continued - Date(s): 9/25/2019    Intervention(s)  Therapist will assign homework to practice coping skills  teach coping skills.    Objective #C  Client will identify whether she needs further support to reduce alcohol use.  Status: Continued - Date(s): 9/25/2019     Intervention(s)  Therapist will provide support and referrals as needed, education on alcohol use.      Goal 2: Client will maintain reduced symptoms of depression as evidenced by PHQ-9 remaining under 5 and client reporting continued motivation.    I will know I've met my goal when I feel like I have my own life again.      Objective #A (Client Action)    Status: Continued - Date(s): 9/25/2019      Client will Increase interest, engagement, and pleasure in doing things  Decrease frequency and intensity of feeling down, depressed,  hopeless.    Intervention(s)  Therapist will teach behavioral activation.    Objective #B  Client will Identify negative self-talk and behaviors: challenge core beliefs, myths, and actions.    Status: Continued - Date(s): 9/25/2019       Intervention(s)  Therapist will teach CBT skills.    Objective #C  Client will Improve concentration, focus, and mindfulness in daily activities .  Status: Continued - Date(s): 9/25/2019     Intervention(s)  Therapist will teach mindfulness skills.      Goal 3: Client will report increased acceptance towards her decision about having children as evidenced by client reporting use of effective coping skills when feeling distress or regret.    I will know I've met my goal when I can accept .      Objective #A (Client Action)    Status: Continued - Date(s):  9/25/2019     Client will use acceptance skills when feeling willful.    Intervention(s)  Therapist will teach acceptance DBT skills.    Objective #B  Client will identify coping skills that reduce distress when grieving.    Status: Continued - Date(s): 9/25/2019       Intervention(s)  Therapist will teach coping skills, self-soothing.      Client has reviewed and agreed to the above plan.      Maite SIMMONS, LGSW September 25, 2019  Note reviewed and clinical supervision by TROY Hunter [facility] 10/1/2019

## 2019-10-09 ASSESSMENT — PATIENT HEALTH QUESTIONNAIRE - PHQ9: SUM OF ALL RESPONSES TO PHQ QUESTIONS 1-9: 1

## 2019-10-22 ENCOUNTER — OFFICE VISIT (OUTPATIENT)
Dept: PSYCHOLOGY | Facility: CLINIC | Age: 54
End: 2019-10-22
Payer: COMMERCIAL

## 2019-10-22 DIAGNOSIS — F33.1 MAJOR DEPRESSIVE DISORDER, RECURRENT EPISODE, MODERATE (H): ICD-10-CM

## 2019-10-22 DIAGNOSIS — F41.1 GENERALIZED ANXIETY DISORDER: Primary | ICD-10-CM

## 2019-10-22 PROCEDURE — 90832 PSYTX W PT 30 MINUTES: CPT | Performed by: SOCIAL WORKER

## 2019-10-22 ASSESSMENT — PATIENT HEALTH QUESTIONNAIRE - PHQ9: SUM OF ALL RESPONSES TO PHQ QUESTIONS 1-9: 3

## 2019-10-22 NOTE — PROGRESS NOTES
Progress Note    Client Name: Jeanna Steel  Date: 10/22/2019         Service Type: Individual  Video Visit: No     Session Start Time: 9:15  Session End Time: 9:45     Session Length: 30 min    Session #: 11    Attendees: Client attended alone     Treatment Plan Last Reviewed: 3/15/2019; 6/21/2019; 9/25/2019  PHQ-9 / JESSICA-7 : 10/22/2019    DATA  Interactive Complexity: No  Crisis: No       Progress Since Last Session (Related to Symptoms / Goals / Homework):   Symptoms: Worsening client identified increased irritability    Homework: Achieved / completed to satisfaction client reported trying to be curious about her emotions      Episode of Care Goals: Satisfactory progress - ACTION (Actively working towards change); Intervened by reinforcing change plan / affirming steps taken     Current / Ongoing Stressors and Concerns:   Ongoing: Client reports increased depression symptoms while caring for elderly parents.              Current: Client reported that she had been struggling with irritability towards her mom lately as she tries to sell some of their property. Discussed how client had been engaging in personalization and in unhelpful communication with her mom. Practiced how client can talk with her parents about decision making and not engage when her mom is argumentative. Client reported that she has resentment towards her mom and feels like her mom has been selfish throughout her life. Discussed how her mom's memory has continued getting worse which is making conversation and decision making more difficult for her. Client reported that she has not kept as strictly to her drinking plan but that she feels more motivated to get back to it.      Treatment Objective(s) Addressed in This Session:   Identify negative self-talk and behaviors: challenge core beliefs, myths, and actions  Improve concentration, focus, and mindfulness in daily activities         Intervention:   DBT: Practiced effective communication skills and how to reduce personalization   Motivational Interviewing    MI Intervention: Expressed Empathy/Understanding, Supported Autonomy, Collaboration, Evocation, Permission to raise concern or advise, Open-ended questions, Reflections: simple and complex, Change talk (evoked) and Reframe     Change Talk Expressed by the Patient: Need to change Committment to change Activation Taking steps    Provider Response to Change Talk: E - Evoked more info from patient about behavior change, A - Affirmed patient's thoughts, decisions, or attempts at behavior change, R - Reflected patient's change talk and S - Summarized patient's change talk statements          ASSESSMENT: Current Emotional / Mental Status (status of significant symptoms):   Risk status (Self / Other harm or suicidal ideation)   Client denies current fears or concerns for personal safety.   Client denies current or recent suicidal ideation or behaviors.   Client denies current or recent homicidal ideation or behaviors.   Client denies current or recent self injurious behavior or ideation.   Client denies other safety concerns.   Client Patient reports there has been no change in risk factors since their last session.     Client Patient reports there has been no change in protective factors since their last session.     Recommended that patient call 911 or go to the local ED should there be a change in any of these risk factors.     Appearance:   Appropriate    Eye Contact:   Fair    Psychomotor Behavior: Normal    Attitude:   Cooperative    Orientation:   All   Speech    Rate / Production: Hyperverbal  Client was talkative today    Volume:  Normal    Mood:    Irritable  Normal client appeared irritable talking about her mom   Affect:    Appropriate    Thought Content:  Clear    Thought Form:  Coherent  Logical    Insight:    Fair      Medication Review:   No changes to current psychiatric  medication(s)     Medication Compliance:   Yes     Changes in Health Issues:   None reported     Chemical Use Review:   Substance Use: decrease in alcohol .  Patient reports frequency of use not drinking for three to four days a week.  Stage of Change: Preparatory  Provided encouragement towards sobriety  Provided support and affirmation for steps taken towards sobriety          Tobacco Use: No current tobacco use.      Diagnosis:  1. Generalized anxiety disorder    2. Major depressive disorder, recurrent episode, moderate (H)        Collateral Reports Completed:   Not Applicable    PLAN: (Client Tasks / Therapist Tasks / Other)  Client will practice challenging personalization with mom and return for therapy in two weeks.         Maite SIMMONS, [facility] 10/22/2019  Note reviewed and clinical supervision [facility]      ___________________________________________________________________    Treatment Plan    Client's Name: Jeanna Steel  YOB: 1965    Date: 3/15/2019; 6/21/2019; 9/25/2019    DSM-V Diagnoses: 296.32 (F33.1) Major Depressive Disorder, Recurrent Episode, Moderate _, 300.02 (F41.1) Generalized Anxiety Disorder or Adjustment Disorders  309.28 (F43.23) With mixed anxiety and depressed mood  Psychosocial / Contextual Factors: Client reports continued symptoms of depression and anxiety while caring for her elderly parents and drinking on a daily basis.  WHODAS: see intake    Referral / Collaboration:  Referral to another professional/service is not indicated at this time..    Anticipated number of session or this episode of care: 8-12      MeasurableTreatment Goal(s) related to diagnosis / functional impairment(s)  Goal 1: Client will reduce alcohol use from 5 standard drinks a day to 2 standard drinks a week in the next three months and client reporting use of three new coping skills to manage stress in the evenings.    I will know I've met my goal when I'm  only drinking two drinks and going to bed earlier.      Objective #A (Client Action)    Client will identify at least three consequences of maladaptive drinking.  Status: Continued - Date(s):  9/25/2019     Intervention(s)  Therapist will assign homework to identify impact of drinking  provide support.    Objective #B  Client will identify three coping skills to replace drinking .  Status: Continued - Date(s): 9/25/2019    Intervention(s)  Therapist will assign homework to practice coping skills  teach coping skills.    Objective #C  Client will identify whether she needs further support to reduce alcohol use.  Status: Continued - Date(s): 9/25/2019     Intervention(s)  Therapist will provide support and referrals as needed, education on alcohol use.      Goal 2: Client will maintain reduced symptoms of depression as evidenced by PHQ-9 remaining under 5 and client reporting continued motivation.    I will know I've met my goal when I feel like I have my own life again.      Objective #A (Client Action)    Status: Continued - Date(s): 9/25/2019      Client will Increase interest, engagement, and pleasure in doing things  Decrease frequency and intensity of feeling down, depressed, hopeless.    Intervention(s)  Therapist will teach behavioral activation.    Objective #B  Client will Identify negative self-talk and behaviors: challenge core beliefs, myths, and actions.    Status: Continued - Date(s): 9/25/2019       Intervention(s)  Therapist will teach CBT skills.    Objective #C  Client will Improve concentration, focus, and mindfulness in daily activities .  Status: Continued - Date(s): 9/25/2019     Intervention(s)  Therapist will teach mindfulness skills.      Goal 3: Client will report increased acceptance towards her decision about having children as evidenced by client reporting use of effective coping skills when feeling distress or regret.    I will know I've met my goal when I can accept .      Objective #A  (Client Action)    Status: Continued - Date(s):  9/25/2019     Client will use acceptance skills when feeling willful.    Intervention(s)  Therapist will teach acceptance DBT skills.    Objective #B  Client will identify coping skills that reduce distress when grieving.    Status: Continued - Date(s): 9/25/2019       Intervention(s)  Therapist will teach coping skills, self-soothing.      Client has reviewed and agreed to the above plan.      Maite SIMMONS, LGSW September 25, 2019  Note reviewed and clinical supervision by TROY Hunter [facility] 10/1/2019

## 2019-11-05 ENCOUNTER — OFFICE VISIT (OUTPATIENT)
Dept: PSYCHOLOGY | Facility: CLINIC | Age: 54
End: 2019-11-05
Payer: COMMERCIAL

## 2019-11-05 DIAGNOSIS — F33.1 MAJOR DEPRESSIVE DISORDER, RECURRENT EPISODE, MODERATE (H): ICD-10-CM

## 2019-11-05 DIAGNOSIS — F41.1 GENERALIZED ANXIETY DISORDER: Primary | ICD-10-CM

## 2019-11-05 PROCEDURE — 90834 PSYTX W PT 45 MINUTES: CPT | Performed by: SOCIAL WORKER

## 2019-11-05 NOTE — PROGRESS NOTES
Progress Note    Client Name: Jeanna Steel  Date: 11/5/2019         Service Type: Individual  Video Visit: No     Session Start Time: 8:05  Session End Time: 8:50     Session Length: 45 min    Session #: 12    Attendees: Client attended alone     Treatment Plan Last Reviewed: 3/15/2019; 6/21/2019; 9/25/2019  PHQ-9 / JESSICA-7 : 10/22/2019    DATA  Interactive Complexity: No  Crisis: No       Progress Since Last Session (Related to Symptoms / Goals / Homework):   Symptoms: Worsening client reported increased depression    Homework: Achieved / completed to satisfaction client reported reducing personalization with mom      Episode of Care Goals: Satisfactory progress - ACTION (Actively working towards change); Intervened by reinforcing change plan / affirming steps taken     Current / Ongoing Stressors and Concerns:   Ongoing: Client reports increased depression symptoms while caring for elderly parents.              Current: Client reported that she had been drinking more lately and was frustrated with herself for returning to old habits. She described her thinking process and how she felt like she was punishing herself for past decisions, including not having a child. Client reported that she will think to herself she doesn't deserve happiness and that it's her own fault her life didn't turn out the way she had hoped. Discussed self-forgiveness and how the client can begin practicing acceptance with the decisions she made in her past. Reviewed client's strengths and accomplishments that occurred due to her decision making. Client reported that she is wanting to get back on track with controlling her alcohol use and that she is thinking she will do the full 28 days of sobriety.     Treatment Objective(s) Addressed in This Session:   Identify negative self-talk and behaviors: challenge core beliefs, myths, and actions  Improve concentration, focus, and mindfulness in  daily activities        Intervention:   DBT: reviewed forgiveness and acceptance skills   Motivational Interviewing    MI Intervention: Expressed Empathy/Understanding, Supported Autonomy, Collaboration, Evocation, Permission to raise concern or advise, Open-ended questions, Reflections: simple and complex, Change talk (evoked) and Reframe     Change Talk Expressed by the Patient: Need to change Committment to change Activation Taking steps    Provider Response to Change Talk: E - Evoked more info from patient about behavior change, A - Affirmed patient's thoughts, decisions, or attempts at behavior change, R - Reflected patient's change talk and S - Summarized patient's change talk statements          ASSESSMENT: Current Emotional / Mental Status (status of significant symptoms):   Risk status (Self / Other harm or suicidal ideation)   Client denies current fears or concerns for personal safety.   Client denies current or recent suicidal ideation or behaviors.   Client denies current or recent homicidal ideation or behaviors.   Client denies current or recent self injurious behavior or ideation.   Client denies other safety concerns.   Client Patient reports there has been no change in risk factors since their last session.     Client Patient reports there has been no change in protective factors since their last session.     Recommended that patient call 911 or go to the local ED should there be a change in any of these risk factors.     Appearance:   Appropriate    Eye Contact:   Fair    Psychomotor Behavior: Normal    Attitude:   Cooperative    Orientation:   All   Speech    Rate / Production: Hyperverbal  Client was talkative today    Volume:  Normal    Mood:    Anxious  Depressed  Irritable  client presented with irritability, anxiety and negativity towards herself   Affect:    Appropriate    Thought Content:  Clear    Thought Form:  Coherent  Logical    Insight:    Fair      Medication Review:   No changes to  current psychiatric medication(s)     Medication Compliance:   Yes     Changes in Health Issues:   None reported     Chemical Use Review:   Substance Use: increase in alcohol .  Patient reports frequency of use three to five drinks a night.  Stage of Change: Preparatory  Provided encouragement towards sobriety  Provided support and affirmation for steps taken towards sobriety          Tobacco Use: No current tobacco use.      Diagnosis:  1. Generalized anxiety disorder    2. Major depressive disorder, recurrent episode, moderate (H)        Collateral Reports Completed:   Not Applicable    PLAN: (Client Tasks / Therapist Tasks / Other)  Client will finish reading her book on sobriety, think about how she could practice self forgiveness for not having children and return for therapy in two weeks.         Maite SIMMONS, [facility] 11/5/2019  Note reviewed and clinical supervision by TROY Hunter [facility] 11/21/2019      ___________________________________________________________________    Treatment Plan    Client's Name: Jeanna Steel  YOB: 1965    Date: 3/15/2019; 6/21/2019; 9/25/2019    DSM-V Diagnoses: 296.32 (F33.1) Major Depressive Disorder, Recurrent Episode, Moderate _, 300.02 (F41.1) Generalized Anxiety Disorder or Adjustment Disorders  309.28 (F43.23) With mixed anxiety and depressed mood  Psychosocial / Contextual Factors: Client reports continued symptoms of depression and anxiety while caring for her elderly parents and drinking on a daily basis.  WHODAS: see intake    Referral / Collaboration:  Referral to another professional/service is not indicated at this time..    Anticipated number of session or this episode of care: 8-12      MeasurableTreatment Goal(s) related to diagnosis / functional impairment(s)  Goal 1: Client will reduce alcohol use from 5 standard drinks a day to 2 standard drinks a week in the next three months and client reporting use of three new coping skills to  manage stress in the evenings.    I will know I've met my goal when I'm only drinking two drinks and going to bed earlier.      Objective #A (Client Action)    Client will identify at least three consequences of maladaptive drinking.  Status: Continued - Date(s):  9/25/2019     Intervention(s)  Therapist will assign homework to identify impact of drinking  provide support.    Objective #B  Client will identify three coping skills to replace drinking .  Status: Continued - Date(s): 9/25/2019    Intervention(s)  Therapist will assign homework to practice coping skills  teach coping skills.    Objective #C  Client will identify whether she needs further support to reduce alcohol use.  Status: Continued - Date(s): 9/25/2019     Intervention(s)  Therapist will provide support and referrals as needed, education on alcohol use.      Goal 2: Client will maintain reduced symptoms of depression as evidenced by PHQ-9 remaining under 5 and client reporting continued motivation.    I will know I've met my goal when I feel like I have my own life again.      Objective #A (Client Action)    Status: Continued - Date(s): 9/25/2019      Client will Increase interest, engagement, and pleasure in doing things  Decrease frequency and intensity of feeling down, depressed, hopeless.    Intervention(s)  Therapist will teach behavioral activation.    Objective #B  Client will Identify negative self-talk and behaviors: challenge core beliefs, myths, and actions.    Status: Continued - Date(s): 9/25/2019       Intervention(s)  Therapist will teach CBT skills.    Objective #C  Client will Improve concentration, focus, and mindfulness in daily activities .  Status: Continued - Date(s): 9/25/2019     Intervention(s)  Therapist will teach mindfulness skills.      Goal 3: Client will report increased acceptance towards her decision about having children as evidenced by client reporting use of effective coping skills when feeling distress or  regret.    I will know I've met my goal when I can accept .      Objective #A (Client Action)    Status: Continued - Date(s):  9/25/2019     Client will use acceptance skills when feeling willful.    Intervention(s)  Therapist will teach acceptance DBT skills.    Objective #B  Client will identify coping skills that reduce distress when grieving.    Status: Continued - Date(s): 9/25/2019       Intervention(s)  Therapist will teach coping skills, self-soothing.      Client has reviewed and agreed to the above plan.      Maite SIMMONS, LGSW September 25, 2019  Note reviewed and clinical supervision by TROY Hunter LICSW 10/1/2019

## 2019-11-19 ENCOUNTER — OFFICE VISIT (OUTPATIENT)
Dept: PSYCHOLOGY | Facility: CLINIC | Age: 54
End: 2019-11-19
Payer: COMMERCIAL

## 2019-11-19 DIAGNOSIS — F41.1 GENERALIZED ANXIETY DISORDER: Primary | ICD-10-CM

## 2019-11-19 DIAGNOSIS — F33.1 MAJOR DEPRESSIVE DISORDER, RECURRENT EPISODE, MODERATE (H): ICD-10-CM

## 2019-11-19 PROCEDURE — 90834 PSYTX W PT 45 MINUTES: CPT | Performed by: SOCIAL WORKER

## 2019-11-19 ASSESSMENT — ANXIETY QUESTIONNAIRES
5. BEING SO RESTLESS THAT IT IS HARD TO SIT STILL: NOT AT ALL
7. FEELING AFRAID AS IF SOMETHING AWFUL MIGHT HAPPEN: NOT AT ALL
3. WORRYING TOO MUCH ABOUT DIFFERENT THINGS: SEVERAL DAYS
2. NOT BEING ABLE TO STOP OR CONTROL WORRYING: NOT AT ALL
GAD7 TOTAL SCORE: 3
1. FEELING NERVOUS, ANXIOUS, OR ON EDGE: SEVERAL DAYS
6. BECOMING EASILY ANNOYED OR IRRITABLE: SEVERAL DAYS

## 2019-11-19 ASSESSMENT — PATIENT HEALTH QUESTIONNAIRE - PHQ9
SUM OF ALL RESPONSES TO PHQ QUESTIONS 1-9: 7
5. POOR APPETITE OR OVEREATING: NOT AT ALL

## 2019-11-19 NOTE — PROGRESS NOTES
Progress Note    Client Name: Jeanna Steel  Date: 11/19/2019         Service Type: Individual  Video Visit: No     Session Start Time:  12:00  Session End Time: 12:45     Session Length: 45 min    Session #: 13    Attendees: Client attended alone     Treatment Plan Last Reviewed: 3/15/2019; 6/21/2019; 9/25/2019  PHQ-9 / JESSICA-7 : 10/22/2019    DATA  Interactive Complexity: No  Crisis: No       Progress Since Last Session (Related to Symptoms / Goals / Homework):   Symptoms: Improving client reported slight increase in motivation    Homework: Achieved / completed to satisfaction client reported finishing book on drinking      Episode of Care Goals: Satisfactory progress - ACTION (Actively working towards change); Intervened by reinforcing change plan / affirming steps taken     Current / Ongoing Stressors and Concerns:   Ongoing: Client reports increased depression symptoms while caring for elderly parents.              Current: Client reported that she completed her book on sobriety and will be coming up with a plan for a 28 day period of sobriety. She identifed having distressing dreams lately about her ex- and having regret that she didn't leave him earlier to have children with someone who was able. Client became tearful talking about this regret and wishing she had made different choices in the past. Discussed how the client had made the best decisions she could at the time, and how she can only control what is in her present. Client reported that she feels like she will figure things out but that she is wishing it wasn't so difficult.     Treatment Objective(s) Addressed in This Session:   Identify negative self-talk and behaviors: challenge core beliefs, myths, and actions  Improve concentration, focus, and mindfulness in daily activities        Intervention:   DBT: reviewed forgiveness and acceptance skills   Motivational Interviewing    MI Intervention:  Expressed Empathy/Understanding, Supported Autonomy, Collaboration, Evocation, Permission to raise concern or advise, Open-ended questions, Reflections: simple and complex, Change talk (evoked) and Reframe     Change Talk Expressed by the Patient: Need to change Committment to change Activation Taking steps    Provider Response to Change Talk: E - Evoked more info from patient about behavior change, A - Affirmed patient's thoughts, decisions, or attempts at behavior change, R - Reflected patient's change talk and S - Summarized patient's change talk statements          ASSESSMENT: Current Emotional / Mental Status (status of significant symptoms):   Risk status (Self / Other harm or suicidal ideation)   Client denies current fears or concerns for personal safety.   Client denies current or recent suicidal ideation or behaviors.   Client denies current or recent homicidal ideation or behaviors.   Client denies current or recent self injurious behavior or ideation.   Client denies other safety concerns.   Client Patient reports there has been no change in risk factors since their last session.     Client Patient reports there has been no change in protective factors since their last session.     Recommended that patient call 911 or go to the local ED should there be a change in any of these risk factors.     Appearance:   Appropriate    Eye Contact:   Fair    Psychomotor Behavior: Normal    Attitude:   Cooperative    Orientation:   All   Speech    Rate / Production: Hyperverbal   Client was talkative    Volume:  Normal    Mood:    Anxious  client presented anxiety considering 28 days of sobriety   Affect:    Appropriate    Thought Content:  Clear    Thought Form:  Coherent  Logical    Insight:    Fair      Medication Review:   No changes to current psychiatric medication(s)     Medication Compliance:   Yes     Changes in Health Issues:   None reported     Chemical Use Review:   Substance Use: Problem use continues  with no change since last session, Stage of Change: Preparatory  Provided encouragement towards sobriety  Provided support and affirmation for steps taken towards sobriety          Tobacco Use: No current tobacco use.      Diagnosis:  1. Generalized anxiety disorder    2. Major depressive disorder, recurrent episode, moderate (H)        Collateral Reports Completed:   Not Applicable    PLAN: (Client Tasks / Therapist Tasks / Other)  Client will identify when she is doing her period of sobriety, tell her  and return for therapy in two weeks.         Maite SIMMONS, [facility] 11/19/2019  Note reviewed and clinical supervision by TROY Hunter [facility] 12/11/2019      ___________________________________________________________________    Treatment Plan    Client's Name: Jeanna Steel  YOB: 1965    Date: 3/15/2019; 6/21/2019; 9/25/2019    DSM-V Diagnoses: 296.32 (F33.1) Major Depressive Disorder, Recurrent Episode, Moderate _, 300.02 (F41.1) Generalized Anxiety Disorder or Adjustment Disorders  309.28 (F43.23) With mixed anxiety and depressed mood  Psychosocial / Contextual Factors: Client reports continued symptoms of depression and anxiety while caring for her elderly parents and drinking on a daily basis.  WHODAS: see intake    Referral / Collaboration:  Referral to another professional/service is not indicated at this time..    Anticipated number of session or this episode of care: 8-12      MeasurableTreatment Goal(s) related to diagnosis / functional impairment(s)  Goal 1: Client will reduce alcohol use from 5 standard drinks a day to 2 standard drinks a week in the next three months and client reporting use of three new coping skills to manage stress in the evenings.    I will know I've met my goal when I'm only drinking two drinks and going to bed earlier.      Objective #A (Client Action)    Client will identify at least three consequences of maladaptive drinking.  Status:  Continued - Date(s):  9/25/2019     Intervention(s)  Therapist will assign homework to identify impact of drinking  provide support.    Objective #B  Client will identify three coping skills to replace drinking .  Status: Continued - Date(s): 9/25/2019    Intervention(s)  Therapist will assign homework to practice coping skills  teach coping skills.    Objective #C  Client will identify whether she needs further support to reduce alcohol use.  Status: Continued - Date(s): 9/25/2019     Intervention(s)  Therapist will provide support and referrals as needed, education on alcohol use.      Goal 2: Client will maintain reduced symptoms of depression as evidenced by PHQ-9 remaining under 5 and client reporting continued motivation.    I will know I've met my goal when I feel like I have my own life again.      Objective #A (Client Action)    Status: Continued - Date(s): 9/25/2019      Client will Increase interest, engagement, and pleasure in doing things  Decrease frequency and intensity of feeling down, depressed, hopeless.    Intervention(s)  Therapist will teach behavioral activation.    Objective #B  Client will Identify negative self-talk and behaviors: challenge core beliefs, myths, and actions.    Status: Continued - Date(s): 9/25/2019       Intervention(s)  Therapist will teach CBT skills.    Objective #C  Client will Improve concentration, focus, and mindfulness in daily activities .  Status: Continued - Date(s): 9/25/2019     Intervention(s)  Therapist will teach mindfulness skills.      Goal 3: Client will report increased acceptance towards her decision about having children as evidenced by client reporting use of effective coping skills when feeling distress or regret.    I will know I've met my goal when I can accept .      Objective #A (Client Action)    Status: Continued - Date(s):  9/25/2019     Client will use acceptance skills when feeling willful.    Intervention(s)  Therapist will teach acceptance  DBT skills.    Objective #B  Client will identify coping skills that reduce distress when grieving.    Status: Continued - Date(s): 9/25/2019       Intervention(s)  Therapist will teach coping skills, self-soothing.      Client has reviewed and agreed to the above plan.      Maite SIMMONS, LGSW September 25, 2019  Note reviewed and clinical supervision by TROY Hunter LICSW 10/1/2019

## 2019-11-20 ASSESSMENT — ANXIETY QUESTIONNAIRES: GAD7 TOTAL SCORE: 3

## 2019-12-03 ENCOUNTER — OFFICE VISIT (OUTPATIENT)
Dept: PSYCHOLOGY | Facility: CLINIC | Age: 54
End: 2019-12-03
Payer: COMMERCIAL

## 2019-12-03 DIAGNOSIS — F41.1 GENERALIZED ANXIETY DISORDER: Primary | ICD-10-CM

## 2019-12-03 DIAGNOSIS — F33.1 MAJOR DEPRESSIVE DISORDER, RECURRENT EPISODE, MODERATE (H): ICD-10-CM

## 2019-12-03 PROCEDURE — 90834 PSYTX W PT 45 MINUTES: CPT | Performed by: SOCIAL WORKER

## 2019-12-03 ASSESSMENT — PATIENT HEALTH QUESTIONNAIRE - PHQ9: SUM OF ALL RESPONSES TO PHQ QUESTIONS 1-9: 3

## 2019-12-03 NOTE — PROGRESS NOTES
Progress Note    Client Name: Jeanna Steel  Date: 12/3/2019         Service Type: Individual  Video Visit: No     Session Start Time:  12:00  Session End Time: 12:45     Session Length: 45 min    Session #: 14    Attendees: Client attended alone     Treatment Plan Last Reviewed: 3/15/2019; 6/21/2019; 9/25/2019  PHQ-9 / JESSICA-7 : 12/3/2019    DATA  Interactive Complexity: No  Crisis: No       Progress Since Last Session (Related to Symptoms / Goals / Homework):   Symptoms: Improving client reported slight increase in motivation    Homework: Achieved / completed to satisfaction client reported thinking further about sobriety      Episode of Care Goals: Satisfactory progress - ACTION (Actively working towards change); Intervened by reinforcing change plan / affirming steps taken     Current / Ongoing Stressors and Concerns:   Ongoing: Client reports increased depression symptoms while caring for elderly parents.              Current: Client reported that she has had a hard time deciding when to do a 28 day period of sobriety. She became tearful and said she didn't think she was ready to commit to that long, though she feels she can commit to drinking less than she was before. Client said that she wants to address other concerns before addressing her drinking. She reported that she has continued feeling guilty about her parent's house and how she has avoidance about cleaning out their things. Discussed how client can begin approaching her parent's house and feel less overwhelmed about how much she has to do.     Treatment Objective(s) Addressed in This Session:   Identify negative self-talk and behaviors: challenge core beliefs, myths, and actions  Improve concentration, focus, and mindfulness in daily activities        Intervention:   DBT: Broke down client's task with her parent's house into more managable steps   Motivational Interviewing    MI Intervention: Expressed  Empathy/Understanding, Supported Autonomy, Collaboration, Evocation, Permission to raise concern or advise, Open-ended questions, Reflections: simple and complex, Change talk (evoked) and Reframe     Change Talk Expressed by the Patient: Need to change Committment to change Activation Taking steps    Provider Response to Change Talk: E - Evoked more info from patient about behavior change, A - Affirmed patient's thoughts, decisions, or attempts at behavior change, R - Reflected patient's change talk and S - Summarized patient's change talk statements          ASSESSMENT: Current Emotional / Mental Status (status of significant symptoms):   Risk status (Self / Other harm or suicidal ideation)   Client denies current fears or concerns for personal safety.   Client denies current or recent suicidal ideation or behaviors.   Client denies current or recent homicidal ideation or behaviors.   Client denies current or recent self injurious behavior or ideation.   Client denies other safety concerns.   Client Patient reports there has been no change in risk factors since their last session.     Client Patient reports there has been no change in protective factors since their last session.     Recommended that patient call 911 or go to the local ED should there be a change in any of these risk factors.     Appearance:   Appropriate    Eye Contact:   Fair    Psychomotor Behavior: Normal    Attitude:   Cooperative    Orientation:   All   Speech    Rate / Production: Hyperverbal   Client was talkative    Volume:  Normal    Mood:    Anxious  Depressed  client presented with low mood and tearfulness   Affect:    Appropriate    Thought Content:  Clear    Thought Form:  Coherent  Logical    Insight:    Fair      Medication Review:   No changes to current psychiatric medication(s)     Medication Compliance:   Yes     Changes in Health Issues:   None reported     Chemical Use Review:   Substance Use: Problem use continues with no  change since last session, Stage of Change: Preparatory  Provided encouragement towards sobriety  Provided support and affirmation for steps taken towards sobriety          Tobacco Use: No current tobacco use.      Diagnosis:  1. Generalized anxiety disorder    2. Major depressive disorder, recurrent episode, moderate (H)        Collateral Reports Completed:   Not Applicable    PLAN: (Client Tasks / Therapist Tasks / Other)  Client will stick to two drinks a night, practice going to her parents house and doing a small task and return for therapy in two weeks.         Maite SIMMONS, [facility] 12/3/2019  Note reviewed and clinical supervision by TROY Hunter [facility] 12/17/2019      ___________________________________________________________________    Treatment Plan    Client's Name: Jeanna Steel  YOB: 1965    Date: 3/15/2019; 6/21/2019; 9/25/2019    DSM-V Diagnoses: 296.32 (F33.1) Major Depressive Disorder, Recurrent Episode, Moderate _, 300.02 (F41.1) Generalized Anxiety Disorder or Adjustment Disorders  309.28 (F43.23) With mixed anxiety and depressed mood  Psychosocial / Contextual Factors: Client reports continued symptoms of depression and anxiety while caring for her elderly parents and drinking on a daily basis.  WHODAS: see intake    Referral / Collaboration:  Referral to another professional/service is not indicated at this time..    Anticipated number of session or this episode of care: 8-12      MeasurableTreatment Goal(s) related to diagnosis / functional impairment(s)  Goal 1: Client will reduce alcohol use from 5 standard drinks a day to 2 standard drinks a week in the next three months and client reporting use of three new coping skills to manage stress in the evenings.    I will know I've met my goal when I'm only drinking two drinks and going to bed earlier.      Objective #A (Client Action)    Client will identify at least three consequences of maladaptive  drinking.  Status: Continued - Date(s):  9/25/2019     Intervention(s)  Therapist will assign homework to identify impact of drinking  provide support.    Objective #B  Client will identify three coping skills to replace drinking .  Status: Continued - Date(s): 9/25/2019    Intervention(s)  Therapist will assign homework to practice coping skills  teach coping skills.    Objective #C  Client will identify whether she needs further support to reduce alcohol use.  Status: Continued - Date(s): 9/25/2019     Intervention(s)  Therapist will provide support and referrals as needed, education on alcohol use.      Goal 2: Client will maintain reduced symptoms of depression as evidenced by PHQ-9 remaining under 5 and client reporting continued motivation.    I will know I've met my goal when I feel like I have my own life again.      Objective #A (Client Action)    Status: Continued - Date(s): 9/25/2019      Client will Increase interest, engagement, and pleasure in doing things  Decrease frequency and intensity of feeling down, depressed, hopeless.    Intervention(s)  Therapist will teach behavioral activation.    Objective #B  Client will Identify negative self-talk and behaviors: challenge core beliefs, myths, and actions.    Status: Continued - Date(s): 9/25/2019       Intervention(s)  Therapist will teach CBT skills.    Objective #C  Client will Improve concentration, focus, and mindfulness in daily activities .  Status: Continued - Date(s): 9/25/2019     Intervention(s)  Therapist will teach mindfulness skills.      Goal 3: Client will report increased acceptance towards her decision about having children as evidenced by client reporting use of effective coping skills when feeling distress or regret.    I will know I've met my goal when I can accept .      Objective #A (Client Action)    Status: Continued - Date(s):  9/25/2019     Client will use acceptance skills when feeling willful.    Intervention(s)  Therapist  will teach acceptance DBT skills.    Objective #B  Client will identify coping skills that reduce distress when grieving.    Status: Continued - Date(s): 9/25/2019       Intervention(s)  Therapist will teach coping skills, self-soothing.      Client has reviewed and agreed to the above plan.      Maite SIMMONS, LGSW September 25, 2019  Note reviewed and clinical supervision [facility]/2019

## 2019-12-17 ENCOUNTER — OFFICE VISIT (OUTPATIENT)
Dept: PSYCHOLOGY | Facility: CLINIC | Age: 54
End: 2019-12-17
Payer: COMMERCIAL

## 2019-12-17 DIAGNOSIS — F41.1 GENERALIZED ANXIETY DISORDER: Primary | ICD-10-CM

## 2019-12-17 DIAGNOSIS — F33.1 MAJOR DEPRESSIVE DISORDER, RECURRENT EPISODE, MODERATE (H): ICD-10-CM

## 2019-12-17 PROCEDURE — 90834 PSYTX W PT 45 MINUTES: CPT | Performed by: SOCIAL WORKER

## 2019-12-17 ASSESSMENT — PATIENT HEALTH QUESTIONNAIRE - PHQ9: SUM OF ALL RESPONSES TO PHQ QUESTIONS 1-9: 2

## 2019-12-17 NOTE — PROGRESS NOTES
Progress Note    Client Name: Jeanna Steel  Date: 12/17/2019         Service Type: Individual  Video Visit: No     Session Start Time:  12:00  Session End Time: 12:45     Session Length: 45 min    Session #: 14    Attendees: Client attended alone     Treatment Plan Last Reviewed: 3/15/2019; 6/21/2019; 9/25/2019; 12/17/2019  PHQ-9 / JESSICA-7 : 12/17/2019    DATA  Interactive Complexity: No  Crisis: No       Progress Since Last Session (Related to Symptoms / Goals / Homework):   Symptoms: Worsening increased negative mood    Homework: Achieved / completed to satisfaction client reported thinking about going to her parent's house      Episode of Care Goals: Satisfactory progress - ACTION (Actively working towards change); Intervened by reinforcing change plan / affirming steps taken     Current / Ongoing Stressors and Concerns:   Ongoing: Client reports increased depression symptoms while caring for elderly parents.              Current: Client reported that she had been feeling overwhelmed lately thinking about the balance between caring for herself and doing things for her parents. She explained that she recently saw her sister-in-law who hadn't understood why the client was helping her parent's so much. Client became tearful and talked about how she goes back and forth between feeling like she is doing a lot for them and feeling like they aren't doing enough. Client reviewed the facts regarding the work she does for her parents and identifed that she does in fact do a lot for them. She reported that she had returned to drinking 3-4 drinks in the evening and that she feels badly about it but doesn't fell ready to take steps.     Treatment Objective(s) Addressed in This Session:   Identify negative self-talk and behaviors: challenge core beliefs, myths, and actions  Improve concentration, focus, and mindfulness in daily activities        Intervention:   DBT: practiced  checking the facts to determine whether client's fear of not doing enough was justified or not   Motivational Interviewing    MI Intervention: Expressed Empathy/Understanding, Supported Autonomy, Collaboration, Evocation, Permission to raise concern or advise, Open-ended questions, Reflections: simple and complex, Change talk (evoked) and Reframe     Change Talk Expressed by the Patient: Desire to change Reasons to change Need to change Committment to change Activation    Provider Response to Change Talk: E - Evoked more info from patient about behavior change, A - Affirmed patient's thoughts, decisions, or attempts at behavior change, R - Reflected patient's change talk and S - Summarized patient's change talk statements          ASSESSMENT: Current Emotional / Mental Status (status of significant symptoms):   Risk status (Self / Other harm or suicidal ideation)   Client denies current fears or concerns for personal safety.   Client denies current or recent suicidal ideation or behaviors.   Client denies current or recent homicidal ideation or behaviors.   Client denies current or recent self injurious behavior or ideation.   Client denies other safety concerns.   Client Patient reports there has been no change in risk factors since their last session.     Client Patient reports there has been no change in protective factors since their last session.     Recommended that patient call 911 or go to the local ED should there be a change in any of these risk factors.     Appearance:   Appropriate    Eye Contact:   Fair    Psychomotor Behavior: Restless client was fidgeting   Attitude:   Cooperative    Orientation:   All   Speech    Rate / Production: Hyperverbal   Client was talkative    Volume:  Normal    Mood:    Anxious  Depressed  client presented with low mood and tearfulness   Affect:    Appropriate    Thought Content:  Clear    Thought Form:  Coherent  Logical    Insight:    Fair      Medication Review:   No  changes to current psychiatric medication(s)     Medication Compliance:   Yes     Changes in Health Issues:   None reported     Chemical Use Review:   Substance Use: increase in alcohol .  Patient reports frequency of use 3-4 glasses of wine a night.  Stage of Change: Contemplation  Provided encouragement towards sobriety  Provided support and affirmation for steps taken towards sobriety          Tobacco Use: No current tobacco use.      Diagnosis:  1. Generalized anxiety disorder    2. Major depressive disorder, recurrent episode, moderate (H)        Collateral Reports Completed:   Not Applicable    PLAN: (Client Tasks / Therapist Tasks / Other)  Client will continue checking the facts and return for therapy in two weeks.         Maite SIMMONS, [facility] 12/17/2019  Note reviewed and clinical supervision by TROY Hunter [facility] 12/26/2019      ___________________________________________________________________    Treatment Plan    Client's Name: Jeanna Steel  YOB: 1965    Date: 3/15/2019; 6/21/2019; 9/25/2019; 12/17/2019    DSM-V Diagnoses: 296.32 (F33.1) Major Depressive Disorder, Recurrent Episode, Moderate _, 300.02 (F41.1) Generalized Anxiety Disorder or Adjustment Disorders  309.28 (F43.23) With mixed anxiety and depressed mood  Psychosocial / Contextual Factors: Client reports continued symptoms of depression and anxiety while caring for her elderly parents and drinking on a daily basis.  WHODAS: see intake    Referral / Collaboration:  Referral to another professional/service is not indicated at this time..    Anticipated number of session or this episode of care: 8-12      MeasurableTreatment Goal(s) related to diagnosis / functional impairment(s)  Goal 1: Client will reduce alcohol use from 5 standard drinks a day to 2 standard drinks a week in the next three months and client reporting use of three new coping skills to manage stress in the evenings.    I will know I've met my  goal when I'm only drinking two drinks and going to bed earlier.      Objective #A (Client Action)    Client will identify at least three consequences of maladaptive drinking.  Status: Continued - Date(s):  12/17/2019     Intervention(s)  Therapist will assign homework to identify impact of drinking  provide support.    Objective #B  Client will identify three coping skills to replace drinking .  Status: Continued - Date(s):  12/17/2019     Intervention(s)  Therapist will assign homework to practice coping skills  teach coping skills.    Objective #C  Client will identify whether she needs further support to reduce alcohol use.  Status: Continued - Date(s):  12/17/2019      Intervention(s)  Therapist will provide support and referrals as needed, education on alcohol use.      Goal 2: Client will maintain reduced symptoms of depression as evidenced by PHQ-9 remaining under 5 and client reporting continued motivation.    I will know I've met my goal when I feel like I have my own life again.      Objective #A (Client Action)    Status: Continued - Date(s):  12/17/2019     Client will Increase interest, engagement, and pleasure in doing things  Decrease frequency and intensity of feeling down, depressed, hopeless.    Intervention(s)  Therapist will teach behavioral activation.    Objective #B  Client will Identify negative self-talk and behaviors: challenge core beliefs, myths, and actions.    Status: Continued - Date(s):  12/17/2019        Intervention(s)  Therapist will teach CBT skills.    Objective #C  Client will Improve concentration, focus, and mindfulness in daily activities .  Status: Continued - Date(s):  12/17/2019     Intervention(s)  Therapist will teach mindfulness skills.      Goal 3: Client will report increased acceptance towards her decision about having children as evidenced by client reporting use of effective coping skills when feeling distress or regret.    I will know I've met my goal when I can  accept .      Objective #A (Client Action)    Status: Continued - Date(s):   12/17/2019     Client will use acceptance skills when feeling willful.    Intervention(s)  Therapist will teach acceptance DBT skills.    Objective #B  Client will identify coping skills that reduce distress when grieving.    Status: Continued - Date(s):  12/17/2019       Intervention(s)  Therapist will teach coping skills, self-soothing.      Client has reviewed and agreed to the above plan.      Maite SIMMONS, SW December 17, 2019  Note reviewed and clinical supervision by TROY Hunter [facility] 12/26/2019

## 2020-01-07 ENCOUNTER — OFFICE VISIT (OUTPATIENT)
Dept: PSYCHOLOGY | Facility: CLINIC | Age: 55
End: 2020-01-07
Payer: COMMERCIAL

## 2020-01-07 DIAGNOSIS — F41.1 GENERALIZED ANXIETY DISORDER: ICD-10-CM

## 2020-01-07 DIAGNOSIS — F33.1 MAJOR DEPRESSIVE DISORDER, RECURRENT EPISODE, MODERATE (H): Primary | ICD-10-CM

## 2020-01-07 PROCEDURE — 90834 PSYTX W PT 45 MINUTES: CPT | Performed by: SOCIAL WORKER

## 2020-01-07 ASSESSMENT — ANXIETY QUESTIONNAIRES
1. FEELING NERVOUS, ANXIOUS, OR ON EDGE: NOT AT ALL
6. BECOMING EASILY ANNOYED OR IRRITABLE: NOT AT ALL
2. NOT BEING ABLE TO STOP OR CONTROL WORRYING: NOT AT ALL
5. BEING SO RESTLESS THAT IT IS HARD TO SIT STILL: NOT AT ALL
GAD7 TOTAL SCORE: 0
3. WORRYING TOO MUCH ABOUT DIFFERENT THINGS: NOT AT ALL
7. FEELING AFRAID AS IF SOMETHING AWFUL MIGHT HAPPEN: NOT AT ALL

## 2020-01-07 ASSESSMENT — PATIENT HEALTH QUESTIONNAIRE - PHQ9
5. POOR APPETITE OR OVEREATING: NOT AT ALL
SUM OF ALL RESPONSES TO PHQ QUESTIONS 1-9: 1

## 2020-01-07 NOTE — PROGRESS NOTES
Progress Note    Client Name: Jeanna Steel  Date: 1/7/2020         Service Type: Individual  Video Visit: No     Session Start Time:  12:00  Session End Time: 12:45     Session Length: 45 min    Session #: 15    Attendees: Client attended alone     Treatment Plan Last Reviewed: 3/15/2019; 6/21/2019; 9/25/2019; 12/17/2019  PHQ-9 / JESSICA-7 : 12/17/2019    DATA  Interactive Complexity: No  Crisis: No       Progress Since Last Session (Related to Symptoms / Goals / Homework):   Symptoms: Improving client reported reduced negative mood and anxiet    Homework: Achieved / completed to satisfaction client reported checking the facts when feeling anxious      Episode of Care Goals: Satisfactory progress - ACTION (Actively working towards change); Intervened by reinforcing change plan / affirming steps taken     Current / Ongoing Stressors and Concerns:   Ongoing: Client reports increased depression symptoms while caring for elderly parents.              Current: Client reported that she had been doing okay lately though stressed dealing with some family concerns. She stated that her brother's wife passed away from cancer, and that she was able to see her multiple times before, which gave her comfort. Client reported having concern for her brother who struggles with alcoholism but says that the family is supporting him more than they have in the past. Client described having continued guilt and anxiety about the work she does for her parents but says that when she thinks logically she feels better about it. Client reported that she was working on feeling mor confident about her self image and described how talking with her  had helped. Client stated that she had been listening to an audio book that explained how alcohol worked, and that while she didn't agree with everything it said, she was finding it helpful.      Treatment Objective(s) Addressed in This  Session:   Identify negative self-talk and behaviors: challenge core beliefs, myths, and actions  Improve concentration, focus, and mindfulness in daily activities        Intervention:   DBT: Reviewed client's ability to check the facts and place value with logic, not just emotions; explored client's experience of grief for her sister-in-law and her brother   Motivational Interviewing    MI Intervention: Expressed Empathy/Understanding, Supported Autonomy, Collaboration, Evocation, Permission to raise concern or advise, Open-ended questions, Reflections: simple and complex, Change talk (evoked) and Reframe     Change Talk Expressed by the Patient: Desire to change Ability to change Committment to change Activation Taking steps    Provider Response to Change Talk: E - Evoked more info from patient about behavior change, A - Affirmed patient's thoughts, decisions, or attempts at behavior change, R - Reflected patient's change talk and S - Summarized patient's change talk statements          ASSESSMENT: Current Emotional / Mental Status (status of significant symptoms):   Risk status (Self / Other harm or suicidal ideation)   Client denies current fears or concerns for personal safety.   Client denies current or recent suicidal ideation or behaviors.   Client denies current or recent homicidal ideation or behaviors.   Client denies current or recent self injurious behavior or ideation.   Client denies other safety concerns.   Client reports there has been no change in risk factors since their last session.     Client reports there has been no change in protective factors since their last session.     Recommended that patient call 911 or go to the local ED should there be a change in any of these risk factors.     Appearance:   Appropriate    Eye Contact:   Fair    Psychomotor Behavior: Restless  client was fidgeting   Attitude:   Cooperative    Orientation:   All   Speech    Rate / Production: Hyperverbal   Client was  talkative    Volume:  Normal    Mood:    Anxious  client presented with some anxiety about her drinking   Affect:    Appropriate    Thought Content:  Clear    Thought Form:  Coherent  Logical    Insight:    Fair      Medication Review:   No changes to current psychiatric medication(s)     Medication Compliance:   Yes     Changes in Health Issues:   None reported     Chemical Use Review:   Substance Use: Problem use continues with no change since last session, Stage of Change: Contemplation and Preparatory  Patient assessed present costs and future losses as a result of substance use  Provided encouragement towards sobriety  Provided support and affirmation for steps taken towards sobriety          Tobacco Use: No current tobacco use.      Diagnosis:  1. Major depressive disorder, recurrent episode, moderate (H)    2. Generalized anxiety disorder        Collateral Reports Completed:   Not Applicable    PLAN: (Client Tasks / Therapist Tasks / Other)  Client will finish her audiobook, continue balancing emotion and logic and return for therapy in two weeks.         Maite SIMMONS, [facility] 1/7/2020  Note reviewed and clinical supervision by TROY Hunter [facility] 1/28/2020      ___________________________________________________________________    Treatment Plan    Client's Name: Jeanna Steel  YOB: 1965    Date: 3/15/2019; 6/21/2019; 9/25/2019; 12/17/2019    DSM-V Diagnoses: 296.32 (F33.1) Major Depressive Disorder, Recurrent Episode, Moderate _, 300.02 (F41.1) Generalized Anxiety Disorder or Adjustment Disorders  309.28 (F43.23) With mixed anxiety and depressed mood  Psychosocial / Contextual Factors: Client reports continued symptoms of depression and anxiety while caring for her elderly parents and drinking on a daily basis.  WHODAS: see intake    Referral / Collaboration:  Referral to another professional/service is not indicated at this time..    Anticipated number of session or this  episode of care: 8-12      MeasurableTreatment Goal(s) related to diagnosis / functional impairment(s)  Goal 1: Client will reduce alcohol use from 5 standard drinks a day to 2 standard drinks a week in the next three months and client reporting use of three new coping skills to manage stress in the evenings.    I will know I've met my goal when I'm only drinking two drinks and going to bed earlier.      Objective #A (Client Action)    Client will identify at least three consequences of maladaptive drinking.  Status: Continued - Date(s):  12/17/2019     Intervention(s)  Therapist will assign homework to identify impact of drinking  provide support.    Objective #B  Client will identify three coping skills to replace drinking .  Status: Continued - Date(s):  12/17/2019     Intervention(s)  Therapist will assign homework to practice coping skills  teach coping skills.    Objective #C  Client will identify whether she needs further support to reduce alcohol use.  Status: Continued - Date(s):  12/17/2019      Intervention(s)  Therapist will provide support and referrals as needed, education on alcohol use.      Goal 2: Client will maintain reduced symptoms of depression as evidenced by PHQ-9 remaining under 5 and client reporting continued motivation.    I will know I've met my goal when I feel like I have my own life again.      Objective #A (Client Action)    Status: Continued - Date(s):  12/17/2019     Client will Increase interest, engagement, and pleasure in doing things  Decrease frequency and intensity of feeling down, depressed, hopeless.    Intervention(s)  Therapist will teach behavioral activation.    Objective #B  Client will Identify negative self-talk and behaviors: challenge core beliefs, myths, and actions.    Status: Continued - Date(s):  12/17/2019        Intervention(s)  Therapist will teach CBT skills.    Objective #C  Client will Improve concentration, focus, and mindfulness in daily activities  .  Status: Continued - Date(s):  12/17/2019     Intervention(s)  Therapist will teach mindfulness skills.      Goal 3: Client will report increased acceptance towards her decision about having children as evidenced by client reporting use of effective coping skills when feeling distress or regret.    I will know I've met my goal when I can accept .      Objective #A (Client Action)    Status: Continued - Date(s):   12/17/2019     Client will use acceptance skills when feeling willful.    Intervention(s)  Therapist will teach acceptance DBT skills.    Objective #B  Client will identify coping skills that reduce distress when grieving.    Status: Continued - Date(s):  12/17/2019       Intervention(s)  Therapist will teach coping skills, self-soothing.      Client has reviewed and agreed to the above plan.      Maite SIMMONS, LGSW December 17, 2019  Note reviewed and clinical supervision by TROY Hunter [facility] 12/26/2019

## 2020-01-08 ENCOUNTER — OFFICE VISIT (OUTPATIENT)
Dept: URGENT CARE | Facility: URGENT CARE | Age: 55
End: 2020-01-08
Payer: COMMERCIAL

## 2020-01-08 VITALS
DIASTOLIC BLOOD PRESSURE: 82 MMHG | HEART RATE: 71 BPM | WEIGHT: 187 LBS | OXYGEN SATURATION: 96 % | SYSTOLIC BLOOD PRESSURE: 129 MMHG | TEMPERATURE: 98.3 F | RESPIRATION RATE: 20 BRPM | BODY MASS INDEX: 30.18 KG/M2

## 2020-01-08 DIAGNOSIS — H10.32 ACUTE CONJUNCTIVITIS OF LEFT EYE, UNSPECIFIED ACUTE CONJUNCTIVITIS TYPE: Primary | ICD-10-CM

## 2020-01-08 PROCEDURE — 99213 OFFICE O/P EST LOW 20 MIN: CPT | Performed by: PHYSICIAN ASSISTANT

## 2020-01-08 RX ORDER — TOBRAMYCIN 3 MG/ML
1-2 SOLUTION/ DROPS OPHTHALMIC EVERY 4 HOURS
Qty: 3 ML | Refills: 0 | Status: SHIPPED | OUTPATIENT
Start: 2020-01-08 | End: 2020-01-13

## 2020-01-08 ASSESSMENT — ENCOUNTER SYMPTOMS
RESPIRATORY NEGATIVE: 1
EYE ITCHING: 1
EYE DISCHARGE: 1
CONSTITUTIONAL NEGATIVE: 1
EYE PAIN: 1
CARDIOVASCULAR NEGATIVE: 1
MUSCULOSKELETAL NEGATIVE: 1

## 2020-01-08 ASSESSMENT — ANXIETY QUESTIONNAIRES: GAD7 TOTAL SCORE: 0

## 2020-01-08 NOTE — PATIENT INSTRUCTIONS
Patient Education     What Is Conjunctivitis?    Conjunctivitis is an irritation or infection. It affects the membrane that covers the white of your eye and the inside of your eyelid (conjunctiva). It can happen to one or both eyes. The membrane swells and the blood vessels enlarge (dilate). This makes your eye red. That's why conjunctivitis is sometimes called red eye or pink eye.  What are the symptoms?  If you have one or more of these symptoms, see an eye healthcare provider:    Redness in and around your eye    Eyes that are puffy and sore    Itching, burning, or stinging eyes    Watery eyes or discharge from your eye    Eyelids that are crusty or stuck together when you wake up in the morning    Pink color in the whites of one or both eyes    Sensitivity to bright light  Getting treatment quickly can help prevent damage to your eyes.  How is it diagnosed?  Conjunctivitis is usually a minor eye infection. But it can sometimes become a more serious problem. Some more serious eye diseases have symptoms that look like conjunctivitis. So it's important for an eye healthcare provider to diagnose you. Your eye healthcare provider will ask about your symptoms and any medicines you take. He or she will ask about any illnesses or medical conditions you may have. The healthcare provider will also check your eyes with a hand-held light and a special microscope called a slit lamp.  Date Last Reviewed: 10/1/2017    2722-6019 [employer]. [address] [place] [address], [place] [address]. All rights reserved. This information is not intended as a substitute for professional medical care. Always follow your healthcare professional's instructions.

## 2020-01-08 NOTE — PROGRESS NOTES
SUBJECTIVE:   Jeanna Steel is a 54 year old female presenting with a chief complaint of   Chief Complaint   Patient presents with     Urgent Care     Ear Problem     Eyes has been teary, crusted and swollen for one week. Patient state that she has gotten this before and was treated at a dermatologist with some eye cream  but this treatment is no longer effective.         She is an established patient of [facility].    Eye Problem    Onset of symptoms was 7 day(s) ago.   Location: left eye   Course of illness is same.    Severity moderate  Current and Associated symptoms: discharge, pain, itching  Treatment measures tried include uses cream for eye lid - eczema  Context: none      Review of Systems   Constitutional: Negative.    HENT: Negative.    Eyes: Positive for pain, discharge and itching.   Respiratory: Negative.    Cardiovascular: Negative.    Genitourinary: Negative.    Musculoskeletal: Negative.    Skin: Negative.    All other systems reviewed and are negative.      Past Medical History:   Diagnosis Date     Anxiety      Depressive disorder      Infertility, female      Family History   Problem Relation Age of Onset     Breast Cancer Mother      Diabetes Father      Cerebrovascular Disease Father      Alzheimer Disease Father      Arthritis Father      Hypertension Father      Breast Cancer Maternal Grandmother 92     Diabetes Maternal Grandfather      Diabetes Paternal Grandfather      Alcohol/Drug Brother      Breast Cancer Paternal Aunt 65     Cancer Paternal Aunt         Endometrial cancer     Schizophrenia Brother         suicide in 1992     Other - See Comments Brother         Multiple Sclerosis     Schizophrenia Maternal Half-Brother      Current Outpatient Medications   Medication Sig Dispense Refill     buPROPion (WELLBUTRIN SR) 100 MG 12 hr tablet 1 tab daily 90 tablet 0     LORazepam (ATIVAN) 0.5 MG tablet TAKE 1 TABLET BY MOUTH AS NEEDED FOR ANXIETY. LIMIT TO TWICE PER WEEK. 30 tablet 0      sertraline (ZOLOFT) 100 MG tablet Take 1 tablet (100 mg) by mouth daily Take with 50 mg daily 90 tablet 0     sertraline (ZOLOFT) 50 MG tablet Take 1 tablet (50 mg) by mouth daily Take with 100 mg 90 tablet 0     tobramycin (TOBREX) 0.3 % ophthalmic solution Place 1-2 drops Into the left eye every 4 hours for 5 days 3 mL 0     Social History     Tobacco Use     Smoking status: Former Smoker     Packs/day: 0.00     Types: Cigarettes     Smokeless tobacco: Never Used     Tobacco comment: None   Substance Use Topics     Alcohol use: Yes     Alcohol/week: 11.7 standard drinks     Types: 14 Standard drinks or equivalent per week     Comment: 2-3 on the weekends.        OBJECTIVE  /82 (BP Location: Left arm, Patient Position: Sitting, Cuff Size: Adult Regular)   Pulse 71   Temp 98.3  F (36.8  C) (Oral)   Resp 20   Wt 84.8 kg (187 lb)   SpO2 96%   Breastfeeding No   BMI 30.18 kg/m      Physical Exam  Vitals signs and nursing note reviewed.   Constitutional:       Appearance: Normal appearance. She is normal weight.   HENT:      Head: Normocephalic and atraumatic.      Right Ear: Tympanic membrane, ear canal and external ear normal.      Left Ear: Tympanic membrane, ear canal and external ear normal.      Mouth/Throat:      Mouth: Mucous membranes are moist.      Pharynx: Oropharynx is clear.   Eyes:      General:         Left eye: Discharge present.     Extraocular Movements: Extraocular movements intact.      Comments: Very mild erythema to conjunctiva.  Discharge, light yellow present.     Neck:      Musculoskeletal: Normal range of motion and neck supple.   Cardiovascular:      Rate and Rhythm: Normal rate and regular rhythm.      Pulses: Normal pulses.      Heart sounds: Normal heart sounds.   Pulmonary:      Effort: Pulmonary effort is normal.      Breath sounds: Normal breath sounds.   Musculoskeletal: Normal range of motion.   Skin:     General: Skin is warm and dry.      Capillary Refill: Capillary  refill takes less than 2 seconds.   Neurological:      General: No focal deficit present.      Mental Status: She is alert and oriented to person, place, and time.   Psychiatric:         Mood and Affect: Mood normal.         Behavior: Behavior normal.         Labs:  No results found for this or any previous visit (from the past 24 hour(s)).    X-Ray was not done.    ASSESSMENT:      ICD-10-CM    1. Acute conjunctivitis of left eye, unspecified acute conjunctivitis type H10.32 tobramycin (TOBREX) 0.3 % ophthalmic solution        Medical Decision Making:    Differential Diagnosis:  Eye Problem: Bacterial conjunctivitis  Viral conjunctivitis  Allergic conjunctivitis    Serious Comorbid Conditions:  Adult:  None    PLAN:    Eye Problem: Hygiene measures discussed. tobramycin    Followup:    If not improving or if condition worsens, follow up with your Primary Care Provider, If not improving or if conditions worsens over the next 12-24 hours, go to the Emergency Department.  Discussed if no improvement to see opthomologist.    Patient Instructions     Patient Education     What Is Conjunctivitis?    Conjunctivitis is an irritation or infection. It affects the membrane that covers the white of your eye and the inside of your eyelid (conjunctiva). It can happen to one or both eyes. The membrane swells and the blood vessels enlarge (dilate). This makes your eye red. That's why conjunctivitis is sometimes called red eye or pink eye.  What are the symptoms?  If you have one or more of these symptoms, see an eye healthcare provider:    Redness in and around your eye    Eyes that are puffy and sore    Itching, burning, or stinging eyes    Watery eyes or discharge from your eye    Eyelids that are crusty or stuck together when you wake up in the morning    Pink color in the whites of one or both eyes    Sensitivity to bright light  Getting treatment quickly can help prevent damage to your eyes.  How is it  diagnosed?  Conjunctivitis is usually a minor eye infection. But it can sometimes become a more serious problem. Some more serious eye diseases have symptoms that look like conjunctivitis. So it's important for an eye healthcare provider to diagnose you. Your eye healthcare provider will ask about your symptoms and any medicines you take. He or she will ask about any illnesses or medical conditions you may have. The healthcare provider will also check your eyes with a hand-held light and a special microscope called a slit lamp.  Date Last Reviewed: 10/1/2017    8800-8505 [employer]. [address] [place] [address]. All rights reserved. This information is not intended as a substitute for professional medical care. Always follow your healthcare professional's instructions.

## 2020-01-21 ENCOUNTER — OFFICE VISIT (OUTPATIENT)
Dept: PSYCHOLOGY | Facility: CLINIC | Age: 55
End: 2020-01-21
Payer: COMMERCIAL

## 2020-01-21 DIAGNOSIS — F33.1 MAJOR DEPRESSIVE DISORDER, RECURRENT EPISODE, MODERATE (H): Primary | ICD-10-CM

## 2020-01-21 DIAGNOSIS — F41.1 GENERALIZED ANXIETY DISORDER: ICD-10-CM

## 2020-01-21 PROCEDURE — 90834 PSYTX W PT 45 MINUTES: CPT | Performed by: SOCIAL WORKER

## 2020-01-21 ASSESSMENT — ANXIETY QUESTIONNAIRES
GAD7 TOTAL SCORE: 0
1. FEELING NERVOUS, ANXIOUS, OR ON EDGE: NOT AT ALL
2. NOT BEING ABLE TO STOP OR CONTROL WORRYING: NOT AT ALL
5. BEING SO RESTLESS THAT IT IS HARD TO SIT STILL: NOT AT ALL
7. FEELING AFRAID AS IF SOMETHING AWFUL MIGHT HAPPEN: NOT AT ALL
6. BECOMING EASILY ANNOYED OR IRRITABLE: NOT AT ALL
3. WORRYING TOO MUCH ABOUT DIFFERENT THINGS: NOT AT ALL

## 2020-01-21 ASSESSMENT — PATIENT HEALTH QUESTIONNAIRE - PHQ9
SUM OF ALL RESPONSES TO PHQ QUESTIONS 1-9: 5
5. POOR APPETITE OR OVEREATING: NOT AT ALL

## 2020-01-21 NOTE — PROGRESS NOTES
Progress Note    Client Name: Jeanna Steel  Date: 1/21/2020         Service Type: Individual  Video Visit: No     Session Start Time:  12:00  Session End Time: 12:45     Session Length: 45 min    Session #: 16    Attendees: Client attended alone     Treatment Plan Last Reviewed: 3/15/2019; 6/21/2019; 9/25/2019; 12/17/2019; will review [place] at next session  PHQ-9 / JESSICA-7 : 1/21/2020    DATA  Interactive Complexity: No  Crisis: No       Progress Since Last Session (Related to Symptoms / Goals / Homework):   Symptoms: Worsening client reported increased stress    Homework: Achieved / completed to satisfaction client reported finishing her audiobook      Episode of Care Goals: Satisfactory progress - ACTION (Actively working towards change); Intervened by reinforcing change plan / affirming steps taken     Current / Ongoing Stressors and Concerns:   Ongoing: Client reports increased depression symptoms while caring for elderly parents.              Current: Client reported that she had been really busy lately and that a lot of emotional things have happened. She said that while finishing her audiobook she was able to not drink at all three times during the week and that she was feeling more hopeful about her approach to alcohol. Client reported that she had a difficult evening with her sister-in-law, in which she drank more than she'd have liked and her sister-in-law became very intoxicated. She explained that her nephew was in the house and seemed anxious about his mom being drunk, so she asked him if it happened often. Client reported that her nephew went to bed and she went home, but the next day her sister-in-law was upset with the client for having asked her nephew questions about her drinking. Client said that she had been feeling badly about herself as she feels her sister-in-law looks down on her. She explained that her sister-in-law tends to bring up the  client's wish for children and encourage her to adopt, while the client tries to explain that she is working towards acceptance of not having children. Client reported also feeling stressed as her father is in the hospital recovering after surgery.     Treatment Objective(s) Addressed in This Session:   Identify negative self-talk and behaviors: challenge core beliefs, myths, and actions  Improve concentration, focus, and mindfulness in daily activities        Intervention:   DBT: Identified whether client upheld her values in navigating situation with sister-in-law; client identified that she did uphold her values in caring for her nephew rather than staying with her sister-in-law, and demonstrating her concern for his well-being. Client identified that she did not uphold her values in drinking more than she wanted, not setting boundaries to leave when she wanted and driving when she was not sober enough.   Explored how client can set boundaries with her sister-in-law in the future and avoid drinking with her. Identified how client can set boundaries around how she talks about not having children.   Motivational Interviewing    MI Intervention: Expressed Empathy/Understanding, Supported Autonomy, Collaboration, Evocation, Permission to raise concern or advise, Open-ended questions, Reflections: simple and complex, Change talk (evoked) and Reframe     Change Talk Expressed by the Patient: Desire to change Ability to change Committment to change Activation Taking steps    Provider Response to Change Talk: E - Evoked more info from patient about behavior change, A - Affirmed patient's thoughts, decisions, or attempts at behavior change, R - Reflected patient's change talk and S - Summarized patient's change talk statements          ASSESSMENT: Current Emotional / Mental Status (status of significant symptoms):   Risk status (Self / Other harm or suicidal ideation)   Client denies current fears or concerns for personal  safety.   Client denies current or recent suicidal ideation or behaviors.   Client denies current or recent homicidal ideation or behaviors.   Client denies current or recent self injurious behavior or ideation.   Client denies other safety concerns.   Client reports there has been no change in risk factors since their last session.     Client reports there has been no change in protective factors since their last session.     Recommended that patient call 911 or go to the local ED should there be a change in any of these risk factors.     Appearance:   Appropriate    Eye Contact:   Fair    Psychomotor Behavior: Restless  client was fidgeting   Attitude:   Cooperative    Orientation:   All   Speech    Rate / Production: Hyperverbal   Client was talkative    Volume:  Normal    Mood:    Anxious  client appeared tearful and anxious recounting recent conflict   Affect:    Appropriate    Thought Content:  Clear    Thought Form:  Coherent  Logical    Insight:    Fair      Medication Review:   No changes to current psychiatric medication(s)     Medication Compliance:   Yes     Changes in Health Issues:   None reported     Chemical Use Review:   Substance Use: Problem use continues with no change since last session, Stage of Change: Preparatory  Patient assessed present costs and future losses as a result of substance use  Provided encouragement towards sobriety  Provided support and affirmation for steps taken towards sobriety          Tobacco Use: No change in amount of tobacco use since last session.  Contemplation  Provided encouragement to quit     Diagnosis:  1. Major depressive disorder, recurrent episode, moderate (H)    2. Generalized anxiety disorder        Collateral Reports Completed:   Not Applicable    PLAN: (Client Tasks / Therapist Tasks / Other)  Client will set boundaries with her sister-in-law and return for therapy in two weeks.         Maite SIMMONS, LGSW 1/21/2020  Note reviewed and clinical  supervision by TROY Hunter [facility] 1/30/2020      ___________________________________________________________________    Treatment Plan    Client's Name: Jeanna Steel  YOB: 1965    Date: 3/15/2019; 6/21/2019; 9/25/2019; 12/17/2019    DSM-V Diagnoses: 296.32 (F33.1) Major Depressive Disorder, Recurrent Episode, Moderate _, 300.02 (F41.1) Generalized Anxiety Disorder or Adjustment Disorders  309.28 (F43.23) With mixed anxiety and depressed mood  Psychosocial / Contextual Factors: Client reports continued symptoms of depression and anxiety while caring for her elderly parents and drinking on a daily basis.  WHODAS: see intake    Referral / Collaboration:  Referral to another professional/service is not indicated at this time..    Anticipated number of session or this episode of care: 8-12      MeasurableTreatment Goal(s) related to diagnosis / functional impairment(s)  Goal 1: Client will reduce alcohol use from 5 standard drinks a day to 2 standard drinks a week in the next three months and client reporting use of three new coping skills to manage stress in the evenings.    I will know I've met my goal when I'm only drinking two drinks and going to bed earlier.      Objective #A (Client Action)    Client will identify at least three consequences of maladaptive drinking.  Status: Continued - Date(s):  12/17/2019     Intervention(s)  Therapist will assign homework to identify impact of drinking  provide support.    Objective #B  Client will identify three coping skills to replace drinking .  Status: Continued - Date(s):  12/17/2019     Intervention(s)  Therapist will assign homework to practice coping skills  teach coping skills.    Objective #C  Client will identify whether she needs further support to reduce alcohol use.  Status: Continued - Date(s):  12/17/2019      Intervention(s)  Therapist will provide support and referrals as needed, education on alcohol use.      Goal 2: Client will  maintain reduced symptoms of depression as evidenced by PHQ-9 remaining under 5 and client reporting continued motivation.    I will know I've met my goal when I feel like I have my own life again.      Objective #A (Client Action)    Status: Continued - Date(s):  12/17/2019     Client will Increase interest, engagement, and pleasure in doing things  Decrease frequency and intensity of feeling down, depressed, hopeless.    Intervention(s)  Therapist will teach behavioral activation.    Objective #B  Client will Identify negative self-talk and behaviors: challenge core beliefs, myths, and actions.    Status: Continued - Date(s):  12/17/2019        Intervention(s)  Therapist will teach CBT skills.    Objective #C  Client will Improve concentration, focus, and mindfulness in daily activities .  Status: Continued - Date(s):  12/17/2019     Intervention(s)  Therapist will teach mindfulness skills.      Goal 3: Client will report increased acceptance towards her decision about having children as evidenced by client reporting use of effective coping skills when feeling distress or regret.    I will know I've met my goal when I can accept .      Objective #A (Client Action)    Status: Continued - Date(s):   12/17/2019     Client will use acceptance skills when feeling willful.    Intervention(s)  Therapist will teach acceptance DBT skills.    Objective #B  Client will identify coping skills that reduce distress when grieving.    Status: Continued - Date(s):  12/17/2019       Intervention(s)  Therapist will teach coping skills, self-soothing.      Client has reviewed and agreed to the above plan.      Maite SIMMONS, [facility] December 17, 2019  Note reviewed and clinical supervision by TROY Hunter [facility] 12/26/2019

## 2020-01-22 ASSESSMENT — ANXIETY QUESTIONNAIRES: GAD7 TOTAL SCORE: 0

## 2020-01-24 ENCOUNTER — OFFICE VISIT (OUTPATIENT)
Dept: FAMILY MEDICINE | Facility: CLINIC | Age: 55
End: 2020-01-24
Payer: COMMERCIAL

## 2020-01-24 VITALS
WEIGHT: 183.8 LBS | RESPIRATION RATE: 16 BRPM | HEIGHT: 66 IN | TEMPERATURE: 97.9 F | DIASTOLIC BLOOD PRESSURE: 78 MMHG | SYSTOLIC BLOOD PRESSURE: 128 MMHG | OXYGEN SATURATION: 99 % | BODY MASS INDEX: 29.54 KG/M2 | HEART RATE: 78 BPM

## 2020-01-24 DIAGNOSIS — J02.9 ACUTE PHARYNGITIS, UNSPECIFIED ETIOLOGY: Primary | ICD-10-CM

## 2020-01-24 DIAGNOSIS — F33.1 MAJOR DEPRESSIVE DISORDER, RECURRENT EPISODE, MODERATE (H): ICD-10-CM

## 2020-01-24 DIAGNOSIS — F41.1 GENERALIZED ANXIETY DISORDER: ICD-10-CM

## 2020-01-24 DIAGNOSIS — L30.9 PERIORBITAL DERMATITIS: ICD-10-CM

## 2020-01-24 LAB
DEPRECATED S PYO AG THROAT QL EIA: NORMAL
SPECIMEN SOURCE: NORMAL

## 2020-01-24 PROCEDURE — 99214 OFFICE O/P EST MOD 30 MIN: CPT | Performed by: PHYSICIAN ASSISTANT

## 2020-01-24 PROCEDURE — 87880 STREP A ASSAY W/OPTIC: CPT | Performed by: PHYSICIAN ASSISTANT

## 2020-01-24 PROCEDURE — 87081 CULTURE SCREEN ONLY: CPT | Performed by: PHYSICIAN ASSISTANT

## 2020-01-24 RX ORDER — LORAZEPAM 0.5 MG/1
TABLET ORAL
Qty: 30 TABLET | Refills: 0 | Status: SHIPPED | OUTPATIENT
Start: 2020-01-24 | End: 2020-04-23

## 2020-01-24 ASSESSMENT — MIFFLIN-ST. JEOR: SCORE: 1445.46

## 2020-01-24 NOTE — PROGRESS NOTES
Subjective     Jeanna Steel is a 54 year old female who presents to clinic today for the following health issues:    HPI   ED/UC Followup:    Facility:  [facility]  Date of visit: 1/8/2020  Reason for visit: Eye Problem  Current Status: Patient states that her eyes haven't gotten any better and now she has also been having some cold sx.  She feels that it may all be related.       Depression and Anxiety Follow-Up    How are you doing with your depression since your last visit? No change    How are you doing with your anxiety since your last visit?  No change    Are you having other symptoms that might be associated with depression or anxiety? No    Have you had a significant life event? No     Do you have any concerns with your use of alcohol or other drugs? No    Social History     Tobacco Use     Smoking status: Former Smoker     Packs/day: 0.00     Types: Cigarettes     Smokeless tobacco: Never Used     Tobacco comment: None   Substance Use Topics     Alcohol use: Yes     Alcohol/week: 11.7 standard drinks     Types: 14 Standard drinks or equivalent per week     Comment: 2-3 on the weekends.      Drug use: Yes     Types: Marijuana     Comment: edibles very rarely- friend bakes goods     PHQ 1/21/2020 2/4/2020 2/18/2020   PHQ-9 Total Score 5 3 3   Q9: Thoughts of better off dead/self-harm past 2 weeks Not at all Not at all Not at all   F/U: Thoughts of suicide or self-harm - - -   F/U: Safety concerns - - -     JESSICA-7 SCORE 1/7/2020 1/21/2020 2/18/2020   Total Score - - -   Total Score - - -   Total Score 0 0 2           Suicide Assessment Five-step Evaluation and Treatment (SAFE-T)    She is also interested in establishing with dermatology.  Gets facial rash from time to time, tends to be around mouth and sometimes around eyes.    BP Readings from Last 3 Encounters:   01/24/20 128/78   01/08/20 129/82   09/27/19 (!) 147/83    Wt Readings from Last 3 Encounters:   01/24/20 83.4 kg (183 lb 12.8 oz)  "  01/08/20 84.8 kg (187 lb)   09/27/19 83 kg (183 lb)                      Reviewed and updated as needed this visit by Provider         Review of Systems   ROS COMP: Constitutional, HEENT, cardiovascular, pulmonary, gi and gu systems are negative, except as otherwise noted.      Objective    /78   Pulse 78   Temp 97.9  F (36.6  C) (Tympanic)   Resp 16   Ht 1.676 m (5' 6\")   Wt 83.4 kg (183 lb 12.8 oz)   SpO2 99%   BMI 29.67 kg/m    Body mass index is 29.67 kg/m .  Physical Exam   GENERAL: alert and no distress  EYES: Eyes grossly normal to inspection  HENT: ear canals and TM's normal, nose and mouth without ulcers or lesions  NECK: no adenopathy, no asymmetry, masses, or scars and thyroid normal to palpation  RESP: lungs clear to auscultation - no rales, rhonchi or wheezes  CV: regular rate and rhythm, normal S1 S2, no S3 or S4, no murmur, click or rub, no peripheral edema and peripheral pulses strong  PSYCH: mentation appears normal, affect normal/bright    Diagnostic Test Results:  Labs reviewed in Epic  Results for orders placed or performed in visit on 01/24/20   Rapid strep screen     Status: None   Result Value Ref Range    Specimen Description Throat     Rapid Strep A Screen       NEGATIVE: No Group A streptococcal antigen detected by immunoassay, await culture report.   Beta strep group A culture     Status: None   Result Value Ref Range    Specimen Description Throat     Culture Micro       NEGATIVE: No Group A streptococcal antigen detected by immunoassay, await culture report.           Assessment & Plan     1. Acute pharyngitis, unspecified etiology  Most likely viral.  Supportive care  - Rapid strep screen  - Beta strep group A culture    2. Periorbital dermatitis  Discussed potential treatment options, she will hold off until derm evaluation.  - DERMATOLOGY REFERRAL    3. Major depressive disorder, recurrent episode, moderate (H)  Currently on zoloft and wellbutrin.  Follows with " "psychiatry, but we have be rx prn atian.  - LORazepam (ATIVAN) 0.5 MG tablet; TAKE 1 TABLET BY MOUTH AS NEEDED FOR ANXIETY. LIMIT TO TWICE PER WEEK.  Dispense: 30 tablet; Refill: 0    4. Generalized anxiety disorder    - LORazepam (ATIVAN) 0.5 MG tablet; TAKE 1 TABLET BY MOUTH AS NEEDED FOR ANXIETY. LIMIT TO TWICE PER WEEK.  Dispense: 30 tablet; Refill: 0     BMI:   Estimated body mass index is 29.67 kg/m  as calculated from the following:    Height as of this encounter: 1.676 m (5' 6\").    Weight as of this encounter: 83.4 kg (183 lb 12.8 oz).               Return in about 6 months (around 7/24/2020).    Mike Ye PA-C  [facility]      "

## 2020-01-27 LAB
BACTERIA SPEC CULT: NORMAL
SPECIMEN SOURCE: NORMAL

## 2020-02-04 ENCOUNTER — OFFICE VISIT (OUTPATIENT)
Dept: PSYCHOLOGY | Facility: CLINIC | Age: 55
End: 2020-02-04
Payer: COMMERCIAL

## 2020-02-04 DIAGNOSIS — F33.1 MAJOR DEPRESSIVE DISORDER, RECURRENT EPISODE, MODERATE (H): Primary | ICD-10-CM

## 2020-02-04 DIAGNOSIS — F41.1 GENERALIZED ANXIETY DISORDER: ICD-10-CM

## 2020-02-04 PROCEDURE — 90834 PSYTX W PT 45 MINUTES: CPT | Performed by: SOCIAL WORKER

## 2020-02-04 ASSESSMENT — COLUMBIA-SUICIDE SEVERITY RATING SCALE - C-SSRS
5. HAVE YOU STARTED TO WORK OUT OR WORKED OUT THE DETAILS OF HOW TO KILL YOURSELF? DO YOU INTEND TO CARRY OUT THIS PLAN?: NO
1. IN THE PAST MONTH, HAVE YOU WISHED YOU WERE DEAD OR WISHED YOU COULD GO TO SLEEP AND NOT WAKE UP?: NO
4. HAVE YOU HAD THESE THOUGHTS AND HAD SOME INTENTION OF ACTING ON THEM?: NO
ATTEMPT LIFETIME: YES
1. IN THE PAST MONTH, HAVE YOU WISHED YOU WERE DEAD OR WISHED YOU COULD GO TO SLEEP AND NOT WAKE UP?: YES
5. HAVE YOU STARTED TO WORK OUT OR WORKED OUT THE DETAILS OF HOW TO KILL YOURSELF? DO YOU INTEND TO CARRY OUT THIS PLAN?: NO
TOTAL  NUMBER OF ACTUAL ATTEMPTS LIFETIME: 1
2. HAVE YOU ACTUALLY HAD ANY THOUGHTS OF KILLING YOURSELF LIFETIME?: YES
REASONS FOR IDEATION LIFETIME: COMPLETELY TO END OR STOP THE PAIN (YOU COULDN'T GO ON LIVING WITH THE PAIN OR HOW YOU WERE FEELING)
4. HAVE YOU HAD THESE THOUGHTS AND HAD SOME INTENTION OF ACTING ON THEM?: YES
ATTEMPT PAST THREE MONTHS: NO
3. HAVE YOU BEEN THINKING ABOUT HOW YOU MIGHT KILL YOURSELF?: YES
2. HAVE YOU ACTUALLY HAD ANY THOUGHTS OF KILLING YOURSELF?: NO

## 2020-02-04 ASSESSMENT — PATIENT HEALTH QUESTIONNAIRE - PHQ9: SUM OF ALL RESPONSES TO PHQ QUESTIONS 1-9: 3

## 2020-02-04 NOTE — PROGRESS NOTES
Progress Note    Client Name: Jeanna Steel  Date: 2/4/2020         Service Type: Individual  Video Visit: No     Session Start Time:  12:00  Session End Time: 12:45     Session Length: 45 min    Session #: 17    Attendees: Client attended alone     Treatment Plan Last Reviewed: 3/15/2019; 6/21/2019; 9/25/2019; 12/17/2019  PHQ-9 / JESSICA-7 : 2/4/2020    DATA  Interactive Complexity: No  Crisis: No       Progress Since Last Session (Related to Symptoms / Goals / Homework):   Symptoms: No change client reported stress has continued    Homework: Achieved / completed to satisfaction client reported maintaining boundaries with her sister-in-law      Episode of Care Goals: Satisfactory progress - ACTION (Actively working towards change); Intervened by reinforcing change plan / affirming steps taken     Current / Ongoing Stressors and Concerns:   Ongoing: Client reports increased depression symptoms while caring for elderly parents.              Current: Client reported that her sister-in-law invited her for a drink again, and this time she held her boundary of having just one drink and leaving. She stated that she also opted to have a less alcoholic drink than what her sister-in-law offered. Client reported that she has been experiencing distress while navigating conflict between her parents. She explained that her half-brother from her mother's previous marriage is looking to enter treatment for alcoholism, but cannot afford the most effective treatment option. Client described how her parents differ in wanting to provide financial support to her brother, as her mother wants to pay for his treatment while her father does not. Client said that with her parent's dementia it has been extra complicated and she has been brought in the middle since she is in charge of their finances. Client explored her own values in the situation and identified a compromise that allows her  brother to go to treatment but with an expectation he would pay their parents back.      Treatment Objective(s) Addressed in This Session:   Identify negative self-talk and behaviors: challenge core beliefs, myths, and actions  learn & utilize at least 3 assertive communication skills weekly       Intervention:   DBT: Identified boundaries client wants to hold with her parents around their conflict with each other and with her brother. Client reported that she wants to make it clear that she is not taking sides and that she is looking to manage the financial aspect as effectively as possible. Client identified that she values supporting family in financial matters to improve their well-being but that she also values holding oneself accountable to their choices. Client practice how she wanted to be direct and assertive with her parents while not engaging if they become tangential and argumentative.   Motivational Interviewing    MI Intervention: Expressed Empathy/Understanding, Supported Autonomy, Collaboration, Evocation, Permission to raise concern or advise, Open-ended questions, Reflections: simple and complex, Change talk (evoked) and Reframe     Change Talk Expressed by the Patient: Desire to change Ability to change Reasons to change Committment to change Activation Taking steps    Provider Response to Change Talk: E - Evoked more info from patient about behavior change, A - Affirmed patient's thoughts, decisions, or attempts at behavior change, R - Reflected patient's change talk and S - Summarized patient's change talk statements          ASSESSMENT: Current Emotional / Mental Status (status of significant symptoms):   Risk status (Self / Other harm or suicidal ideation)   Client denies current fears or concerns for personal safety.   Client denies current or recent suicidal ideation or behaviors.   Client denies current or recent homicidal ideation or behaviors.   Client denies current or recent self  injurious behavior or ideation.   Client denies other safety concerns.   Client reports there has been no change in risk factors since their last session.     Client reports there has been a chance in protective factors since their last session.  held boundaries around alcohol with sister-in-law   Recommended that patient call 911 or go to the local ED should there be a change in any of these risk factors.     Appearance:   Appropriate    Eye Contact:   Fair    Psychomotor Behavior: Restless  client was fidgeting and shifting in seat   Attitude:   Cooperative    Orientation:   All   Speech    Rate / Production: Hyperverbal  Client was talkative and had a hard time pausing    Volume:  Normal    Mood:    Anxious  Irritable  client presented with tearfulness, anxiety and irritable describing conflict between her parents   Affect:    Appropriate    Thought Content:  Clear    Thought Form:  Coherent  Logical    Insight:    Fair      Medication Review:   No changes to current psychiatric medication(s)     Medication Compliance:   Yes     Changes in Health Issues:   None reported     Chemical Use Review:   Substance Use: Problem use continues with no change since last session, Stage of Change: Preparatory  Patient assessed present costs and future losses as a result of substance use  Provided encouragement towards sobriety  Provided support and affirmation for steps taken towards sobriety          Tobacco Use: No change in amount of tobacco use since last session.  Contemplation  Provided encouragement to quit     Diagnosis:  1. Major depressive disorder, recurrent episode, moderate (H)    2. Generalized anxiety disorder        Collateral Reports Completed:   Not Applicable    PLAN: (Client Tasks / Therapist Tasks / Other)  Client will set boundary with her parents, use effective communication skills to address their financial participation in her brother's treatment and return for therapy in two weeks.         Maite  [place]  MSW, [facility] 2/4/2020  Note reviewed and clinical supervision by TROY Hunter [facility] 2/12/2020      ___________________________________________________________________    Treatment Plan    Client's Name: Jeanna Steel  YOB: 1965    Date: 3/15/2019; 6/21/2019; 9/25/2019; 12/17/2019    DSM-V Diagnoses: 296.32 (F33.1) Major Depressive Disorder, Recurrent Episode, Moderate _, 300.02 (F41.1) Generalized Anxiety Disorder or Adjustment Disorders  309.28 (F43.23) With mixed anxiety and depressed mood  Psychosocial / Contextual Factors: Client reports continued symptoms of depression and anxiety while caring for her elderly parents and drinking on a daily basis.  WHODAS: see intake    Referral / Collaboration:  Referral to another professional/service is not indicated at this time..    Anticipated number of session or this episode of care: 8-12      MeasurableTreatment Goal(s) related to diagnosis / functional impairment(s)  Goal 1: Client will reduce alcohol use from 5 standard drinks a day to 2 standard drinks a week in the next three months and client reporting use of three new coping skills to manage stress in the evenings.    I will know I've met my goal when I'm only drinking two drinks and going to bed earlier.      Objective #A (Client Action)    Client will identify at least three consequences of maladaptive drinking.  Status: Continued - Date(s):  12/17/2019     Intervention(s)  Therapist will assign homework to identify impact of drinking  provide support.    Objective #B  Client will identify three coping skills to replace drinking .  Status: Continued - Date(s):  12/17/2019     Intervention(s)  Therapist will assign homework to practice coping skills  teach coping skills.    Objective #C  Client will identify whether she needs further support to reduce alcohol use.  Status: Continued - Date(s):  12/17/2019      Intervention(s)  Therapist will provide support and referrals as  needed, education on alcohol use.      Goal 2: Client will maintain reduced symptoms of depression as evidenced by PHQ-9 remaining under 5 and client reporting continued motivation.    I will know I've met my goal when I feel like I have my own life again.      Objective #A (Client Action)    Status: Continued - Date(s):  12/17/2019     Client will Increase interest, engagement, and pleasure in doing things  Decrease frequency and intensity of feeling down, depressed, hopeless.    Intervention(s)  Therapist will teach behavioral activation.    Objective #B  Client will Identify negative self-talk and behaviors: challenge core beliefs, myths, and actions.    Status: Continued - Date(s):  12/17/2019        Intervention(s)  Therapist will teach CBT skills.    Objective #C  Client will Improve concentration, focus, and mindfulness in daily activities .  Status: Continued - Date(s):  12/17/2019     Intervention(s)  Therapist will teach mindfulness skills.      Goal 3: Client will report increased acceptance towards her decision about having children as evidenced by client reporting use of effective coping skills when feeling distress or regret.    I will know I've met my goal when I can accept .      Objective #A (Client Action)    Status: Continued - Date(s):   12/17/2019     Client will use acceptance skills when feeling willful.    Intervention(s)  Therapist will teach acceptance DBT skills.    Objective #B  Client will identify coping skills that reduce distress when grieving.    Status: Continued - Date(s):  12/17/2019       Intervention(s)  Therapist will teach coping skills, self-soothing.      Client has reviewed and agreed to the above plan.      Maite SIMMONS, [facility] December 17, 2019  Note reviewed and clinical supervision by TROY Hunter [facility] 12/26/2019

## 2020-02-08 ENCOUNTER — HEALTH MAINTENANCE LETTER (OUTPATIENT)
Age: 55
End: 2020-02-08

## 2020-02-18 ENCOUNTER — OFFICE VISIT (OUTPATIENT)
Dept: PSYCHOLOGY | Facility: CLINIC | Age: 55
End: 2020-02-18
Payer: COMMERCIAL

## 2020-02-18 DIAGNOSIS — F41.1 GENERALIZED ANXIETY DISORDER: ICD-10-CM

## 2020-02-18 DIAGNOSIS — F10.20 ALCOHOL USE DISORDER, MODERATE, DEPENDENCE (H): ICD-10-CM

## 2020-02-18 DIAGNOSIS — F33.1 MAJOR DEPRESSIVE DISORDER, RECURRENT EPISODE, MODERATE (H): Primary | ICD-10-CM

## 2020-02-18 PROCEDURE — 90834 PSYTX W PT 45 MINUTES: CPT | Performed by: SOCIAL WORKER

## 2020-02-18 ASSESSMENT — ANXIETY QUESTIONNAIRES
3. WORRYING TOO MUCH ABOUT DIFFERENT THINGS: NOT AT ALL
6. BECOMING EASILY ANNOYED OR IRRITABLE: SEVERAL DAYS
2. NOT BEING ABLE TO STOP OR CONTROL WORRYING: NOT AT ALL
5. BEING SO RESTLESS THAT IT IS HARD TO SIT STILL: NOT AT ALL
1. FEELING NERVOUS, ANXIOUS, OR ON EDGE: SEVERAL DAYS
7. FEELING AFRAID AS IF SOMETHING AWFUL MIGHT HAPPEN: NOT AT ALL
GAD7 TOTAL SCORE: 2

## 2020-02-18 ASSESSMENT — PATIENT HEALTH QUESTIONNAIRE - PHQ9
5. POOR APPETITE OR OVEREATING: NOT AT ALL
SUM OF ALL RESPONSES TO PHQ QUESTIONS 1-9: 3

## 2020-02-18 NOTE — PROGRESS NOTES
Progress Note    Client Name: Jeanna Steel  Date: 2/18/2020         Service Type: Individual  Video Visit: No     Session Start Time:  12:00  Session End Time: 12:45     Session Length: 45 min    Session #: 18    Attendees: Client attended alone     Treatment Plan Last Reviewed: 3/15/2019; 6/21/2019; 9/25/2019; 12/17/2019  PHQ-9 / JESSICA-7 :2/18/2020    DATA  Interactive Complexity: No  Crisis: No       Progress Since Last Session (Related to Symptoms / Goals / Homework):   Symptoms: Improving improvment in interpersonal relationships    Homework: Achieved / completed to satisfaction client reported setting a boundary with her parents and using effective communication skills to address their financial participation in her brother's treatment      Episode of Care Goals: Satisfactory progress - ACTION (Actively working towards change); Intervened by reinforcing change plan / affirming steps taken     Current / Ongoing Stressors and Concerns:   Ongoing: Client reports increased depression symptoms while caring for elderly parents.              Current: Client reported that she had felt confident when addressing her parents and her brother regarding their financial contribution to his treatment for alcoholism. She stated that her father had another fall, resulting in an overnight stay in the hospital and increased stress for the client. She reported staying overnight with her dad due to the confusion he has from dementia, though she was woken up every hour to explain to him where he was. Client reported that her mother wouldn't stay overnight with him and that she had judgments towards her mom, though she recognized her mom is also going through her own struggle with memory loss. She stated that she didn't try to change her drinking habits due to her stress but that she also hadn't been feeling badly about it. Client described a conversation she had with her  in  which she used assertiveness and validation. She stated that they were able to find a compromise and that she felt an increased connection to him.      Treatment Objective(s) Addressed in This Session:   Identify negative self-talk and behaviors: challenge core beliefs, myths, and actions  learn & utilize at least 3 assertive communication skills weekly       Intervention:   DBT: Reviewed client's use of assertiveness and boundary setting in her relationships. Identified that client was effective in also reducing her own personalization when people responded to her boundary setting.    Identified that if client doesn't feel ready to reduce her alcohol use, we can focus on increasing her use of other stress relieving skills.   Motivational Interviewing   Brainstormed strategies for client to find stress relief without alcohol  MI Intervention: Expressed Empathy/Understanding, Supported Autonomy, Collaboration, Evocation, Permission to raise concern or advise, Open-ended questions, Reflections: simple and complex, Change talk (evoked) and Reframe     Change Talk Expressed by the Patient: Desire to change Ability to change Reasons to change Committment to change Activation Taking steps    Provider Response to Change Talk: E - Evoked more info from patient about behavior change, A - Affirmed patient's thoughts, decisions, or attempts at behavior change, R - Reflected patient's change talk and S - Summarized patient's change talk statements          ASSESSMENT: Current Emotional / Mental Status (status of significant symptoms):   Risk status (Self / Other harm or suicidal ideation)   Client denies current fears or concerns for personal safety.   Client denies current or recent suicidal ideation or behaviors.   Client denies current or recent homicidal ideation or behaviors.   Client denies current or recent self injurious behavior or ideation.   Client denies other safety concerns.   Client reports there has been no  change in risk factors since their last session.     Client reports there has been no change in protective factors since their last session.     Recommended that patient call 911 or go to the local ED should there be a change in any of these risk factors.     Appearance:   Appropriate    Eye Contact:   Fair    Psychomotor Behavior: Normal     Attitude:   Cooperative    Orientation:   All   Speech    Rate / Production: Hyperverbal  Client was talkative without many pauses    Volume:  Normal    Mood:    Depressed  Irritable  client appeared irritable describing recent navigation of issue with parents   Affect:    Appropriate    Thought Content:  Clear    Thought Form:  Coherent  Logical    Insight:    Fair      Medication Review:   No changes to current psychiatric medication(s)     Medication Compliance:   Yes     Changes in Health Issues:   None reported     Chemical Use Review:   Substance Use: Problem use continues with no change since last session, Stage of Change: Contemplation and Preparatory  Patient assessed present costs and future losses as a result of substance use  Provided encouragement towards sobriety  Provided support and affirmation for steps taken towards sobriety          Tobacco Use: No change in amount of tobacco use since last session.  Contemplation  Provided encouragement to quit     Diagnosis:  1. Major depressive disorder, recurrent episode, moderate (H)    2. Generalized anxiety disorder    3. Alcohol use disorder, moderate, dependence (H)        Collateral Reports Completed:   Not Applicable    PLAN: (Client Tasks / Therapist Tasks / Other)  Client will practice a form of exercise (either at the gym or at home) and then ask herself if she wants/needs to drink for stress relief and return for therapy in two weeks.         Maite SIMMONS, [facility] 2/18/2020  Note reviewed and clinical supervision by TROY Hunter [facility] 2/25/2020       ___________________________________________________________________    Treatment Plan    Client's Name: Jeanna Steel  YOB: 1965    Date: 3/15/2019; 6/21/2019; 9/25/2019; 12/17/2019    DSM-V Diagnoses: 296.32 (F33.1) Major Depressive Disorder, Recurrent Episode, Moderate _, 300.02 (F41.1) Generalized Anxiety Disorder or Adjustment Disorders  309.28 (F43.23) With mixed anxiety and depressed mood  Psychosocial / Contextual Factors: Client reports continued symptoms of depression and anxiety while caring for her elderly parents and drinking on a daily basis.  WHODAS: see intake    Referral / Collaboration:  Referral to another professional/service is not indicated at this time..    Anticipated number of session or this episode of care: 8-12      MeasurableTreatment Goal(s) related to diagnosis / functional impairment(s)  Goal 1: Client will reduce alcohol use from 5 standard drinks a day to 2 standard drinks a week in the next three months and client reporting use of three new coping skills to manage stress in the evenings.    I will know I've met my goal when I'm only drinking two drinks and going to bed earlier.      Objective #A (Client Action)    Client will identify at least three consequences of maladaptive drinking.  Status: Continued - Date(s):  12/17/2019     Intervention(s)  Therapist will assign homework to identify impact of drinking  provide support.    Objective #B  Client will identify three coping skills to replace drinking .  Status: Continued - Date(s):  12/17/2019     Intervention(s)  Therapist will assign homework to practice coping skills  teach coping skills.    Objective #C  Client will identify whether she needs further support to reduce alcohol use.  Status: Continued - Date(s):  12/17/2019      Intervention(s)  Therapist will provide support and referrals as needed, education on alcohol use.      Goal 2: Client will maintain reduced symptoms of depression as evidenced by  PHQ-9 remaining under 5 and client reporting continued motivation.    I will know I've met my goal when I feel like I have my own life again.      Objective #A (Client Action)    Status: Continued - Date(s):  12/17/2019     Client will Increase interest, engagement, and pleasure in doing things  Decrease frequency and intensity of feeling down, depressed, hopeless.    Intervention(s)  Therapist will teach behavioral activation.    Objective #B  Client will Identify negative self-talk and behaviors: challenge core beliefs, myths, and actions.    Status: Continued - Date(s):  12/17/2019        Intervention(s)  Therapist will teach CBT skills.    Objective #C  Client will Improve concentration, focus, and mindfulness in daily activities .  Status: Continued - Date(s):  12/17/2019     Intervention(s)  Therapist will teach mindfulness skills.      Goal 3: Client will report increased acceptance towards her decision about having children as evidenced by client reporting use of effective coping skills when feeling distress or regret.    I will know I've met my goal when I can accept .      Objective #A (Client Action)    Status: Continued - Date(s):   12/17/2019     Client will use acceptance skills when feeling willful.    Intervention(s)  Therapist will teach acceptance DBT skills.    Objective #B  Client will identify coping skills that reduce distress when grieving.    Status: Continued - Date(s):  12/17/2019       Intervention(s)  Therapist will teach coping skills, self-soothing.      Client has reviewed and agreed to the above plan.      Maite SIMMONS, [facility] December 17, 2019  Note reviewed and clinical supervision by TROY Hunter [facility] 12/26/2019

## 2020-02-19 ASSESSMENT — ANXIETY QUESTIONNAIRES: GAD7 TOTAL SCORE: 2

## 2020-02-25 DIAGNOSIS — F33.1 MAJOR DEPRESSIVE DISORDER, RECURRENT EPISODE, MODERATE (H): ICD-10-CM

## 2020-02-25 DIAGNOSIS — F41.1 GENERALIZED ANXIETY DISORDER: ICD-10-CM

## 2020-02-25 RX ORDER — SERTRALINE HYDROCHLORIDE 100 MG/1
100 TABLET, FILM COATED ORAL DAILY
Qty: 90 TABLET | Refills: 0 | Status: SHIPPED | OUTPATIENT
Start: 2020-02-25 | End: 2020-05-28

## 2020-02-25 NOTE — TELEPHONE ENCOUNTER
"Requested Prescriptions   Pending Prescriptions Disp Refills     sertraline (ZOLOFT) 100 MG tablet 90 tablet 0     Sig: Take 1 tablet (100 mg) by mouth daily Take with 50 mg daily   Last Written Prescription Date:  9/27/2019  Last Fill Quantity: 90,  # refills: 0   Last office visit: 9/27/2019 with prescribing provider   Future Office Visit:   Next 5 appointments (look out 90 days)    Mar 03, 2020 12:00 PM CST  Return Visit with [facility] [place] ([facility] [place]) [address] [place] [address] [address]  [place] [address]  [phone]   Mar 17, 2020 12:00 PM CDT  Return Visit with [facility] [place] ([facility] [place]) [address] [place] [address] [address]  [place] [address]  [phone]   Mar 31, 2020 12:30 PM CDT  Return Visit with [facility] [place] ([facility] [place]) [address]  [place] [address]  [phone]             SSRIs Protocol Passed - 2/25/2020  7:59 AM        Passed - PHQ-9 score less than 5 in past 6 months     Please review last PHQ-9 score.           Passed - Medication is active on med list        Passed - Patient is age 18 or older        Passed - No active pregnancy on record        Passed - No positive pregnancy test in last 12 months        Passed - Recent (6 mo) or future (30 days) visit within the authorizing provider's specialty     Patient had office visit in the last 6 months or has a visit in the next 30 days with authorizing provider or within the authorizing provider's specialty.  See \"Patient Info\" tab in inbasket, or \"Choose Columns\" in Meds & Orders section of the refill encounter.            sertraline (ZOLOFT) 50 MG tablet 90 tablet 0     Sig: Take 1 tablet (50 mg) by mouth daily Take with 100 mg   Last Written Prescription Date:  9/27/2019  Last Fill Quantity: 90,  # refills: 0   Last office visit: 9/27/2019 with prescribing provider   Future Office Visit:   Next 5 appointments (look out 90 days)    Mar 03, 2020 " "12:00 PM CST  Return Visit with [facility] [place] ([facility] [place]) [address]  [place] [address]  [phone]   Mar 17, 2020 12:00 PM CDT  Return Visit with [facility] [place] ([facility] [place]) [address]  [place] [address]  [phone]   Mar 31, 2020 12:30 PM CDT  Return Visit with [facility] [place] ([facility] [place]) [address]  [place] [address]  [phone]             SSRIs Protocol Passed - 2/25/2020  7:59 AM        Passed - PHQ-9 score less than 5 in past 6 months     Please review last PHQ-9 score.           Passed - Medication is active on med list        Passed - Patient is age 18 or older        Passed - No active pregnancy on record        Passed - No positive pregnancy test in last 12 months        Passed - Recent (6 mo) or future (30 days) visit within the authorizing provider's specialty     Patient had office visit in the last 6 months or has a visit in the next 30 days with authorizing provider or within the authorizing provider's specialty.  See \"Patient Info\" tab in inbasket, or \"Choose Columns\" in Meds & Orders section of the refill encounter.          Prescription approved per [facility] Refill Protocol.  Laina Nieves RN on 2/25/2020 at 12:43 PM      "

## 2020-03-03 ENCOUNTER — OFFICE VISIT (OUTPATIENT)
Dept: PSYCHOLOGY | Facility: CLINIC | Age: 55
End: 2020-03-03
Payer: COMMERCIAL

## 2020-03-03 DIAGNOSIS — F41.1 GENERALIZED ANXIETY DISORDER: ICD-10-CM

## 2020-03-03 DIAGNOSIS — F33.1 MAJOR DEPRESSIVE DISORDER, RECURRENT EPISODE, MODERATE (H): Primary | ICD-10-CM

## 2020-03-03 DIAGNOSIS — F10.20 ALCOHOL USE DISORDER, MODERATE, DEPENDENCE (H): ICD-10-CM

## 2020-03-03 PROCEDURE — 90834 PSYTX W PT 45 MINUTES: CPT | Performed by: SOCIAL WORKER

## 2020-03-03 ASSESSMENT — ANXIETY QUESTIONNAIRES
7. FEELING AFRAID AS IF SOMETHING AWFUL MIGHT HAPPEN: NOT AT ALL
1. FEELING NERVOUS, ANXIOUS, OR ON EDGE: NOT AT ALL
5. BEING SO RESTLESS THAT IT IS HARD TO SIT STILL: NOT AT ALL
2. NOT BEING ABLE TO STOP OR CONTROL WORRYING: NOT AT ALL
GAD7 TOTAL SCORE: 1
6. BECOMING EASILY ANNOYED OR IRRITABLE: SEVERAL DAYS
3. WORRYING TOO MUCH ABOUT DIFFERENT THINGS: NOT AT ALL

## 2020-03-03 ASSESSMENT — PATIENT HEALTH QUESTIONNAIRE - PHQ9
SUM OF ALL RESPONSES TO PHQ QUESTIONS 1-9: 3
5. POOR APPETITE OR OVEREATING: NOT AT ALL

## 2020-03-03 NOTE — PROGRESS NOTES
Progress Note    Client Name: Jeanna Steel  Date: 3/3/2020         Service Type: Individual  Video Visit: No     Session Start Time:  12:00  Session End Time: 12:45     Session Length: 45 min    Session #: 19    Attendees: Client attended alone     Treatment Plan Last Reviewed: 3/15/2019; 6/21/2019; 9/25/2019; 12/17/2019  PHQ-9 / JESSICA-7 : 3/3/2020    DATA  Interactive Complexity: No  Crisis: No       Progress Since Last Session (Related to Symptoms / Goals / Homework):   Symptoms: Worsening increased grief    Homework: Achieved / completed to satisfaction client reported exercising for stress relief         Episode of Care Goals: Satisfactory progress - ACTION (Actively working towards change); Intervened by reinforcing change plan / affirming steps taken     Current / Ongoing Stressors and Concerns:   Ongoing: Client reports increased depression symptoms while caring for elderly parents.              Current: Client reported that she had been working to increase her use of stress relieving skills, reporting she exercised four times in the last two weeks. She stated that she was mostly focusing on her emotional reaction to a friend becoming pregnant at 40 years old. Client reported that her friend has a history of meth abuse and she feels jealous, wondering why her body wasn't able to get pregnant even though she had treated it well. Client became tearful, reported that she was feeling badly about herself and her inability to get pregnant. She identified considering adopting but said she knows it's not necessarily going to fix her grief. Client reported that she wants to find other meaning for her life and that she knows there are things in her life she is grateful for, but that it's hard to let go of her dream of having a child.     Treatment Objective(s) Addressed in This Session:   Identify negative self-talk and behaviors: challenge core beliefs, myths, and  actions       Intervention:   DBT: Reviewed the stages of grief and how client may cycle through them when faced with triggering events. Identified how client's ability to cope with he rgrief has improved with time and she feels more confident in her ability to reach acceptance.   Motivational Interviewing     MI Intervention: Expressed Empathy/Understanding, Supported Autonomy, Collaboration, Evocation, Permission to raise concern or advise, Open-ended questions, Reflections: simple and complex, Change talk (evoked) and Reframe     Change Talk Expressed by the Patient: Desire to change Ability to change Reasons to change Committment to change Activation Taking steps    Provider Response to Change Talk: E - Evoked more info from patient about behavior change, A - Affirmed patient's thoughts, decisions, or attempts at behavior change, R - Reflected patient's change talk and S - Summarized patient's change talk statements          ASSESSMENT: Current Emotional / Mental Status (status of significant symptoms):   Risk status (Self / Other harm or suicidal ideation)   Client denies current fears or concerns for personal safety.   Client denies current or recent suicidal ideation or behaviors.   Client denies current or recent homicidal ideation or behaviors.   Client denies current or recent self injurious behavior or ideation.   Client denies other safety concerns.   Client reports there has been a change in risk factors since their last session.  increased grief   Client reports there has been no change in protective factors since their last session.     Recommended that patient call 911 or go to the local ED should there be a change in any of these risk factors.     Appearance:   Appropriate    Eye Contact:   Fair    Psychomotor Behavior: Agitated  Normal  client shifted throughout session   Attitude:   Cooperative    Orientation:   All   Speech    Rate / Production: Hyperverbal  Client was  talkative    Volume:  Normal    Mood:    Depressed  Irritable  client presented as tearful and with low mood   Affect:    Appropriate    Thought Content:  Clear    Thought Form:  Coherent  Logical    Insight:    Fair      Medication Review:   No changes to current psychiatric medication(s)     Medication Compliance:   Yes     Changes in Health Issues:   None reported     Chemical Use Review:   Substance Use: Problem use continues with no change since last session, Stage of Change: Contemplation and Preparatory  Patient assessed present costs and future losses as a result of substance use  Provided encouragement towards sobriety  Provided support and affirmation for steps taken towards sobriety          Tobacco Use: No change in amount of tobacco use since last session.  Contemplation  Provided encouragement to quit     Diagnosis:  1. Major depressive disorder, recurrent episode, moderate (H)    2. Generalized anxiety disorder    3. Alcohol use disorder, moderate, dependence (H)        Collateral Reports Completed:   Not Applicable    PLAN: (Client Tasks / Therapist Tasks / Other)  Client will consider making art about her grief, focus on what meaning she has in her life without having a child and return for therapy in two weeks.         Maite SIMMONS, [facility] 3/3/2020  Note reviewed and clinical supervision by TROY Hunter [facility] 3/11/2020    ___________________________________________________________________    Treatment Plan    Client's Name: Jeanna Steel  YOB: 1965    Date: 3/15/2019; 6/21/2019; 9/25/2019; 12/17/2019    DSM-V Diagnoses: 296.32 (F33.1) Major Depressive Disorder, Recurrent Episode, Moderate _, 300.02 (F41.1) Generalized Anxiety Disorder or Adjustment Disorders  309.28 (F43.23) With mixed anxiety and depressed mood  Psychosocial / Contextual Factors: Client reports continued symptoms of depression and anxiety while caring for her elderly parents and drinking on a  daily basis.  WHODAS: see intake    Referral / Collaboration:  Referral to another professional/service is not indicated at this time..    Anticipated number of session or this episode of care: 8-12      MeasurableTreatment Goal(s) related to diagnosis / functional impairment(s)  Goal 1: Client will reduce alcohol use from 5 standard drinks a day to 2 standard drinks a week in the next three months and client reporting use of three new coping skills to manage stress in the evenings.    I will know I've met my goal when I'm only drinking two drinks and going to bed earlier.      Objective #A (Client Action)    Client will identify at least three consequences of maladaptive drinking.  Status: Continued - Date(s):  12/17/2019     Intervention(s)  Therapist will assign homework to identify impact of drinking  provide support.    Objective #B  Client will identify three coping skills to replace drinking .  Status: Continued - Date(s):  12/17/2019     Intervention(s)  Therapist will assign homework to practice coping skills  teach coping skills.    Objective #C  Client will identify whether she needs further support to reduce alcohol use.  Status: Continued - Date(s):  12/17/2019      Intervention(s)  Therapist will provide support and referrals as needed, education on alcohol use.      Goal 2: Client will maintain reduced symptoms of depression as evidenced by PHQ-9 remaining under 5 and client reporting continued motivation.    I will know I've met my goal when I feel like I have my own life again.      Objective #A (Client Action)    Status: Continued - Date(s):  12/17/2019     Client will Increase interest, engagement, and pleasure in doing things  Decrease frequency and intensity of feeling down, depressed, hopeless.    Intervention(s)  Therapist will teach behavioral activation.    Objective #B  Client will Identify negative self-talk and behaviors: challenge core beliefs, myths, and actions.    Status: Continued -  Date(s):  12/17/2019        Intervention(s)  Therapist will teach CBT skills.    Objective #C  Client will Improve concentration, focus, and mindfulness in daily activities .  Status: Continued - Date(s):  12/17/2019     Intervention(s)  Therapist will teach mindfulness skills.      Goal 3: Client will report increased acceptance towards her decision about having children as evidenced by client reporting use of effective coping skills when feeling distress or regret.    I will know I've met my goal when I can accept .      Objective #A (Client Action)    Status: Continued - Date(s):   12/17/2019     Client will use acceptance skills when feeling willful.    Intervention(s)  Therapist will teach acceptance DBT skills.    Objective #B  Client will identify coping skills that reduce distress when grieving.    Status: Continued - Date(s):  12/17/2019       Intervention(s)  Therapist will teach coping skills, self-soothing.      Client has reviewed and agreed to the above plan.      Maite SIMMONS, [facility] December 17, 2019  Note reviewed and clinical supervision by TROY Hunter [facility] 12/26/2019

## 2020-03-04 ASSESSMENT — ANXIETY QUESTIONNAIRES: GAD7 TOTAL SCORE: 1

## 2020-03-14 ENCOUNTER — NURSE TRIAGE (OUTPATIENT)
Dept: NURSING | Facility: CLINIC | Age: 55
End: 2020-03-14

## 2020-03-15 NOTE — TELEPHONE ENCOUNTER
Back issues in the past - 12 years ago had a ruptured disc. Back was tight all day, then she stubbed toe, super sharp pain. Reports she is unable to walk. Per protocol, advised ED evaluation.    Liat Santacruz RN on 3/14/2020 at 9:52 PM     Reason for Disposition    Unable to walk    Additional Information    Negative: Passed out (i.e., lost consciousness, collapsed and was not responding)    Negative: Shock suspected (e.g., cold/pale/clammy skin, too weak to stand, low BP, rapid pulse)    Negative: Sounds like a life-threatening emergency to the triager    Negative: Major injury to the back (e.g., MVA, fall > 10 feet or 3 meters, penetrating injury, etc.)    Negative: Followed a tailbone injury    Negative: [1] Pain in the upper back over the ribs (rib cage) AND [2] radiates (travels, goes) into chest    Negative: [1] Pain in the upper back over the ribs (rib cage) AND [2] worsened by coughing (or clearly increases with breathing)    Negative: Back pain during pregnancy    Negative: Pain mainly in flank (i.e., in the side, over the lower ribs or just below the ribs)    Negative: [1] SEVERE back pain (e.g., excruciating) AND [2] sudden onset AND [3] age > 60    Negative: [1] Unable to urinate (or only a few drops) > 4 hours AND     [2] bladder feels very full (e.g., palpable bladder or strong urge to urinate)    Negative: [1] Urinary or bowel incontinence (i.e., loss of bladder or bowel control) AND [2] new onset    Negative: Numbness in groin or rectal area (i.e., loss of sensation)    Negative: [1] SEVERE abdominal pain AND [2] present > 1 hour    Negative: [1] Abdominal pain AND [2] age > 60    Negative: Weakness of a leg or foot (e.g., unable to bear weight, dragging foot)    Protocols used: BACK PAIN-A-

## 2020-03-16 ENCOUNTER — TELEPHONE (OUTPATIENT)
Dept: PSYCHOLOGY | Facility: CLINIC | Age: 55
End: 2020-03-16

## 2020-03-16 NOTE — TELEPHONE ENCOUNTER
Talked with patient and informed them their in-person visit is canceled tomorrow. Client agreed to having a telephone visit at their scheduled appointment time.

## 2020-03-17 ENCOUNTER — OFFICE VISIT (OUTPATIENT)
Dept: PSYCHOLOGY | Facility: CLINIC | Age: 55
End: 2020-03-17
Payer: COMMERCIAL

## 2020-03-17 DIAGNOSIS — F33.1 MAJOR DEPRESSIVE DISORDER, RECURRENT EPISODE, MODERATE (H): Primary | ICD-10-CM

## 2020-03-17 DIAGNOSIS — F10.20 ALCOHOL USE DISORDER, MODERATE, DEPENDENCE (H): ICD-10-CM

## 2020-03-17 DIAGNOSIS — F43.23 ADJUSTMENT DISORDER WITH MIXED ANXIETY AND DEPRESSED MOOD: ICD-10-CM

## 2020-03-17 DIAGNOSIS — F41.1 GENERALIZED ANXIETY DISORDER: ICD-10-CM

## 2020-03-17 PROCEDURE — 99207 ZZC NON-BILLABLE SERV PER CHARTING: CPT | Performed by: SOCIAL WORKER

## 2020-03-17 NOTE — PROGRESS NOTES
"Jeanna Steel is a 54 year old female who is being evaluated via a billable telephone visit.      The patient has been notified of following:     \"This telephone visit will be conducted via a call between you and your physician/provider. We have found that certain health care needs can be provided without the need for a physical exam.  This service lets us provide the care you need with a short phone conversation.  If a prescription is necessary we can send it directly to your pharmacy.  If lab work is needed we can place an order for that and you can then stop by our lab to have the test done at a later time.    If during the course of the call the physician/provider feels a telephone visit is not appropriate, you will not be charged for this service.\"       Jeanna Steel complains of  No chief complaint on file.      I have reviewed and updated the patient's Past Medical History, Social History, Family History and Medication List.    ALLERGIES  Patient has no known allergies.        Assessment/Plan:  (F33.1) Major depressive disorder, recurrent episode, moderate (H)  (primary encounter diagnosis)  Comment: Client denied experiencing any SI or other risk concerns. She stated that she was feeling more motivated around her home recently and was going to start a new art project.  Plan: Client will continue monitoring symptoms and completing tasks to continue behavioral activation.    (F41.1) Generalized anxiety disorder  Comment: Client reported that she was anxious about the pandemic as she has two vulnerable parents. She said that she is spending a lot of time trying to take care of their needs from afar but knowing there is only so much she can do.   Plan: Client will focus on what is within her control, identify if she begins engaging in catastrophic thinking    (F10.20) Alcohol use disorder, moderate, dependence (H)  Comment: Client reported that she had reduced her alcohol use for a week but then " after a friend came to visit she began drinking again. Client said she was in a new online group that she is hoping will provide helpful support. She stated that while the group prefers abstinence from alcohol they understand that people have their own journey to get there.  Plan: Client will tell the group about her recent use, be vulnerable and open with the support group    (F43.23) Adjustment disorder with mixed anxiety and depressed mood  Comment: Client reported feeling badly about her father being isolated as he will get confused with his dementia. Client identified having fear that without seeing her regularly he will forget who she is. She said that knowing how much both of her parents have declined makes her feel very sad.  Plan: Client will talk with her parent's assisted living facility to learn about their coronavirus policy, practice acceptance with what she cannot do and focus on what is within her control.    I have reviewed the note as documented above.  This accurately captures the substance of my conversation with the patient.        Phone call contact time  Call Started at 12:00  Call Ended at 12:35    Maite SIMMONS, SW 3/17/2020   Note reviewed and clinical supervision by TROY Hunter [facility] 4/29/2020

## 2020-03-31 ENCOUNTER — VIRTUAL VISIT (OUTPATIENT)
Dept: PSYCHOLOGY | Facility: CLINIC | Age: 55
End: 2020-03-31
Payer: COMMERCIAL

## 2020-03-31 DIAGNOSIS — F10.20 ALCOHOL USE DISORDER, MODERATE, DEPENDENCE (H): ICD-10-CM

## 2020-03-31 DIAGNOSIS — F33.1 MAJOR DEPRESSIVE DISORDER, RECURRENT EPISODE, MODERATE (H): Primary | ICD-10-CM

## 2020-03-31 DIAGNOSIS — F41.1 GENERALIZED ANXIETY DISORDER: ICD-10-CM

## 2020-03-31 PROCEDURE — 90832 PSYTX W PT 30 MINUTES: CPT | Mod: 95 | Performed by: SOCIAL WORKER

## 2020-03-31 NOTE — PROGRESS NOTES
Progress Note    Client Name: Jeanna Steel  Date: 3/31/2020     Service Type: Individual         Session Start Time:  12:30  Session End Time: 1:00     Session Length: 30 min    Session #: 20    Attendees: Client attended alone    Telemedicine Visit: The patient's condition can be safely assessed and treated via synchronous audio and visual telemedicine encounter.      Reason for Telemedicine Visit: Services only offered telehealth    Originating Site (Patient Location): Patient's home    Distant Site (Provider Location): Provider Remote Setting- home office    Consent:  The patient/guardian has verbally consented to: the potential risks and benefits of telemedicine (video visit) versus in person care; bill my insurance or make self-payment for services provided; and responsibility for payment of non-covered services.        Treatment Plan Last Reviewed: 3/15/2019; 6/21/2019; 9/25/2019; 12/17/2019  PHQ-9 / JESSICA-7 : 3/3/2020    DATA  Interactive Complexity: No  Crisis: No       Progress Since Last Session (Related to Symptoms / Goals / Homework):   Symptoms: Improving reduced distress    Homework: Achieved / completed to satisfaction client reported not drinking for 8 days and being patient with her parents      Episode of Care Goals: Satisfactory progress - ACTION (Actively working towards change); Intervened by reinforcing change plan / affirming steps taken     Current / Ongoing Stressors and Concerns:   Ongoing: Client reports increased depression symptoms while caring for elderly parents.              Current: Client reported that she had been experimenting further with her alcohol use and that she didn't use for 8 days, which she felt was easier than she expected. Client reported feeling more in control of her alcohol use and while she enjoys it, she no longer feels like she needs alcohol to relax. Client reported that her biggest stressor right now is her  parents, as they are in isolation in their assisted living facility but continue leaving their apartment. She stated feeling like her mom was being selfish and putting her needs before their health. Client said she has fear for her parents getting ill as they are in the vulnerable population and their memory isn't great so they keep forgetting.     Treatment Objective(s) Addressed in This Session:   Identify negative self-talk and behaviors: challenge core beliefs, myths, and actions       Intervention:   DBT: Checked the facts and identified how client is taking all of the necessary steps to keep her parents safe, has talked with their facility, has hid their car keys and told them many times what they need to do to remain protected.   Motivational Interviewing     MI Intervention: Expressed Empathy/Understanding, Supported Autonomy, Collaboration, Evocation, Permission to raise concern or advise, Open-ended questions, Reflections: simple and complex, Change talk (evoked) and Reframe     Change Talk Expressed by the Patient: Desire to change Ability to change Reasons to change Committment to change Activation Taking steps    Provider Response to Change Talk: E - Evoked more info from patient about behavior change, A - Affirmed patient's thoughts, decisions, or attempts at behavior change, R - Reflected patient's change talk and S - Summarized patient's change talk statements          ASSESSMENT: Current Emotional / Mental Status (status of significant symptoms):   Risk status (Self / Other harm or suicidal ideation)   Client denies current fears or concerns for personal safety.   Client denies current or recent suicidal ideation or behaviors.   Client denies current or recent homicidal ideation or behaviors.   Client denies current or recent self injurious behavior or ideation.   Client denies other safety concerns.   Client reports there has been no change in risk factors since their last session.     Client  reports there has been no change in protective factors since their last session.     Recommended that patient call 911 or go to the local ED should there be a change in any of these risk factors.     Appearance:   Appropriate    Eye Contact:   Fair    Psychomotor Behavior: Normal     Attitude:   Cooperative  Attentive   Orientation:   All   Speech    Rate / Production: Talkative     Volume:  Normal    Mood:    Anxious  Agitated client appeared nervous and irritable regarding her parents   Affect:    Appropriate    Thought Content:  Clear    Thought Form:  Coherent  Logical    Insight:    Fair      Medication Review:   No changes to current psychiatric medication(s)     Medication Compliance:   Yes     Changes in Health Issues:   None reported     Chemical Use Review:   Substance Use: decrease in alcohol .  Patient reports frequency of use only a handful of times in last few weeks.  Stage of Change: Preparatory and Action  Provided encouragement towards sobriety  Provided support and affirmation for steps taken towards sobriety          Tobacco Use: No change in amount of tobacco use since last session.  Contemplation  Provided encouragement to quit     Diagnosis:  1. Major depressive disorder, recurrent episode, moderate (H)    2. Generalized anxiety disorder    3. Alcohol use disorder, moderate, dependence (H)        Collateral Reports Completed:   Not Applicable    PLAN: (Client Tasks / Therapist Tasks / Other)  Client will continue pursuing improved use of alcohol, focus on what is within her control with her parents and return for therapy in two weeks.         Maite SIMMONS, [facility] 3/31/2020  Note reviewed and clinical supervision by TROY Hunter [facility] 4/17/2020    ___________________________________________________________________    Treatment Plan    Client's Name: Jeanna Steel  YOB: 1965    Date: 3/15/2019; 6/21/2019; 9/25/2019; 12/17/2019    DSM-V Diagnoses: 296.32 (F33.1)  Major Depressive Disorder, Recurrent Episode, Moderate _, 300.02 (F41.1) Generalized Anxiety Disorder or Adjustment Disorders  309.28 (F43.23) With mixed anxiety and depressed mood  Psychosocial / Contextual Factors: Client reports continued symptoms of depression and anxiety while caring for her elderly parents and drinking on a daily basis.  WHODAS: see intake    Referral / Collaboration:  Referral to another professional/service is not indicated at this time..    Anticipated number of session or this episode of care: 8-12      MeasurableTreatment Goal(s) related to diagnosis / functional impairment(s)  Goal 1: Client will reduce alcohol use from 5 standard drinks a day to 2 standard drinks a week in the next three months and client reporting use of three new coping skills to manage stress in the evenings.    I will know I've met my goal when I'm only drinking two drinks and going to bed earlier.      Objective #A (Client Action)    Client will identify at least three consequences of maladaptive drinking.  Status: Continued - Date(s):  12/17/2019     Intervention(s)  Therapist will assign homework to identify impact of drinking  provide support.    Objective #B  Client will identify three coping skills to replace drinking .  Status: Continued - Date(s):  12/17/2019     Intervention(s)  Therapist will assign homework to practice coping skills  teach coping skills.    Objective #C  Client will identify whether she needs further support to reduce alcohol use.  Status: Continued - Date(s):  12/17/2019      Intervention(s)  Therapist will provide support and referrals as needed, education on alcohol use.      Goal 2: Client will maintain reduced symptoms of depression as evidenced by PHQ-9 remaining under 5 and client reporting continued motivation.    I will know I've met my goal when I feel like I have my own life again.      Objective #A (Client Action)    Status: Continued - Date(s):  12/17/2019     Client will  Increase interest, engagement, and pleasure in doing things  Decrease frequency and intensity of feeling down, depressed, hopeless.    Intervention(s)  Therapist will teach behavioral activation.    Objective #B  Client will Identify negative self-talk and behaviors: challenge core beliefs, myths, and actions.    Status: Continued - Date(s):  12/17/2019        Intervention(s)  Therapist will teach CBT skills.    Objective #C  Client will Improve concentration, focus, and mindfulness in daily activities .  Status: Continued - Date(s):  12/17/2019     Intervention(s)  Therapist will teach mindfulness skills.      Goal 3: Client will report increased acceptance towards her decision about having children as evidenced by client reporting use of effective coping skills when feeling distress or regret.    I will know I've met my goal when I can accept .      Objective #A (Client Action)    Status: Continued - Date(s):   12/17/2019     Client will use acceptance skills when feeling willful.    Intervention(s)  Therapist will teach acceptance DBT skills.    Objective #B  Client will identify coping skills that reduce distress when grieving.    Status: Continued - Date(s):  12/17/2019       Intervention(s)  Therapist will teach coping skills, self-soothing.      Client has reviewed and agreed to the above plan.      Maite SIMMONS, SW December 17, 2019  Note reviewed and clinical supervision by TROY Hunter [facility] 12/26/2019

## 2020-04-14 ENCOUNTER — VIRTUAL VISIT (OUTPATIENT)
Dept: PSYCHOLOGY | Facility: CLINIC | Age: 55
End: 2020-04-14
Payer: COMMERCIAL

## 2020-04-14 DIAGNOSIS — F43.23 ADJUSTMENT DISORDER WITH MIXED ANXIETY AND DEPRESSED MOOD: ICD-10-CM

## 2020-04-14 DIAGNOSIS — F33.1 MAJOR DEPRESSIVE DISORDER, RECURRENT EPISODE, MODERATE (H): Primary | ICD-10-CM

## 2020-04-14 DIAGNOSIS — F10.20 ALCOHOL USE DISORDER, MODERATE, DEPENDENCE (H): ICD-10-CM

## 2020-04-14 DIAGNOSIS — F41.1 GENERALIZED ANXIETY DISORDER: ICD-10-CM

## 2020-04-14 PROCEDURE — 90832 PSYTX W PT 30 MINUTES: CPT | Mod: 95 | Performed by: SOCIAL WORKER

## 2020-04-14 NOTE — PROGRESS NOTES
Progress Note    Client Name: Jeanna Steel  Date: 4/14/2020         Service Type: Individual     Session Start Time:  12:30  Session End Time: 1:00     Session Length: 30 min    Session #: 20    Attendees: Client attended alone    Telemedicine Visit: The patient's condition can be safely assessed and treated via synchronous audio and visual telemedicine encounter.      Reason for Telemedicine Visit: [facility]    Originating Site (Patient Location): Patient's home    Distant Site (Provider Location): Provider Remote Setting home office    Consent:  The patient/guardian has verbally consented to: the potential risks and benefits of telemedicine (video visit) versus in person care; bill my insurance or make self-payment for services provided; and responsibility for payment of non-covered services.      Treatment Plan Last Reviewed: 3/15/2019; 6/21/2019; 9/25/2019; 12/17/2019; to be reviewed, client declined today  PHQ-9 / JESSICA-7 : 3/3/2020    DATA  Interactive Complexity: No  Crisis: No       Progress Since Last Session (Related to Symptoms / Goals / Homework):   Symptoms: Worsening increased depression    Homework: Did not complete client reported return to drinking and struggling to cope      Episode of Care Goals: Satisfactory progress - ACTION (Actively working towards change); Intervened by reinforcing change plan / affirming steps taken     Current / Ongoing Stressors and Concerns:   Ongoing: Client reports increased depression symptoms while caring for elderly parents.              Current: Client reported that she was experiencing increased symptoms of depression the past week and that she had also drank every day. She described feeling stuck in a negative spiral and that everything felt hard. Client reported having a lot of negative thoughts about herself in her head, feeling like she makes mistakes  and is letting other's down. Client practiced checking the facts around guilt that she has not been present with her brother who is recently out of [facility] and who's wife  recently. She identified that she has called him a few times when she has felt like she had the capacity but that the emotional load of talking with her brother often makes her mental health worse after their conversations. Client said that she also was being very supportive of their parents which was also emotionally draining. She reported that she was unsure whether her mood worsened before or after she started drinking daily.      Treatment Objective(s) Addressed in This Session:   Identify negative self-talk and behaviors: challenge core beliefs, myths, and actions       Intervention:   CBT: identified client was engaging in filtering and not seeing the positive things she has done   DBT: Practiced checking the facts to demonstrate client has been doing her best and has accomplished more than she realized.    Motivational Interviewing     MI Intervention: Expressed Empathy/Understanding, Supported Autonomy, Collaboration, Evocation, Permission to raise concern or advise, Open-ended questions, Reflections: simple and complex, Change talk (evoked) and Reframe     Change Talk Expressed by the Patient: Desire to change Reasons to change Need to change Committment to change Activation    Provider Response to Change Talk: E - Evoked more info from patient about behavior change, A - Affirmed patient's thoughts, decisions, or attempts at behavior change, R - Reflected patient's change talk and S - Summarized patient's change talk statements          ASSESSMENT: Current Emotional / Mental Status (status of significant symptoms):   Risk status (Self / Other harm or suicidal ideation)   Client denies current fears or concerns for personal safety.   Client denies current or recent suicidal ideation or behaviors.   Client denies current or  recent homicidal ideation or behaviors.   Client denies current or recent self injurious behavior or ideation.   Client denies other safety concerns.   Client reports there has been a change in risk factors since their last session.  increase in drinking   Client reports there has been no change in protective factors since their last session.     Recommended that patient call 911 or go to the local ED should there be a change in any of these risk factors.     Appearance:   Appropriate  Disheveled    Eye Contact:   Fair    Psychomotor Behavior: Normal     Attitude:   Cooperative  Attentive   Orientation:   All   Speech    Rate / Production: Emotional Talkative     Volume:  Normal    Mood:    Depressed  Sad  Fearful client presented with low mood and sadness   Affect:    Appropriate  Tearful   Thought Content:  Clear    Thought Form:  Coherent  Logical    Insight:    Fair      Medication Review:   No changes to current psychiatric medication(s)     Medication Compliance:   Yes     Changes in Health Issues:   None reported     Chemical Use Review:   Substance Use: increase in alcohol .  Patient reports frequency of use every day.  Stage of Change: Preparatory and Action  Provided encouragement towards sobriety  Provided support and affirmation for steps taken towards sobriety          Tobacco Use: No change in amount of tobacco use since last session.  Contemplation  Provided encouragement to quit     Diagnosis:  1. Major depressive disorder, recurrent episode, moderate (H)    2. Generalized anxiety disorder    3. Alcohol use disorder, moderate, dependence (H)    4. Adjustment disorder with mixed anxiety and depressed mood        Collateral Reports Completed:   Not Applicable    PLAN: (Client Tasks / Therapist Tasks / Other)  Client will identify when she is engaging in filtering, emphasize when she makes progress and return for therapy in two weeks.         Maite SIMMONS, LGSW 4/14/2020  Note reviewed and  clinical supervision by TROY Hunter [facility] 4/21/2020    ___________________________________________________________________    Treatment Plan    Client's Name: Jeanna Steel  YOB: 1965    Date: 3/15/2019; 6/21/2019; 9/25/2019; 12/17/2019; 4/14/2020    DSM-V Diagnoses: 296.32 (F33.1) Major Depressive Disorder, Recurrent Episode, Moderate _, 300.02 (F41.1) Generalized Anxiety Disorder or Adjustment Disorders  309.28 (F43.23) With mixed anxiety and depressed mood  Psychosocial / Contextual Factors: Client reports continued symptoms of depression and anxiety while caring for her elderly parents and drinking on a daily basis.  WHODAS: see intake    Referral / Collaboration:  Referral to another professional/service is not indicated at this time..    Anticipated number of session or this episode of care: 8-12      MeasurableTreatment Goal(s) related to diagnosis / functional impairment(s)  Goal 1: Client will reduce alcohol use from 5 standard drinks a day to 2 standard drinks a week in the next three months and client reporting use of three new coping skills to manage stress in the evenings.    I will know I've met my goal when I'm only drinking two drinks and going to bed earlier.      Objective #A (Client Action)    Client will identify at least three consequences of maladaptive drinking.  Status: Continued - Date(s):  4/14/2020    Intervention(s)  Therapist will assign homework to identify impact of drinking  provide support.    Objective #B  Client will identify three coping skills to replace drinking .  Status: Continued - Date(s):  4/14/2020    Intervention(s)  Therapist will assign homework to practice coping skills  teach coping skills.    Objective #C  Client will identify whether she needs further support to reduce alcohol use.  Status: Continued - Date(s):   4/14/2020    Intervention(s)  Therapist will provide support and referrals as needed, education on alcohol use.      Goal 2:  Client will maintain reduced symptoms of depression as evidenced by PHQ-9 remaining under 5 and client reporting continued motivation.    I will know I've met my goal when I feel more productive rather than paralyzed.      Objective #A (Client Action)    Status: Continued - Date(s):   4/14/2020    Client will Increase interest, engagement, and pleasure in doing things  Decrease frequency and intensity of feeling down, depressed, hopeless.    Intervention(s)  Therapist will teach behavioral activation.    Objective #B  Client will Identify negative self-talk and behaviors: challenge core beliefs, myths, and actions.    Status: Continued - Date(s):  4/14/2020    Intervention(s)  Therapist will teach CBT skills.    Objective #C  Client will Improve concentration, focus, and mindfulness in daily activities .  Status: Continued - Date(s):   4/14/2020    Intervention(s)  Therapist will teach mindfulness skills.      Goal 3: Client will report continued increased acceptance towards her decision about having children as evidenced by client reporting use of effective coping skills when feeling distress or regret.    I will know I've met my goal when I can accept .      Objective #A (Client Action)    Status: Continued - Date(s):    4/14/2020    Client will use acceptance skills when feeling willful.    Intervention(s)  Therapist will teach acceptance DBT skills.    Objective #B  Client will identify coping skills that reduce distress when grieving.    Status: Continued - Date(s):   4/14/2020     Intervention(s)  Therapist will teach coping skills, self-soothing.      Client has reviewed and agreed to the above plan.      Maite SIMMONS, [facility] April 14, 2020  Note reviewed and clinical supervision by TROY Hunter [facility] 4/21/2020

## 2020-04-20 ASSESSMENT — ANXIETY QUESTIONNAIRES
1. FEELING NERVOUS, ANXIOUS, OR ON EDGE: NOT AT ALL
5. BEING SO RESTLESS THAT IT IS HARD TO SIT STILL: NOT AT ALL
7. FEELING AFRAID AS IF SOMETHING AWFUL MIGHT HAPPEN: SEVERAL DAYS
2. NOT BEING ABLE TO STOP OR CONTROL WORRYING: NOT AT ALL
6. BECOMING EASILY ANNOYED OR IRRITABLE: SEVERAL DAYS
GAD7 TOTAL SCORE: 2
3. WORRYING TOO MUCH ABOUT DIFFERENT THINGS: NOT AT ALL

## 2020-04-20 ASSESSMENT — PATIENT HEALTH QUESTIONNAIRE - PHQ9
5. POOR APPETITE OR OVEREATING: NOT AT ALL
SUM OF ALL RESPONSES TO PHQ QUESTIONS 1-9: 7

## 2020-04-21 ASSESSMENT — ANXIETY QUESTIONNAIRES: GAD7 TOTAL SCORE: 2

## 2020-04-23 ENCOUNTER — VIRTUAL VISIT (OUTPATIENT)
Dept: FAMILY MEDICINE | Facility: CLINIC | Age: 55
End: 2020-04-23
Payer: COMMERCIAL

## 2020-04-23 DIAGNOSIS — F33.1 MAJOR DEPRESSIVE DISORDER, RECURRENT EPISODE, MODERATE (H): Primary | ICD-10-CM

## 2020-04-23 DIAGNOSIS — F41.1 GENERALIZED ANXIETY DISORDER: ICD-10-CM

## 2020-04-23 PROCEDURE — 99214 OFFICE O/P EST MOD 30 MIN: CPT | Mod: 95 | Performed by: FAMILY MEDICINE

## 2020-04-23 RX ORDER — LORAZEPAM 0.5 MG/1
TABLET ORAL
Qty: 30 TABLET | Refills: 0 | Status: SHIPPED | OUTPATIENT
Start: 2020-04-23 | End: 2021-06-28

## 2020-04-23 RX ORDER — BUPROPION HYDROCHLORIDE 150 MG/1
150 TABLET ORAL EVERY MORNING
Qty: 30 TABLET | Refills: 1 | Status: SHIPPED | OUTPATIENT
Start: 2020-04-23 | End: 2020-06-19

## 2020-04-23 ASSESSMENT — ANXIETY QUESTIONNAIRES
GAD7 TOTAL SCORE: 7
IF YOU CHECKED OFF ANY PROBLEMS ON THIS QUESTIONNAIRE, HOW DIFFICULT HAVE THESE PROBLEMS MADE IT FOR YOU TO DO YOUR WORK, TAKE CARE OF THINGS AT HOME, OR GET ALONG WITH OTHER PEOPLE: SOMEWHAT DIFFICULT
7. FEELING AFRAID AS IF SOMETHING AWFUL MIGHT HAPPEN: MORE THAN HALF THE DAYS
2. NOT BEING ABLE TO STOP OR CONTROL WORRYING: SEVERAL DAYS
1. FEELING NERVOUS, ANXIOUS, OR ON EDGE: SEVERAL DAYS
5. BEING SO RESTLESS THAT IT IS HARD TO SIT STILL: NOT AT ALL
6. BECOMING EASILY ANNOYED OR IRRITABLE: SEVERAL DAYS
3. WORRYING TOO MUCH ABOUT DIFFERENT THINGS: SEVERAL DAYS

## 2020-04-23 ASSESSMENT — PATIENT HEALTH QUESTIONNAIRE - PHQ9
5. POOR APPETITE OR OVEREATING: SEVERAL DAYS
SUM OF ALL RESPONSES TO PHQ QUESTIONS 1-9: 9

## 2020-04-23 NOTE — PROGRESS NOTES
"Jeanna Steel is a 54 year old female who is being evaluated via a billable video visit.      The patient has been notified of following:     \"This video visit will be conducted via a call between you and your physician/provider. We have found that certain health care needs can be provided without the need for an in-person physical exam.  This service lets us provide the care you need with a video conversation.  If a prescription is necessary we can send it directly to your pharmacy.  If lab work is needed we can place an order for that and you can then stop by our lab to have the test done at a later time.    Video visits are billed at different rates depending on your insurance coverage.  Please reach out to your insurance provider with any questions.    If during the course of the call the physician/provider feels a video visit is not appropriate, you will not be charged for this service.\"    Patient has given verbal consent for Video visit? Yes    How would you like to obtain your AVS? Sharonda    Patient would like the video invitation sent by: Text to cell phone: [phone]    Will anyone else be joining your video visit? No      Subjective     Jeanna Steel is a 54 year old female who presents to clinic today for the following health issues:    HPI  Depression and Anxiety Follow-Up    Needs refills  Was seen here about 1.5 year ago - tearful and very depressed  Started seeing a therapist and this has helped  She advised med review  Saw psychiatrist   Has stopped using alcohol - still has some use  Anxiety is increased  Not able to connect with family  Parents are living assisted living  She is not able to see them regularly  Anxiety due to concerns for her parents  Trying not to use alcohol    Takes 150 mg dose of sertraline  Feels the sertraline  Was also prescribed wellbutrin in the past not sure that this helped may have helped some  Stopped it due to difficulty renewing the meds    Also " notes some difficulty with sleep - has tried melatonin      How are you doing with your depression since your last visit? Worsened     How are you doing with your anxiety since your last visit?  Worsened     Are you having other symptoms that might be associated with depression or anxiety? Yes:  patient is having panic attacks    Have you had a significant life event? OTHER: parents are in assisted living and pt cannot help them     Do you have any concerns with your use of alcohol or other drugs? yes    Social History     Tobacco Use     Smoking status: Former Smoker     Packs/day: 0.00     Types: Cigarettes     Smokeless tobacco: Never Used     Tobacco comment: None   Substance Use Topics     Alcohol use: Yes     Alcohol/week: 11.7 standard drinks     Types: 14 Standard drinks or equivalent per week     Comment: 2-3 on the weekends.      Drug use: Yes     Types: Marijuana     Comment: edibles very rarely- friend bakes goods     PHQ 3/3/2020 4/20/2020 4/23/2020   PHQ-9 Total Score 3 7 9   Q9: Thoughts of better off dead/self-harm past 2 weeks Not at all Not at all Not at all   F/U: Thoughts of suicide or self-harm - - -   F/U: Safety concerns - - -     JESSICA-7 SCORE 3/3/2020 4/20/2020 4/23/2020   Total Score - - -   Total Score - - -   Total Score 1 2 7           Suicide Assessment Five-step Evaluation and Treatment (SAFE-T)      How many servings of fruits and vegetables do you eat daily?  2-3    On average, how many sweetened beverages do you drink each day (Examples: soda, juice, sweet tea, etc.  Do NOT count diet or artificially sweetened beverages)?   limited    How many days per week do you exercise enough to make your heart beat faster? 7    How many minutes a day do you exercise enough to make your heart beat faster? 20 - 29    How many days per week do you miss taking your medication? 0         Video Start Time: 1:55        Patient Active Problem List   Diagnosis     Major depressive disorder, recurrent  episode, moderate (H)     Generalized anxiety disorder     Vitamin D deficiency     Microscopic hematuria     Past Surgical History:   Procedure Laterality Date     BACK SURGERY  2006     ECTOPIC PREGNANCY SURGERY  2003     EYE SURGERY  1969     GYN SURGERY  2003       Social History     Tobacco Use     Smoking status: Former Smoker     Packs/day: 0.00     Years: 0.00     Pack years: 0.00     Types: Cigarettes     Smokeless tobacco: Never Used     Tobacco comment: None   Substance Use Topics     Alcohol use: Yes     Alcohol/week: 11.7 standard drinks     Comment: 2-3 on the weekends.      Family History   Problem Relation Age of Onset     Breast Cancer Mother      Hypertension Mother      Diabetes Father      Cerebrovascular Disease Father      Alzheimer Disease Father      Arthritis Father      Hypertension Father      Breast Cancer Maternal Grandmother 92     Diabetes Maternal Grandfather      Diabetes Paternal Grandfather      Alcohol/Drug Brother      Breast Cancer Paternal Aunt 65     Cancer Paternal Aunt         Endometrial cancer     Schizophrenia Brother         suicide in 1992     Other - See Comments Brother         Multiple Sclerosis     Schizophrenia Maternal Half-Brother      Mental Illness Maternal Half-Brother      Diabetes Other      Substance Abuse Maternal Half-Brother      Substance Abuse Maternal Half-Brother          Current Outpatient Medications   Medication Sig Dispense Refill     buPROPion (WELLBUTRIN XL) 150 MG 24 hr tablet Take 1 tablet (150 mg) by mouth every morning 30 tablet 1     LORazepam (ATIVAN) 0.5 MG tablet TAKE 1 TABLET BY MOUTH AS NEEDED FOR ANXIETY. LIMIT TO TWICE PER WEEK. 30 tablet 0     sertraline (ZOLOFT) 100 MG tablet Take 1 tablet (100 mg) by mouth daily Take with 50 mg daily 90 tablet 0     sertraline (ZOLOFT) 50 MG tablet Take 1 tablet (50 mg) by mouth daily Take with 100 mg 90 tablet 0       Reviewed and updated as needed this visit by Provider         Review of  "Systems   ROS COMP: Constitutional, HEENT, cardiovascular, pulmonary, gi and gu systems are negative, except as otherwise noted.      Objective    There were no vitals taken for this visit.  Estimated body mass index is 29.67 kg/m  as calculated from the following:    Height as of 1/24/20: 1.676 m (5' 6\").    Weight as of 1/24/20: 83.4 kg (183 lb 12.8 oz).  Physical Exam     GENERAL: healthy, alert and no distress  EYES: Eyes grossly normal to inspection, conjunctivae and sclerae normal  RESP: no audible wheeze, cough, or visible cyanosis.  No visible retractions or increased work of breathing.  Able to speak fully in complete sentences.  NEURO: Cranial nerves grossly intact, mentation intact and speech normal  PSYCH: mentation appears normal, affect normal/bright, judgement and insight intact, normal speech and appearance well-groomed      Diagnostic Test Results:  Labs reviewed in Epic        Assessment & Plan     1. Major depressive disorder, recurrent episode, moderate (H)  Trial of adding wellbutrin again  Discussed reducing anxiety - talk therapy exercise stress reduction  Can use melatonin or unasom for sleep prn  - buPROPion (WELLBUTRIN XL) 150 MG 24 hr tablet; Take 1 tablet (150 mg) by mouth every morning  Dispense: 30 tablet; Refill: 1  - LORazepam (ATIVAN) 0.5 MG tablet; TAKE 1 TABLET BY MOUTH AS NEEDED FOR ANXIETY. LIMIT TO TWICE PER WEEK.  Dispense: 30 tablet; Refill: 0    2. Generalized anxiety disorder  PRN use  Advised to avoid alcohol  - LORazepam (ATIVAN) 0.5 MG tablet; TAKE 1 TABLET BY MOUTH AS NEEDED FOR ANXIETY. LIMIT TO TWICE PER WEEK.  Dispense: 30 tablet; Refill: 0     BMI:   Estimated body mass index is 29.67 kg/m  as calculated from the following:    Height as of 1/24/20: 1.676 m (5' 6\").    Weight as of 1/24/20: 83.4 kg (183 lb 12.8 oz).           4 weeks    No follow-ups on file.    Karie Queen MD  [facility]      Video-Visit Details    Type of service:  Video " Visit    Video End Time:2:13    Originating Location (pt. Location): Home    Distant Location (provider location):  [facility]     Mode of Communication:  Video Conference via [facility]    No follow-ups on file.       Karie Queen MD

## 2020-04-24 ASSESSMENT — ANXIETY QUESTIONNAIRES: GAD7 TOTAL SCORE: 7

## 2020-04-27 ENCOUNTER — VIRTUAL VISIT (OUTPATIENT)
Dept: PSYCHOLOGY | Facility: CLINIC | Age: 55
End: 2020-04-27
Payer: COMMERCIAL

## 2020-04-27 DIAGNOSIS — F41.1 GENERALIZED ANXIETY DISORDER: ICD-10-CM

## 2020-04-27 DIAGNOSIS — F43.23 ADJUSTMENT DISORDER WITH MIXED ANXIETY AND DEPRESSED MOOD: ICD-10-CM

## 2020-04-27 DIAGNOSIS — F33.1 MAJOR DEPRESSIVE DISORDER, RECURRENT EPISODE, MODERATE (H): Primary | ICD-10-CM

## 2020-04-27 PROCEDURE — 90834 PSYTX W PT 45 MINUTES: CPT | Mod: 95 | Performed by: SOCIAL WORKER

## 2020-04-27 NOTE — PROGRESS NOTES
"                                           Progress Note    Client Name: Jeanna Steel  Date: 4/27/2020         Service Type: Individual     Session Start Time:  10:00  Session End Time: 10:46     Session Length: 46 min    Session #: 21    Attendees: Client attended alone    The patient has been notified of the following:      \"We have found that certain health care needs can be provided without the need for a face to face visit.  This service lets us provide the care you need with a phone conversation.       I will have full access to your [place] medical record during this entire phone call.   I will be taking notes for your medical record.      Since this is like an office visit, we will bill your insurance company for this service.       There are potential benefits and risks of telephone visits (e.g. limits to patient confidentiality) that differ from in-person visits.?  Confidentiality still applies for telephone services, and nobody will record the visit.  It is important to be in a quiet, private space that is free of distractions (including cell phone or other devices) during the visit.??      If during the course of the call I believe a telephone visit is not appropriate, you will not be charged for this service\"     Consent has been obtained for this service by care team member: Yes     Started at video visit, switched to phone due to video delay     Treatment Plan Last Reviewed: 3/15/2019; 6/21/2019; 9/25/2019; 12/17/2019; 4/14/2020  PHQ-9 / JESSICA-7 : 4/23/2020 at virtual doc visit    DATA  Interactive Complexity: No  Crisis: No       Progress Since Last Session (Related to Symptoms / Goals / Homework):   Symptoms: Improving reduced depression    Homework: Partially completed client reported struggling to identify distorted thoughts in the moment      Episode of Care Goals: Satisfactory progress - ACTION (Actively working towards change); Intervened by reinforcing change plan / affirming steps " taken     Current / Ongoing Stressors and Concerns:   Ongoing: Client reports increased depression symptoms while caring for elderly parents.              Current: Client reported that she had been feeling increased anxiety and irritability lately as she continues experiencing stress with caretaking. She described having a frustrating experience trying to navigate prescription refills and how she feels tired of having to to virtual visits. Client stated that she had also been feeling defensive with her partner, though she explained how they were able to communicate and work through conflict. She reported also struggling with anxious thoughts about an interaction with another member of the board the client is on. Identified increased engagement in personalization and how client can begin reducing her automatic thinking that makes her feel like she is being judged.      Treatment Objective(s) Addressed in This Session:   Identify negative self-talk and behaviors: challenge core beliefs, myths, and actions       Intervention:   CBT: Reviewed how client was returning to some maladaptive coping skills with her increased stress, including increased personalization in her relationships. Reviewed how client has been able to identify and challenge personalization with hindsight, how she can do so in the moment.     Motivational Interviewing     MI Intervention: Expressed Empathy/Understanding, Supported Autonomy, Collaboration, Evocation, Permission to raise concern or advise, Open-ended questions, Reflections: simple and complex, Change talk (evoked) and Reframe     Change Talk Expressed by the Patient: Desire to change Reasons to change Need to change Committment to change Activation    Provider Response to Change Talk: E - Evoked more info from patient about behavior change, A - Affirmed patient's thoughts, decisions, or attempts at behavior change, R - Reflected patient's change talk and S - Summarized patient's change  talk statements          ASSESSMENT: Current Emotional / Mental Status (status of significant symptoms):   Risk status (Self / Other harm or suicidal ideation)   Client denies current fears or concerns for personal safety.   Client denies current or recent suicidal ideation or behaviors.   Client denies current or recent homicidal ideation or behaviors.   Client denies current or recent self injurious behavior or ideation.   Client denies other safety concerns.   Client reports there has been no change in risk factors since their last session.     Client reports there has been a chance in protective factors since their last session.  reduced depression and drinking   Recommended that patient call 911 or go to the local ED should there be a change in any of these risk factors.     Appearance:   could not assess over phone    Eye Contact:   could not assess over phone    Psychomotor Behavior: could not assess over phone     Attitude:   Cooperative  Attentive   Orientation:   All   Speech    Rate / Production: Normal/ Responsive Talkative     Volume:  Normal    Mood:    Irritable  Fearful client sounded worried and irritable   Affect:    could not assess over phone    Thought Content:  Clear    Thought Form:  Coherent  Logical    Insight:    Fair      Medication Review:   Changes to psychiatric medications, see updated Medication List in EPIC.      Medication Compliance:   Yes     Changes in Health Issues:   None reported     Chemical Use Review:   Substance Use: decrease in alcohol .  Patient reports frequency of use just on Friday, Saturday and Sunday.  Stage of Change: Action  Provided encouragement towards sobriety  Provided support and affirmation for steps taken towards sobriety          Tobacco Use: No change in amount of tobacco use since last session.  Contemplation  Provided encouragement to quit     Diagnosis:  1. Major depressive disorder, recurrent episode, moderate (H)    2. Generalized anxiety disorder     3. Adjustment disorder with mixed anxiety and depressed mood        Collateral Reports Completed:   Not Applicable    PLAN: (Client Tasks / Therapist Tasks / Other)  Client will identify when she she is personalizing in the moment, practice increasing empathy and identifying other perspectives to explain someone's behavior and return for therapy in two weeks.         Maite Cheyenne  MSW, [facility] 4/27/2020  Note reviewed and clinical supervision by TROY Hunter [facility] 4/27/2020  ___________________________________________________________________    Treatment Plan    Client's Name: Jeanna Steel  YOB: 1965    Date: 3/15/2019; 6/21/2019; 9/25/2019; 12/17/2019; 4/14/2020    DSM-V Diagnoses: 296.32 (F33.1) Major Depressive Disorder, Recurrent Episode, Moderate _, 300.02 (F41.1) Generalized Anxiety Disorder or Adjustment Disorders  309.28 (F43.23) With mixed anxiety and depressed mood  Psychosocial / Contextual Factors: Client reports continued symptoms of depression and anxiety while caring for her elderly parents and drinking on a daily basis.  WHODAS: see intake    Referral / Collaboration:  Referral to another professional/service is not indicated at this time..    Anticipated number of session or this episode of care: 8-12      MeasurableTreatment Goal(s) related to diagnosis / functional impairment(s)  Goal 1: Client will reduce alcohol use from 5 standard drinks a day to 2 standard drinks a week in the next three months and client reporting use of three new coping skills to manage stress in the evenings.    I will know I've met my goal when I'm only drinking two drinks and going to bed earlier.      Objective #A (Client Action)    Client will identify at least three consequences of maladaptive drinking.  Status: Continued - Date(s):  4/14/2020    Intervention(s)  Therapist will assign homework to identify impact of drinking  provide support.    Objective #B  Client will identify three  coping skills to replace drinking .  Status: Continued - Date(s):  4/14/2020    Intervention(s)  Therapist will assign homework to practice coping skills  teach coping skills.    Objective #C  Client will identify whether she needs further support to reduce alcohol use.  Status: Continued - Date(s):   4/14/2020    Intervention(s)  Therapist will provide support and referrals as needed, education on alcohol use.      Goal 2: Client will maintain reduced symptoms of depression as evidenced by PHQ-9 remaining under 5 and client reporting continued motivation.    I will know I've met my goal when I feel more productive rather than paralyzed.      Objective #A (Client Action)    Status: Continued - Date(s):   4/14/2020    Client will Increase interest, engagement, and pleasure in doing things  Decrease frequency and intensity of feeling down, depressed, hopeless.    Intervention(s)  Therapist will teach behavioral activation.    Objective #B  Client will Identify negative self-talk and behaviors: challenge core beliefs, myths, and actions.    Status: Continued - Date(s):  4/14/2020    Intervention(s)  Therapist will teach CBT skills.    Objective #C  Client will Improve concentration, focus, and mindfulness in daily activities .  Status: Continued - Date(s):   4/14/2020    Intervention(s)  Therapist will teach mindfulness skills.      Goal 3: Client will report continued increased acceptance towards her decision about having children as evidenced by client reporting use of effective coping skills when feeling distress or regret.    I will know I've met my goal when I can accept .      Objective #A (Client Action)    Status: Continued - Date(s):    4/14/2020    Client will use acceptance skills when feeling willful.    Intervention(s)  Therapist will teach acceptance DBT skills.    Objective #B  Client will identify coping skills that reduce distress when grieving.    Status: Continued - Date(s):   4/14/2020      Intervention(s)  Therapist will teach coping skills, self-soothing.      Client has reviewed and agreed to the above plan.      Maite SIMMONS, LGSW April 14, 2020  Note reviewed and clinical supervision by TROY Hunter LICSW 4/21/2020

## 2020-05-12 ENCOUNTER — VIRTUAL VISIT (OUTPATIENT)
Dept: PSYCHOLOGY | Facility: CLINIC | Age: 55
End: 2020-05-12
Payer: COMMERCIAL

## 2020-05-12 DIAGNOSIS — F41.1 GENERALIZED ANXIETY DISORDER: ICD-10-CM

## 2020-05-12 DIAGNOSIS — F33.1 MAJOR DEPRESSIVE DISORDER, RECURRENT EPISODE, MODERATE (H): Primary | ICD-10-CM

## 2020-05-12 PROCEDURE — 90834 PSYTX W PT 45 MINUTES: CPT | Mod: 95 | Performed by: SOCIAL WORKER

## 2020-05-12 NOTE — PROGRESS NOTES
Progress Note    Client Name: Jeanna Steel  Date: 5/12/2020         Service Type: Individual     Session Start Time:  11:00  Session End Time: 11:45     Session Length: 45 min    Session #: 22    Attendees: Client attended alone  Telemedicine Visit: The patient's condition can be safely assessed and treated via synchronous audio and visual telemedicine encounter.      Reason for Telemedicine Visit: Services only offered telehealth    Originating Site (Patient Location): Patient's home    Distant Site (Provider Location): Provider Remote Setting home office    Consent:  The patient/guardian has verbally consented to: the potential risks and benefits of telemedicine (video visit) versus in person care; bill my insurance or make self-payment for services provided; and responsibility for payment of non-covered services.     Mode of Communication:  Video Conference via [employer]    As the provider I attest to compliance with applicable laws and regulations related to telemedicine.     Treatment Plan Last Reviewed: 4/14/2020  PHQ-9 / JESSICA-7 : 4/23/2020 at virtual doc visit    DATA  Interactive Complexity: No  Crisis: No       Progress Since Last Session (Related to Symptoms / Goals / Homework):   Symptoms: Worsening increased sadness    Homework: Partially completed client reported reducing personalization      Episode of Care Goals: Satisfactory progress - ACTION (Actively working towards change); Intervened by reinforcing change plan / affirming steps taken     Current / Ongoing Stressors and Concerns:   Ongoing: Client reports increased depression symptoms while caring for elderly parents.              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 Client said she feels good about her decision as she realized this person has  continuously made her feel badly about herself. Client reported experiencing stressors with her parents after her father suddenly declined and had to have emergency surgery. She said her father was now in transitional housing and has not returned to his apartment in the memory care unit, where his wife has remained. Client said it was stressful to be unable to visit her father as he cannot remember where he is or why he had to have surgery. She stated that her mom's decline was more pronounced now that she was on her own as she kept forgetting there was a pandemic. Client reported that she had felt overwhelmed but she was really focusing on what she could do things about and accept her limits. She identified going to work on her parents house for a while and feeling proud that she worked through her avoidance. 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     Treatment Objective(s) Addressed in This Session:   Identify negative self-talk and behaviors: challenge core beliefs, myths, and actions       Intervention:   DBT: Identified client's effective use of assertiveness to set boundaries and how she maintained her relationships while upholding her self-respect.   Validated and reviewed client's grief with slowly losing parents to dementia, sadness at her inability to visit the to provide comfort      Motivational Interviewing     MI Intervention: Expressed Empathy/Understanding, Supported Autonomy, Collaboration, Evocation, Permission to raise concern or advise, Open-ended questions, Reflections: simple and complex, Change talk (evoked) and Reframe     Change Talk Expressed by the Patient: Desire to change Reasons to change Need to change Committment to change Activation    Provider Response to Change Talk: E - Evoked more info from patient about behavior change, A - Affirmed patient's thoughts, decisions, or attempts at  behavior change, R - Reflected patient's change talk and S - Summarized patient's change talk statements          ASSESSMENT: Current Emotional / Mental Status (status of significant symptoms):   Risk status (Self / Other harm or suicidal ideation)   Client denies current fears or concerns for personal safety.   Client denies current or recent suicidal ideation or behaviors.   Client denies current or recent homicidal ideation or behaviors.   Client denies current or recent self injurious behavior or ideation.   Client denies other safety concerns.   Client reports there has been no change in risk factors since their last session.     Client reports there has been no change in protective factors since their last session.     Recommended that patient call 911 or go to the local ED should there be a change in any of these risk factors.     Appearance:   Appropriate    Eye Contact:   Fair    Psychomotor Behavior: Normal     Attitude:   Cooperative  Pleasant Attentive   Orientation:   All   Speech    Rate / Production: Normal/ Responsive Talkative     Volume:  Normal    Mood:    Sad  Grieving Fearful client appeared worried and sad for her parents   Affect:    Appropriate    Thought Content:  Clear    Thought Form:  Coherent  Goal Directed  Logical    Insight:    Fair      Medication Review:   No changes to current psychiatric medication(s)     Medication Compliance:   Yes     Changes in Health Issues:   None reported     Chemical Use Review:   Substance Use: Problem use continues with no change since last session, Stage of Change: Action  Provided encouragement towards sobriety  Provided support and affirmation for steps taken towards sobriety     client reported having one drink daily this past week, before only drank three days in the week     Tobacco Use: No change in amount of tobacco use since last session.  Contemplation  Provided encouragement to quit     Diagnosis:  1. Major depressive disorder, recurrent  episode, moderate (H)    2. Generalized anxiety disorder        Collateral Reports Completed:   Not Applicable    PLAN: (Client Tasks / Therapist Tasks / Other)  Client will validate sadness and grief, remind herself what she can control with her parents and return for therapy in two weeks.         Maite Quinn MSW, SW 5/12/2020   Note reviewed and clinical supervision by TROY Hunter [facility] ___________________________________________________________________    Treatment Plan    Client's Name: Jeanna Steel  YOB: 1965    Date: 3/15/2019; 6/21/2019; 9/25/2019; 12/17/2019; 4/14/2020    DSM-V Diagnoses: 296.32 (F33.1) Major Depressive Disorder, Recurrent Episode, Moderate _, 300.02 (F41.1) Generalized Anxiety Disorder or Adjustment Disorders  309.28 (F43.23) With mixed anxiety and depressed mood  Psychosocial / Contextual Factors: Client reports continued symptoms of depression and anxiety while caring for her elderly parents and drinking on a daily basis.  WHODAS: see intake    Referral / Collaboration:  Referral to another professional/service is not indicated at this time..    Anticipated number of session or this episode of care: 8-12      MeasurableTreatment Goal(s) related to diagnosis / functional impairment(s)  Goal 1: Client will reduce alcohol use from 5 standard drinks a day to 2 standard drinks a week in the next three months and client reporting use of three new coping skills to manage stress in the evenings.    I will know I've met my goal when I'm only drinking two drinks and going to bed earlier.      Objective #A (Client Action)    Client will identify at least three consequences of maladaptive drinking.  Status: Continued - Date(s):  4/14/2020    Intervention(s)  Therapist will assign homework to identify impact of drinking  provide support.    Objective #B  Client will identify three coping skills to replace drinking .  Status: Continued - Date(s):   4/14/2020    Intervention(s)  Therapist will assign homework to practice coping skills  teach coping skills.    Objective #C  Client will identify whether she needs further support to reduce alcohol use.  Status: Continued - Date(s):   4/14/2020    Intervention(s)  Therapist will provide support and referrals as needed, education on alcohol use.      Goal 2: Client will maintain reduced symptoms of depression as evidenced by PHQ-9 remaining under 5 and client reporting continued motivation.    I will know I've met my goal when I feel more productive rather than paralyzed.      Objective #A (Client Action)    Status: Continued - Date(s):   4/14/2020    Client will Increase interest, engagement, and pleasure in doing things  Decrease frequency and intensity of feeling down, depressed, hopeless.    Intervention(s)  Therapist will teach behavioral activation.    Objective #B  Client will Identify negative self-talk and behaviors: challenge core beliefs, myths, and actions.    Status: Continued - Date(s):  4/14/2020    Intervention(s)  Therapist will teach CBT skills.    Objective #C  Client will Improve concentration, focus, and mindfulness in daily activities .  Status: Continued - Date(s):   4/14/2020    Intervention(s)  Therapist will teach mindfulness skills.      Goal 3: Client will report continued increased acceptance towards her decision about having children as evidenced by client reporting use of effective coping skills when feeling distress or regret.    I will know I've met my goal when I can accept .      Objective #A (Client Action)    Status: Continued - Date(s):    4/14/2020    Client will use acceptance skills when feeling willful.    Intervention(s)  Therapist will teach acceptance DBT skills.    Objective #B  Client will identify coping skills that reduce distress when grieving.    Status: Continued - Date(s):   4/14/2020     Intervention(s)  Therapist will teach coping skills,  self-soothing.      Client has reviewed and agreed to the above plan.      Maite SIMMONS, LGSW April 14, 2020  Note reviewed and clinical supervision by TROY Hunter [facility] 4/21/2020

## 2020-05-26 ENCOUNTER — VIRTUAL VISIT (OUTPATIENT)
Dept: PSYCHOLOGY | Facility: CLINIC | Age: 55
End: 2020-05-26
Payer: COMMERCIAL

## 2020-05-26 DIAGNOSIS — F33.1 MAJOR DEPRESSIVE DISORDER, RECURRENT EPISODE, MODERATE (H): Primary | ICD-10-CM

## 2020-05-26 DIAGNOSIS — F41.1 GENERALIZED ANXIETY DISORDER: ICD-10-CM

## 2020-05-26 PROCEDURE — 90832 PSYTX W PT 30 MINUTES: CPT | Mod: 95 | Performed by: SOCIAL WORKER

## 2020-05-26 NOTE — PROGRESS NOTES
Progress Note    Client Name: Jeanna Steel  Date: 5/26/2020         Service Type: Individual     Session Start Time:  11:00  Session End Time: 11:35     Session Length: 35 min    Session #: 23    Attendees: Client attended alone  Telemedicine Visit: The patient's condition can be safely assessed and treated via synchronous audio and visual telemedicine encounter.      Reason for Telemedicine Visit: Services only offered telehealth    Originating Site (Patient Location): Patient's home    Distant Site (Provider Location): Provider Remote Setting home office    Consent:  The patient/guardian has verbally consented to: the potential risks and benefits of telemedicine (video visit) versus in person care; bill my insurance or make self-payment for services provided; and responsibility for payment of non-covered services.     Mode of Communication:  Video Conference via [employer]    As the provider I attest to compliance with applicable laws and regulations related to telemedicine.     Treatment Plan Last Reviewed:  4/14/2020  PHQ-9 / JESSICA-7 : 4/23/2020 at virtual doc visit    DATA  Interactive Complexity: No  Crisis: No       Progress Since Last Session (Related to Symptoms / Goals / Homework):   Symptoms: Improving reduced distress    Homework: Achieved / completed to satisfaction client reported validating her emotions      Episode of Care Goals: Satisfactory progress - ACTION (Actively working towards change); Intervened by reinforcing change plan / affirming steps taken     Current / Ongoing Stressors and Concerns:   Ongoing: Client reports increased depression symptoms while caring for elderly parents.              Current: Client reported that she had been feeling better about what is going on with her parents as they are adjusting to the pandemic. She said her mom keeps forgetting it's a thing but that she's trying not to stress over it as she can only control  them to a certain extent. Client reported she was having this session from the art [employer] as she is installing her newest piece that will be observed from the sidewalk. She stated that it felt good to be creating and that she was proud of this particular piece. Client reported having increased awareness when she tranfers emotions from her ex- to the [employer] owner as she said they are similar. She said she was feeling more confident in her ability to use skills and let go of things she doesn't need to worry over.      Treatment Objective(s) Addressed in This Session:   Identify negative self-talk and behaviors: challenge core beliefs, myths, and actions       Intervention:   DBT: Reviewed client's use of acceptance skills   Motivational Interviewing     MI Intervention: Expressed Empathy/Understanding, Supported Autonomy, Collaboration, Evocation, Permission to raise concern or advise, Open-ended questions, Reflections: simple and complex, Change talk (evoked) and Reframe     Change Talk Expressed by the Patient: Desire to change Reasons to change Need to change Committment to change Activation Taking steps    Provider Response to Change Talk: E - Evoked more info from patient about behavior change, A - Affirmed patient's thoughts, decisions, or attempts at behavior change, R - Reflected patient's change talk and S - Summarized patient's change talk statements          ASSESSMENT: Current Emotional / Mental Status (status of significant symptoms):   Risk status (Self / Other harm or suicidal ideation)   Client denies current fears or concerns for personal safety.   Client denies current or recent suicidal ideation or behaviors.   Client denies current or recent homicidal ideation or behaviors.   Client denies current or recent self injurious behavior or ideation.   Client denies other safety concerns.   Client reports there has been no change in risk factors since their last session.     Client reports there  has been no change in protective factors since their last session.     Recommended that patient call 911 or go to the local ED should there be a change in any of these risk factors.     Appearance:   Appropriate    Eye Contact:   Fair    Psychomotor Behavior: Normal     Attitude:   Cooperative  Pleasant Attentive   Orientation:   All   Speech    Rate / Production: Normal/ Responsive Talkative     Volume:  Normal    Mood:    Anxious  Normal client presented with a positive mood   Affect:    Appropriate    Thought Content:  Clear    Thought Form:  Coherent  Goal Directed  Logical    Insight:    Fair      Medication Review:   No changes to current psychiatric medication(s)     Medication Compliance:   Yes     Changes in Health Issues:   None reported     Chemical Use Review:   Substance Use: Problem use continues with no change since last session, Stage of Change: Action  Provided encouragement towards sobriety  Provided support and affirmation for steps taken towards sobriety          Tobacco Use: No change in amount of tobacco use since last session.  Contemplation  Provided encouragement to quit     Diagnosis:  1. Major depressive disorder, recurrent episode, moderate (H)    2. Generalized anxiety disorder        Collateral Reports Completed:   Not Applicable    PLAN: (Client Tasks / Therapist Tasks / Other)  Client will continue focusing on what is within her control and return for therapy in two weeks.         Maite SIMMONS, [facility] 5/26/2020   Note reviewed and clinical supervision by TROY Hunter [facility] 6/8/2020    ___________________________________________________________________    Treatment Plan    Client's Name: Jenana Steel  YOB: 1965    Date: 3/15/2019; 6/21/2019; 9/25/2019; 12/17/2019; 4/14/2020    DSM-V Diagnoses: 296.32 (F33.1) Major Depressive Disorder, Recurrent Episode, Moderate _, 300.02 (F41.1) Generalized Anxiety Disorder or Adjustment Disorders  309.28 (F43.23)  With mixed anxiety and depressed mood  Psychosocial / Contextual Factors: Client reports continued symptoms of depression and anxiety while caring for her elderly parents and drinking on a daily basis.  WHODAS: see intake    Referral / Collaboration:  Referral to another professional/service is not indicated at this time..    Anticipated number of session or this episode of care: 8-12      MeasurableTreatment Goal(s) related to diagnosis / functional impairment(s)  Goal 1: Client will reduce alcohol use from 5 standard drinks a day to 2 standard drinks a week in the next three months and client reporting use of three new coping skills to manage stress in the evenings.    I will know I've met my goal when I'm only drinking two drinks and going to bed earlier.      Objective #A (Client Action)    Client will identify at least three consequences of maladaptive drinking.  Status: Continued - Date(s):  4/14/2020    Intervention(s)  Therapist will assign homework to identify impact of drinking  provide support.    Objective #B  Client will identify three coping skills to replace drinking .  Status: Continued - Date(s):  4/14/2020    Intervention(s)  Therapist will assign homework to practice coping skills  teach coping skills.    Objective #C  Client will identify whether she needs further support to reduce alcohol use.  Status: Continued - Date(s):   4/14/2020    Intervention(s)  Therapist will provide support and referrals as needed, education on alcohol use.      Goal 2: Client will maintain reduced symptoms of depression as evidenced by PHQ-9 remaining under 5 and client reporting continued motivation.    I will know I've met my goal when I feel more productive rather than paralyzed.      Objective #A (Client Action)    Status: Continued - Date(s):   4/14/2020    Client will Increase interest, engagement, and pleasure in doing things  Decrease frequency and intensity of feeling down, depressed,  hopeless.    Intervention(s)  Therapist will teach behavioral activation.    Objective #B  Client will Identify negative self-talk and behaviors: challenge core beliefs, myths, and actions.    Status: Continued - Date(s):  4/14/2020    Intervention(s)  Therapist will teach CBT skills.    Objective #C  Client will Improve concentration, focus, and mindfulness in daily activities .  Status: Continued - Date(s):   4/14/2020    Intervention(s)  Therapist will teach mindfulness skills.      Goal 3: Client will report continued increased acceptance towards her decision about having children as evidenced by client reporting use of effective coping skills when feeling distress or regret.    I will know I've met my goal when I can accept .      Objective #A (Client Action)    Status: Continued - Date(s):    4/14/2020    Client will use acceptance skills when feeling willful.    Intervention(s)  Therapist will teach acceptance DBT skills.    Objective #B  Client will identify coping skills that reduce distress when grieving.    Status: Continued - Date(s):   4/14/2020     Intervention(s)  Therapist will teach coping skills, self-soothing.      Client has reviewed and agreed to the above plan.      Maite SIMMONS, LGSW April 14, 2020  Note reviewed and clinical supervision by TROY Hunter [facility] 4/21/2020

## 2020-05-28 ENCOUNTER — MYC MEDICAL ADVICE (OUTPATIENT)
Dept: FAMILY MEDICINE | Facility: CLINIC | Age: 55
End: 2020-05-28

## 2020-05-28 DIAGNOSIS — F41.1 GENERALIZED ANXIETY DISORDER: ICD-10-CM

## 2020-05-28 DIAGNOSIS — F33.1 MAJOR DEPRESSIVE DISORDER, RECURRENT EPISODE, MODERATE (H): ICD-10-CM

## 2020-05-28 RX ORDER — SERTRALINE HYDROCHLORIDE 100 MG/1
100 TABLET, FILM COATED ORAL DAILY
Qty: 90 TABLET | Refills: 0 | Status: SHIPPED | OUTPATIENT
Start: 2020-05-28 | End: 2020-05-28

## 2020-05-28 RX ORDER — SERTRALINE HYDROCHLORIDE 100 MG/1
100 TABLET, FILM COATED ORAL DAILY
Qty: 90 TABLET | Refills: 0 | Status: SHIPPED | OUTPATIENT
Start: 2020-05-28 | End: 2020-08-31

## 2020-05-28 ASSESSMENT — ANXIETY QUESTIONNAIRES
GAD7 TOTAL SCORE: 1
3. WORRYING TOO MUCH ABOUT DIFFERENT THINGS: NOT AT ALL
7. FEELING AFRAID AS IF SOMETHING AWFUL MIGHT HAPPEN: NOT AT ALL
1. FEELING NERVOUS, ANXIOUS, OR ON EDGE: SEVERAL DAYS
4. TROUBLE RELAXING: NOT AT ALL
6. BECOMING EASILY ANNOYED OR IRRITABLE: NOT AT ALL
2. NOT BEING ABLE TO STOP OR CONTROL WORRYING: NOT AT ALL
5. BEING SO RESTLESS THAT IT IS HARD TO SIT STILL: NOT AT ALL
GAD7 TOTAL SCORE: 1
7. FEELING AFRAID AS IF SOMETHING AWFUL MIGHT HAPPEN: NOT AT ALL

## 2020-05-28 ASSESSMENT — PATIENT HEALTH QUESTIONNAIRE - PHQ9
SUM OF ALL RESPONSES TO PHQ QUESTIONS 1-9: 3
10. IF YOU CHECKED OFF ANY PROBLEMS, HOW DIFFICULT HAVE THESE PROBLEMS MADE IT FOR YOU TO DO YOUR WORK, TAKE CARE OF THINGS AT HOME, OR GET ALONG WITH OTHER PEOPLE: SOMEWHAT DIFFICULT
SUM OF ALL RESPONSES TO PHQ QUESTIONS 1-9: 3

## 2020-05-28 NOTE — TELEPHONE ENCOUNTER
[facility]  Current [facility] closed d/t protesters  Prescription approved per [facility] Refill Protocol.  Lara OBRIEN RN

## 2020-05-28 NOTE — TELEPHONE ENCOUNTER
Reason for Call:  Medication or medication refill:    Do you use a [facility]?  Name of the pharmacy and phone number for the current request:       [id number] [address]    Name of the medication requested:   sertraline (ZOLOFT) 50 MG tablet     Other request: Pt reports that [facility] was supposed to send in a refill request. There is no encounter about this. Pt is out of this medication and is going through a rough time in her life and is needing this ASAP. Her normal pharmacy is nonoperational at this time, so the pt is requesting it be sent to the pharmacy above.      Can we leave a detailed message on this number? YES    Phone number patient can be reached at: Cell number on file:    Telephone Information:   Mobile [phone]       Best Time: Any    Call taken on 5/28/2020 at 1:30 PM by Tameka Damon

## 2020-05-28 NOTE — TELEPHONE ENCOUNTER
PHQ9 and JESSICA-7 sent to patient via [employer].    Note from 4/23 virtual visit:  1. Major depressive disorder, recurrent episode, moderate (H)  Trial of adding wellbutrin again  Discussed reducing anxiety - talk therapy exercise stress reduction    Anastacia Pizarro RN

## 2020-05-28 NOTE — TELEPHONE ENCOUNTER
PHQ9 = 3  JESSICA-7 = 1    Prescription approved per [facility] Refill Protocol.  Anastacia Pizarro RN

## 2020-05-29 ASSESSMENT — PATIENT HEALTH QUESTIONNAIRE - PHQ9: SUM OF ALL RESPONSES TO PHQ QUESTIONS 1-9: 3

## 2020-05-29 ASSESSMENT — ANXIETY QUESTIONNAIRES: GAD7 TOTAL SCORE: 1

## 2020-06-03 ENCOUNTER — APPOINTMENT (OUTPATIENT)
Dept: GENERAL RADIOLOGY | Facility: CLINIC | Age: 55
End: 2020-06-03
Attending: EMERGENCY MEDICINE
Payer: COMMERCIAL

## 2020-06-03 ENCOUNTER — HOSPITAL ENCOUNTER (EMERGENCY)
Facility: CLINIC | Age: 55
Discharge: HOME | End: 2020-06-03
Attending: EMERGENCY MEDICINE | Admitting: EMERGENCY MEDICINE
Payer: COMMERCIAL

## 2020-06-03 VITALS
WEIGHT: 181 LBS | TEMPERATURE: 98.7 F | OXYGEN SATURATION: 98 % | BODY MASS INDEX: 28.41 KG/M2 | RESPIRATION RATE: 16 BRPM | DIASTOLIC BLOOD PRESSURE: 80 MMHG | SYSTOLIC BLOOD PRESSURE: 142 MMHG | HEIGHT: 67 IN | HEART RATE: 73 BPM

## 2020-06-03 DIAGNOSIS — S86.229A LACERATION OF TIBIALIS ANTERIOR TENDON: ICD-10-CM

## 2020-06-03 PROCEDURE — 29515 APPLICATION SHORT LEG SPLINT: CPT | Mod: RT

## 2020-06-03 PROCEDURE — 25000125 ZZHC RX 250: Performed by: EMERGENCY MEDICINE

## 2020-06-03 PROCEDURE — 12002 RPR S/N/AX/GEN/TRNK2.6-7.5CM: CPT

## 2020-06-03 PROCEDURE — 25000128 H RX IP 250 OP 636: Performed by: EMERGENCY MEDICINE

## 2020-06-03 PROCEDURE — 99284 EMERGENCY DEPT VISIT MOD MDM: CPT | Mod: 25

## 2020-06-03 PROCEDURE — 90715 TDAP VACCINE 7 YRS/> IM: CPT | Performed by: EMERGENCY MEDICINE

## 2020-06-03 PROCEDURE — 73590 X-RAY EXAM OF LOWER LEG: CPT | Mod: RT

## 2020-06-03 PROCEDURE — 90471 IMMUNIZATION ADMIN: CPT

## 2020-06-03 PROCEDURE — 25000132 ZZH RX MED GY IP 250 OP 250 PS 637: Performed by: EMERGENCY MEDICINE

## 2020-06-03 RX ORDER — LIDOCAINE 40 MG/G
CREAM TOPICAL
Status: COMPLETED | OUTPATIENT
Start: 2020-06-03 | End: 2020-06-03

## 2020-06-03 RX ORDER — CEPHALEXIN 500 MG/1
500 CAPSULE ORAL 4 TIMES DAILY
Qty: 28 CAPSULE | Refills: 0 | Status: SHIPPED | OUTPATIENT
Start: 2020-06-03 | End: 2020-06-10

## 2020-06-03 RX ORDER — OXYCODONE AND ACETAMINOPHEN 5; 325 MG/1; MG/1
1-2 TABLET ORAL EVERY 4 HOURS PRN
Qty: 12 TABLET | Refills: 0 | Status: SHIPPED | OUTPATIENT
Start: 2020-06-03 | End: 2020-09-06

## 2020-06-03 RX ORDER — CEPHALEXIN 500 MG/1
500 CAPSULE ORAL ONCE
Status: COMPLETED | OUTPATIENT
Start: 2020-06-03 | End: 2020-06-03

## 2020-06-03 RX ADMIN — CLOSTRIDIUM TETANI TOXOID ANTIGEN (FORMALDEHYDE INACTIVATED), CORYNEBACTERIUM DIPHTHERIAE TOXOID ANTIGEN (FORMALDEHYDE INACTIVATED), BORDETELLA PERTUSSIS TOXOID ANTIGEN (GLUTARALDEHYDE INACTIVATED), BORDETELLA PERTUSSIS FILAMENTOUS HEMAGGLUTININ ANTIGEN (FORMALDEHYDE INACTIVATED), BORDETELLA PERTUSSIS PERTACTIN ANTIGEN, AND BORDETELLA PERTUSSIS FIMBRIAE 2/3 ANTIGEN 0.5 ML: 5; 2; 2.5; 5; 3; 5 INJECTION, SUSPENSION INTRAMUSCULAR at 12:16

## 2020-06-03 RX ADMIN — LIDOCAINE: 40 CREAM TOPICAL at 12:15

## 2020-06-03 RX ADMIN — CEPHALEXIN 500 MG: 500 CAPSULE ORAL at 13:53

## 2020-06-03 ASSESSMENT — ENCOUNTER SYMPTOMS
COUGH: 0
WEAKNESS: 0
WOUND: 1
NUMBNESS: 0
SHORTNESS OF BREATH: 0
FEVER: 0
DIARRHEA: 0

## 2020-06-03 ASSESSMENT — MIFFLIN-ST. JEOR: SCORE: 1440.7

## 2020-06-03 NOTE — PROGRESS NOTES
Orthopedics:  Call received from ED with concerns regarding tibialis anterior laceration    The patient reports she was at her parent's house this morning and while taking a shower, a glass soap dish fell. The glass dish broke as it hit the anterior aspect of her right tibia and shattered, lacerating her leg. The patient denies any numbness or weakness in the right lower leg. She does have some trouble lifting the leg due to pain.    On exam, there is a laceration along the distal aspect of the right low leg, just proximal to the ankle joint.  The bleeding is controlled.  Difficult to visualize tendons.  Patient is able to flex and extend her toes.  She is unable to actively dorsi flex. States sensation is equal bilateral.  Strong dorsal pedal pulse.     Plan:  Discussed findings with Dr Smith  Laceration will be closed in the ED and ankle to be splinted in max dorsi flexion.    Dr Thao at [facility] was made aware of the patient and will have his team contact her to arrange scheduling.

## 2020-06-03 NOTE — ED AVS SNAPSHOT
Emergency Department  [address] [place] [id number]  Phone:  [phone]  Fax:  [phone]                                    Jeanna Steel   MRN: [id number]    Department:   Emergency Department   Date of Visit:  6/3/2020           After Visit Summary Signature Page    I have received my discharge instructions, and my questions have been answered. I have discussed any challenges I see with this plan with the nurse or doctor.    ..........................................................................................................................................  Patient/Patient Representative Signature      ..........................................................................................................................................  Patient Representative Print Name and Relationship to Patient    ..................................................               ................................................  Date                                   Time    ..........................................................................................................................................  Reviewed by Signature/Title    ...................................................              ..............................................  Date                                               Time          22EPIC Rev 08/18

## 2020-06-03 NOTE — ED NOTES
Bed: ED14  Expected date:   Expected time:   Means of arrival:   Comments:  christy - 54 F leg lac no covid eta 1158

## 2020-06-03 NOTE — ED TRIAGE NOTES
A glass soap dispenser broken apart landed on anterior right ankle, sustained laceration. Bleeding controlled.

## 2020-06-03 NOTE — ED PROVIDER NOTES
"  History   Chief Complaint:  Laceration    HPI   Jeanna Steel is a 54 year old female, who presents to the ED for evaluation of laceration. The patient reports she was at her parent's house this morning, taking a shower, when a glass soap dish fell. Upon falling, the glass dish broke as it hit the anterior aspect of her right tibia and shattered, lacerating her leg. The patient denies any numbness or weakness in the right lower leg. She does have some trouble lifting the leg due to pain. The patient denies any fever, cough, shortness of breath, diarrhea, or any other acute symptoms. Patient's Tetanus will be updated here.     Allergies:  No known drug allergies    Medications:    Wellbutrin  Zoloft  Ativan    Past Medical History:    Anxiety  Major depressive disorder  Infertility    Past Surgical History:    Back surgery  GYN surgery  Ectopic pregnancy surgery  Eye surgery    Family History:    Breast cancer  Hypertension  Diabetes  Cerebrovascular disease  Alzheimer disease  Arthritis  Schizophrenia  MS    Social History:  Smoking status: Former  Alcohol use: Yes  Drug use: Yes-Marijuana  PCP: Karie Queen  Marital Status:   [2]    Review of Systems   Constitutional: Negative for fever.   Respiratory: Negative for cough and shortness of breath.    Cardiovascular: Negative for chest pain.   Gastrointestinal: Negative for diarrhea.   Skin: Positive for wound.   Neurological: Negative for weakness and numbness.   All other systems reviewed and are negative.    Physical Exam     Patient Vitals for the past 24 hrs:   BP Temp Temp src Pulse Resp SpO2 Height Weight   06/03/20 1354 (!) 142/80 -- -- 73 -- 98 % -- --   06/03/20 1205 (!) 146/86 98.7  F (37.1  C) Oral 68 16 97 % 1.689 m (5' 6.5\") 82.1 kg (181 lb)     Physical Exam  Nursing note and vitals reviewed.  Constitutional:  Appears well-developed and well-nourished.   HENT:   Head:    Atraumatic.   Mouth/Throat:   Oropharynx is clear and " moist. No oropharyngeal exudate.   Eyes:    Pupils are equal, round, and reactive to light.   Neck:    Normal range of motion. Neck supple.      No tracheal deviation present. No thyromegaly present.   Cardiovascular:  Normal rate, regular rhythm, no murmur   Pulmonary/Chest: Breath sounds are clear and equal without wheezes or crackles.  Abdominal:   Soft. Bowel sounds are normal. Exhibits no distension and      no mass. There is no tenderness.      There is no rebound and no guarding.   Musculoskeletal:  Defect in the region of the left tibialis tendon. Weak right dorsal flexions and strength of foot.  Lymphadenopathy:  No cervical adenopathy.   Neurological:   Alert and oriented to person, place, and time. Sensation intact distally to the right anterior tibia.  Skin:    Skin is warm and dry. No rash noted. No pallor. 3.0 cm jagged laceration to the anterior right tibia, proximal to the ankle. No active bleeding.       Emergency Department Course   Imaging:  Radiology findings were communicated with the patient who voiced understanding of the findings.    XR Tibia & Fibula, 2 views, Right:  No evidence of fracture or radiopaque foreign body.     Imaging independently reviewed and agree with radiologist interpretation.      Procedures    Laceration Repair        LACERATION:  A simple clean 3 cm laceration.      LOCATION:  Right Anterior Tibialis       FUNCTION:  Distally sensation, circulation and motor are intact.      ANESTHESIA:  Local using 1.0% Lidocaine total of 3 mLs      PREPARATION:  Irrigation and Scrubbing with Normal Saline      DEBRIDEMENT:  No debridement      CLOSURE:  Wound was closed with One Layer.  Skin closed with 5 x 5.0 Ethylon using interrupted sutures.      Posterior Short Leg and Extension with Ortho Glass Splint Placement     Splint was applied and after placement I checked and adjusted the fit to ensure proper positioning. Patient was more comfortable with splint in place. Sensation and  circulation are intact after splint placement.    Interventions:  1216: Tetanus-diptheria 0.5 mL IM  1353: Keflex 500 mg PO    Emergency Department Course:  Past medical records, nursing notes, and vitals reviewed.    1216 I performed an exam of the patient as documented above.     The patient was sent for a tibia x-ray while in the emergency department, results above.     1310 I spoke to GABRIELA Wise on-call for Dr. Smith of Ortho Trauma.    1435 I rechecked the patient and discussed the results of her workup thus far.     1310 I performed the laceration repair per the above procedure note.     Findings and plan explained to the Patient. Patient discharged home with instructions regarding supportive care, medications, and reasons to return. The importance of close follow-up was reviewed.    I personally reviewed the imaging results with the Patient and answered all related questions prior to discharge.     Impression & Plan   Medical Decision Making:  Jeanna Steel is a 54 year old female who was found to have an extensor tendon laceration of the tibialis anterior tendon. I consulted [facility] the GABRIELA Wies, who consulted with Dr. Smith, and based on our discussion a wound irrigation was performed and I sutured the skin. She was then splinted in extension and placed on Keflex and Percocet and told not to drive a car until after her surgery. She was also told to avoid alcohol on the Percocet. She has an appointment at the [facility] for surgical repair of the tendon. She will wear the splint until surgery.     Diagnosis:    ICD-10-CM    1. Laceration of tibialis anterior tendon  S86.229A     right side       Disposition:  Discharged to home.    Discharge Medications:  New Prescriptions    CEPHALEXIN (KEFLEX) 500 MG CAPSULE    Take 1 capsule (500 mg) by mouth 4 times daily for 7 days    OXYCODONE-ACETAMINOPHEN (PERCOCET) 5-325 MG TABLET    Take 1-2 tablets by mouth  every 4 hours as needed for pain No driving a car or drinking alcohol for 8 hours after taking this medication.       Scribe Disclosure:  I, Homero Avilez, am serving as a scribe at 12:16 PM on 6/3/2020 to document services personally performed by Jessica Goldberg MD based on my observations and the provider's statements to me.        Jessica Goldberg MD  06/03/20 [id number]

## 2020-06-05 ENCOUNTER — TRANSFERRED RECORDS (OUTPATIENT)
Dept: HEALTH INFORMATION MANAGEMENT | Facility: CLINIC | Age: 55
End: 2020-06-05

## 2020-06-09 ENCOUNTER — VIRTUAL VISIT (OUTPATIENT)
Dept: PSYCHOLOGY | Facility: CLINIC | Age: 55
End: 2020-06-09
Payer: COMMERCIAL

## 2020-06-09 DIAGNOSIS — F41.1 GENERALIZED ANXIETY DISORDER: ICD-10-CM

## 2020-06-09 DIAGNOSIS — F33.1 MAJOR DEPRESSIVE DISORDER, RECURRENT EPISODE, MODERATE (H): Primary | ICD-10-CM

## 2020-06-09 PROCEDURE — 90834 PSYTX W PT 45 MINUTES: CPT | Mod: 95 | Performed by: SOCIAL WORKER

## 2020-06-09 NOTE — PROGRESS NOTES
Progress Note    Client Name: Jeanna Steel  Date: 6/9/2020         Service Type: Individual     Session Start Time:  11:00  Session End Time: 11:45     Session Length: 45 min    Session #: 24    Attendees: Client attended alone  Telemedicine Visit: The patient's condition can be safely assessed and treated via synchronous audio and visual telemedicine encounter.      Reason for Telemedicine Visit: Services only offered telehealth    Originating Site (Patient Location): Patient's home    Distant Site (Provider Location): Provider Remote Setting home office    Consent:  The patient/guardian has verbally consented to: the potential risks and benefits of telemedicine (video visit) versus in person care; bill my insurance or make self-payment for services provided; and responsibility for payment of non-covered services.     Mode of Communication:  Video Conference via [employer]    As the provider I attest to compliance with applicable laws and regulations related to telemedicine.     Treatment Plan Last Reviewed:  4/14/2020  PHQ-9 / JESSICA-7 : 4/23/2020    DATA  Interactive Complexity: No  Crisis: No       Progress Since Last Session (Related to Symptoms / Goals / Homework):   Symptoms: Worsening increased stress    Homework: Achieved / completed to satisfaction client reported focusing on what is in her control      Episode of Care Goals: Satisfactory progress - ACTION (Actively working towards change); Intervened by reinforcing change plan / affirming steps taken     Current / Ongoing Stressors and Concerns:   Ongoing: Client reports increased depression symptoms while caring for elderly parents.              Current: Client reported that she had been going through a lot lately. Her father was released from transitional care this past Sunday and the client had stayed at their apartment through the adjustment. She explained that when showering at their place a glass  soap dish fell and cut into her leg, resulting in an ambulance ride and needing to get a tendon repaired in surgery yesterday. Client described the resulting events and how she had been struggling with the stress. She identified feeling grateful that it happened to her and not to one of her parents, and upset with her mom who put the glass soap dish in an unsteady place. Client reported she hadn't cried until now but that she had felt hysterical when she had been in the ambulance.          Treatment Objective(s) Addressed in This Session:   Identify negative self-talk and behaviors: challenge core beliefs, myths, and actions       Intervention:   Provided psychoeducation around shock and discussed how client had first reacted to the cut. Discussed how client can remain present when reminded of what happened to reduce panic response. Provided space and acceptance for client to described what happened while grounded.   Motivational Interviewing     MI Intervention: Expressed Empathy/Understanding, Supported Autonomy, Collaboration, Evocation, Permission to raise concern or advise, Open-ended questions, Reflections: simple and complex, Change talk (evoked) and Reframe     Change Talk Expressed by the Patient: Desire to change Reasons to change Need to change Committment to change Activation Taking steps    Provider Response to Change Talk: E - Evoked more info from patient about behavior change, A - Affirmed patient's thoughts, decisions, or attempts at behavior change, R - Reflected patient's change talk and S - Summarized patient's change talk statements          ASSESSMENT: Current Emotional / Mental Status (status of significant symptoms):   Risk status (Self / Other harm or suicidal ideation)   Client denies current fears or concerns for personal safety.   Client denies current or recent suicidal ideation or behaviors.   Client denies current or recent homicidal ideation or behaviors.   Client denies current or  recent self injurious behavior or ideation.   Client denies other safety concerns.   Client reports there has been no change in risk factors since their last session.     Client reports there has been no change in protective factors since their last session.     Recommended that patient call 911 or go to the local ED should there be a change in any of these risk factors.     Appearance:   Appropriate    Eye Contact:   Fair    Psychomotor Behavior: Normal     Attitude:   Cooperative  Attentive   Orientation:   All   Speech    Rate / Production: Emotional Talkative     Volume:  Normal    Mood:    Panicked Agitated client appeared panicked describing what happened   Affect:    Appropriate  Tearful   Thought Content:  Perservative    Thought Form:  Coherent  Goal Directed  Logical    Insight:    Fair      Medication Review:   No changes to current psychiatric medication(s)     Medication Compliance:   Yes     Changes in Health Issues:   None reported     Chemical Use Review:   Substance Use: Problem use continues with no change since last session, Stage of Change: Preparatory and Action  Provided encouragement towards sobriety  Provided support and affirmation for steps taken towards sobriety          Tobacco Use: No change in amount of tobacco use since last session.  Contemplation  Provided encouragement to quit     Diagnosis:  1. Major depressive disorder, recurrent episode, moderate (H)    2. Generalized anxiety disorder        Collateral Reports Completed:   Not Applicable    PLAN: (Client Tasks / Therapist Tasks / Other)  Client will use grounding skills when feeling fear, remind herself she is home and safe and return for therapy in two weeks.         Maite SIMMONS, [facility] 6/9/2020   Note reviewed and clinical supervision by TROY Hunter [facility] 6/22/2020     ___________________________________________________________________    Treatment Plan    Client's Name: Jeanna Steel  Date Of  Birth: 1965    Date: 3/15/2019; 6/21/2019; 9/25/2019; 12/17/2019; 4/14/2020    DSM-V Diagnoses: 296.32 (F33.1) Major Depressive Disorder, Recurrent Episode, Moderate _, 300.02 (F41.1) Generalized Anxiety Disorder or Adjustment Disorders  309.28 (F43.23) With mixed anxiety and depressed mood  Psychosocial / Contextual Factors: Client reports continued symptoms of depression and anxiety while caring for her elderly parents and drinking on a daily basis.  WHODAS: see intake    Referral / Collaboration:  Referral to another professional/service is not indicated at this time..    Anticipated number of session or this episode of care: 8-12      MeasurableTreatment Goal(s) related to diagnosis / functional impairment(s)  Goal 1: Client will reduce alcohol use from 5 standard drinks a day to 2 standard drinks a week in the next three months and client reporting use of three new coping skills to manage stress in the evenings.    I will know I've met my goal when I'm only drinking two drinks and going to bed earlier.      Objective #A (Client Action)    Client will identify at least three consequences of maladaptive drinking.  Status: Continued - Date(s):  4/14/2020    Intervention(s)  Therapist will assign homework to identify impact of drinking  provide support.    Objective #B  Client will identify three coping skills to replace drinking .  Status: Continued - Date(s):  4/14/2020    Intervention(s)  Therapist will assign homework to practice coping skills  teach coping skills.    Objective #C  Client will identify whether she needs further support to reduce alcohol use.  Status: Continued - Date(s):   4/14/2020    Intervention(s)  Therapist will provide support and referrals as needed, education on alcohol use.      Goal 2: Client will maintain reduced symptoms of depression as evidenced by PHQ-9 remaining under 5 and client reporting continued motivation.    I will know I've met my goal when I feel more productive  rather than paralyzed.      Objective #A (Client Action)    Status: Continued - Date(s):   4/14/2020    Client will Increase interest, engagement, and pleasure in doing things  Decrease frequency and intensity of feeling down, depressed, hopeless.    Intervention(s)  Therapist will teach behavioral activation.    Objective #B  Client will Identify negative self-talk and behaviors: challenge core beliefs, myths, and actions.    Status: Continued - Date(s):  4/14/2020    Intervention(s)  Therapist will teach CBT skills.    Objective #C  Client will Improve concentration, focus, and mindfulness in daily activities .  Status: Continued - Date(s):   4/14/2020    Intervention(s)  Therapist will teach mindfulness skills.      Goal 3: Client will report continued increased acceptance towards her decision about having children as evidenced by client reporting use of effective coping skills when feeling distress or regret.    I will know I've met my goal when I can accept .      Objective #A (Client Action)    Status: Continued - Date(s):    4/14/2020    Client will use acceptance skills when feeling willful.    Intervention(s)  Therapist will teach acceptance DBT skills.    Objective #B  Client will identify coping skills that reduce distress when grieving.    Status: Continued - Date(s):   4/14/2020     Intervention(s)  Therapist will teach coping skills, self-soothing.      Client has reviewed and agreed to the above plan.      Maite SIMMONS, LGSW April 14, 2020  Note reviewed and clinical supervision by TROY Hunter [facility] 4/21/2020

## 2020-06-19 DIAGNOSIS — F33.1 MAJOR DEPRESSIVE DISORDER, RECURRENT EPISODE, MODERATE (H): ICD-10-CM

## 2020-06-19 RX ORDER — BUPROPION HYDROCHLORIDE 150 MG/1
TABLET ORAL
Qty: 90 TABLET | Refills: 0 | Status: SHIPPED | OUTPATIENT
Start: 2020-06-19 | End: 2020-09-21

## 2020-06-19 NOTE — TELEPHONE ENCOUNTER
Prescription approved per [facility] Refill Protocol.  Lara OBRIEN RN

## 2020-06-22 ENCOUNTER — TRANSFERRED RECORDS (OUTPATIENT)
Dept: HEALTH INFORMATION MANAGEMENT | Facility: CLINIC | Age: 55
End: 2020-06-22

## 2020-06-23 ENCOUNTER — VIRTUAL VISIT (OUTPATIENT)
Dept: PSYCHOLOGY | Facility: CLINIC | Age: 55
End: 2020-06-23
Payer: COMMERCIAL

## 2020-06-23 DIAGNOSIS — F33.1 MAJOR DEPRESSIVE DISORDER, RECURRENT EPISODE, MODERATE (H): Primary | ICD-10-CM

## 2020-06-23 DIAGNOSIS — F41.1 GENERALIZED ANXIETY DISORDER: ICD-10-CM

## 2020-06-23 PROCEDURE — 90832 PSYTX W PT 30 MINUTES: CPT | Mod: 95 | Performed by: SOCIAL WORKER

## 2020-06-23 NOTE — PROGRESS NOTES
Progress Note    Client Name: Jeanna Steel  Date: 6/23/2020         Service Type: Individual     Session Start Time:  12:45  Session End Time: 1:15     Session Length: 30 min    Session #: 25    Attendees: Client attended alone  Telemedicine Visit: The patient's condition can be safely assessed and treated via synchronous audio and visual telemedicine encounter.      Reason for Telemedicine Visit: Services only offered telehealth    Originating Site (Patient Location): Patient's home    Distant Site (Provider Location): Provider Remote Setting home office    Consent:  The patient/guardian has verbally consented to: the potential risks and benefits of telemedicine (video visit) versus in person care; bill my insurance or make self-payment for services provided; and responsibility for payment of non-covered services.     Mode of Communication:  Video Conference via [employer]    As the provider I attest to compliance with applicable laws and regulations related to telemedicine.     Treatment Plan Last Reviewed:  4/14/2020  PHQ-9 / JESSICA-7 : 4/23/2020    DATA  Interactive Complexity: No  Crisis: No       Progress Since Last Session (Related to Symptoms / Goals / Homework):   Symptoms: No change mood is stable    Homework: Achieved / completed to satisfaction client reported focusing on what is in her control      Episode of Care Goals: Satisfactory progress - ACTION (Actively working towards change); Intervened by reinforcing change plan / affirming steps taken     Current / Ongoing Stressors and Concerns:   Ongoing: Client reports increased depression symptoms while caring for elderly parents.              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 Client reported that she does  think she associates the gallery owner with her ex- and that it contributes to her irritability. She said she had been dreaming about her ex- and sometimes feel sad about how their relationship ended. She said that it's likely more related to her chance to have children rather than her ex-himself as she is really happy and in love with her current .         Treatment Objective(s) Addressed in This Session:   Identify negative self-talk and behaviors: challenge core beliefs, myths, and actions       Intervention:   DBT: Reviewed how client can navigate getting what she wants using [employer], GIVE and FAST.    Motivational Interviewing     MI Intervention: Expressed Empathy/Understanding, Supported Autonomy, Collaboration, Evocation, Permission to raise concern or advise, Open-ended questions, Reflections: simple and complex, Change talk (evoked) and Reframe     Change Talk Expressed by the Patient: Desire to change Reasons to change Need to change Committment to change Activation Taking steps    Provider Response to Change Talk: E - Evoked more info from patient about behavior change, A - Affirmed patient's thoughts, decisions, or attempts at behavior change, R - Reflected patient's change talk and S - Summarized patient's change talk statements          ASSESSMENT: Current Emotional / Mental Status (status of significant symptoms):   Risk status (Self / Other harm or suicidal ideation)   Client denies current fears or concerns for personal safety.   Client denies current or recent suicidal ideation or behaviors.   Client denies current or recent homicidal ideation or behaviors.   Client denies current or recent self injurious behavior or ideation.   Client denies other safety concerns.   Client reports there has been no change in risk factors since their last session.     Client reports there has been no change in protective factors since their last session.     Recommended that patient call 911  or go to the local ED should there be a change in any of these risk factors.     Appearance:   Appropriate    Eye Contact:   Fair    Psychomotor Behavior: Normal     Attitude:   Cooperative  Attentive Espinoza   Orientation:   All   Speech    Rate / Production: Emotional Talkative     Volume:  Normal    Mood:    Anxious  Agitated client appeared irritable   Affect:    Appropriate  Tearful   Thought Content:  Perservative    Thought Form:  Coherent  Goal Directed  Logical    Insight:    Fair      Medication Review:   No changes to current psychiatric medication(s)     Medication Compliance:   Yes     Changes in Health Issues:   None reported     Chemical Use Review:   Substance Use: Problem use continues with no change since last session, Stage of Change: Preparatory and Action  Provided encouragement towards sobriety  Provided support and affirmation for steps taken towards sobriety          Tobacco Use: No change in amount of tobacco use since last session.  Contemplation  Provided encouragement to quit     Diagnosis:  1. Major depressive disorder, recurrent episode, moderate (H)    2. Generalized anxiety disorder        Collateral Reports Completed:   Not Applicable    PLAN: (Client Tasks / Therapist Tasks / Other)  Client will use effective communication skills, practice empathy to reduce irritability and return for therapy in two weeks.         Maite SIMMONS, [facility] 6/23/2020   Note reviewed and clinical supervision by TROY Hunter [facility] 7/7/2020     ___________________________________________________________________    Treatment Plan    Client's Name: Jeanna Steel  YOB: 1965    Date: 3/15/2019; 6/21/2019; 9/25/2019; 12/17/2019; 4/14/2020    DSM-V Diagnoses: 296.32 (F33.1) Major Depressive Disorder, Recurrent Episode, Moderate _, 300.02 (F41.1) Generalized Anxiety Disorder or Adjustment Disorders  309.28 (F43.23) With mixed anxiety and depressed mood  Psychosocial / Contextual  Factors: Client reports continued symptoms of depression and anxiety while caring for her elderly parents and drinking on a daily basis.  WHODAS: see intake    Referral / Collaboration:  Referral to another professional/service is not indicated at this time..    Anticipated number of session or this episode of care: 8-12      MeasurableTreatment Goal(s) related to diagnosis / functional impairment(s)  Goal 1: Client will reduce alcohol use from 5 standard drinks a day to 2 standard drinks a week in the next three months and client reporting use of three new coping skills to manage stress in the evenings.    I will know I've met my goal when I'm only drinking two drinks and going to bed earlier.      Objective #A (Client Action)    Client will identify at least three consequences of maladaptive drinking.  Status: Continued - Date(s):  4/14/2020    Intervention(s)  Therapist will assign homework to identify impact of drinking  provide support.    Objective #B  Client will identify three coping skills to replace drinking .  Status: Continued - Date(s):  4/14/2020    Intervention(s)  Therapist will assign homework to practice coping skills  teach coping skills.    Objective #C  Client will identify whether she needs further support to reduce alcohol use.  Status: Continued - Date(s):   4/14/2020    Intervention(s)  Therapist will provide support and referrals as needed, education on alcohol use.      Goal 2: Client will maintain reduced symptoms of depression as evidenced by PHQ-9 remaining under 5 and client reporting continued motivation.    I will know I've met my goal when I feel more productive rather than paralyzed.      Objective #A (Client Action)    Status: Continued - Date(s):   4/14/2020    Client will Increase interest, engagement, and pleasure in doing things  Decrease frequency and intensity of feeling down, depressed, hopeless.    Intervention(s)  Therapist will teach behavioral activation.    Objective  #B  Client will Identify negative self-talk and behaviors: challenge core beliefs, myths, and actions.    Status: Continued - Date(s):  4/14/2020    Intervention(s)  Therapist will teach CBT skills.    Objective #C  Client will Improve concentration, focus, and mindfulness in daily activities .  Status: Continued - Date(s):   4/14/2020    Intervention(s)  Therapist will teach mindfulness skills.      Goal 3: Client will report continued increased acceptance towards her decision about having children as evidenced by client reporting use of effective coping skills when feeling distress or regret.    I will know I've met my goal when I can accept .      Objective #A (Client Action)    Status: Continued - Date(s):    4/14/2020    Client will use acceptance skills when feeling willful.    Intervention(s)  Therapist will teach acceptance DBT skills.    Objective #B  Client will identify coping skills that reduce distress when grieving.    Status: Continued - Date(s):   4/14/2020     Intervention(s)  Therapist will teach coping skills, self-soothing.      Client has reviewed and agreed to the above plan.      Maite SIMMONS, LGSW April 14, 2020  Note reviewed and clinical supervision by TROY Hunter [facility] 4/21/2020

## 2020-07-07 ENCOUNTER — VIRTUAL VISIT (OUTPATIENT)
Dept: PSYCHOLOGY | Facility: CLINIC | Age: 55
End: 2020-07-07
Payer: COMMERCIAL

## 2020-07-07 DIAGNOSIS — F41.1 GENERALIZED ANXIETY DISORDER: ICD-10-CM

## 2020-07-07 DIAGNOSIS — F33.1 MAJOR DEPRESSIVE DISORDER, RECURRENT EPISODE, MODERATE (H): Primary | ICD-10-CM

## 2020-07-07 PROCEDURE — 90834 PSYTX W PT 45 MINUTES: CPT | Mod: 95 | Performed by: SOCIAL WORKER

## 2020-07-07 NOTE — PROGRESS NOTES
Progress Note    Client Name: Jeanna Steel  Date: 7/7/2020         Service Type: Individual     Session Start Time: 11:00  Session End Time: 11:40     Session Length: 40 min    Session #: 26    Attendees: Client attended alone  Telemedicine Visit: The patient's condition can be safely assessed and treated via synchronous audio and visual telemedicine encounter.      Reason for Telemedicine Visit: Services only offered telehealth    Originating Site (Patient Location): Patient's home    Distant Site (Provider Location): Provider Remote Setting home office    Consent:  The patient/guardian has verbally consented to: the potential risks and benefits of telemedicine (video visit) versus in person care; bill my insurance or make self-payment for services provided; and responsibility for payment of non-covered services.     Mode of Communication:  Video Conference via [employer]    As the provider I attest to compliance with applicable laws and regulations related to telemedicine.     Treatment Plan Last Reviewed:  4/14/2020  PHQ-9 / JESSICA-7 : 4/23/2020    DATA  Interactive Complexity: No  Crisis: No       Progress Since Last Session (Related to Symptoms / Goals / Homework):   Symptoms: Improving reduced stress    Homework: Achieved / completed to satisfaction client reported increased empathy       Episode of Care Goals: Satisfactory progress - ACTION (Actively working towards change); Intervened by reinforcing change plan / affirming steps taken     Current / Ongoing Stressors and Concerns:   Ongoing: Client reports increased depression symptoms while caring for elderly parents.              Current: Client reported that she had been doing well the past two weeks and was feeling confident about her ability to cope with stress. She described recent events with her parents and how her dad ended up back in the hospital again. She stated that this time she had been more  amused by their various confusion and questions rather than frustrated at their inability to remember what was going on. 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 She stated that she will continue thinking about what is best over time.         Treatment Objective(s) Addressed in This Session:   Identify negative self-talk and behaviors: challenge core beliefs, myths, and actions       Intervention:   Reviewed how client has effectively coped with stress   Motivational Interviewing     MI Intervention: Expressed Empathy/Understanding, Supported Autonomy, Collaboration, Evocation, Permission to raise concern or advise, Open-ended questions, Reflections: simple and complex, Change talk (evoked) and Reframe     Change Talk Expressed by the Patient: Desire to change Reasons to change Need to change Committment to change Activation Taking steps    Provider Response to Change Talk: E - Evoked more info from patient about behavior change, A - Affirmed patient's thoughts, decisions, or attempts at behavior change, R - Reflected patient's change talk and S - Summarized patient's change talk statements          ASSESSMENT: Current Emotional / Mental Status (status of significant symptoms):   Risk status (Self / Other harm or suicidal ideation)   Client denies current fears or concerns for personal safety.   Client denies current or recent suicidal ideation or behaviors.   Client denies current or recent homicidal ideation or behaviors.   Client denies current or recent self injurious behavior or ideation.   Client denies other safety concerns.   Client reports there has been no change in risk factors since their last session.     Client reports there has been a chance in protective factors since their last session.  improved mood   Recommended that patient call 911 or go to the local ED should there be a change in any  of these risk factors.     Appearance:   Appropriate    Eye Contact:   Fair    Psychomotor Behavior: Normal     Attitude:   Cooperative  Attentive Espinoza   Orientation:   All   Speech    Rate / Production: Normal/ Responsive     Volume:  Normal    Mood:    Normal client presented with a good mood   Affect:    Appropriate    Thought Content:  Clear    Thought Form:  Coherent  Goal Directed  Logical    Insight:    Fair      Medication Review:   No changes to current psychiatric medication(s)     Medication Compliance:   Yes     Changes in Health Issues:   None reported     Chemical Use Review:   Substance Use: Problem use continues with no change since last session, Stage of Change: Preparatory  Patient declined discussion at this time         Tobacco Use: No change in amount of tobacco use since last session.  Contemplation  Provided encouragement to quit     Diagnosis:  1. Major depressive disorder, recurrent episode, moderate (H)    2. Generalized anxiety disorder        Collateral Reports Completed:   Not Applicable    PLAN: (Client Tasks / Therapist Tasks / Other)  Client will continue using coping skills and return for therapy in two weeks.         Maite SIMMONS, [facility] 7/7/2020   Note reviewed and clinical supervision by TROY Hunter [facility] 7/7/2020     ___________________________________________________________________    Treatment Plan    Client's Name: Jeanna Steel  YOB: 1965    Date: 3/15/2019; 6/21/2019; 9/25/2019; 12/17/2019; 4/14/2020    DSM-V Diagnoses: 296.32 (F33.1) Major Depressive Disorder, Recurrent Episode, Moderate _, 300.02 (F41.1) Generalized Anxiety Disorder or Adjustment Disorders  309.28 (F43.23) With mixed anxiety and depressed mood  Psychosocial / Contextual Factors: Client reports continued symptoms of depression and anxiety while caring for her elderly parents and drinking on a daily basis.  WHODAS: see intake    Referral / Collaboration:  Referral to  another professional/service is not indicated at this time..    Anticipated number of session or this episode of care: 8-12      MeasurableTreatment Goal(s) related to diagnosis / functional impairment(s)  Goal 1: Client will reduce alcohol use from 5 standard drinks a day to 2 standard drinks a week in the next three months and client reporting use of three new coping skills to manage stress in the evenings.    I will know I've met my goal when I'm only drinking two drinks and going to bed earlier.      Objective #A (Client Action)    Client will identify at least three consequences of maladaptive drinking.  Status: Continued - Date(s):  4/14/2020    Intervention(s)  Therapist will assign homework to identify impact of drinking  provide support.    Objective #B  Client will identify three coping skills to replace drinking .  Status: Continued - Date(s):  4/14/2020    Intervention(s)  Therapist will assign homework to practice coping skills  teach coping skills.    Objective #C  Client will identify whether she needs further support to reduce alcohol use.  Status: Continued - Date(s):   4/14/2020    Intervention(s)  Therapist will provide support and referrals as needed, education on alcohol use.      Goal 2: Client will maintain reduced symptoms of depression as evidenced by PHQ-9 remaining under 5 and client reporting continued motivation.    I will know I've met my goal when I feel more productive rather than paralyzed.      Objective #A (Client Action)    Status: Continued - Date(s):   4/14/2020    Client will Increase interest, engagement, and pleasure in doing things  Decrease frequency and intensity of feeling down, depressed, hopeless.    Intervention(s)  Therapist will teach behavioral activation.    Objective #B  Client will Identify negative self-talk and behaviors: challenge core beliefs, myths, and actions.    Status: Continued - Date(s):  4/14/2020    Intervention(s)  Therapist will teach CBT  skills.    Objective #C  Client will Improve concentration, focus, and mindfulness in daily activities .  Status: Continued - Date(s):   4/14/2020    Intervention(s)  Therapist will teach mindfulness skills.      Goal 3: Client will report continued increased acceptance towards her decision about having children as evidenced by client reporting use of effective coping skills when feeling distress or regret.    I will know I've met my goal when I can accept .      Objective #A (Client Action)    Status: Continued - Date(s):    4/14/2020    Client will use acceptance skills when feeling willful.    Intervention(s)  Therapist will teach acceptance DBT skills.    Objective #B  Client will identify coping skills that reduce distress when grieving.    Status: Continued - Date(s):   4/14/2020     Intervention(s)  Therapist will teach coping skills, self-soothing.      Client has reviewed and agreed to the above plan.      Maite SIMMONS, [facility] April 14, 2020  Note reviewed and clinical supervision by TROY Hunter [facility] 4/21/2020

## 2020-07-20 ENCOUNTER — TRANSFERRED RECORDS (OUTPATIENT)
Dept: HEALTH INFORMATION MANAGEMENT | Facility: CLINIC | Age: 55
End: 2020-07-20

## 2020-07-21 ENCOUNTER — VIRTUAL VISIT (OUTPATIENT)
Dept: PSYCHOLOGY | Facility: CLINIC | Age: 55
End: 2020-07-21
Payer: COMMERCIAL

## 2020-07-21 DIAGNOSIS — F33.1 MAJOR DEPRESSIVE DISORDER, RECURRENT EPISODE, MODERATE (H): Primary | ICD-10-CM

## 2020-07-21 DIAGNOSIS — F41.1 GENERALIZED ANXIETY DISORDER: ICD-10-CM

## 2020-07-21 PROCEDURE — 90834 PSYTX W PT 45 MINUTES: CPT | Mod: 95 | Performed by: SOCIAL WORKER

## 2020-07-21 NOTE — PROGRESS NOTES
Progress Note    Client Name: Jeanna Steel  Date: 7/21/2020         Service Type: Individual     Session Start Time: 11:00  Session End Time: 11:45     Session Length: 45 min    Session #: 27    Attendees: Client attended alone  Telemedicine Visit: The patient's condition can be safely assessed and treated via synchronous audio and visual telemedicine encounter.      Reason for Telemedicine Visit: Services only offered telehealth    Originating Site (Patient Location): Patient's home    Distant Site (Provider Location): Provider Remote Setting home office    Consent:  The patient/guardian has verbally consented to: the potential risks and benefits of telemedicine (video visit) versus in person care; bill my insurance or make self-payment for services provided; and responsibility for payment of non-covered services.     Mode of Communication:  Video Conference via [employer]    As the provider I attest to compliance with applicable laws and regulations related to telemedicine.     Treatment Plan Last Reviewed:  7/21/2020  PHQ-9 / JESSICA-7 : 4/23/2020    DATA  Interactive Complexity: No  Crisis: No       Progress Since Last Session (Related to Symptoms / Goals / Homework):   Symptoms: No change continued improved mood    Homework: Achieved / completed to satisfaction client reported using coping skills      Episode of Care Goals: Satisfactory progress - ACTION (Actively working towards change); Intervened by reinforcing change plan / affirming steps taken     Current / Ongoing Stressors and Concerns:   Ongoing: Client reports increased depression symptoms while caring for elderly parents.              Current: Client reported that she had been coping well with things lately and feeling good about herself. Reviewed client's goal for therapy and updated treatment plan. She identified that she is not only having three drinks a day on average, and that she feels confident  that she can drink less as she continues working on it. She reported that she hasn't felt depressed, saying the buproprion has helped increase her motivation and she is feeling productive. Client reviewed having guilt and anger towards her brother who is in treatment for alcoholism. Created goal to address client's hope to have better communication skills.        Treatment Objective(s) Addressed in This Session:   Identify negative self-talk and behaviors: challenge core beliefs, myths, and actions       Intervention:   Reviewed how client can address anger and guilt towards brother   Motivational Interviewing   Updated treatment plan  MI Intervention: Expressed Empathy/Understanding, Supported Autonomy, Collaboration, Evocation, Permission to raise concern or advise, Open-ended questions, Reflections: simple and complex, Change talk (evoked) and Reframe     Change Talk Expressed by the Patient: Desire to change Reasons to change Need to change Committment to change Activation Taking steps    Provider Response to Change Talk: E - Evoked more info from patient about behavior change, A - Affirmed patient's thoughts, decisions, or attempts at behavior change, R - Reflected patient's change talk and S - Summarized patient's change talk statements          ASSESSMENT: Current Emotional / Mental Status (status of significant symptoms):   Risk status (Self / Other harm or suicidal ideation)   Client denies current fears or concerns for personal safety.   Client denies current or recent suicidal ideation or behaviors.   Client denies current or recent homicidal ideation or behaviors.   Client denies current or recent self injurious behavior or ideation.   Client denies other safety concerns.   Client reports there has been no change in risk factors since their last session.     Client reports there has been no change in protective factors since their last session.     Recommended that patient call 911 or go to the local ED  should there be a change in any of these risk factors.     Appearance:   Appropriate    Eye Contact:   Fair    Psychomotor Behavior: Normal     Attitude:   Cooperative  Attentive Espinoza   Orientation:   All   Speech    Rate / Production: Normal/ Responsive     Volume:  Normal    Mood:    Irritable  Normal client appeared irritable towards brother, otherwise good mood   Affect:    Appropriate    Thought Content:  Clear    Thought Form:  Coherent  Goal Directed  Logical    Insight:    Fair      Medication Review:   Changes to psychiatric medications, see updated Medication List in EPIC.      Medication Compliance:   Yes     Changes in Health Issues:   None reported     Chemical Use Review:   Substance Use: Problem use continues with no change since last session, Stage of Change: Preparatory and Action  Provided encouragement towards sobriety  Provided support and affirmation for steps taken towards sobriety          Tobacco Use: No change in amount of tobacco use since last session.  Contemplation  Provided encouragement to quit     Diagnosis:  1. Major depressive disorder, recurrent episode, moderate (H)    2. Generalized anxiety disorder        Collateral Reports Completed:   Not Applicable    PLAN: (Client Tasks / Therapist Tasks / Other)  Client will follow up with family members, validate her emotions towards her brother and return for therapy in two weeks.         Maite SIMMONS, [facility] 7/21/2020   Note reviewed and clinical supervision by TROY Hunter [facility] 7/27/2020     ___________________________________________________________________    Treatment Plan    Client's Name: Jeanna Steel  YOB: 1965    Date: 7/21/2020    DSM-V Diagnoses: 296.32 (F33.1) Major Depressive Disorder, Recurrent Episode, Moderate _, 300.02 (F41.1) Generalized Anxiety Disorder or Adjustment Disorders  309.28 (F43.23) With mixed anxiety and depressed mood  Psychosocial / Contextual Factors: Client reports  continued symptoms of depression and anxiety while caring for her elderly parents and drinking on a daily basis.  WHODAS: see intake    Referral / Collaboration:  Referral to another professional/service is not indicated at this time..    Anticipated number of session or this episode of care: 8-12      MeasurableTreatment Goal(s) related to diagnosis / functional impairment(s)  Goal 1: Client will reduce alcohol use from 5 standard drinks a day to 2 standard drinks a week in the next three months and client reporting use of three new coping skills to manage stress in the evenings.    I will know I've met my goal when I'm only drinking two drinks and going to bed earlier.      Objective #A (Client Action)    Client will identify at least three consequences of maladaptive drinking.  Status: Continued - Date(s):  7/21/2020    Intervention(s)  Therapist will assign homework to identify impact of drinking  provide support.    Objective #B  Client will identify three coping skills to replace drinking .  Status: Continued - Date(s):  7/21/2020    Intervention(s)  Therapist will assign homework to practice coping skills  teach coping skills.    Objective #C  Client will identify whether she needs further support to reduce alcohol use.  Status: Continued - Date(s):   7/21/2020    Intervention(s)  Therapist will provide support and referrals as needed, education on alcohol use.      Goal 2: Client will maintain reduced symptoms of depression as evidenced by PHQ-9 remaining under 5 and client reporting continued motivation.    I will know I've met my goal when I feel more productive rather than paralyzed.      Objective #A (Client Action)    Status: Completed - Date: 7/21/2020       Client will Increase interest, engagement, and pleasure in doing things  Decrease frequency and intensity of feeling down, depressed, hopeless.    Intervention(s)  Therapist will teach behavioral activation.    Objective #B  Client will Identify  negative self-talk and behaviors: challenge core beliefs, myths, and actions.    Status: Completed - Date: 7/21/2020      Intervention(s)  Therapist will teach CBT skills.    Objective #C  Client will Improve concentration, focus, and mindfulness in daily activities .  Status: Completed - Date: 7/21/2020       Intervention(s)  Therapist will teach mindfulness skills.      Goal 3: Client will report continued increased acceptance towards her decision about having children as evidenced by client reporting use of effective coping skills when feeling distress or regret.    I will know I've met my goal when I can accept .      Objective #A (Client Action)    Status: Continued - Date(s):    7/21/2020    Client will use acceptance skills when feeling willful.    Intervention(s)  Therapist will teach acceptance DBT skills.    Objective #B  Client will identify coping skills that reduce distress when grieving.    Status: Continued - Date(s):   7/21/2020    Intervention(s)  Therapist will teach coping skills, self-soothing.    Goal 4: Client will improve confidence with difficult conversations as evidenced by client reporting reduce avoidance and use of three effective communication skills over the next three months.     I will know I've met my goal when I know how to talk about things.      Objective #A (Client Action)    Client will learn & utilize at least 3 assertive communication skills weekly.  Status: New - Date: 7/21/2020     Intervention(s)  Therapist will teach assertiveness skills. DBT DEAR MAN skills.    Objective #B  Client will Identify negative self-talk and behaviors: challenge core beliefs, myths, and actions.    Status: New - Date: 7/21/2020     Intervention(s)  Therapist will guide client in exploring how core belief system influences her ability to communicate and cope with conflict.        Client has reviewed and agreed to the above plan.      Maite Quinn MSW, LGSW July 21, 2020  Note reviewed and  clinical supervision by TROY Hunter [facility] 7/27/2020

## 2020-08-04 ENCOUNTER — VIRTUAL VISIT (OUTPATIENT)
Dept: PSYCHOLOGY | Facility: CLINIC | Age: 55
End: 2020-08-04
Payer: COMMERCIAL

## 2020-08-04 DIAGNOSIS — F41.1 GENERALIZED ANXIETY DISORDER: ICD-10-CM

## 2020-08-04 DIAGNOSIS — F33.1 MAJOR DEPRESSIVE DISORDER, RECURRENT EPISODE, MODERATE (H): Primary | ICD-10-CM

## 2020-08-04 PROCEDURE — 90834 PSYTX W PT 45 MINUTES: CPT | Mod: 95 | Performed by: SOCIAL WORKER

## 2020-08-04 NOTE — PROGRESS NOTES
Progress Note    Client Name: Jeanna Steel  Date: 8/4/2020         Service Type: Individual     Session Start Time: 11:00  Session End Time: 11:45     Session Length: 45 min    Session #: 28    Attendees: Client attended alone  Telemedicine Visit: The patient's condition can be safely assessed and treated via synchronous audio and visual telemedicine encounter.      Reason for Telemedicine Visit: Services only offered telehealth    Originating Site (Patient Location): Patient's home    Distant Site (Provider Location): Provider Remote Setting home office    Consent:  The patient/guardian has verbally consented to: the potential risks and benefits of telemedicine (video visit) versus in person care; bill my insurance or make self-payment for services provided; and responsibility for payment of non-covered services.     Mode of Communication:  Video Conference via [employer]    As the provider I attest to compliance with applicable laws and regulations related to telemedicine.     Treatment Plan Last Reviewed:  7/21/2020  PHQ-9 / JESSICA-7 : 4/23/2020    DATA  Interactive Complexity: No  Crisis: No       Progress Since Last Session (Related to Symptoms / Goals / Homework):   Symptoms: Worsening increased stress    Homework: Achieved / completed to satisfaction client reported working through her emotions towards her brother      Episode of Care Goals: Satisfactory progress - ACTION (Actively working towards change); Intervened by reinforcing change plan / affirming steps taken     Current / Ongoing Stressors and Concerns:   Ongoing: Client reports increased depression symptoms while caring for elderly parents.              Current: Client reported that she was having a hard time while taking care of her parent's cat, who was very badly injured. She described how the cat ran away, returned and has needed extensive treatment. Client said she has a hard time providing the  needed care at home as it is really gross and hard for her to see an animal in distress. Practiced observe and describe skill so client can tend to the cat's injuries without such intense distress. She reported that she set boundaries with her brother and she felt really proud of how she communicated. Client explained that her brother wanted her to get more involved in his treatment for his alcoholism, but that she didn't feel she had the capacity for it. Client reported that she spoke with him and that he had reacted well to her boundary setting. She said she has also been successful following her [employer] weight-loss program and that considering her alcohol-use as liquid calories has helped her restrict her intake. Client reported she has yet to speak to her  about wanting more intimacy in their marriage as she feels he will be upset. Practiced how client will talk with her .        Treatment Objective(s) Addressed in This Session:   Identify negative self-talk and behaviors: challenge core beliefs, myths, and actions       Intervention:   DBT: Practiced grounding with observe and describe for client to use while caring for cat. Role played using DEAR MAN for client to talk with .   Motivational Interviewing     MI Intervention: Expressed Empathy/Understanding, Supported Autonomy, Collaboration, Evocation, Permission to raise concern or advise, Open-ended questions, Reflections: simple and complex, Change talk (evoked) and Reframe     Change Talk Expressed by the Patient: Desire to change Reasons to change Need to change Committment to change Activation Taking steps    Provider Response to Change Talk: E - Evoked more info from patient about behavior change, A - Affirmed patient's thoughts, decisions, or attempts at behavior change, R - Reflected patient's change talk and S - Summarized patient's change talk statements          ASSESSMENT: Current Emotional / Mental Status (status of significant  symptoms):   Risk status (Self / Other harm or suicidal ideation)   Client denies current fears or concerns for personal safety.   Client denies current or recent suicidal ideation or behaviors.   Client denies current or recent homicidal ideation or behaviors.   Client denies current or recent self injurious behavior or ideation.   Client denies other safety concerns.   Client reports there has been no change in risk factors since their last session.     Client reports there has been no change in protective factors since their last session.     Recommended that patient call 911 or go to the local ED should there be a change in any of these risk factors.     Appearance:   Appropriate    Eye Contact:   Fair    Psychomotor Behavior: Agitated  client shifted throughout session   Attitude:   Cooperative  Attentive Espinoza   Orientation:   All   Speech    Rate / Production: Emotional     Volume:  Normal    Mood:    Anxious  Irritable  Agitated client presented as frustrated and distressed   Affect:    Appropriate  Tearful   Thought Content:  Clear    Thought Form:  Coherent  Goal Directed  Logical    Insight:    Fair      Medication Review:   No changes to current psychiatric medication(s)     Medication Compliance:   Yes     Changes in Health Issues:   None reported     Chemical Use Review:   Substance Use: Problem use continues with no change since last session, Stage of Change: Preparatory and Action  Provided encouragement towards sobriety  Provided support and affirmation for steps taken towards sobriety          Tobacco Use: No change in amount of tobacco use since last session.  Contemplation  Provided encouragement to quit     Diagnosis:  1. Major depressive disorder, recurrent episode, moderate (H)    2. Generalized anxiety disorder        Collateral Reports Completed:   Not Applicable    PLAN: (Client Tasks / Therapist Tasks / Other)  Client will continue practicing observe and describe, talk with her  and  return for therapy in two weeks.         Maite Cheyenne  MSW, LGSW 8/4/2020  Note reviewed and clinical supervision by TROY Hunter LICSW 8/11/2020     ___________________________________________________________________    Treatment Plan    Client's Name: Jeanna Steel  YOB: 1965    Date: 7/21/2020    DSM-V Diagnoses: 296.32 (F33.1) Major Depressive Disorder, Recurrent Episode, Moderate _, 300.02 (F41.1) Generalized Anxiety Disorder or Adjustment Disorders  309.28 (F43.23) With mixed anxiety and depressed mood  Psychosocial / Contextual Factors: Client reports continued symptoms of depression and anxiety while caring for her elderly parents and drinking on a daily basis.  WHODAS: see intake    Referral / Collaboration:  Referral to another professional/service is not indicated at this time..    Anticipated number of session or this episode of care: 8-12      MeasurableTreatment Goal(s) related to diagnosis / functional impairment(s)  Goal 1: Client will reduce alcohol use from 5 standard drinks a day to 2 standard drinks a week in the next three months and client reporting use of three new coping skills to manage stress in the evenings.    I will know I've met my goal when I'm only drinking two drinks and going to bed earlier.      Objective #A (Client Action)    Client will identify at least three consequences of maladaptive drinking.  Status: Continued - Date(s):  7/21/2020    Intervention(s)  Therapist will assign homework to identify impact of drinking  provide support.    Objective #B  Client will identify three coping skills to replace drinking .  Status: Continued - Date(s):  7/21/2020    Intervention(s)  Therapist will assign homework to practice coping skills  teach coping skills.    Objective #C  Client will identify whether she needs further support to reduce alcohol use.  Status: Continued - Date(s):   7/21/2020    Intervention(s)  Therapist will provide support and referrals  as needed, education on alcohol use.      Goal 2: Client will maintain reduced symptoms of depression as evidenced by PHQ-9 remaining under 5 and client reporting continued motivation.    I will know I've met my goal when I feel more productive rather than paralyzed.      Objective #A (Client Action)    Status: Completed - Date: 7/21/2020       Client will Increase interest, engagement, and pleasure in doing things  Decrease frequency and intensity of feeling down, depressed, hopeless.    Intervention(s)  Therapist will teach behavioral activation.    Objective #B  Client will Identify negative self-talk and behaviors: challenge core beliefs, myths, and actions.    Status: Completed - Date: 7/21/2020      Intervention(s)  Therapist will teach CBT skills.    Objective #C  Client will Improve concentration, focus, and mindfulness in daily activities .  Status: Completed - Date: 7/21/2020       Intervention(s)  Therapist will teach mindfulness skills.      Goal 3: Client will report continued increased acceptance towards her decision about having children as evidenced by client reporting use of effective coping skills when feeling distress or regret.    I will know I've met my goal when I can accept .      Objective #A (Client Action)    Status: Continued - Date(s):    7/21/2020    Client will use acceptance skills when feeling willful.    Intervention(s)  Therapist will teach acceptance DBT skills.    Objective #B  Client will identify coping skills that reduce distress when grieving.    Status: Continued - Date(s):   7/21/2020    Intervention(s)  Therapist will teach coping skills, self-soothing.    Goal 4: Client will improve confidence with difficult conversations as evidenced by client reporting reduce avoidance and use of three effective communication skills over the next three months.     I will know I've met my goal when I know how to talk about things.      Objective #A (Client Action)    Client will learn &  utilize at least 3 assertive communication skills weekly.  Status: New - Date: 7/21/2020     Intervention(s)  Therapist will teach assertiveness skills. DBT DEAR MAN skills.    Objective #B  Client will Identify negative self-talk and behaviors: challenge core beliefs, myths, and actions.    Status: New - Date: 7/21/2020     Intervention(s)  Therapist will guide client in exploring how core belief system influences her ability to communicate and cope with conflict.        Client has reviewed and agreed to the above plan.      Maite SIMMONS, LGSW July 21, 2020  Note reviewed and clinical supervision by TROY Hunter [facility] 7/27/2020

## 2020-08-17 ENCOUNTER — TRANSFERRED RECORDS (OUTPATIENT)
Dept: HEALTH INFORMATION MANAGEMENT | Facility: CLINIC | Age: 55
End: 2020-08-17

## 2020-08-25 ENCOUNTER — VIRTUAL VISIT (OUTPATIENT)
Dept: PSYCHOLOGY | Facility: CLINIC | Age: 55
End: 2020-08-25
Payer: COMMERCIAL

## 2020-08-25 DIAGNOSIS — F41.1 GENERALIZED ANXIETY DISORDER: ICD-10-CM

## 2020-08-25 DIAGNOSIS — F33.1 MAJOR DEPRESSIVE DISORDER, RECURRENT EPISODE, MODERATE (H): Primary | ICD-10-CM

## 2020-08-25 PROCEDURE — 90834 PSYTX W PT 45 MINUTES: CPT | Mod: 95 | Performed by: SOCIAL WORKER

## 2020-08-25 NOTE — PROGRESS NOTES
Progress Note    Client Name: Jeanna Steel  Date: 8/25/2020         Service Type: Individual     Session Start Time: 11:00  Session End Time: 11:45     Session Length: 45 min    Session #: 28    Attendees: Client attended alone  Telemedicine Visit: The patient's condition can be safely assessed and treated via synchronous audio and visual telemedicine encounter.      Reason for Telemedicine Visit: Services only offered telehealth    Originating Site (Patient Location): Patient's home    Distant Site (Provider Location): Provider Remote Setting home office    Consent:  The patient/guardian has verbally consented to: the potential risks and benefits of telemedicine (video visit) versus in person care; bill my insurance or make self-payment for services provided; and responsibility for payment of non-covered services.     Mode of Communication:  Video Conference via IROCKE    As the provider I attest to compliance with applicable laws and regulations related to telemedicine.     Treatment Plan Last Reviewed:  7/21/2020  PHQ-9 / JESSICA-7 : 4/23/2020    DATA  Interactive Complexity: No  Crisis: No       Progress Since Last Session (Related to Symptoms / Goals / Homework):   Symptoms: Improving reduced stress    Homework: Achieved / completed to satisfaction client reported using grounding skills      Episode of Care Goals: Satisfactory progress - ACTION (Actively working towards change); Intervened by reinforcing change plan / affirming steps taken     Current / Ongoing Stressors and Concerns:   Ongoing: Client reports increased depression symptoms while caring for elderly parents.              Current: Client reported that while she continues caring for her parents cat, she feels better about treating his injuries and he has begun healing. Client said that she now is able to focus on the task rather than how gross it was. She reported doing a lot of positive things  "the past two weeks and that she feels proud of herself. Client described setting boundaries regarding her parents and asking for help from their facility. She identified using clear communication with her mom, not taking responsibility for things out of her control and saying \"no\" when asked to do more than she could do. Client reported she had also made progress towards reducing her alcohol consumption as she is not drinking four days out of the week. She reported losing five pounds and using a weight-loss sydnee every day.        Treatment Objective(s) Addressed in This Session:   Identify negative self-talk and behaviors: challenge core beliefs, myths, and actions       Intervention:   DBT: Reviewed client's use of grounding skills, maintaining boundaries and effective communication skills.    Motivational Interviewing     MI Intervention: Expressed Empathy/Understanding, Supported Autonomy, Collaboration, Evocation, Permission to raise concern or advise, Open-ended questions, Reflections: simple and complex, Change talk (evoked) and Reframe     Change Talk Expressed by the Patient: Desire to change Reasons to change Need to change Committment to change Activation Taking steps    Provider Response to Change Talk: E - Evoked more info from patient about behavior change, A - Affirmed patient's thoughts, decisions, or attempts at behavior change, R - Reflected patient's change talk and S - Summarized patient's change talk statements          ASSESSMENT: Current Emotional / Mental Status (status of significant symptoms):   Risk status (Self / Other harm or suicidal ideation)   Client denies current fears or concerns for personal safety.   Client denies current or recent suicidal ideation or behaviors.   Client denies current or recent homicidal ideation or behaviors.   Client denies current or recent self injurious behavior or ideation.   Client denies other safety concerns.   Client reports there has been no change in " risk factors since their last session.     Client reports there has been no change in protective factors since their last session.     Recommended that patient call 911 or go to the local ED should there be a change in any of these risk factors.     Appearance:   Appropriate    Eye Contact:   Good    Psychomotor Behavior: Normal     Attitude:   Cooperative  Pleasant Attentive Espinoza   Orientation:   All   Speech    Rate / Production: Normal/ Responsive     Volume:  Normal    Mood:    Normal client appeared to be in a good mood   Affect:    Appropriate    Thought Content:  Clear    Thought Form:  Coherent  Goal Directed  Logical    Insight:    Good      Medication Review:   No changes to current psychiatric medication(s)     Medication Compliance:   Yes     Changes in Health Issues:   None reported     Chemical Use Review:   Substance Use: Chemical use reviewed, no active concerns identified       Tobacco Use: No change in amount of tobacco use since last session.  Contemplation  Provided encouragement to quit     Diagnosis:  1. Major depressive disorder, recurrent episode, moderate (H)    2. Generalized anxiety disorder        Collateral Reports Completed:   Not Applicable    PLAN: (Client Tasks / Therapist Tasks / Other)  Client will practice maintaining her use of coping skills and return for therapy in two weeks.         Maite SIMMONS, [facility] 8/24/2020  Note reviewed and clinical supervision by TROY Hunter [facility] 8/31/2020     ___________________________________________________________________    Treatment Plan    Client's Name: Jeanna Steel  YOB: 1965    Date: 7/21/2020    DSM-V Diagnoses: 296.32 (F33.1) Major Depressive Disorder, Recurrent Episode, Moderate _, 300.02 (F41.1) Generalized Anxiety Disorder or Adjustment Disorders  309.28 (F43.23) With mixed anxiety and depressed mood  Psychosocial / Contextual Factors: Client reports continued symptoms of depression and anxiety  while caring for her elderly parents and drinking on a daily basis.  WHODAS: see intake    Referral / Collaboration:  Referral to another professional/service is not indicated at this time..    Anticipated number of session or this episode of care: 8-12      MeasurableTreatment Goal(s) related to diagnosis / functional impairment(s)  Goal 1: Client will reduce alcohol use from 5 standard drinks a day to 2 standard drinks a week in the next three months and client reporting use of three new coping skills to manage stress in the evenings.    I will know I've met my goal when I'm only drinking two drinks and going to bed earlier.      Objective #A (Client Action)    Client will identify at least three consequences of maladaptive drinking.  Status: Continued - Date(s):  7/21/2020    Intervention(s)  Therapist will assign homework to identify impact of drinking  provide support.    Objective #B  Client will identify three coping skills to replace drinking .  Status: Continued - Date(s):  7/21/2020    Intervention(s)  Therapist will assign homework to practice coping skills  teach coping skills.    Objective #C  Client will identify whether she needs further support to reduce alcohol use.  Status: Continued - Date(s):   7/21/2020    Intervention(s)  Therapist will provide support and referrals as needed, education on alcohol use.      Goal 2: Client will maintain reduced symptoms of depression as evidenced by PHQ-9 remaining under 5 and client reporting continued motivation.    I will know I've met my goal when I feel more productive rather than paralyzed.      Objective #A (Client Action)    Status: Completed - Date: 7/21/2020       Client will Increase interest, engagement, and pleasure in doing things  Decrease frequency and intensity of feeling down, depressed, hopeless.    Intervention(s)  Therapist will teach behavioral activation.    Objective #B  Client will Identify negative self-talk and behaviors: challenge  core beliefs, myths, and actions.    Status: Completed - Date: 7/21/2020      Intervention(s)  Therapist will teach CBT skills.    Objective #C  Client will Improve concentration, focus, and mindfulness in daily activities .  Status: Completed - Date: 7/21/2020       Intervention(s)  Therapist will teach mindfulness skills.      Goal 3: Client will report continued increased acceptance towards her decision about having children as evidenced by client reporting use of effective coping skills when feeling distress or regret.    I will know I've met my goal when I can accept .      Objective #A (Client Action)    Status: Continued - Date(s):    7/21/2020    Client will use acceptance skills when feeling willful.    Intervention(s)  Therapist will teach acceptance DBT skills.    Objective #B  Client will identify coping skills that reduce distress when grieving.    Status: Continued - Date(s):   7/21/2020    Intervention(s)  Therapist will teach coping skills, self-soothing.    Goal 4: Client will improve confidence with difficult conversations as evidenced by client reporting reduce avoidance and use of three effective communication skills over the next three months.     I will know I've met my goal when I know how to talk about things.      Objective #A (Client Action)    Client will learn & utilize at least 3 assertive communication skills weekly.  Status: New - Date: 7/21/2020     Intervention(s)  Therapist will teach assertiveness skills. DBT DEAR MAN skills.    Objective #B  Client will Identify negative self-talk and behaviors: challenge core beliefs, myths, and actions.    Status: New - Date: 7/21/2020     Intervention(s)  Therapist will guide client in exploring how core belief system influences her ability to communicate and cope with conflict.        Client has reviewed and agreed to the above plan.      Maite SIMMONS, LGSW July 21, 2020  Note reviewed and clinical supervision by TROY Hunter  LICSW 7/27/2020

## 2020-08-31 DIAGNOSIS — F41.1 GENERALIZED ANXIETY DISORDER: ICD-10-CM

## 2020-08-31 DIAGNOSIS — F33.1 MAJOR DEPRESSIVE DISORDER, RECURRENT EPISODE, MODERATE (H): ICD-10-CM

## 2020-08-31 RX ORDER — SERTRALINE HYDROCHLORIDE 100 MG/1
100 TABLET, FILM COATED ORAL DAILY
Qty: 90 TABLET | Refills: 0 | Status: SHIPPED | OUTPATIENT
Start: 2020-08-31 | End: 2020-12-08

## 2020-09-06 ENCOUNTER — OFFICE VISIT (OUTPATIENT)
Dept: URGENT CARE | Facility: URGENT CARE | Age: 55
End: 2020-09-06
Payer: COMMERCIAL

## 2020-09-06 VITALS — TEMPERATURE: 97.1 F | SYSTOLIC BLOOD PRESSURE: 121 MMHG | DIASTOLIC BLOOD PRESSURE: 73 MMHG | HEART RATE: 83 BPM

## 2020-09-06 DIAGNOSIS — L98.9 SKIN LESION: Primary | ICD-10-CM

## 2020-09-06 PROCEDURE — 99213 OFFICE O/P EST LOW 20 MIN: CPT | Performed by: FAMILY MEDICINE

## 2020-09-06 RX ORDER — PREDNISONE 20 MG/1
40 TABLET ORAL DAILY
Qty: 10 TABLET | Refills: 0 | Status: SHIPPED | OUTPATIENT
Start: 2020-09-06 | End: 2020-09-11

## 2020-09-06 NOTE — PATIENT INSTRUCTIONS
Fexofenadine (generic version of allegra) take twice a day (morning and night) for the next week -- then move to once daily in the evening    If you especially have itching that flares up okay to take 25-50mg benadryl every 8 hours as needed      Prednisone 40mg a day for 5 days      If symptoms worsen return to be seen   -- look out for development of pain, worsening swelling, or visual difficulties

## 2020-09-06 NOTE — PROGRESS NOTES
Subjective:   Jeanna Steel is a 55 year old female who presents for   Chief Complaint   Patient presents with     Urgent Care     awoke with irritated puffy red eyes - reports that this has happened before, not sure what is causing it.   yesterday she developed some slight eye redness noticed by others, itching has been present for 10 days. Woke up this morning with swelling of the skin around her eyes. Benadryl this morning taken, this appeared to help    No reported visual difficulties  Had a similar episode to this in January. She did consult with ophthalmologist who believed she had seborrheic dermatitis along with dry eyes.     With onset the area feels especially itchy along with the eyelids    She has not been using makeup.     She has seen dermatologist for other skin issues including seborrheic dermatitis of the lower lip area and her eyelids - along with eczema of her flexural surfaces of elbows/knees.     She has tried topical steroids for her eczema of the face    Patient Active Problem List    Diagnosis Date Noted     Microscopic hematuria 07/12/2017     Priority: Medium     Vitamin D deficiency 05/19/2015     Priority: Medium     Major depressive disorder, recurrent episode, moderate (H) 06/25/2012     Priority: Medium     Generalized anxiety disorder 06/25/2012     Priority: Medium     Patient is followed by KEARA FRANCO for ongoing prescription of benzodiazepines.  All refills should be approved by this provider, or covering partner.    Medication(s): Ativan. 0.5mg   Maximum quantity per month: 8   TAKE 1 TABLETS BY MOUTH AS NEEDED FOR ANXIETY. LIMIT TO TWICE PER WEEK          Clinic visit frequency required:  Q 6 months    Controlled substance agreement on file: Yes       Date(s): 01/18/2019  Benzodiazepine use reviewed by psychiatry:  None, referral placed on 1/18/2019    Last [facility] website verification:  done on 1/18/2019   [email or web address]           Current  Outpatient Medications   Medication     buPROPion (WELLBUTRIN XL) 150 MG 24 hr tablet     diphenhydrAMINE HCl (BENADRYL PO)     predniSONE (DELTASONE) 20 MG tablet     sertraline (ZOLOFT) 100 MG tablet     sertraline (ZOLOFT) 50 MG tablet     LORazepam (ATIVAN) 0.5 MG tablet     No current facility-administered medications for this visit.      ROS:  As above per HPI    Objective:   /73   Pulse 83   Temp 97.1  F (36.2  C) (Temporal) , There is no height or weight on file to calculate BMI.  Gen:  NAD, well-nourished, sitting in chair comfortably  HEENT: EOMI, sclera anicteric, Head normocephalic, ; nares patent; moist mucous membranes, no eye hyperemia, preseptal areas of the eye there is mild skin inflammation/redness bilaterally - no fluctuant areas. No proptosis. PERRL  Neck: trachea midline, no thyromegaly  CV:  Hemodynamically stable  Pulm:  no increased work of breathing   Extrem: no cyanosis, edema or clubbing  Skin: no obvious rashes or abnormalities  Psych: Euthymic, linear thoughts, normal rate of speech          No results found for any visits on 09/06/20.    Assessment & Plan:   Jeanna Steel, 55 year old female who presents with:  Skin lesion  Much improvement of the preseptal area since the swelling initiated over the last 12 hours. She had concerns reading online about preseptal cellulitis discussed that this is unlikely with bilateral presentation and the improvement in her symptoms suggest against such. Recommendations today include: twice daily fexofenadine, 40mg x 5 days prednisone, PRN benadryl for itching. F/u sooner if symptoms worsen.   - predniSONE (DELTASONE) 20 MG tablet  Dispense: 10 tablet; Refill: 0      Volodymyr Snowden MD   [facility]    The use of Dragon/Weblio dictation services may have been used to construct the content in this note; any grammatical or spelling errors are non-intentional. Please contact the author of this note directly if you are in need  of any clarification.

## 2020-09-22 ENCOUNTER — VIRTUAL VISIT (OUTPATIENT)
Dept: PSYCHOLOGY | Facility: CLINIC | Age: 55
End: 2020-09-22
Payer: COMMERCIAL

## 2020-09-22 DIAGNOSIS — F41.1 GENERALIZED ANXIETY DISORDER: ICD-10-CM

## 2020-09-22 DIAGNOSIS — F33.1 MAJOR DEPRESSIVE DISORDER, RECURRENT EPISODE, MODERATE (H): Primary | ICD-10-CM

## 2020-09-22 PROCEDURE — 90834 PSYTX W PT 45 MINUTES: CPT | Mod: 95 | Performed by: SOCIAL WORKER

## 2020-09-22 NOTE — PROGRESS NOTES
Progress Note    Client Name: Jeanna Steel  Date: 9/22/2020         Service Type: Individual     Session Start Time:  11:00  Session End Time: 11:45     Session Length: 45 min    Session #: 29    Attendees: Client attended alone  Telemedicine Visit: The patient's condition can be safely assessed and treated via synchronous audio and visual telemedicine encounter.      Reason for Telemedicine Visit: Services only offered telehealth    Originating Site (Patient Location): Patient's home    Distant Site (Provider Location): Provider Remote Setting home office    Consent:  The patient/guardian has verbally consented to: the potential risks and benefits of telemedicine (video visit) versus in person care; bill my insurance or make self-payment for services provided; and responsibility for payment of non-covered services.     Mode of Communication:  Video Conference via JFrog    As the provider I attest to compliance with applicable laws and regulations related to telemedicine.     Treatment Plan Last Reviewed:  7/21/2020  PHQ-9 / JESSICA-7 : 4/23/2020    DATA  Interactive Complexity: No  Crisis: No       Progress Since Last Session (Related to Symptoms / Goals / Homework):   Symptoms: Improving increased happiness    Homework: Achieved / completed to satisfaction client reported increased use of coping skills      Episode of Care Goals: Satisfactory progress - ACTION (Actively working towards change); Intervened by reinforcing change plan / affirming steps taken     Current / Ongoing Stressors and Concerns:   Ongoing: Client reports increased depression symptoms while caring for elderly parents.              Current: Client reported that she had done well the past few weeks with using effective communication skills and self-compassion. She described managing her alcohol use well, while she had increased use last week she was able to return to her plan and reduce her  use again. Client said she has struggled with some anxiety regarding her parents finances and managing their health needs. She explained coming up with a solution to demonstrate to her mother how much she is drinking, as her mom's increasing dementia makes her forget. Client reported her mom has finally agreed that she is the one who is drinking their alcohol and that she will try to be more mindful.         Treatment Objective(s) Addressed in This Session:   Identify negative self-talk and behaviors: challenge core beliefs, myths, and actions       Intervention:   DBT: Reviewed client's management of stress and interpersonal relationships. Identified and challenged with opposite action client's sense of guilt with managing her parents finances and not feeling competent.    Motivational Interviewing     MI Intervention: Expressed Empathy/Understanding, Supported Autonomy, Collaboration, Evocation, Permission to raise concern or advise, Open-ended questions, Reflections: simple and complex, Change talk (evoked) and Reframe     Change Talk Expressed by the Patient: Desire to change Reasons to change Need to change Committment to change Activation Taking steps    Provider Response to Change Talk: E - Evoked more info from patient about behavior change, A - Affirmed patient's thoughts, decisions, or attempts at behavior change, R - Reflected patient's change talk and S - Summarized patient's change talk statements          ASSESSMENT: Current Emotional / Mental Status (status of significant symptoms):   Risk status (Self / Other harm or suicidal ideation)   Client denies current fears or concerns for personal safety.   Client denies current or recent suicidal ideation or behaviors.   Client denies current or recent homicidal ideation or behaviors.   Client denies current or recent self injurious behavior or ideation.   Client denies other safety concerns.   Client reports there has been no change in risk factors since  their last session.     Client reports there has been no change in protective factors since their last session.     Recommended that patient call 911 or go to the local ED should there be a change in any of these risk factors.     Appearance:   Appropriate    Eye Contact:   Good    Psychomotor Behavior: Normal     Attitude:   Cooperative  Pleasant Attentive   Orientation:   All   Speech    Rate / Production: Normal/ Responsive     Volume:  Normal    Mood:    Normal client appeared to be in a good mood   Affect:    Appropriate    Thought Content:  Clear    Thought Form:  Coherent  Goal Directed  Logical    Insight:    Good      Medication Review:   No changes to current psychiatric medication(s)     Medication Compliance:   Yes     Changes in Health Issues:   None reported     Chemical Use Review:   Substance Use: Chemical use reviewed, no active concerns identified       Tobacco Use: No change in amount of tobacco use since last session.  Contemplation  Provided encouragement to quit     Diagnosis:  1. Major depressive disorder, recurrent episode, moderate (H)    2. Generalized anxiety disorder        Collateral Reports Completed:   Not Applicable    PLAN: (Client Tasks / Therapist Tasks / Other)  Client will identify her values relevant to managing parents finances, consider how she can uphold these values and return for therapy in two weeks.         Maite SIMMONS, [facility] 9/22/2020  Note reviewed and clinical supervision by TROY Hunter [facility] 9/28/2020     ___________________________________________________________________    Treatment Plan    Client's Name: Jeanna Steel  YOB: 1965    Date: 7/21/2020    DSM-V Diagnoses: 296.32 (F33.1) Major Depressive Disorder, Recurrent Episode, Moderate _, 300.02 (F41.1) Generalized Anxiety Disorder or Adjustment Disorders  309.28 (F43.23) With mixed anxiety and depressed mood  Psychosocial / Contextual Factors: Client reports continued symptoms  of depression and anxiety while caring for her elderly parents and drinking on a daily basis.  WHODAS: see intake    Referral / Collaboration:  Referral to another professional/service is not indicated at this time..    Anticipated number of session or this episode of care: 8-12      MeasurableTreatment Goal(s) related to diagnosis / functional impairment(s)  Goal 1: Client will reduce alcohol use from 5 standard drinks a day to 2 standard drinks a week in the next three months and client reporting use of three new coping skills to manage stress in the evenings.    I will know I've met my goal when I'm only drinking two drinks and going to bed earlier.      Objective #A (Client Action)    Client will identify at least three consequences of maladaptive drinking.  Status: Continued - Date(s):  7/21/2020    Intervention(s)  Therapist will assign homework to identify impact of drinking  provide support.    Objective #B  Client will identify three coping skills to replace drinking .  Status: Continued - Date(s):  7/21/2020    Intervention(s)  Therapist will assign homework to practice coping skills  teach coping skills.    Objective #C  Client will identify whether she needs further support to reduce alcohol use.  Status: Continued - Date(s):   7/21/2020    Intervention(s)  Therapist will provide support and referrals as needed, education on alcohol use.      Goal 2: Client will maintain reduced symptoms of depression as evidenced by PHQ-9 remaining under 5 and client reporting continued motivation.    I will know I've met my goal when I feel more productive rather than paralyzed.      Objective #A (Client Action)    Status: Completed - Date: 7/21/2020       Client will Increase interest, engagement, and pleasure in doing things  Decrease frequency and intensity of feeling down, depressed, hopeless.    Intervention(s)  Therapist will teach behavioral activation.    Objective #B  Client will Identify negative self-talk  and behaviors: challenge core beliefs, myths, and actions.    Status: Completed - Date: 7/21/2020      Intervention(s)  Therapist will teach CBT skills.    Objective #C  Client will Improve concentration, focus, and mindfulness in daily activities .  Status: Completed - Date: 7/21/2020       Intervention(s)  Therapist will teach mindfulness skills.      Goal 3: Client will report continued increased acceptance towards her decision about having children as evidenced by client reporting use of effective coping skills when feeling distress or regret.    I will know I've met my goal when I can accept .      Objective #A (Client Action)    Status: Continued - Date(s):    7/21/2020    Client will use acceptance skills when feeling willful.    Intervention(s)  Therapist will teach acceptance DBT skills.    Objective #B  Client will identify coping skills that reduce distress when grieving.    Status: Continued - Date(s):   7/21/2020    Intervention(s)  Therapist will teach coping skills, self-soothing.    Goal 4: Client will improve confidence with difficult conversations as evidenced by client reporting reduce avoidance and use of three effective communication skills over the next three months.     I will know I've met my goal when I know how to talk about things.      Objective #A (Client Action)    Client will learn & utilize at least 3 assertive communication skills weekly.  Status: New - Date: 7/21/2020     Intervention(s)  Therapist will teach assertiveness skills. DBT DEAR MAN skills.    Objective #B  Client will Identify negative self-talk and behaviors: challenge core beliefs, myths, and actions.    Status: New - Date: 7/21/2020     Intervention(s)  Therapist will guide client in exploring how core belief system influences her ability to communicate and cope with conflict.        Client has reviewed and agreed to the above plan.      Maite SIMMONS, LGSW July 21, 2020  Note reviewed and clinical supervision by  TROY Hunter LICSW 7/27/2020

## 2020-09-28 ENCOUNTER — TRANSFERRED RECORDS (OUTPATIENT)
Dept: HEALTH INFORMATION MANAGEMENT | Facility: CLINIC | Age: 55
End: 2020-09-28

## 2020-10-06 ENCOUNTER — VIRTUAL VISIT (OUTPATIENT)
Dept: FAMILY MEDICINE | Facility: OTHER | Age: 55
End: 2020-10-06
Payer: COMMERCIAL

## 2020-10-06 ENCOUNTER — MYC MEDICAL ADVICE (OUTPATIENT)
Dept: FAMILY MEDICINE | Facility: CLINIC | Age: 55
End: 2020-10-06

## 2020-10-06 PROCEDURE — 99421 OL DIG E/M SVC 5-10 MIN: CPT | Performed by: PHYSICIAN ASSISTANT

## 2020-10-06 NOTE — PROGRESS NOTES
"Date: 10/06/2020 10:21:45  Clinician: Shaheed Anna  Clinician NPI: [id number]  Patient: Jenana Steel  Patient : 1965  Patient Address: [address] [place] [address]  Patient Phone: [phone]  Visit Protocol: URI  Patient Summary:  Jeanna is a 55 year old ( : 1965 ) female who initiated a [facility] Visit for COVID-19 (Coronavirus) evaluation and screening. When asked the question \"Please sign me up to receive news, health information and promotions from [facility].\", Jeanna responded \"Yes\".    When asked when her symptoms started, Jeanna reported that she does not have any symptoms.   She denies taking antibiotic medication in the past month and having recent facial or sinus surgery in the past 60 days.    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   She does not work or volunteer as healthcare worker or a  and does not work or volunteer in a healthcare facility. 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 He lives in the assisted living facility [facility] and they are the ones who notified me of his testing positive for Covid. I have been exposed to him all week. I want to get tested right away.   Jeanna has not lived in a congregate living setting in the past 14 days. She does not live with a healthcare worker.   Jeanna has had a close contact with a laboratory-confirmed COVID-19 patient in the last 14 days. She was not exposed at her work. Additional information about contact with COVID-19 (Coronavirus) patient as reported by the patient (free text): My father and mother live in the assisted facility [facility] and were recently tested. My Dad tested positive. I have been visiting him everyday for the past week.   Patient reported they are not living in the same household " with a COVID-19 positive patient.  Patient was in an enclosed space for greater than 15 minutes with a COVID-19 patient.  Since December 2019, Jeanna and has not had upper respiratory infection or influenza-like illness. Has not been diagnosed with lab-confirmed COVID-19 test   Pertinent medical history  Jeanna does not get yeast infections when she takes antibiotics.   Jeanna does not need a return to work/school note.   Weight: 176 lbs   Jeanna does not smoke or use smokeless tobacco.   Additional information as reported by the patient (free text): I have allergies to ragweed and this past week I took antihistamines because my eyes were really runny. I felt tired and assumed it was from the antihistamine but maybe it was a symptom of Corona Virus.   Weight: 176 lbs    MEDICATIONS: bupropion HCl oral, sertraline oral, ALLERGIES: NKDA  Clinician Response:  Dear Jeanna,   Based on your exposure to COVID-19 (coronavirus), we would like to test you for this virus.  1. Please call [phone] to schedule your visit. Explain that you were referred by [facility] to have a COVID-19 test. Be ready [facility].  The following will serve as your written order for this COVID Test, ordered by me, for the indication of suspected COVID [Z20.828]: The test will be ordered in [employer], our electronic health record, after you are scheduled. It will show as ordered and authorized by Fritz Wilkins MD.  Order: COVID-19 (coronavirus) PCR for ASYMPTOMATIC EXPOSURE testing from [facility].  If you know you have had close contact with someone who tested positive, you should be quarantined for 14 days after this exposure. You should stay in quarantine for the14 days even if the covid test is negative, the optimal time to test after exposure is 5-7 days from the exposure  Quarantine means   What should I do?  For safety, it's very important to follow these rules. Do this for 14 days after the date you were last  exposed to the virus..  Stay home and away from others. Don't go to school or anywhere else. Generally quarantine means staying home from work but there are some exceptions to this. Please contact your workplace.   No hugging, kissing or shaking hands.  Don't let anyone visit.  Cover your mouth and nose with a mask, tissue or washcloth to avoid spreading germs.  Wash your hands and face often. Use soap and water.  What are the symptoms of COVID-19?  The most common symptoms are cough, fever and trouble breathing. Less common symptoms include headache, body aches, fatigue (feeling very tired), chills, sore throat, stuffy or runny nose, diarrhea (loose poop), loss of taste or smell, belly pain, and nausea or vomiting (feeling sick to your stomach or throwing up).  After 14 days, if you have still don't have symptoms, you likely don't have this virus.  If you develop symptoms, follow these guidelines.  If you're normally healthy: Please start another [facility] visit to report your symptoms. Go to [email or web address].  If you have a serious health problem (like cancer, heart failure, an organ transplant or kidney disease): Call your specialty clinic. Let them know that you might have COVID-19.  2. When it's time for your COVID test:  Stay at least 6 feet away from others. (If someone will drive you to your test, stay in the backseat, as far away from the  as you can.)  Cover your mouth and nose with a mask, tissue or washcloth.  Go straight to the testing site. Don't make any stops on the way there or back.  Please note  Caregivers in these groups are at risk for severe illness due to COVID-19:  o People 65 years and older  o People who live in a nursing home or long-term care facility  o People with chronic disease (lung, heart, cancer, diabetes, kidney, liver, immunologic)  o People who have a weakened immune system, including those who:  Are in cancer treatment  Take medicine that weakens the immune system, such as  corticosteroids  Had a bone marrow or organ transplant  Have an immune deficiency  Have poorly controlled HIV or AIDS  Are obese (body mass index of 40 or higher)  Smoke regularly  Where can I get more information?  [facility] -- About COVID-19: [email or web address]  CDC -- What to Do If You're Sick: www.cdc.gov/coronavirus/2019-ncov/about/steps-when-sick.html  CDC -- Ending Home Isolation: www.cdc.gov/coronavirus/2019-ncov/hcp/disposition-in-home-patients.html  [facility] -- Caring for Someone: www.cdc.gov/coronavirus/2019-ncov/if-you-are-sick/care-for-someone.html  [facility] -- Interim Guidance for Hospital Discharge to Home: www.[facility].mn./diseases/coronavirus/hcp/hospdischarge.pdf  [facility] clinical trials (COVID-19 research studies): clinicalaffairs.[facility]-clinical-trials  Below are the COVID-19 hotlines at the [facility] ([facility]). Interpreters are available.  For health questions: Call 613-509-3074 or 1-479.562.8018 (7 a.m. to 7 p.m.)  For questions about schools and childcare: Call 809-484-7688 or 1-823.933.2405 (7 a.m. to 7 p.m.)    Diagnosis: Contact with and (suspected) exposure to other viral communicable diseases  Diagnosis ICD: Z20.828

## 2020-10-07 DIAGNOSIS — Z20.822 SUSPECTED 2019 NOVEL CORONAVIRUS INFECTION: Primary | ICD-10-CM

## 2020-10-08 DIAGNOSIS — Z20.822 SUSPECTED 2019 NOVEL CORONAVIRUS INFECTION: ICD-10-CM

## 2020-10-08 PROCEDURE — U0003 INFECTIOUS AGENT DETECTION BY NUCLEIC ACID (DNA OR RNA); SEVERE ACUTE RESPIRATORY SYNDROME CORONAVIRUS 2 (SARS-COV-2) (CORONAVIRUS DISEASE [COVID-19]), AMPLIFIED PROBE TECHNIQUE, MAKING USE OF HIGH THROUGHPUT TECHNOLOGIES AS DESCRIBED BY CMS-2020-01-R: HCPCS | Performed by: FAMILY MEDICINE

## 2020-10-10 LAB
SARS-COV-2 RNA SPEC QL NAA+PROBE: ABNORMAL
SPECIMEN SOURCE: ABNORMAL

## 2020-10-13 ENCOUNTER — VIRTUAL VISIT (OUTPATIENT)
Dept: PSYCHOLOGY | Facility: CLINIC | Age: 55
End: 2020-10-13
Payer: COMMERCIAL

## 2020-10-13 DIAGNOSIS — F33.1 MAJOR DEPRESSIVE DISORDER, RECURRENT EPISODE, MODERATE (H): Primary | ICD-10-CM

## 2020-10-13 DIAGNOSIS — F41.1 GENERALIZED ANXIETY DISORDER: ICD-10-CM

## 2020-10-13 PROCEDURE — 90832 PSYTX W PT 30 MINUTES: CPT | Mod: 95 | Performed by: SOCIAL WORKER

## 2020-10-13 NOTE — PROGRESS NOTES
Progress Note    Client Name: Jeanna Steel  Date: 10/13/2020          Service Type: Individual     Session Start Time:  11:10  Session End Time: 11:45     Session Length: 30 min    Session #: 30    Attendees: Client attended alone  Telemedicine Visit: The patient's condition can be safely assessed and treated via synchronous audio and visual telemedicine encounter.      Reason for Telemedicine Visit: Services only offered telehealth    Originating Site (Patient Location): Patient's home    Distant Site (Provider Location): Provider Remote Setting home office    Consent:  The patient/guardian has verbally consented to: the potential risks and benefits of telemedicine (video visit) versus in person care; bill my insurance or make self-payment for services provided; and responsibility for payment of non-covered services.     Mode of Communication:  Video Conference via [employer]    As the provider I attest to compliance with applicable laws and regulations related to telemedicine.     Treatment Plan Last Reviewed:  7/21/2020  PHQ-9 / JESSICA-7 : 4/23/2020    DATA  Interactive Complexity: No  Crisis: No       Progress Since Last Session (Related to Symptoms / Goals / Homework):   Symptoms: Worsening increased physical illness    Homework: Achieved / completed to satisfaction client reported upholding values      Episode of Care Goals: Satisfactory progress - ACTION (Actively working towards change); Intervened by reinforcing change plan / affirming steps taken     Current / Ongoing Stressors and Concerns:   Ongoing: Client reports increased depression symptoms while caring for elderly parents.              Current: Client reported she has had a busy few weeks and that she feels tired. She explained that she has coronavirus and is experiencing symptoms of significant fatigue, congestion and difficulty breathing. Client described how she contracted the virus from her  father who first tested positive but had not presented with symptoms. She reported her father has begun wandering throughout his living facility, getting lost in stairwells and skyway systems. Client discussed how they were going to move him into a memory care unit but with his positive coronavirus the move has been delayed so he can quarantine for two weeks. She reported taking further steps to address legal needs with her parent's finances.      Treatment Objective(s) Addressed in This Session:   Identify negative self-talk and behaviors: challenge core beliefs, myths, and actions       Intervention:   DBT: Reviewed how client can focus on healing and addressing her physical needs while being self-compassionate   Motivational Interviewing     MI Intervention: Expressed Empathy/Understanding, Supported Autonomy, Collaboration, Evocation, Permission to raise concern or advise, Open-ended questions, Reflections: simple and complex, Change talk (evoked) and Reframe     Change Talk Expressed by the Patient: Desire to change Reasons to change Need to change Committment to change Activation Taking steps    Provider Response to Change Talk: E - Evoked more info from patient about behavior change, A - Affirmed patient's thoughts, decisions, or attempts at behavior change, R - Reflected patient's change talk and S - Summarized patient's change talk statements          ASSESSMENT: Current Emotional / Mental Status (status of significant symptoms):   Risk status (Self / Other harm or suicidal ideation)   Client denies current fears or concerns for personal safety.   Client denies current or recent suicidal ideation or behaviors.   Client denies current or recent homicidal ideation or behaviors.   Client denies current or recent self injurious behavior or ideation.   Client denies other safety concerns.   Client reports there has been no change in risk factors since their last session.     Client reports there has been no  change in protective factors since their last session.     Recommended that patient call 911 or go to the local ED should there be a change in any of these risk factors.     Appearance:   Appropriate    Eye Contact:   Good    Psychomotor Behavior: Normal     Attitude:   Cooperative  Pleasant Attentive   Orientation:   All   Speech    Rate / Production: Normal/ Responsive     Volume:  Normal    Mood:    Anxious  client presented as fatigued and overwhelmed   Affect:    Appropriate    Thought Content:  Clear    Thought Form:  Coherent  Goal Directed  Logical    Insight:    Good      Medication Review:   No changes to current psychiatric medication(s)     Medication Compliance:   Yes     Changes in Health Issues:   None reported     Chemical Use Review:   Substance Use: increase in alcohol .  Patient reports frequency of use a drink every day.  Stage of Change: Preparatory  Provided encouragement towards sobriety  Provided support and affirmation for steps taken towards sobriety     Client reported she has been drinking with her  every day and that she doesn't feel she has the willpower to address it until she feels better. Reported she is trying to remain mindful about quantity of alcohol as frequency increased.     Tobacco Use: No change in amount of tobacco use since last session.  Contemplation  Provided encouragement to quit     Diagnosis:  1. Major depressive disorder, recurrent episode, moderate (H)    2. Generalized anxiety disorder        Collateral Reports Completed:   Not Applicable    PLAN: (Client Tasks / Therapist Tasks / Other)  Client will focus on self-compassion and rest while healing and return for therapy in two weeks.         Maite SIMMONS, [facility] 10/13/2020   Note reviewed and clinical supervision by TROY Hunter [facility] 10/14/2020     ___________________________________________________________________    Treatment Plan    Client's Name: Jeanna Steel  Date Of  Birth: 1965    Date: 7/21/2020    DSM-V Diagnoses: 296.32 (F33.1) Major Depressive Disorder, Recurrent Episode, Moderate _, 300.02 (F41.1) Generalized Anxiety Disorder or Adjustment Disorders  309.28 (F43.23) With mixed anxiety and depressed mood  Psychosocial / Contextual Factors: Client reports continued symptoms of depression and anxiety while caring for her elderly parents and drinking on a daily basis.  WHODAS: see intake    Referral / Collaboration:  Referral to another professional/service is not indicated at this time..    Anticipated number of session or this episode of care: 8-12      MeasurableTreatment Goal(s) related to diagnosis / functional impairment(s)  Goal 1: Client will reduce alcohol use from 5 standard drinks a day to 2 standard drinks a week in the next three months and client reporting use of three new coping skills to manage stress in the evenings.    I will know I've met my goal when I'm only drinking two drinks and going to bed earlier.      Objective #A (Client Action)    Client will identify at least three consequences of maladaptive drinking.  Status: Continued - Date(s):  7/21/2020    Intervention(s)  Therapist will assign homework to identify impact of drinking  provide support.    Objective #B  Client will identify three coping skills to replace drinking .  Status: Continued - Date(s):  7/21/2020    Intervention(s)  Therapist will assign homework to practice coping skills  teach coping skills.    Objective #C  Client will identify whether she needs further support to reduce alcohol use.  Status: Continued - Date(s):   7/21/2020    Intervention(s)  Therapist will provide support and referrals as needed, education on alcohol use.      Goal 2: Client will maintain reduced symptoms of depression as evidenced by PHQ-9 remaining under 5 and client reporting continued motivation.    I will know I've met my goal when I feel more productive rather than paralyzed.      Objective #A  (Client Action)    Status: Completed - Date: 7/21/2020       Client will Increase interest, engagement, and pleasure in doing things  Decrease frequency and intensity of feeling down, depressed, hopeless.    Intervention(s)  Therapist will teach behavioral activation.    Objective #B  Client will Identify negative self-talk and behaviors: challenge core beliefs, myths, and actions.    Status: Completed - Date: 7/21/2020      Intervention(s)  Therapist will teach CBT skills.    Objective #C  Client will Improve concentration, focus, and mindfulness in daily activities .  Status: Completed - Date: 7/21/2020       Intervention(s)  Therapist will teach mindfulness skills.      Goal 3: Client will report continued increased acceptance towards her decision about having children as evidenced by client reporting use of effective coping skills when feeling distress or regret.    I will know I've met my goal when I can accept .      Objective #A (Client Action)    Status: Continued - Date(s):    7/21/2020    Client will use acceptance skills when feeling willful.    Intervention(s)  Therapist will teach acceptance DBT skills.    Objective #B  Client will identify coping skills that reduce distress when grieving.    Status: Continued - Date(s):   7/21/2020    Intervention(s)  Therapist will teach coping skills, self-soothing.    Goal 4: Client will improve confidence with difficult conversations as evidenced by client reporting reduce avoidance and use of three effective communication skills over the next three months.     I will know I've met my goal when I know how to talk about things.      Objective #A (Client Action)    Client will learn & utilize at least 3 assertive communication skills weekly.  Status: New - Date: 7/21/2020     Intervention(s)  Therapist will teach assertiveness skills. DBT DEAR MAN skills.    Objective #B  Client will Identify negative self-talk and behaviors: challenge core beliefs, myths, and  actions.    Status: New - Date: 7/21/2020     Intervention(s)  Therapist will guide client in exploring how core belief system influences her ability to communicate and cope with conflict.        Client has reviewed and agreed to the above plan.      Maite SIMMONS, LGSW July 21, 2020  Note reviewed and clinical supervision by TROY Hunter [facility] 7/27/2020

## 2020-11-03 ENCOUNTER — VIRTUAL VISIT (OUTPATIENT)
Dept: PSYCHOLOGY | Facility: CLINIC | Age: 55
End: 2020-11-03
Payer: COMMERCIAL

## 2020-11-03 DIAGNOSIS — F33.1 MAJOR DEPRESSIVE DISORDER, RECURRENT EPISODE, MODERATE (H): Primary | ICD-10-CM

## 2020-11-03 DIAGNOSIS — F41.1 GENERALIZED ANXIETY DISORDER: ICD-10-CM

## 2020-11-03 DIAGNOSIS — F43.23 ADJUSTMENT DISORDER WITH MIXED ANXIETY AND DEPRESSED MOOD: ICD-10-CM

## 2020-11-03 PROCEDURE — 90834 PSYTX W PT 45 MINUTES: CPT | Mod: 95 | Performed by: SOCIAL WORKER

## 2020-11-03 NOTE — PROGRESS NOTES
Progress Note    Client Name: Jeanna Steel  Date: 11/3/2020          Service Type: Individual     Session Start Time:  11:05  Session End Time: 11:45     Session Length: 40 min    Session #: 31    Attendees: Client attended alone  Telemedicine Visit: The patient's condition can be safely assessed and treated via synchronous audio and visual telemedicine encounter.      Reason for Telemedicine Visit: Services only offered telehealth    Originating Site (Patient Location): Patient's home    Distant Site (Provider Location): Provider Remote Setting home office    Consent:  The patient/guardian has verbally consented to: the potential risks and benefits of telemedicine (video visit) versus in person care; bill my insurance or make self-payment for services provided; and responsibility for payment of non-covered services.     Mode of Communication:  Video Conference via [facility]    As the provider I attest to compliance with applicable laws and regulations related to telemedicine.     Treatment Plan Last Reviewed:  11/3/2020  PHQ-9 / JESSICA-7 : 4/23/2020    DATA  Interactive Complexity: No  Crisis: No       Progress Since Last Session (Related to Symptoms / Goals / Homework):   Symptoms: Worsening improved physical health but increased emotional distress    Homework: Achieved / completed to satisfaction client reported practicing self-compassion and resting while ill      Episode of Care Goals: Satisfactory progress - ACTION (Actively working towards change); Intervened by reinforcing change plan / affirming steps taken     Current / Ongoing Stressors and Concerns:   Ongoing: Client reports increased depression symptoms while caring for elderly parents.              Current: Client reported she has had a difficult few weeks as her father's condition has worsened and he was hospitalized again. Reviewed client's goals for therapy and she identified having made progress  towards all of her active goals, while she believes there is further progress she can make. She said his doctor has recommended he prepare for hospice care while she would like to get a second opinion and an MRI to check a brain stunt position. Client described her fear and sadness, saying that it has been difficult to see him so unwell. She reported having complicated family conflict with her brother and mother that was adding to her stress. She identified feeling overwhelmed with all of the decisions she has to make.      Treatment Objective(s) Addressed in This Session:   Decrease frequency and intensity of feeling down, depressed, hopeless  Identify negative self-talk and behaviors: challenge core beliefs, myths, and actions       Intervention:   DBT: Provided an accepting space for client to process difficult and distressing emotions. Identified what first steps client will take to move forward.   Motivational Interviewing   Reviewed and updated treatment plan.  MI Intervention: Expressed Empathy/Understanding, Supported Autonomy, Collaboration, Evocation, Permission to raise concern or advise, Open-ended questions, Reflections: simple and complex, Change talk (evoked) and Reframe     Change Talk Expressed by the Patient: Desire to change Reasons to change Need to change Committment to change Activation Taking steps    Provider Response to Change Talk: E - Evoked more info from patient about behavior change, A - Affirmed patient's thoughts, decisions, or attempts at behavior change, R - Reflected patient's change talk and S - Summarized patient's change talk statements          ASSESSMENT: Current Emotional / Mental Status (status of significant symptoms):   Risk status (Self / Other harm or suicidal ideation)   Client denies current fears or concerns for personal safety.   Client denies current or recent suicidal ideation or behaviors.   Client denies current or recent homicidal ideation or behaviors.   Client  denies current or recent self injurious behavior or ideation.   Client denies other safety concerns.   Client reports there has been a change in risk factors since their last session.  increased distress   Client reports there has been no change in protective factors since their last session.     Recommended that patient call 911 or go to the local ED should there be a change in any of these risk factors.     Appearance:   Appropriate    Eye Contact:   Good    Psychomotor Behavior: Normal     Attitude:   Cooperative  Pleasant Attentive   Orientation:   All   Speech    Rate / Production: Emotional Talkative     Volume:  Normal    Mood:    Anxious  Grieving Agitated client appeared distressed   Affect:    Appropriate  Tearful   Thought Content:  Clear    Thought Form:  Coherent  Goal Directed  Logical    Insight:    Good      Medication Review:   No changes to current psychiatric medication(s)     Medication Compliance:   Yes     Changes in Health Issues:   None reported     Chemical Use Review:   Substance Use: Problem use continues with no change since last session, Stage of Change: Preparatory  Not addressed in session    Client reported generally watching her consumption     Tobacco Use: No change in amount of tobacco use since last session.  Contemplation  Provided encouragement to quit     Diagnosis:  1. Major depressive disorder, recurrent episode, moderate (H)    2. Generalized anxiety disorder    3. Adjustment disorder with mixed anxiety and depressed mood        Collateral Reports Completed:   Not Applicable    PLAN: (Client Tasks / Therapist Tasks / Other)  Client will talk with her father's doctor about idea for MRI, practice grounding skills as needed, practice self-compassion and return for therapy in two weeks.         Maite SIMMONS, LGSW  11/3/2020  Note reviewed and clinical supervision by TROY Hunter LICSW 11/20/2020    ___________________________________________________________________    Treatment Plan    Client's Name: Jeanna Steel  YOB: 1965    Date: 11/3/2020    DSM-V Diagnoses: 296.32 (F33.1) Major Depressive Disorder, Recurrent Episode, Moderate _, 300.02 (F41.1) Generalized Anxiety Disorder or Adjustment Disorders  309.28 (F43.23) With mixed anxiety and depressed mood  Psychosocial / Contextual Factors: Client reports continued symptoms of depression and anxiety while caretaking for her two parents.  WHODAS: see intake    Referral / Collaboration:  Referral to another professional/service is not indicated at this time..    Anticipated number of session or this episode of care: 8-12      MeasurableTreatment Goal(s) related to diagnosis / functional impairment(s)  Goal 1: Client will reduce alcohol use from 5 standard drinks a day to 2 standard drinks a week in the next three months and client reporting use of three new coping skills to manage stress in the evenings.    I will know I've met my goal when I'm only drinking two drinks and going to bed earlier.      Objective #A (Client Action)    Client will identify at least three consequences of maladaptive drinking.  Status: Continued - Date(s):  11/3/2020    Intervention(s)  Therapist will assign homework to identify impact of drinking  provide support.    Objective #B  Client will identify three coping skills to replace drinking .  Status: Continued - Date(s):  11/3/2020    Intervention(s)  Therapist will assign homework to practice coping skills  teach coping skills.    Objective #C  Client will identify whether she needs further support to reduce alcohol use.  Status: Continued - Date(s):   11/3/2020    Intervention(s)  Therapist will provide support and referrals as needed, education on alcohol use.      Goal 2: Client will maintain reduced symptoms of depression as evidenced by PHQ-9 remaining under 5 and client reporting continued motivation.     I will know I've met my goal when I feel more productive rather than paralyzed.      Objective #A (Client Action)    Status: Completed - Date: 7/21/2020       Client will Increase interest, engagement, and pleasure in doing things  Decrease frequency and intensity of feeling down, depressed, hopeless.    Intervention(s)  Therapist will teach behavioral activation.    Objective #B  Client will Identify negative self-talk and behaviors: challenge core beliefs, myths, and actions.    Status: Completed - Date: 7/21/2020      Intervention(s)  Therapist will teach CBT skills.    Objective #C  Client will Improve concentration, focus, and mindfulness in daily activities .  Status: Completed - Date: 7/21/2020       Intervention(s)  Therapist will teach mindfulness skills.      Goal 3: Client will report continued increased acceptance towards her decision about having children as evidenced by client reporting use of effective coping skills when feeling distress or regret.    I will know I've met my goal when I can accept .      Objective #A (Client Action)    Status: Continued - Date(s):    11/3/2020    Client will use acceptance skills when feeling willful.    Intervention(s)  Therapist will teach acceptance DBT skills.    Objective #B  Client will identify coping skills that reduce distress when grieving.    Status: Continued - Date(s):   11/3/2020    Intervention(s)  Therapist will teach coping skills, self-soothing.    Goal 4: Client will improve confidence with difficult conversations as evidenced by client reporting reduce avoidance and use of three effective communication skills over the next three months.     I will know I've met my goal when I know how to talk about things.      Objective #A (Client Action)    Client will learn & utilize at least 3 assertive communication skills weekly.  Status: Continued - Date(s): 11/3/2020     Intervention(s)  Therapist will teach assertiveness skills. DBT DEAR MAN  skills.    Objective #B  Client will Identify negative self-talk and behaviors: challenge core beliefs, myths, and actions.    Status: Continued - Date(s): 11/3/2020     Intervention(s)  Therapist will guide client in exploring how core belief system influences her ability to communicate and cope with conflict.        Client has reviewed and agreed to the above plan.      Maite SIMMONS, LGSW November 3, 2020q  Note reviewed and clinical supervision by TROY Hunter [facility] 11/20/2020

## 2020-11-06 ENCOUNTER — OFFICE VISIT (OUTPATIENT)
Dept: URGENT CARE | Facility: URGENT CARE | Age: 55
End: 2020-11-06
Payer: COMMERCIAL

## 2020-11-06 ENCOUNTER — ANCILLARY PROCEDURE (OUTPATIENT)
Dept: GENERAL RADIOLOGY | Facility: CLINIC | Age: 55
End: 2020-11-06
Attending: FAMILY MEDICINE
Payer: COMMERCIAL

## 2020-11-06 VITALS
TEMPERATURE: 97.9 F | OXYGEN SATURATION: 99 % | RESPIRATION RATE: 16 BRPM | HEART RATE: 80 BPM | WEIGHT: 179.6 LBS | DIASTOLIC BLOOD PRESSURE: 80 MMHG | BODY MASS INDEX: 28.55 KG/M2 | SYSTOLIC BLOOD PRESSURE: 124 MMHG

## 2020-11-06 DIAGNOSIS — R10.13 ABDOMINAL PAIN, EPIGASTRIC: Primary | ICD-10-CM

## 2020-11-06 LAB
ALBUMIN SERPL-MCNC: 3.7 G/DL (ref 3.4–5)
ALP SERPL-CCNC: 82 U/L (ref 40–150)
ALT SERPL W P-5'-P-CCNC: 49 U/L (ref 0–50)
AST SERPL W P-5'-P-CCNC: 19 U/L (ref 0–45)
BASOPHILS # BLD AUTO: 0 10E9/L (ref 0–0.2)
BASOPHILS NFR BLD AUTO: 0.5 %
BILIRUB DIRECT SERPL-MCNC: 0.1 MG/DL (ref 0–0.2)
BILIRUB SERPL-MCNC: 0.4 MG/DL (ref 0.2–1.3)
DIFFERENTIAL METHOD BLD: NORMAL
EOSINOPHIL # BLD AUTO: 0.1 10E9/L (ref 0–0.7)
EOSINOPHIL NFR BLD AUTO: 1.5 %
ERYTHROCYTE [DISTWIDTH] IN BLOOD BY AUTOMATED COUNT: 13.9 % (ref 10–15)
HCT VFR BLD AUTO: 37.5 % (ref 35–47)
HGB BLD-MCNC: 12.2 G/DL (ref 11.7–15.7)
LIPASE SERPL-CCNC: 184 U/L (ref 73–393)
LYMPHOCYTES # BLD AUTO: 1.5 10E9/L (ref 0.8–5.3)
LYMPHOCYTES NFR BLD AUTO: 20.2 %
MCH RBC QN AUTO: 30.9 PG (ref 26.5–33)
MCHC RBC AUTO-ENTMCNC: 32.5 G/DL (ref 31.5–36.5)
MCV RBC AUTO: 95 FL (ref 78–100)
MONOCYTES # BLD AUTO: 0.5 10E9/L (ref 0–1.3)
MONOCYTES NFR BLD AUTO: 6.8 %
NEUTROPHILS # BLD AUTO: 5.4 10E9/L (ref 1.6–8.3)
NEUTROPHILS NFR BLD AUTO: 71 %
PLATELET # BLD AUTO: 293 10E9/L (ref 150–450)
PROT SERPL-MCNC: 7.7 G/DL (ref 6.8–8.8)
RBC # BLD AUTO: 3.95 10E12/L (ref 3.8–5.2)
WBC # BLD AUTO: 7.5 10E9/L (ref 4–11)

## 2020-11-06 PROCEDURE — 36415 COLL VENOUS BLD VENIPUNCTURE: CPT | Performed by: FAMILY MEDICINE

## 2020-11-06 PROCEDURE — 83690 ASSAY OF LIPASE: CPT | Performed by: FAMILY MEDICINE

## 2020-11-06 PROCEDURE — 74019 RADEX ABDOMEN 2 VIEWS: CPT | Performed by: RADIOLOGY

## 2020-11-06 PROCEDURE — 99214 OFFICE O/P EST MOD 30 MIN: CPT | Performed by: FAMILY MEDICINE

## 2020-11-06 PROCEDURE — 80076 HEPATIC FUNCTION PANEL: CPT | Performed by: FAMILY MEDICINE

## 2020-11-06 PROCEDURE — 85025 COMPLETE CBC W/AUTO DIFF WBC: CPT | Performed by: FAMILY MEDICINE

## 2020-11-06 RX ORDER — OMEPRAZOLE 40 MG/1
40 CAPSULE, DELAYED RELEASE ORAL DAILY
Qty: 20 CAPSULE | Refills: 0 | Status: SHIPPED | OUTPATIENT
Start: 2020-11-06 | End: 2021-11-06

## 2020-11-06 RX ORDER — LORAZEPAM 0.5 MG/1
0.5 TABLET ORAL EVERY 6 HOURS PRN
Qty: 10 TABLET | Refills: 0 | Status: SHIPPED | OUTPATIENT
Start: 2020-11-06 | End: 2021-01-06

## 2020-11-06 ASSESSMENT — ENCOUNTER SYMPTOMS: ABDOMINAL PAIN: 1

## 2020-11-06 NOTE — NURSING NOTE
"Vital signs:  Temp: 97.9  F (36.6  C) Temp src: Oral BP: 124/80 Pulse: 80   Resp: 16 SpO2: 99 %       Weight: 81.5 kg (179 lb 9.6 oz)  Estimated body mass index is 28.55 kg/m  as calculated from the following:    Height as of 6/3/20: 1.689 m (5' 6.5\").    Weight as of this encounter: 81.5 kg (179 lb 9.6 oz).          "

## 2020-11-06 NOTE — PROGRESS NOTES
SUBJECTIVE:   Jeanna Steel is a 55 year old female presenting with a chief complaint of   Chief Complaint   Patient presents with     Abdominal Pain     Felt gassy for a few days, woke up this morning with burning sensation across stomache. feels tenderness . Pt going through alot of stress right now- maybe be related.    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    She did take some Tums with minimal relief.    No nausea or vomiting.  Bowel movement she says it been normal.    Pain is epigastric.  Sharp pain comes and goes.    No fever or chills.  No history of diarrhea.    She is an established patient of [facility].        Review of Systems   Gastrointestinal: Positive for abdominal pain.   All other systems reviewed and are negative.      Past Medical History:   Diagnosis Date     Anxiety      Depressive disorder      Infertility, female      Family History   Problem Relation Age of Onset     Breast Cancer Mother      Hypertension Mother      Diabetes Father      Cerebrovascular Disease Father      Alzheimer Disease Father      Arthritis Father      Hypertension Father      Breast Cancer Maternal Grandmother 92     Diabetes Maternal Grandfather      Diabetes Paternal Grandfather      Alcohol/Drug Brother      Breast Cancer Paternal Aunt 65     Cancer Paternal Aunt         Endometrial cancer     Schizophrenia Brother         suicide in 1992     Other - See Comments Brother         Multiple Sclerosis     Schizophrenia Maternal Half-Brother      Mental Illness Maternal Half-Brother      Diabetes Other      Substance Abuse Maternal Half-Brother      Substance Abuse Maternal Half-Brother      Current Outpatient Medications   Medication Sig Dispense Refill     buPROPion (WELLBUTRIN XL) 150 MG 24 hr tablet TAKE 1 TABLET(150 MG) BY MOUTH EVERY MORNING 90 tablet 0     diphenhydrAMINE HCl (BENADRYL PO) Take 25 mg by mouth once as needed TAKEN AT 11AM THIS MORNING.        LORazepam (ATIVAN) 0.5 MG tablet Take 1 tablet (0.5 mg) by mouth every 6 hours as needed for anxiety 10 tablet 0     omeprazole (PRILOSEC) 40 MG DR capsule Take 1 capsule (40 mg) by mouth daily 20 capsule 0     sertraline (ZOLOFT) 100 MG tablet TAKE 1 TABLET (100 MG) BY MOUTH DAILY TAKE WITH 50 MG DAILY FOR DAILY TOTAL  MG 90 tablet 0     sertraline (ZOLOFT) 50 MG tablet TAKE 1 TABLET BY MOUTH DAILY ALONG WITH 100MG 90 tablet 0     LORazepam (ATIVAN) 0.5 MG tablet TAKE 1 TABLET BY MOUTH AS NEEDED FOR ANXIETY. LIMIT TO TWICE PER WEEK. (Patient not taking: Reported on 9/6/2020) 30 tablet 0     Social History     Tobacco Use     Smoking status: Former Smoker     Packs/day: 0.00     Years: 0.00     Pack years: 0.00     Types: Cigarettes     Smokeless tobacco: Never Used     Tobacco comment: None   Substance Use Topics     Alcohol use: Yes     Alcohol/week: 11.7 standard drinks     Comment: 2-3 on the weekends.        OBJECTIVE  /80   Pulse 80   Temp 97.9  F (36.6  C) (Oral)   Resp 16   Wt 81.5 kg (179 lb 9.6 oz)   SpO2 99%   BMI 28.55 kg/m      Physical Exam  Vitals signs reviewed.   HENT:      Head: Normocephalic.      Nose: Nose normal.   Eyes:      Extraocular Movements: Extraocular movements intact.   Neck:      Musculoskeletal: Normal range of motion.   Cardiovascular:      Rate and Rhythm: Normal rate.   Pulmonary:      Effort: Pulmonary effort is normal.   Abdominal:      General: Abdomen is flat.      Tenderness: There is abdominal tenderness.      Comments: Epigastric tenderness   Musculoskeletal: Normal range of motion.   Skin:     General: Skin is warm.   Neurological:      General: No focal deficit present.      Mental Status: She is alert.   Psychiatric:      Comments: Anxious         Labs:  Results for orders placed or performed in visit on 11/06/20 (from the past 24 hour(s))   CBC with platelets and differential   Result Value Ref Range    WBC 7.5 4.0 - 11.0 10e9/L    RBC Count 3.95 3.8  - 5.2 10e12/L    Hemoglobin 12.2 11.7 - 15.7 g/dL    Hematocrit 37.5 35.0 - 47.0 %    MCV 95 78 - 100 fl    MCH 30.9 26.5 - 33.0 pg    MCHC 32.5 31.5 - 36.5 g/dL    RDW 13.9 10.0 - 15.0 %    Platelet Count 293 150 - 450 10e9/L    % Neutrophils 71.0 %    % Lymphocytes 20.2 %    % Monocytes 6.8 %    % Eosinophils 1.5 %    % Basophils 0.5 %    Absolute Neutrophil 5.4 1.6 - 8.3 10e9/L    Absolute Lymphocytes 1.5 0.8 - 5.3 10e9/L    Absolute Monocytes 0.5 0.0 - 1.3 10e9/L    Absolute Eosinophils 0.1 0.0 - 0.7 10e9/L    Absolute Basophils 0.0 0.0 - 0.2 10e9/L    Diff Method Automated Method    Hepatic panel (Albumin, ALT, AST, Bili, Alk Phos, TP)   Result Value Ref Range    Bilirubin Direct 0.1 0.0 - 0.2 mg/dL    Bilirubin Total 0.4 0.2 - 1.3 mg/dL    Albumin 3.7 3.4 - 5.0 g/dL    Protein Total 7.7 6.8 - 8.8 g/dL    Alkaline Phosphatase 82 40 - 150 U/L    ALT 49 0 - 50 U/L    AST 19 0 - 45 U/L   XR Abdomen 2 Views    Narrative    XR ABDOMEN 2 VW   11/6/2020 1:52 PM     HISTORY: Abdominal pain, epigastric    COMPARISON: 7/7/2019 CT of the abdomen and pelvis      Impression    IMPRESSION: Nonobstructive bowel gas pattern. Small amount of formed  stool in the colon. No free air.    BRIGITTE REEVES MD       X-Ray was done, my findings are: Stool in the right side of the colon otherwise there is air to the rectum.    Nonspecific gas pattern but no evidence of obstruction    ASSESSMENT:      ICD-10-CM    1. Abdominal pain, epigastric  R10.13 CBC with platelets and differential     Lipase     Hepatic panel (Albumin, ALT, AST, Bili, Alk Phos, TP)     XR Abdomen 2 Views     omeprazole (PRILOSEC) 40 MG DR capsule     LORazepam (ATIVAN) 0.5 MG tablet     CANCELED: *UA reflex to Microscopic and Culture ([place] and [facility] (except [place] and [place])      2.  Anxiety    I suspect the anxiety is giving her some abdominal pain obviously the new hospice situation with her father is probably leading to some somatic  problems.    The abdominal pain are probably dysmotility    Medical Decision Making:    Differential Diagnosis:  Colonic dysmotility, hyperacidity, peptic ulcer disease,    Serious Comorbid Conditions:  Adult:  None    PLAN:    1.  Omeprazole  2.  Lorazepam quantity of 10 no refills    Followup:    If not improving or if condition worsens, follow up with your Primary Care Provider    There are no Patient Instructions on file for this visit.

## 2020-11-07 ENCOUNTER — HEALTH MAINTENANCE LETTER (OUTPATIENT)
Age: 55
End: 2020-11-07

## 2020-11-23 ENCOUNTER — VIRTUAL VISIT (OUTPATIENT)
Dept: PSYCHOLOGY | Facility: CLINIC | Age: 55
End: 2020-11-23
Payer: COMMERCIAL

## 2020-11-23 DIAGNOSIS — F33.1 MAJOR DEPRESSIVE DISORDER, RECURRENT EPISODE, MODERATE (H): Primary | ICD-10-CM

## 2020-11-23 DIAGNOSIS — F10.20 ALCOHOL USE DISORDER, MODERATE, DEPENDENCE (H): ICD-10-CM

## 2020-11-23 DIAGNOSIS — F43.23 ADJUSTMENT DISORDER WITH MIXED ANXIETY AND DEPRESSED MOOD: ICD-10-CM

## 2020-11-23 DIAGNOSIS — F41.1 GENERALIZED ANXIETY DISORDER: ICD-10-CM

## 2020-11-23 PROCEDURE — 90834 PSYTX W PT 45 MINUTES: CPT | Mod: 95 | Performed by: SOCIAL WORKER

## 2020-11-23 NOTE — PROGRESS NOTES
Progress Note    Client Name: Jeanna Steel  Date: 11/23/2020          Service Type: Individual     Session Start Time:  11:05  Session End Time: 11:45     Session Length: 40 min    Session #: 32    Attendees: Client attended alone  Telemedicine Visit: The patient's condition can be safely assessed and treated via synchronous audio and visual telemedicine encounter.      Reason for Telemedicine Visit: Services only offered telehealth    Originating Site (Patient Location): Patient's home    Distant Site (Provider Location): Provider Remote Setting home office    Consent:  The patient/guardian has verbally consented to: the potential risks and benefits of telemedicine (video visit) versus in person care; bill my insurance or make self-payment for services provided; and responsibility for payment of non-covered services.     Mode of Communication:  Video Conference via [employer] [place]    As the provider I attest to compliance with applicable laws and regulations related to telemedicine.     Treatment Plan Last Reviewed:  11/3/2020  PHQ-9 / JESSICA-7 : 4/23/2020    DATA  Interactive Complexity: No  Crisis: No       Progress Since Last Session (Related to Symptoms / Goals / Homework):   Symptoms: Improving greatly reduced distress    Homework: Achieved / completed to satisfaction client reported advocating for her dad's health needs, practiced self-compassion      Episode of Care Goals: Satisfactory progress - ACTION (Actively working towards change); Intervened by reinforcing change plan / affirming steps taken     Current / Ongoing Stressors and Concerns:   Ongoing: Client reports increased depression symptoms while caring for elderly parents.              Current: Client reported her dad has actually improved and was walking yesterday. She described her follow up with his doctors and how steps they are taking have seemed effective. Client said she has gotten behind on  tracking her food and managing her alcohol use while having Covid and navigating dad's care. She reported using alcohol as a managing tool for stress so she is going to address it this week. She identified feeling depressed in the morning and not wanting to get out of bed but still getting tasks done in the day. She described meeting with an  to help with her career and has a great list of things to do. 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 Client identified several interpersonal successes, reported she has reduced personalization, increased assertiveness and let go go of guilt and shame.     Treatment Objective(s) Addressed in This Session:   Identify negative self-talk and behaviors: challenge core beliefs, myths, and actions       Intervention:   DBT: Reviewed client's ability to manage stress while advocating for her father's care and next steps in getting her mom's memory evaluated. Identified potential pre-existing vulnerabilities for her feeling depressed in the morning.    Motivational Interviewing     MI Intervention: Expressed Empathy/Understanding, Supported Autonomy, Collaboration, Evocation, Permission to raise concern or advise, Open-ended questions, Reflections: simple and complex, Change talk (evoked) and Reframe     Change Talk Expressed by the Patient: Desire to change Ability to change Reasons to change Need to change Committment to change Activation Taking steps    Provider Response to Change Talk: E - Evoked more info from patient about behavior change, A - Affirmed patient's thoughts, decisions, or attempts at behavior change, R - Reflected patient's change talk and S - Summarized patient's change talk statements          ASSESSMENT: Current Emotional / Mental Status (status of significant symptoms):   Risk status (Self / Other harm or suicidal ideation)   Client denies current fears or concerns for personal safety.   Client denies current or  recent suicidal ideation or behaviors.   Client denies current or recent homicidal ideation or behaviors.   Client denies current or recent self injurious behavior or ideation.   Client denies other safety concerns.   Client reports there has been no change in risk factors since their last session.     Client reports there has been no change in protective factors since their last session.     Recommended that patient call 911 or go to the local ED should there be a change in any of these risk factors.     Appearance:   Appropriate    Eye Contact:   Good    Psychomotor Behavior: Normal     Attitude:   Cooperative  Pleasant Attentive   Orientation:   All   Speech    Rate / Production: Normal/ Responsive     Volume:  Normal    Mood:    Anxious  client presented with some nervousness   Affect:    Appropriate    Thought Content:  Clear    Thought Form:  Coherent  Goal Directed  Logical    Insight:    Good      Medication Review:   No changes to current psychiatric medication(s)     Medication Compliance:   Yes     Changes in Health Issues:   None reported     Chemical Use Review:   Substance Use: Problem use continues with no change since last session, Stage of Change: Preparatory  Not addressed in session    Client reported wanting to get back on track with her Noom sydnee to help     Tobacco Use: No change in amount of tobacco use since last session.  Contemplation  Provided encouragement to quit     Diagnosis:  1. Major depressive disorder, recurrent episode, moderate (H)    2. Generalized anxiety disorder    3. Adjustment disorder with mixed anxiety and depressed mood    4. Alcohol use disorder, moderate, dependence (H)        Collateral Reports Completed:   Not Applicable    PLAN: (Client Tasks / Therapist Tasks / Other)  Client will get back into daily use of Noom sydnee, keep trusting her instinct with addressing parent's needs and return for therapy in two weeks.         Maite Quinn LP  MSW, LGSW  11/23/2020  Note  reviewed and clinical supervision by TROY Hunter [facility] 12/4/2020   ___________________________________________________________________    Treatment Plan    Client's Name: Jeanna Steel  YOB: 1965    Date: 11/3/2020    DSM-V Diagnoses: 296.32 (F33.1) Major Depressive Disorder, Recurrent Episode, Moderate _, 300.02 (F41.1) Generalized Anxiety Disorder or Adjustment Disorders  309.28 (F43.23) With mixed anxiety and depressed mood  Psychosocial / Contextual Factors: Client reports continued symptoms of depression and anxiety while caretaking for her two parents.  WHODAS: see intake    Referral / Collaboration:  Referral to another professional/service is not indicated at this time..    Anticipated number of session or this episode of care: 8-12      MeasurableTreatment Goal(s) related to diagnosis / functional impairment(s)  Goal 1: Client will reduce alcohol use from 5 standard drinks a day to 2 standard drinks a week in the next three months and client reporting use of three new coping skills to manage stress in the evenings.    I will know I've met my goal when I'm only drinking two drinks and going to bed earlier.      Objective #A (Client Action)    Client will identify at least three consequences of maladaptive drinking.  Status: Continued - Date(s):  11/3/2020    Intervention(s)  Therapist will assign homework to identify impact of drinking  provide support.    Objective #B  Client will identify three coping skills to replace drinking .  Status: Continued - Date(s):  11/3/2020    Intervention(s)  Therapist will assign homework to practice coping skills  teach coping skills.    Objective #C  Client will identify whether she needs further support to reduce alcohol use.  Status: Continued - Date(s):   11/3/2020    Intervention(s)  Therapist will provide support and referrals as needed, education on alcohol use.      Goal 2: Client will maintain reduced symptoms of depression as  evidenced by PHQ-9 remaining under 5 and client reporting continued motivation.    I will know I've met my goal when I feel more productive rather than paralyzed.      Objective #A (Client Action)    Status: Completed - Date: 7/21/2020       Client will Increase interest, engagement, and pleasure in doing things  Decrease frequency and intensity of feeling down, depressed, hopeless.    Intervention(s)  Therapist will teach behavioral activation.    Objective #B  Client will Identify negative self-talk and behaviors: challenge core beliefs, myths, and actions.    Status: Completed - Date: 7/21/2020      Intervention(s)  Therapist will teach CBT skills.    Objective #C  Client will Improve concentration, focus, and mindfulness in daily activities .  Status: Completed - Date: 7/21/2020       Intervention(s)  Therapist will teach mindfulness skills.      Goal 3: Client will report continued increased acceptance towards her decision about having children as evidenced by client reporting use of effective coping skills when feeling distress or regret.    I will know I've met my goal when I can accept .      Objective #A (Client Action)    Status: Continued - Date(s):    11/3/2020    Client will use acceptance skills when feeling willful.    Intervention(s)  Therapist will teach acceptance DBT skills.    Objective #B  Client will identify coping skills that reduce distress when grieving.    Status: Continued - Date(s):   11/3/2020    Intervention(s)  Therapist will teach coping skills, self-soothing.    Goal 4: Client will improve confidence with difficult conversations as evidenced by client reporting reduce avoidance and use of three effective communication skills over the next three months.     I will know I've met my goal when I know how to talk about things.      Objective #A (Client Action)    Client will learn & utilize at least 3 assertive communication skills weekly.  Status: Continued - Date(s): 11/3/2020      Intervention(s)  Therapist will teach assertiveness skills. DBT DEAR MAN skills.    Objective #B  Client will Identify negative self-talk and behaviors: challenge core beliefs, myths, and actions.    Status: Continued - Date(s): 11/3/2020     Intervention(s)  Therapist will guide client in exploring how core belief system influences her ability to communicate and cope with conflict.        Client has reviewed and agreed to the above plan.      JOSE ALBERTO Barriga, LGSW November 3, 2020q  Note reviewed and clinical supervision by TROY Hunter [facility] 11/20/2020

## 2020-12-07 ENCOUNTER — VIRTUAL VISIT (OUTPATIENT)
Dept: PSYCHOLOGY | Facility: CLINIC | Age: 55
End: 2020-12-07
Payer: COMMERCIAL

## 2020-12-07 DIAGNOSIS — F33.1 MAJOR DEPRESSIVE DISORDER, RECURRENT EPISODE, MODERATE (H): Primary | ICD-10-CM

## 2020-12-07 DIAGNOSIS — F41.1 GENERALIZED ANXIETY DISORDER: ICD-10-CM

## 2020-12-07 DIAGNOSIS — F33.1 MAJOR DEPRESSIVE DISORDER, RECURRENT EPISODE, MODERATE (H): ICD-10-CM

## 2020-12-07 PROCEDURE — 90834 PSYTX W PT 45 MINUTES: CPT | Mod: 95 | Performed by: SOCIAL WORKER

## 2020-12-07 NOTE — PROGRESS NOTES
Progress Note    Client Name: Jeanna Steel  Date: 12/7/2020          Service Type: Individual     Session Start Time:  12:00  Session End Time: 12:45     Session Length: 45 min    Session #: 33    Attendees: Client attended alone  Telemedicine Visit: The patient's condition can be safely assessed and treated via synchronous audio and visual telemedicine encounter.      Reason for Telemedicine Visit: Services only offered telehealth    Originating Site (Patient Location): Patient's home    Distant Site (Provider Location): Provider Remote Setting home office    Consent:  The patient/guardian has verbally consented to: the potential risks and benefits of telemedicine (video visit) versus in person care; bill my insurance or make self-payment for services provided; and responsibility for payment of non-covered services.     Mode of Communication:  Video Conference via [employer]    As the provider I attest to compliance with applicable laws and regulations related to telemedicine.     Treatment Plan Last Reviewed:  11/3/2020  PHQ-9 / JESSICA-7 : 4/23/2020    DATA  Interactive Complexity: No  Crisis: No       Progress Since Last Session (Related to Symptoms / Goals / Homework):   Symptoms: No change mood is stable    Homework: Partially completed  Did not complete client reported not using noom, increased confidence addressing parent's needs      Episode of Care Goals: Satisfactory progress - PREPARATION (Decided to change - considering how); Intervened by negotiating a change plan and determining options / strategies for behavior change, identifying triggers, exploring social supports, and working towards setting a date to begin behavior change     Current / Ongoing Stressors and Concerns:   Ongoing: Client reports increased depression symptoms while caring for elderly parents.              Current: Client reported her dad has gotten better while on hospice and now there  is a possibility for her parents to have an space together on the memory care floor. Client stated it is complicated as her mom would be unable to drink on that floor but she has a difficult time remembering this. Client said she has a few other stressors including a family cat passed away in it's sleep, having some issues with her physical health and wanting to address increased depression with the change in seasons.     Treatment Objective(s) Addressed in This Session:   Identify negative self-talk and behaviors: challenge core beliefs, myths, and actions  Improve concentration, focus, and mindfulness in daily activities        Intervention:   DBT: Reviewed client's recent stressors and her efficacy in coping with them. Identified how client can reduce emotional vulnerability by addressing physical, mental and sensory needs.   Motivational Interviewing     MI Intervention: Expressed Empathy/Understanding, Supported Autonomy, Collaboration, Evocation, Permission to raise concern or advise, Open-ended questions, Reflections: simple and complex, Change talk (evoked) and Reframe     Change Talk Expressed by the Patient: Desire to change Ability to change Reasons to change Need to change Committment to change Activation Taking steps    Provider Response to Change Talk: E - Evoked more info from patient about behavior change, A - Affirmed patient's thoughts, decisions, or attempts at behavior change, R - Reflected patient's change talk and S - Summarized patient's change talk statements          ASSESSMENT: Current Emotional / Mental Status (status of significant symptoms):   Risk status (Self / Other harm or suicidal ideation)   Client denies current fears or concerns for personal safety.   Client denies current or recent suicidal ideation or behaviors.   Client denies current or recent homicidal ideation or behaviors.   Client denies current or recent self injurious behavior or ideation.   Client denies other safety  concerns.   Client reports there has been no change in risk factors since their last session.     Client reports there has been no change in protective factors since their last session.     Recommended that patient call 911 or go to the local ED should there be a change in any of these risk factors.     Appearance:   Appropriate    Eye Contact:   Good    Psychomotor Behavior: Normal     Attitude:   Cooperative  Pleasant Attentive   Orientation:   All   Speech    Rate / Production: Normal/ Responsive     Volume:  Normal    Mood:    Depressed  client appeared somewhat low   Affect:    Appropriate    Thought Content:  Clear    Thought Form:  Coherent  Goal Directed  Logical    Insight:    Good      Medication Review:   No changes to current psychiatric medication(s)     Medication Compliance:   Yes     Changes in Health Issues:   None reported     Chemical Use Review:   Substance Use: Problem use continues with no change since last session, Stage of Change: Preparatory  Not addressed in session    Client reported wanting to get back on track with her [employer] sydnee to help     Tobacco Use: No change in amount of tobacco use since last session.  Contemplation  Provided encouragement to quit     Diagnosis:  1. Major depressive disorder, recurrent episode, moderate (H)    2. Generalized anxiety disorder        Collateral Reports Completed:   Not Applicable    PLAN: (Client Tasks / Therapist Tasks / Other)  Client will add a nutritious breakfast, consider a humidifier for her home and return for therapy in two weeks.         Maite Quinn [facility] 12/7/2020  Note reviewed and clinical supervision by TROY Hunter [facility] 12/22/2020   ___________________________________________________________________    Treatment Plan    Client's Name: Jeanna Steel  YOB: 1965    Date: 11/3/2020    DSM-V Diagnoses: 296.32 (F33.1) Major Depressive Disorder, Recurrent Episode, Moderate _, 300.02 (F41.1)  Generalized Anxiety Disorder or Adjustment Disorders  309.28 (F43.23) With mixed anxiety and depressed mood  Psychosocial / Contextual Factors: Client reports continued symptoms of depression and anxiety while caretaking for her two parents.  WHODAS: see intake    Referral / Collaboration:  Referral to another professional/service is not indicated at this time..    Anticipated number of session or this episode of care: 8-12      MeasurableTreatment Goal(s) related to diagnosis / functional impairment(s)  Goal 1: Client will reduce alcohol use from 5 standard drinks a day to 2 standard drinks a week in the next three months and client reporting use of three new coping skills to manage stress in the evenings.    I will know I've met my goal when I'm only drinking two drinks and going to bed earlier.      Objective #A (Client Action)    Client will identify at least three consequences of maladaptive drinking.  Status: Continued - Date(s):  11/3/2020    Intervention(s)  Therapist will assign homework to identify impact of drinking  provide support.    Objective #B  Client will identify three coping skills to replace drinking .  Status: Continued - Date(s):  11/3/2020    Intervention(s)  Therapist will assign homework to practice coping skills  teach coping skills.    Objective #C  Client will identify whether she needs further support to reduce alcohol use.  Status: Continued - Date(s):   11/3/2020    Intervention(s)  Therapist will provide support and referrals as needed, education on alcohol use.      Goal 2: Client will maintain reduced symptoms of depression as evidenced by PHQ-9 remaining under 5 and client reporting continued motivation.    I will know I've met my goal when I feel more productive rather than paralyzed.      Objective #A (Client Action)    Status: Completed - Date: 7/21/2020       Client will Increase interest, engagement, and pleasure in doing things  Decrease frequency and intensity of feeling  down, depressed, hopeless.    Intervention(s)  Therapist will teach behavioral activation.    Objective #B  Client will Identify negative self-talk and behaviors: challenge core beliefs, myths, and actions.    Status: Completed - Date: 7/21/2020      Intervention(s)  Therapist will teach CBT skills.    Objective #C  Client will Improve concentration, focus, and mindfulness in daily activities .  Status: Completed - Date: 7/21/2020       Intervention(s)  Therapist will teach mindfulness skills.      Goal 3: Client will report continued increased acceptance towards her decision about having children as evidenced by client reporting use of effective coping skills when feeling distress or regret.    I will know I've met my goal when I can accept.      Objective #A (Client Action)    Status: Continued - Date(s):    11/3/2020    Client will use acceptance skills when feeling willful.    Intervention(s)  Therapist will teach acceptance DBT skills.    Objective #B  Client will identify coping skills that reduce distress when grieving.    Status: Continued - Date(s):   11/3/2020    Intervention(s)  Therapist will teach coping skills, self-soothing.    Goal 4: Client will improve confidence with difficult conversations as evidenced by client reporting reduce avoidance and use of three effective communication skills over the next three months.     I will know I've met my goal when I know how to talk about things.      Objective #A (Client Action)    Client will learn & utilize at least 3 assertive communication skills weekly.  Status: Continued - Date(s): 11/3/2020     Intervention(s)  Therapist will teach assertiveness skills. DBT DEAR MAN skills.    Objective #B  Client will Identify negative self-talk and behaviors: challenge core beliefs, myths, and actions.    Status: Continued - Date(s): 11/3/2020     Intervention(s)  Therapist will guide client in exploring how core belief system influences her ability to communicate  and cope with conflict.        Client has reviewed and agreed to the above plan.      Maite Quinn, LICSW  MSW, LICSW November 3, 2020  Note reviewed and clinical supervision [facility]

## 2020-12-08 RX ORDER — SERTRALINE HYDROCHLORIDE 100 MG/1
100 TABLET, FILM COATED ORAL DAILY
Qty: 30 TABLET | Refills: 0 | Status: SHIPPED | OUTPATIENT
Start: 2020-12-08 | End: 2021-01-06

## 2020-12-08 NOTE — TELEPHONE ENCOUNTER
Prescription approved per [facility].  Due for physical/appt with PCP  Lara OBRIEN RN

## 2020-12-23 ENCOUNTER — TELEPHONE (OUTPATIENT)
Dept: PSYCHOLOGY | Facility: CLINIC | Age: 55
End: 2020-12-23

## 2020-12-23 NOTE — TELEPHONE ENCOUNTER
Client requested phone call as she was having a hard time while moving her mom into the memory care unit with her dad. She described that her mom has problems with drinking but seems unable to remember she is not supposed to drink in the unit. Client reported being overwhelmed and feeling like she's been on the verge of a panic attack for the past few days. Discussed how client can navigate next couple of weeks before her appointment. Client reported she would practice paced breathing, focus on what is within her control and do one thing at a time.   Pt updated on plan of care.  Awaiting US results.

## 2020-12-24 DIAGNOSIS — F33.1 MAJOR DEPRESSIVE DISORDER, RECURRENT EPISODE, MODERATE (H): ICD-10-CM

## 2020-12-24 RX ORDER — BUPROPION HYDROCHLORIDE 150 MG/1
TABLET ORAL
Qty: 30 TABLET | Refills: 0 | Status: SHIPPED | OUTPATIENT
Start: 2020-12-24 | End: 2021-01-06

## 2020-12-28 ENCOUNTER — MYC MEDICAL ADVICE (OUTPATIENT)
Dept: FAMILY MEDICINE | Facility: CLINIC | Age: 55
End: 2020-12-28

## 2021-01-04 ENCOUNTER — VIRTUAL VISIT (OUTPATIENT)
Dept: PSYCHOLOGY | Facility: CLINIC | Age: 56
End: 2021-01-04
Payer: COMMERCIAL

## 2021-01-04 DIAGNOSIS — F41.1 GENERALIZED ANXIETY DISORDER: ICD-10-CM

## 2021-01-04 DIAGNOSIS — F10.20 ALCOHOL USE DISORDER, MODERATE, DEPENDENCE (H): ICD-10-CM

## 2021-01-04 DIAGNOSIS — F33.1 MAJOR DEPRESSIVE DISORDER, RECURRENT EPISODE, MODERATE (H): Primary | ICD-10-CM

## 2021-01-04 PROCEDURE — 90834 PSYTX W PT 45 MINUTES: CPT | Mod: 95 | Performed by: SOCIAL WORKER

## 2021-01-04 NOTE — PROGRESS NOTES
Progress Note    Client Name: Jeanna Steel  Date: 1/4/2021          Service Type: Individual     Session Start Time:  11:00  Session End Time: 11:45     Session Length: 45 min    Session #: 34    Attendees: Client attended alone  Telemedicine Visit: The patient's condition can be safely assessed and treated via synchronous audio and visual telemedicine encounter.      Reason for Telemedicine Visit: Services only offered telehealth    Originating Site (Patient Location): Patient's home    Distant Site (Provider Location): Provider Remote Setting home office    Consent:  The patient/guardian has verbally consented to: the potential risks and benefits of telemedicine (video visit) versus in person care; bill my insurance or make self-payment for services provided; and responsibility for payment of non-covered services.     Mode of Communication:  Video Conference via [employer]    As the provider I attest to compliance with applicable laws and regulations related to telemedicine.     Treatment Plan Last Reviewed:  11/3/2020  PHQ-9 / JESSICA-7 : 4/23/2020    DATA  Interactive Complexity: No  Crisis: No       Progress Since Last Session (Related to Symptoms / Goals / Homework):   Symptoms: Worsening some increase in stress with mom    Homework: Achieved / completed to satisfaction client reported having nutritional breakfasts      Episode of Care Goals: Satisfactory progress - ACTION (Actively working towards change); Intervened by reinforcing change plan / affirming steps taken     Current / Ongoing Stressors and Concerns:   Ongoing: Client reports increased depression symptoms while caring for elderly parents.              Current: Client reported talking further with her parent's nurse about how to manage her mom's alcohol use and how to keep it safe. Client reported she is doing a dry January and offered to do it with her mom, hoping it will help reset her mom's  cravings. She said she realized her mood is really impacted by her mom's alcohol use. Client reported she hasn't worked on her art since getting some inspiration and ideas in November.     Treatment Objective(s) Addressed in This Session:   Identify negative self-talk and behaviors: challenge core beliefs, myths, and actions  Improve concentration, focus, and mindfulness in daily activities        Intervention:   DBT: reviewed client's interpersonal dynamics with mom, identified boundaries she can hold around her involvement in mom's drinking and how she can work with staff   Motivational Interviewing     MI Intervention: Expressed Empathy/Understanding, Supported Autonomy, Collaboration, Evocation, Permission to raise concern or advise, Open-ended questions, Reflections: simple and complex, Change talk (evoked) and Reframe     Change Talk Expressed by the Patient: Desire to change Ability to change Reasons to change Need to change Committment to change Activation Taking steps    Provider Response to Change Talk: E - Evoked more info from patient about behavior change, A - Affirmed patient's thoughts, decisions, or attempts at behavior change, R - Reflected patient's change talk and S - Summarized patient's change talk statements          ASSESSMENT: Current Emotional / Mental Status (status of significant symptoms):   Risk status (Self / Other harm or suicidal ideation)   Client denies current fears or concerns for personal safety.   Client denies current or recent suicidal ideation or behaviors.   Client denies current or recent homicidal ideation or behaviors.   Client denies current or recent self injurious behavior or ideation.   Client denies other safety concerns.   Client reports there has been no change in risk factors since their last session.     Client reports there has been no change in protective factors since their last session.     Recommended that patient call 911 or go to the local ED should there  be a change in any of these risk factors.     Appearance:   Appropriate    Eye Contact:   Good    Psychomotor Behavior: Normal     Attitude:   Cooperative  Pleasant Attentive   Orientation:   All   Speech    Rate / Production: Normal/ Responsive     Volume:  Normal    Mood:    Agitated client presented as frustrated    Affect:    Appropriate    Thought Content:  Clear    Thought Form:  Coherent  Goal Directed  Logical    Insight:    Good      Medication Review:   No changes to current psychiatric medication(s)     Medication Compliance:   Yes     Changes in Health Issues:   None reported     Chemical Use Review:   Substance Use: decrease in alcohol .  Patient reports frequency of use none for four days.  Stage of Change: Action  Provided encouragement towards sobriety  Provided support and affirmation for steps taken towards sobriety     Client reported successfully starting Dry January month with no drinking     Tobacco Use: No change in amount of tobacco use since last session.  Contemplation  Provided encouragement to quit     Diagnosis:  1. Major depressive disorder, recurrent episode, moderate (H)    2. Generalized anxiety disorder    3. Alcohol use disorder, moderate, dependence (H)        Collateral Reports Completed:   Not Applicable    PLAN: (Client Tasks / Therapist Tasks / Other)  Client will talk with staff about reducing her involvement in mom's alcohol use, consider engaging in art projects and return for therapy in two weeks.         Maite Quinn, [facility] 1/4/2021  Note reviewed and clinical supervision by Chelsi Fitzpatrick [facility] 1/8/2021   ___________________________________________________________________    Treatment Plan    Client's Name: Jeanna Steel  YOB: 1965    Date: 11/3/2020    DSM-V Diagnoses: 296.32 (F33.1) Major Depressive Disorder, Recurrent Episode, Moderate _, 300.02 (F41.1) Generalized Anxiety Disorder or Adjustment Disorders  309.28 (F43.23) With mixed  anxiety and depressed mood  Psychosocial / Contextual Factors: Client reports continued symptoms of depression and anxiety while caretaking for her two parents.  WHODAS: see intake    Referral / Collaboration:  Referral to another professional/service is not indicated at this time..    Anticipated number of session or this episode of care: 8-12      MeasurableTreatment Goal(s) related to diagnosis / functional impairment(s)  Goal 1: Client will reduce alcohol use from 5 standard drinks a day to 2 standard drinks a week in the next three months and client reporting use of three new coping skills to manage stress in the evenings.    I will know I've met my goal when I'm only drinking two drinks and going to bed earlier.      Objective #A (Client Action)    Client will identify at least three consequences of maladaptive drinking.  Status: Continued - Date(s):  11/3/2020    Intervention(s)  Therapist will assign homework to identify impact of drinking  provide support.    Objective #B  Client will identify three coping skills to replace drinking .  Status: Continued - Date(s):  11/3/2020    Intervention(s)  Therapist will assign homework to practice coping skills  teach coping skills.    Objective #C  Client will identify whether she needs further support to reduce alcohol use.  Status: Continued - Date(s):   11/3/2020    Intervention(s)  Therapist will provide support and referrals as needed, education on alcohol use.      Goal 2: Client will maintain reduced symptoms of depression as evidenced by PHQ-9 remaining under 5 and client reporting continued motivation.    I will know I've met my goal when I feel more productive rather than paralyzed.      Objective #A (Client Action)    Status: Completed - Date: 7/21/2020       Client will Increase interest, engagement, and pleasure in doing things  Decrease frequency and intensity of feeling down, depressed, hopeless.    Intervention(s)  Therapist will teach behavioral  activation.    Objective #B  Client will Identify negative self-talk and behaviors: challenge core beliefs, myths, and actions.    Status: Completed - Date: 7/21/2020      Intervention(s)  Therapist will teach CBT skills.    Objective #C  Client will Improve concentration, focus, and mindfulness in daily activities .  Status: Completed - Date: 7/21/2020       Intervention(s)  Therapist will teach mindfulness skills.      Goal 3: Client will report continued increased acceptance towards her decision about having children as evidenced by client reporting use of effective coping skills when feeling distress or regret.    I will know I've met my goal when I can accept.      Objective #A (Client Action)    Status: Continued - Date(s):    11/3/2020    Client will use acceptance skills when feeling willful.    Intervention(s)  Therapist will teach acceptance DBT skills.    Objective #B  Client will identify coping skills that reduce distress when grieving.    Status: Continued - Date(s):   11/3/2020    Intervention(s)  Therapist will teach coping skills, self-soothing.    Goal 4: Client will improve confidence with difficult conversations as evidenced by client reporting reduce avoidance and use of three effective communication skills over the next three months.     I will know I've met my goal when I know how to talk about things.      Objective #A (Client Action)    Client will learn & utilize at least 3 assertive communication skills weekly.  Status: Continued - Date(s): 11/3/2020     Intervention(s)  Therapist will teach assertiveness skills. DBT DEAR MAN skills.    Objective #B  Client will Identify negative self-talk and behaviors: challenge core beliefs, myths, and actions.    Status: Continued - Date(s): 11/3/2020     Intervention(s)  Therapist will guide client in exploring how core belief system influences her ability to communicate and cope with conflict.        Client has reviewed and agreed to the above  plan.      Maite Quinn, [facility]  MSW, LICSW November 3, 2020  Note reviewed and clinical supervision by TROY Hunter LICSW 11/20/2020

## 2021-01-06 ENCOUNTER — OFFICE VISIT (OUTPATIENT)
Dept: FAMILY MEDICINE | Facility: CLINIC | Age: 56
End: 2021-01-06
Payer: COMMERCIAL

## 2021-01-06 VITALS
RESPIRATION RATE: 15 BRPM | HEART RATE: 66 BPM | TEMPERATURE: 97.8 F | SYSTOLIC BLOOD PRESSURE: 115 MMHG | WEIGHT: 178.9 LBS | DIASTOLIC BLOOD PRESSURE: 79 MMHG | OXYGEN SATURATION: 97 % | HEIGHT: 67 IN | BODY MASS INDEX: 28.08 KG/M2

## 2021-01-06 DIAGNOSIS — F33.1 MAJOR DEPRESSIVE DISORDER, RECURRENT EPISODE, MODERATE (H): ICD-10-CM

## 2021-01-06 DIAGNOSIS — H57.9 EYE DISORDER: ICD-10-CM

## 2021-01-06 DIAGNOSIS — F41.1 GENERALIZED ANXIETY DISORDER: ICD-10-CM

## 2021-01-06 DIAGNOSIS — R10.13 ABDOMINAL PAIN, EPIGASTRIC: ICD-10-CM

## 2021-01-06 DIAGNOSIS — Z00.00 ROUTINE GENERAL MEDICAL EXAMINATION AT A HEALTH CARE FACILITY: Primary | ICD-10-CM

## 2021-01-06 DIAGNOSIS — R35.0 URINARY FREQUENCY: ICD-10-CM

## 2021-01-06 LAB
ALBUMIN UR-MCNC: NEGATIVE MG/DL
APPEARANCE UR: CLEAR
BILIRUB UR QL STRIP: NEGATIVE
COLOR UR AUTO: YELLOW
GLUCOSE UR STRIP-MCNC: NEGATIVE MG/DL
HGB UR QL STRIP: ABNORMAL
KETONES UR STRIP-MCNC: NEGATIVE MG/DL
LEUKOCYTE ESTERASE UR QL STRIP: NEGATIVE
NITRATE UR QL: NEGATIVE
PH UR STRIP: 6.5 PH (ref 5–7)
RBC #/AREA URNS AUTO: ABNORMAL /HPF
SOURCE: ABNORMAL
SP GR UR STRIP: 1.02 (ref 1–1.03)
UROBILINOGEN UR STRIP-ACNC: 0.2 EU/DL (ref 0.2–1)
WBC #/AREA URNS AUTO: ABNORMAL /HPF

## 2021-01-06 PROCEDURE — G0145 SCR C/V CYTO,THINLAYER,RESCR: HCPCS | Performed by: FAMILY MEDICINE

## 2021-01-06 PROCEDURE — 80053 COMPREHEN METABOLIC PANEL: CPT | Performed by: FAMILY MEDICINE

## 2021-01-06 PROCEDURE — 99214 OFFICE O/P EST MOD 30 MIN: CPT | Mod: 25 | Performed by: FAMILY MEDICINE

## 2021-01-06 PROCEDURE — 80061 LIPID PANEL: CPT | Performed by: FAMILY MEDICINE

## 2021-01-06 PROCEDURE — 87624 HPV HI-RISK TYP POOLED RSLT: CPT | Performed by: FAMILY MEDICINE

## 2021-01-06 PROCEDURE — 81001 URINALYSIS AUTO W/SCOPE: CPT | Performed by: FAMILY MEDICINE

## 2021-01-06 PROCEDURE — 36415 COLL VENOUS BLD VENIPUNCTURE: CPT | Performed by: FAMILY MEDICINE

## 2021-01-06 PROCEDURE — 99396 PREV VISIT EST AGE 40-64: CPT | Performed by: FAMILY MEDICINE

## 2021-01-06 RX ORDER — SERTRALINE HYDROCHLORIDE 100 MG/1
100 TABLET, FILM COATED ORAL DAILY
Qty: 90 TABLET | Refills: 3 | Status: SHIPPED | OUTPATIENT
Start: 2021-01-06 | End: 2021-11-18

## 2021-01-06 RX ORDER — BUPROPION HYDROCHLORIDE 150 MG/1
TABLET ORAL
Qty: 90 TABLET | Refills: 3 | Status: SHIPPED | OUTPATIENT
Start: 2021-01-06 | End: 2021-06-28

## 2021-01-06 RX ORDER — LORAZEPAM 0.5 MG/1
0.5 TABLET ORAL DAILY PRN
Qty: 10 TABLET | Refills: 0 | Status: SHIPPED | OUTPATIENT
Start: 2021-01-06 | End: 2021-04-16

## 2021-01-06 ASSESSMENT — ANXIETY QUESTIONNAIRES
3. WORRYING TOO MUCH ABOUT DIFFERENT THINGS: NOT AT ALL
IF YOU CHECKED OFF ANY PROBLEMS ON THIS QUESTIONNAIRE, HOW DIFFICULT HAVE THESE PROBLEMS MADE IT FOR YOU TO DO YOUR WORK, TAKE CARE OF THINGS AT HOME, OR GET ALONG WITH OTHER PEOPLE: NOT DIFFICULT AT ALL
1. FEELING NERVOUS, ANXIOUS, OR ON EDGE: SEVERAL DAYS
6. BECOMING EASILY ANNOYED OR IRRITABLE: SEVERAL DAYS
2. NOT BEING ABLE TO STOP OR CONTROL WORRYING: NOT AT ALL
GAD7 TOTAL SCORE: 3
5. BEING SO RESTLESS THAT IT IS HARD TO SIT STILL: NOT AT ALL
7. FEELING AFRAID AS IF SOMETHING AWFUL MIGHT HAPPEN: NOT AT ALL

## 2021-01-06 ASSESSMENT — PATIENT HEALTH QUESTIONNAIRE - PHQ9
SUM OF ALL RESPONSES TO PHQ QUESTIONS 1-9: 4
SUM OF ALL RESPONSES TO PHQ QUESTIONS 1-9: 4
10. IF YOU CHECKED OFF ANY PROBLEMS, HOW DIFFICULT HAVE THESE PROBLEMS MADE IT FOR YOU TO DO YOUR WORK, TAKE CARE OF THINGS AT HOME, OR GET ALONG WITH OTHER PEOPLE: NOT DIFFICULT AT ALL
5. POOR APPETITE OR OVEREATING: SEVERAL DAYS

## 2021-01-06 ASSESSMENT — MIFFLIN-ST. JEOR: SCORE: 1426.18

## 2021-01-06 NOTE — PROGRESS NOTES
ua     SUBJECTIVE:   CC: Jeanna Steel is an 55 year old woman who presents for preventive health visit.       Patient has been advised of split billing requirements and indicates understanding: Yes  Healthy Habits:     Getting at least 3 servings of Calcium per day:  Yes    Bi-annual eye exam:  NO    Dental care twice a year:  NO    Sleep apnea or symptoms of sleep apnea:  Daytime drowsiness and Excessive snoring    Diet:  Regular (no restrictions)    Frequency of exercise:  2-3 days/week    Duration of exercise:  Less than 15 minutes    Taking medications regularly:  Yes    Medication side effects:  None    PHQ-2 Total Score: 2    Additional concerns today:  Yes  Answers for HPI/ROS submitted by the patient on 1/6/2021   Annual Exam:  If you checked off any problems, how difficult have these problems made it for you to do your work, take care of things at home, or get along with other people?: Not difficult at all  PHQ9 TOTAL SCORE: 4        (Z00.00) Routine general medical examination at a health care facility  (primary encounter diagnosis)  Comment: Here for routine physical due for a Pap smear and labs.  Plan: Pap imaged thin layer screen with HPV -         recommended age 30 - 65 years (select HPV order        below), HPV High Risk Types DNA Cervical, Lipid        panel reflex to direct LDL Non-fasting,         Comprehensive metabolic panel (BMP + Alb, Alk         Phos, ALT, AST, Total. Bili, TP)            (F33.1) Major depressive disorder, recurrent episode, moderate (H)  Comment: Feels that mood is fairly well controlled on bupropion and sertraline.  The addition of the bupropion really has helped  Plan: buPROPion (WELLBUTRIN XL) 150 MG 24 hr tablet,         sertraline (ZOLOFT) 100 MG tablet, sertraline         (ZOLOFT) 50 MG tablet          PHQ 5/28/2020 1/6/2021 1/6/2021   PHQ-9 Total Score 3 4 4   Q9: Thoughts of better off dead/self-harm past 2 weeks Not at all Not at all Not at all   F/U:  Thoughts of suicide or self-harm - - -   F/U: Safety concerns - - -         (F41.1) Generalized anxiety disorder  Comment:   Plan: sertraline (ZOLOFT) 100 MG tablet, sertraline         (ZOLOFT) 50 MG tablet        Does occasionally need lorazepam for anxiety and is using this sparingly.  She is caring for 2 elderly parents both have chronic conditions and sometimes anxiety increases because of this.    (R35.0) Urinary frequency  Comment: As noted increased urinary frequency sometimes small amounts.  No burning or pain.  She also recently stopped drinking alcohol.  She is been working on this intermittently with fasts from alcohol and just recently as of the new year as pledged to stop alcohol for a month.  Not sure if this is why she is urinating more frequently.  Plan: UA with Microscopic reflex to Culture             (H57.9) Eye disorder  Comment: Does have a dry eye condition but now has actually been noting more watery eyes wonders about a blocked duct, also has noted increased crusting and irritation around her eyes has not had this evaluated.  Would like to see a provider  Plan: EYE ADULT REFERRAL             Skin:  Eyes water  Skin is irritated  Has eczema and allergies  Saw UC for allergic reaction  Was put on prednisone  Sees derm too  Has diagnosis of dry eyes  Vision is worsening   needs to see eye provider            Today's PHQ-2 Score:   PHQ-2 ( 1999 Pfizer) 1/6/2021   Q1: Little interest or pleasure in doing things 1   Q2: Feeling down, depressed or hopeless 1   PHQ-2 Score 2   Q1: Little interest or pleasure in doing things Several days   Q2: Feeling down, depressed or hopeless Several days   PHQ-2 Score 2       Abuse: Current or Past (Physical, Sexual or Emotional) - No  Do you feel safe in your environment? Yes    Have you ever done Advance Care Planning? (For example, a Health Directive, POLST, or a discussion with a medical provider or your loved ones about your wishes): No, advance care planning  information given to patient to review.  Patient plans to discuss their wishes with loved ones or provider.      Social History     Tobacco Use     Smoking status: Former Smoker     Packs/day: 0.00     Years: 0.00     Pack years: 0.00     Types: Cigarettes     Smokeless tobacco: Never Used     Tobacco comment: None   Substance Use Topics     Alcohol use: Yes     Alcohol/week: 11.7 standard drinks     Comment: 2-3 on the weekends.      If you drink alcohol do you typically have >3 drinks per day or >7 drinks per week? No    Alcohol Use 1/6/2021   Prescreen: >3 drinks/day or >7 drinks/week? Yes   AUDIT SCORE  12     AUDIT - Alcohol Use Disorders Identification Test - Reproduced from the World Health Organization Audit 2001 (Second Edition) 1/6/2021   1.  How often do you have a drink containing alcohol? 4 or more times a week   2.  How many drinks containing alcohol do you have on a typical day when you are drinking? 3 or 4   3.  How often do you have five or more drinks on one occasion? Less than monthly   4.  How often during the last year have you found that you were not able to stop drinking once you had started? Never   5.  How often during the last year have you failed to do what was normally expected of you because of drinking? Never   6.  How often during the last year have you needed a first drink in the morning to get yourself going after a heavy drinking session? Never   7.  How often during the last year have you had a feeling of guilt or remorse after drinking? Monthly   8.  How often during the last year have you been unable to remember what happened the night before because of your drinking? Never   9.  Have you or someone else been injured because of your drinking? No   10. Has a relative, friend, doctor or other health care worker been concerned about your drinking or suggested you cut down? Yes, during the last year   TOTAL SCORE 12       Reviewed orders with patient.  Reviewed health maintenance and  updated orders accordingly - Yes  Lab work is in process  Labs reviewed in EPIC  BP Readings from Last 3 Encounters:   01/06/21 115/79   11/06/20 124/80   09/06/20 121/73    Wt Readings from Last 3 Encounters:   01/06/21 81.1 kg (178 lb 14.4 oz)   11/06/20 81.5 kg (179 lb 9.6 oz)   06/03/20 82.1 kg (181 lb)                  Patient Active Problem List   Diagnosis     Major depressive disorder, recurrent episode, moderate (H)     Generalized anxiety disorder     Vitamin D deficiency     Microscopic hematuria     Past Surgical History:   Procedure Laterality Date     BACK SURGERY  2006     ECTOPIC PREGNANCY SURGERY  2003     EYE SURGERY  1969     GYN SURGERY  2003       Social History     Tobacco Use     Smoking status: Former Smoker     Packs/day: 0.00     Years: 0.00     Pack years: 0.00     Types: Cigarettes     Smokeless tobacco: Never Used     Tobacco comment: None   Substance Use Topics     Alcohol use: Yes     Alcohol/week: 11.7 standard drinks     Comment: 2-3 on the weekends.      Family History   Problem Relation Age of Onset     Breast Cancer Mother      Hypertension Mother      Diabetes Father      Cerebrovascular Disease Father      Alzheimer Disease Father      Arthritis Father      Hypertension Father      Breast Cancer Maternal Grandmother 92     Diabetes Maternal Grandfather      Diabetes Paternal Grandfather      Alcohol/Drug Brother      Breast Cancer Paternal Aunt 65     Cancer Paternal Aunt         Endometrial cancer     Schizophrenia Brother         suicide in 1992     Other - See Comments Brother         Multiple Sclerosis     Schizophrenia Maternal Half-Brother      Mental Illness Maternal Half-Brother      Diabetes Other      Substance Abuse Maternal Half-Brother      Substance Abuse Maternal Half-Brother            Mammogram Screening: Patient over age 50, mutual decision to screen reflected in health maintenance.    Pertinent mammograms are reviewed under the imaging tab.  History of  "abnormal Pap smear: NO - age 30-65 PAP every 5 years with negative HPV co-testing recommended  PAP / HPV Latest Ref Rng & Units 5/2/2016 5/31/2012 10/6/2009   PAP - NIL NIL NIL   HPV 16 DNA NEG Negative - -   HPV 18 DNA NEG Negative - -   OTHER HR HPV NEG Negative - -     Reviewed and updated as needed this visit by clinical staff  Tobacco  Allergies  Meds  Problems  Med Hx  Surg Hx  Fam Hx          Reviewed and updated as needed this visit by Provider                Past Medical History:   Diagnosis Date     Anxiety      Depressive disorder      Infertility, female       Past Surgical History:   Procedure Laterality Date     BACK SURGERY  2006     ECTOPIC PREGNANCY SURGERY  2003     EYE SURGERY  1969     GYN SURGERY  2003       Review of Systems  CONSTITUTIONAL: NEGATIVE for fever, chills, change in weight  INTEGUMENTARU/SKIN: NEGATIVE for worrisome rashes, moles or lesions  EYES: NEGATIVE for vision changes or irritation  ENT: NEGATIVE for ear, mouth and throat problems  RESP: NEGATIVE for significant cough or SOB  BREAST: NEGATIVE for masses, tenderness or discharge  CV: NEGATIVE for chest pain, palpitations or peripheral edema  GI: NEGATIVE for nausea, abdominal pain, heartburn, or change in bowel habits  : NEGATIVE for unusual urinary or vaginal symptoms. Periods are regular.  MUSCULOSKELETAL: NEGATIVE for significant arthralgias or myalgia  NEURO: NEGATIVE for weakness, dizziness or paresthesias  PSYCHIATRIC: NEGATIVE for changes in mood or affect     OBJECTIVE:   /79   Pulse 66   Resp 15   Ht 1.689 m (5' 6.5\")   Wt 81.1 kg (178 lb 14.4 oz)   SpO2 97%   BMI 28.44 kg/m    Physical Exam  GENERAL: healthy, alert and no distress  EYES: Eyes grossly normal to inspection, PERRL and conjunctivae and sclerae normal  HENT: ear canals and TM's normal, nose and mouth without ulcers or lesions  NECK: no adenopathy, no asymmetry, masses, or scars and thyroid normal to palpation  RESP: lungs clear to " auscultation - no rales, rhonchi or wheezes  BREAST: normal without masses, tenderness or nipple discharge and no palpable axillary masses or adenopathy  CV: regular rate and rhythm, normal S1 S2, no S3 or S4, no murmur, click or rub, no peripheral edema and peripheral pulses strong  ABDOMEN: soft, nontender, no hepatosplenomegaly, no masses and bowel sounds normal  MS: no gross musculoskeletal defects noted, no edema  SKIN: no suspicious lesions or rashes  NEURO: Normal strength and tone, mentation intact and speech normal  PSYCH: mentation appears normal, affect normal/bright    Diagnostic Test Results:  Labs reviewed in Epic    ASSESSMENT/PLAN:   1. Routine general medical examination at a health care facility  Reviewed after visit summary up-to-date on routine screenings for vaccines due for a Pap smear today and labs also due for mammogram and colon screening  - Pap imaged thin layer screen with HPV - recommended age 30 - 65 years (select HPV order below)  - HPV High Risk Types DNA Cervical  - Lipid panel reflex to direct LDL Non-fasting  - Comprehensive metabolic panel (BMP + Alb, Alk Phos, ALT, AST, Total. Bili, TP)    2. Major depressive disorder, recurrent episode, moderate (H)  Mood is excellent well-controlled no concerns addition of bupropion has helped  - buPROPion (WELLBUTRIN XL) 150 MG 24 hr tablet; TAKE 1 TABLET(150 MG) BY MOUTH EVERY MORNING.  Please set up a video visit to review medications  Dispense: 90 tablet; Refill: 3  - sertraline (ZOLOFT) 100 MG tablet; Take 1 tablet (100 mg) by mouth daily Take with 50 mg daily for daily total of 150 mg  Dispense: 90 tablet; Refill: 3  - sertraline (ZOLOFT) 50 MG tablet; TAKE 1 TABLET BY MOUTH DAILY ALONG WITH 100 MG  Dispense: 90 tablet; Refill: 3    3. Generalized anxiety disorder  Still has some situational anxiety around caretaking for her parents uses lorazepam sparingly  - sertraline (ZOLOFT) 100 MG tablet; Take 1 tablet (100 mg) by mouth daily Take  "with 50 mg daily for daily total of 150 mg  Dispense: 90 tablet; Refill: 3  - sertraline (ZOLOFT) 50 MG tablet; TAKE 1 TABLET BY MOUTH DAILY ALONG WITH 100 MG  Dispense: 90 tablet; Refill: 3     - LORazepam (ATIVAN) 0.5 MG tablet; Take 1 tablet (0.5 mg) by mouth daily as needed for anxiety  Dispense: 10 tablet; Refill: 0    4. Abdominal pain, epigastric  Feels this could be anxiety    5. Urinary frequency  Urinalysis to evaluate for cause.  Also conference of panel as noted above.  If this is not resolving can have her see urology  - UA with Microscopic reflex to Culture    6. Eye disorder  Referred for eye exam  - EYE ADULT REFERRAL; Future      In addition to the preventive visit, 25 minutes was spent face to face with (>50%) counseling and/or coordination of care regarding addition concerns and symptoms.  Patient has been advised of split billing requirements and indicates understanding: Yes  COUNSELING:  Reviewed preventive health counseling, as reflected in patient instructions       Regular exercise       Healthy diet/nutrition    Estimated body mass index is 28.44 kg/m  as calculated from the following:    Height as of this encounter: 1.689 m (5' 6.5\").    Weight as of this encounter: 81.1 kg (178 lb 14.4 oz).        She reports that she has quit smoking. Her smoking use included cigarettes. She smoked 0.00 packs per day for 0.00 years. She has never used smokeless tobacco.      Counseling Resources:  ATP IV Guidelines  Pooled Cohorts Equation Calculator  Breast Cancer Risk Calculator  BRCA-Related Cancer Risk Assessment: FHS-7 Tool  FRAX Risk Assessment  ICSI Preventive Guidelines  Dietary Guidelines for Americans, 2010  USDA's MyPlate  ASA Prophylaxis  Lung CA Screening    Karie Queen MD  [facility]  "

## 2021-01-06 NOTE — PATIENT INSTRUCTIONS
Preventive Health Recommendations  Female Ages 50 - 64    Yearly exam: See your health care provider every year in order to  o Review health changes.   o Discuss preventive care.    o Review your medicines if your doctor has prescribed any.      Get a Pap test every three years (unless you have an abnormal result and your provider advises testing more often).    If you get Pap tests with HPV test, you only need to test every 5 years, unless you have an abnormal result.     You do not need a Pap test if your uterus was removed (hysterectomy) and you have not had cancer.    You should be tested each year for STDs (sexually transmitted diseases) if you're at risk.     Have a mammogram every 1 to 2 years.    Have a colonoscopy at age 50, or have a yearly FIT test (stool test). These exams screen for colon cancer.      Have a cholesterol test every 5 years, or more often if advised.    Have a diabetes test (fasting glucose) every three years. If you are at risk for diabetes, you should have this test more often.     If you are at risk for osteoporosis (brittle bone disease), think about having a bone density scan (DEXA).    Shots: Get a flu shot each year. Get a tetanus shot every 10 years.    Nutrition:     Eat at least 5 servings of fruits and vegetables each day.    Eat whole-grain bread, whole-wheat pasta and brown rice instead of white grains and rice.    Get adequate Calcium and Vitamin D.     Lifestyle    Exercise at least 150 minutes a week (30 minutes a day, 5 days a week). This will help you control your weight and prevent disease.    Limit alcohol to one drink per day.    No smoking.     Wear sunscreen to prevent skin cancer.     See your dentist every six months for an exam and cleaning.    See your eye doctor every 1 to 2 years.    PLEASE CALL TO SCHEDULE YOUR MAMMOGRAM  [facility] [phone]  [facility] [phone]  [facility]   [phone]  [place]  Scheduling (all locations) [phone]    If you are under/uninsured, we recommend you contact the [facility]. They offer mammograms on a sliding fee charge. You can schedule with them at [phone].

## 2021-01-07 LAB
ALBUMIN SERPL-MCNC: 3.8 G/DL (ref 3.4–5)
ALP SERPL-CCNC: 70 U/L (ref 40–150)
ALT SERPL W P-5'-P-CCNC: 47 U/L (ref 0–50)
ANION GAP SERPL CALCULATED.3IONS-SCNC: 4 MMOL/L (ref 3–14)
AST SERPL W P-5'-P-CCNC: 25 U/L (ref 0–45)
BILIRUB SERPL-MCNC: 0.3 MG/DL (ref 0.2–1.3)
BUN SERPL-MCNC: 11 MG/DL (ref 7–30)
CALCIUM SERPL-MCNC: 9.3 MG/DL (ref 8.5–10.1)
CHLORIDE SERPL-SCNC: 104 MMOL/L (ref 94–109)
CHOLEST SERPL-MCNC: 287 MG/DL
CO2 SERPL-SCNC: 27 MMOL/L (ref 20–32)
CREAT SERPL-MCNC: 0.64 MG/DL (ref 0.52–1.04)
GFR SERPL CREATININE-BSD FRML MDRD: >90 ML/MIN/{1.73_M2}
GLUCOSE SERPL-MCNC: 86 MG/DL (ref 70–99)
HDLC SERPL-MCNC: 81 MG/DL
LDLC SERPL CALC-MCNC: 166 MG/DL
NONHDLC SERPL-MCNC: 206 MG/DL
POTASSIUM SERPL-SCNC: 4.4 MMOL/L (ref 3.4–5.3)
PROT SERPL-MCNC: 7.7 G/DL (ref 6.8–8.8)
SODIUM SERPL-SCNC: 135 MMOL/L (ref 133–144)
TRIGL SERPL-MCNC: 198 MG/DL

## 2021-01-07 ASSESSMENT — ANXIETY QUESTIONNAIRES: GAD7 TOTAL SCORE: 3

## 2021-01-11 LAB
COPATH REPORT: NORMAL
PAP: NORMAL

## 2021-01-13 LAB
FINAL DIAGNOSIS: NORMAL
HPV HR 12 DNA CVX QL NAA+PROBE: NEGATIVE
HPV16 DNA SPEC QL NAA+PROBE: NEGATIVE
HPV18 DNA SPEC QL NAA+PROBE: NEGATIVE
SPECIMEN DESCRIPTION: NORMAL
SPECIMEN SOURCE CVX/VAG CYTO: NORMAL

## 2021-01-13 NOTE — RESULT ENCOUNTER NOTE
Hello,    The lab results do show that there were a few red cells in the urine and trace amount of blood.  This can sometimes happen when women are in menopause because of vaginal dryness however if you continue to have symptoms we can have you see a urologist for further evaluation.  It did not look like a urinary tract infection.    The comprehensive panel showed excellent electrolytes and liver function and kidney function.    The cholesterol unfortunately was pretty high across the board.  The total cholesterol is high and this certainly is a reflection of a combination of different cholesterol types.  In particular the LDL is the bad kind of cholesterol that can form plaques in progress at risk for heart or brain disease like stroke.  Ideally this should be at or below 130.  The triglycerides were also elevated at 198.  This is not too concerning but sometimes it can indicate prediabetes if this level rises.  The good news is the HDL or happy/good cholesterol was 81 which shows that you are active.    I would see if you can work on even 5 or 10 pounds of weight loss coupled with increasing aerobic exercise and a diet low in fat and cholesterol.  If we recheck this in 6 to 9 months and it is improved we do not need to start medications.    Be well,  Karie Queen MD

## 2021-01-15 ENCOUNTER — HEALTH MAINTENANCE LETTER (OUTPATIENT)
Age: 56
End: 2021-01-15

## 2021-01-29 ENCOUNTER — VIRTUAL VISIT (OUTPATIENT)
Dept: PSYCHOLOGY | Facility: CLINIC | Age: 56
End: 2021-01-29
Payer: COMMERCIAL

## 2021-01-29 DIAGNOSIS — F41.1 GENERALIZED ANXIETY DISORDER: ICD-10-CM

## 2021-01-29 DIAGNOSIS — F33.1 MAJOR DEPRESSIVE DISORDER, RECURRENT EPISODE, MODERATE (H): Primary | ICD-10-CM

## 2021-01-29 PROCEDURE — 90834 PSYTX W PT 45 MINUTES: CPT | Mod: 95 | Performed by: SOCIAL WORKER

## 2021-01-29 NOTE — PROGRESS NOTES
Progress Note    Client Name: Jeanna Steel  Date: 1/29/2021          Service Type: Individual     Session Start Time:  9:00  Session End Time: 9:45     Session Length: 45 min    Session #: 35    Attendees: Client attended alone  Telemedicine Visit: The patient's condition can be safely assessed and treated via synchronous audio and visual telemedicine encounter.      Reason for Telemedicine Visit: Services only offered telehealth    Originating Site (Patient Location): Patient's home    Distant Site (Provider Location): Provider Remote Setting home office    Consent:  The patient/guardian has verbally consented to: the potential risks and benefits of telemedicine (video visit) versus in person care; bill my insurance or make self-payment for services provided; and responsibility for payment of non-covered services.     Mode of Communication:  Video Conference via [employer]    As the provider I attest to compliance with applicable laws and regulations related to telemedicine.     Treatment Plan Last Reviewed:  11/3/2020  PHQ-9 / JESSICA-7 : 4/23/2020    DATA  Interactive Complexity: No  Crisis: No       Progress Since Last Session (Related to Symptoms / Goals / Homework):   Symptoms: Improving overall better mood    Homework: Achieved / completed to satisfaction client reported communication with staff at mom's living facility and doing art projects       Episode of Care Goals: Satisfactory progress - ACTION (Actively working towards change); Intervened by reinforcing change plan / affirming steps taken     Current / Ongoing Stressors and Concerns:   Ongoing: Client reports increased depression symptoms while caring for elderly parents.              Current: Client reported she is four weeks into having a dry January, saying she's been more productive and generally feels happier though she hasn't felt health benefits. She identified learning her biggest trigger for  drinking is boredom. She discussed feeling annoyed by her partner recently though she is unsure why as she usually finds his quirks amusing. Client reported she was feeling criticized by him a lot and that he has been passive-aggressive.      Treatment Objective(s) Addressed in This Session:   Identify negative self-talk and behaviors: challenge core beliefs, myths, and actions  Improve concentration, focus, and mindfulness in daily activities        Intervention:   DBT: reviewed interpersonal effectiveness with parents and partner. Identified areas of success, practiced using [employer] Checklist for talking with partner.   Motivational Interviewing     MI Intervention: Expressed Empathy/Understanding, Supported Autonomy, Collaboration, Evocation, Permission to raise concern or advise, Open-ended questions, Reflections: simple and complex, Change talk (evoked) and Reframe     Change Talk Expressed by the Patient: Desire to change Ability to change Reasons to change Need to change Committment to change Activation Taking steps    Provider Response to Change Talk: E - Evoked more info from patient about behavior change, A - Affirmed patient's thoughts, decisions, or attempts at behavior change, R - Reflected patient's change talk and S - Summarized patient's change talk statements          ASSESSMENT: Current Emotional / Mental Status (status of significant symptoms):   Risk status (Self / Other harm or suicidal ideation)   Client denies current fears or concerns for personal safety.   Client denies current or recent suicidal ideation or behaviors.   Client denies current or recent homicidal ideation or behaviors.   Client denies current or recent self injurious behavior or ideation.   Client denies other safety concerns.   Client reports there has been no change in risk factors since their last session.     Client reports there has been no change in protective factors since their last session.     Recommended  that patient call 911 or go to the local ED should there be a change in any of these risk factors.     Appearance:   Appropriate    Eye Contact:   Good    Psychomotor Behavior: Normal     Attitude:   Cooperative  Pleasant Attentive   Orientation:   All   Speech    Rate / Production: Normal/ Responsive     Volume:  Normal    Mood:    Anxious  client appeared a little nervous   Affect:    Appropriate    Thought Content:  Clear    Thought Form:  Coherent  Goal Directed  Logical    Insight:    Good      Medication Review:   No changes to current psychiatric medication(s)     Medication Compliance:   Yes     Changes in Health Issues:   None reported     Chemical Use Review:   Substance Use: Chemical use reviewed, no active concerns identified  client has not drank for four weeks.     Tobacco Use: No change in amount of tobacco use since last session.  Contemplation  Provided encouragement to quit     Diagnosis:  1. Major depressive disorder, recurrent episode, moderate (H)    2. Generalized anxiety disorder        Collateral Reports Completed:   Not Applicable    PLAN: (Client Tasks / Therapist Tasks / Other)  Client will read handout on repairing, practice with  and return for therapy in two weeks.         Maite Quinn, [facility]  1/29/2021  Reviewed and signed by Devante Leal [facility] 1/29/21  ___________________________________________________________________    Treatment Plan    Client's Name: Jeanna Steel  YOB: 1965    Date: 11/3/2020    DSM-V Diagnoses: 296.32 (F33.1) Major Depressive Disorder, Recurrent Episode, Moderate _, 300.02 (F41.1) Generalized Anxiety Disorder or Adjustment Disorders  309.28 (F43.23) With mixed anxiety and depressed mood  Psychosocial / Contextual Factors: Client reports continued symptoms of depression and anxiety while caretaking for her two parents.  WHODAS: see intake    Referral / Collaboration:  Referral to another professional/service is not indicated  at this time..    Anticipated number of session or this episode of care: 8-12      MeasurableTreatment Goal(s) related to diagnosis / functional impairment(s)  Goal 1: Client will reduce alcohol use from 5 standard drinks a day to 2 standard drinks a week in the next three months and client reporting use of three new coping skills to manage stress in the evenings.    I will know I've met my goal when I'm only drinking two drinks and going to bed earlier.      Objective #A (Client Action)    Client will identify at least three consequences of maladaptive drinking.  Status: Continued - Date(s):  11/3/2020    Intervention(s)  Therapist will assign homework to identify impact of drinking  provide support.    Objective #B  Client will identify three coping skills to replace drinking .  Status: Continued - Date(s):  11/3/2020    Intervention(s)  Therapist will assign homework to practice coping skills  teach coping skills.    Objective #C  Client will identify whether she needs further support to reduce alcohol use.  Status: Continued - Date(s):   11/3/2020    Intervention(s)  Therapist will provide support and referrals as needed, education on alcohol use.      Goal 2: Client will maintain reduced symptoms of depression as evidenced by PHQ-9 remaining under 5 and client reporting continued motivation.    I will know I've met my goal when I feel more productive rather than paralyzed.      Objective #A (Client Action)    Status: Completed - Date: 7/21/2020       Client will Increase interest, engagement, and pleasure in doing things  Decrease frequency and intensity of feeling down, depressed, hopeless.    Intervention(s)  Therapist will teach behavioral activation.    Objective #B  Client will Identify negative self-talk and behaviors: challenge core beliefs, myths, and actions.    Status: Completed - Date: 7/21/2020      Intervention(s)  Therapist will teach CBT skills.    Objective #C  Client will Improve concentration,  focus, and mindfulness in daily activities .  Status: Completed - Date: 7/21/2020       Intervention(s)  Therapist will teach mindfulness skills.      Goal 3: Client will report continued increased acceptance towards her decision about having children as evidenced by client reporting use of effective coping skills when feeling distress or regret.    I will know I've met my goal when I can accept.      Objective #A (Client Action)    Status: Continued - Date(s):    11/3/2020    Client will use acceptance skills when feeling willful.    Intervention(s)  Therapist will teach acceptance DBT skills.    Objective #B  Client will identify coping skills that reduce distress when grieving.    Status: Continued - Date(s):   11/3/2020    Intervention(s)  Therapist will teach coping skills, self-soothing.    Goal 4: Client will improve confidence with difficult conversations as evidenced by client reporting reduce avoidance and use of three effective communication skills over the next three months.     I will know I've met my goal when I know how to talk about things.      Objective #A (Client Action)    Client will learn & utilize at least 3 assertive communication skills weekly.  Status: Continued - Date(s): 11/3/2020     Intervention(s)  Therapist will teach assertiveness skills. DBT DEAR MAN skills.    Objective #B  Client will Identify negative self-talk and behaviors: challenge core beliefs, myths, and actions.    Status: Continued - Date(s): 11/3/2020     Intervention(s)  Therapist will guide client in exploring how core belief system influences her ability to communicate and cope with conflict.        Client has reviewed and agreed to the above plan.      Maite Quinn [facility], [facility] November 3, 2020  Note reviewed and clinical supervision by TROY Hunter [facility] 11/20/2020

## 2021-01-29 NOTE — LETTER
[employer] Repair Checklist  [email or web address]

## 2021-02-03 ENCOUNTER — VIRTUAL VISIT (OUTPATIENT)
Dept: PSYCHOLOGY | Facility: CLINIC | Age: 56
End: 2021-02-03
Payer: COMMERCIAL

## 2021-02-03 DIAGNOSIS — F41.1 GENERALIZED ANXIETY DISORDER: ICD-10-CM

## 2021-02-03 DIAGNOSIS — F33.1 MAJOR DEPRESSIVE DISORDER, RECURRENT EPISODE, MODERATE (H): Primary | ICD-10-CM

## 2021-02-03 PROCEDURE — 90834 PSYTX W PT 45 MINUTES: CPT | Mod: 95 | Performed by: SOCIAL WORKER

## 2021-02-03 NOTE — PROGRESS NOTES
Progress Note    Client Name: Jeanna Steel  Date: 2/3/2021          Service Type: Individual     Session Start Time:  10:00  Session End Time: 10:45     Session Length: 45 min    Session #: 36    Attendees: Client attended alone  Telemedicine Visit: The patient's condition can be safely assessed and treated via synchronous audio and visual telemedicine encounter.      Reason for Telemedicine Visit: Services only offered telehealth    Originating Site (Patient Location): Patient's home    Distant Site (Provider Location): Provider Remote Setting home office    Consent:  The patient/guardian has verbally consented to: the potential risks and benefits of telemedicine (video visit) versus in person care; bill my insurance or make self-payment for services provided; and responsibility for payment of non-covered services.     Mode of Communication:  Video Conference via [employer]    As the provider I attest to compliance with applicable laws and regulations related to telemedicine.     Treatment Plan Last Reviewed:  11/3/2020  PHQ-9 / JESSICA-7 : 4/23/2020    DATA  Interactive Complexity: No  Crisis: No       Progress Since Last Session (Related to Symptoms / Goals / Homework):   Symptoms: Improving good mood     Homework: Achieved / completed to satisfaction client reported reading handout and trying with        Episode of Care Goals: Satisfactory progress - ACTION (Actively working towards change); Intervened by reinforcing change plan / affirming steps taken     Current / Ongoing Stressors and Concerns:   Ongoing: Client reports increased depression symptoms while caring for elderly parents.              Current: Client reported her dry month ended this past week and she had a plan to drink a glass of champagne to celebrate. She reported that she didn't even enjoy it and that it hadn't sounded appealing. Client discussed the benefits she felt from not drinking  and that she doubts she'll go back to drinking like she did before. She said she is also feeling better about her relationship with her parents as her mom participated in the dry month as well. Client described how her  was supportive of her throughout the whole month and she was feeling connected to him.     Treatment Objective(s) Addressed in This Session:   Identify negative self-talk and behaviors: challenge core beliefs, myths, and actions  Improve concentration, focus, and mindfulness in daily activities        Intervention:   DBT: reviewed benefits of sobriety and client's plan to maintain benefits. Explored successes with interpersonal relationships   Motivational Interviewing     MI Intervention: Expressed Empathy/Understanding, Supported Autonomy, Collaboration, Evocation, Permission to raise concern or advise, Open-ended questions, Reflections: simple and complex, Change talk (evoked) and Reframe     Change Talk Expressed by the Patient: Desire to change Ability to change Reasons to change Need to change Committment to change Activation Taking steps    Provider Response to Change Talk: E - Evoked more info from patient about behavior change, A - Affirmed patient's thoughts, decisions, or attempts at behavior change, R - Reflected patient's change talk and S - Summarized patient's change talk statements          ASSESSMENT: Current Emotional / Mental Status (status of significant symptoms):   Risk status (Self / Other harm or suicidal ideation)   Client denies current fears or concerns for personal safety.   Client denies current or recent suicidal ideation or behaviors.   Client denies current or recent homicidal ideation or behaviors.   Client denies current or recent self injurious behavior or ideation.   Client denies other safety concerns.   Client reports there has been no change in risk factors since their last session.     Client reports there has been a chance in protective factors since  their last session.  good mood   Recommended that patient call 911 or go to the local ED should there be a change in any of these risk factors.     Appearance:   Appropriate    Eye Contact:   Good    Psychomotor Behavior: Normal     Attitude:   Cooperative  Pleasant Attentive   Orientation:   All   Speech    Rate / Production: Normal/ Responsive     Volume:  Normal    Mood:    Normal client presented with good mood   Affect:    Appropriate    Thought Content:  Clear    Thought Form:  Coherent  Goal Directed  Logical    Insight:    Good      Medication Review:   No changes to current psychiatric medication(s)     Medication Compliance:   Yes     Changes in Health Issues:   None reported     Chemical Use Review:   Substance Use: Chemical use reviewed, no active concerns identified  client has not drank for four weeks.     Tobacco Use: No change in amount of tobacco use since last session.  Contemplation  Provided encouragement to quit     Diagnosis:  1. Major depressive disorder, recurrent episode, moderate (H)    2. Generalized anxiety disorder        Collateral Reports Completed:   Not Applicable    PLAN: (Client Tasks / Therapist Tasks / Other)  Client will maintain boundaries with parents and return for therapy in two weeks.         Maite Quinn [facility]  2/3/2021  Note reviewed and signed by [facility]al [facility] February 10, 2021  ___________________________________________________________________    Treatment Plan    Client's Name: Jeanna Steel  YOB: 1965    Date: 11/3/2020    DSM-V Diagnoses: 296.32 (F33.1) Major Depressive Disorder, Recurrent Episode, Moderate _, 300.02 (F41.1) Generalized Anxiety Disorder or Adjustment Disorders  309.28 (F43.23) With mixed anxiety and depressed mood  Psychosocial / Contextual Factors: Client reports continued symptoms of depression and anxiety while caretaking for her two parents.  WHODAS: see intake    Referral / Collaboration:  Referral to another  professional/service is not indicated at this time..    Anticipated number of session or this episode of care: 8-12      MeasurableTreatment Goal(s) related to diagnosis / functional impairment(s)  Goal 1: Client will reduce alcohol use from 5 standard drinks a day to 2 standard drinks a week in the next three months and client reporting use of three new coping skills to manage stress in the evenings.    I will know I've met my goal when I'm only drinking two drinks and going to bed earlier.      Objective #A (Client Action)    Client will identify at least three consequences of maladaptive drinking.  Status: Continued - Date(s):  11/3/2020    Intervention(s)  Therapist will assign homework to identify impact of drinking  provide support.    Objective #B  Client will identify three coping skills to replace drinking .  Status: Continued - Date(s):  11/3/2020    Intervention(s)  Therapist will assign homework to practice coping skills  teach coping skills.    Objective #C  Client will identify whether she needs further support to reduce alcohol use.  Status: Continued - Date(s):   11/3/2020    Intervention(s)  Therapist will provide support and referrals as needed, education on alcohol use.      Goal 2: Client will maintain reduced symptoms of depression as evidenced by PHQ-9 remaining under 5 and client reporting continued motivation.    I will know I've met my goal when I feel more productive rather than paralyzed.      Objective #A (Client Action)    Status: Completed - Date: 7/21/2020       Client will Increase interest, engagement, and pleasure in doing things  Decrease frequency and intensity of feeling down, depressed, hopeless.    Intervention(s)  Therapist will teach behavioral activation.    Objective #B  Client will Identify negative self-talk and behaviors: challenge core beliefs, myths, and actions.    Status: Completed - Date: 7/21/2020      Intervention(s)  Therapist will teach CBT skills.    Objective  #C  Client will Improve concentration, focus, and mindfulness in daily activities .  Status: Completed - Date: 7/21/2020       Intervention(s)  Therapist will teach mindfulness skills.      Goal 3: Client will report continued increased acceptance towards her decision about having children as evidenced by client reporting use of effective coping skills when feeling distress or regret.    I will know I've met my goal when I can accept.      Objective #A (Client Action)    Status: Continued - Date(s):    11/3/2020    Client will use acceptance skills when feeling willful.    Intervention(s)  Therapist will teach acceptance DBT skills.    Objective #B  Client will identify coping skills that reduce distress when grieving.    Status: Continued - Date(s):   11/3/2020    Intervention(s)  Therapist will teach coping skills, self-soothing.    Goal 4: Client will improve confidence with difficult conversations as evidenced by client reporting reduce avoidance and use of three effective communication skills over the next three months.     I will know I've met my goal when I know how to talk about things.      Objective #A (Client Action)    Client will learn & utilize at least 3 assertive communication skills weekly.  Status: Continued - Date(s): 11/3/2020     Intervention(s)  Therapist will teach assertiveness skills. DBT DEAR MAN skills.    Objective #B  Client will Identify negative self-talk and behaviors: challenge core beliefs, myths, and actions.    Status: Continued - Date(s): 11/3/2020     Intervention(s)  Therapist will guide client in exploring how core belief system influences her ability to communicate and cope with conflict.        Client has reviewed and agreed to the above plan.      SHERYL Barriga, [facility] November 3, 2020  Note reviewed and clinical supervision by TROY [facility] 11/20/2020

## 2021-02-05 DIAGNOSIS — Z12.31 VISIT FOR SCREENING MAMMOGRAM: ICD-10-CM

## 2021-02-05 PROCEDURE — 77063 BREAST TOMOSYNTHESIS BI: CPT | Performed by: RADIOLOGY

## 2021-02-05 PROCEDURE — 77067 SCR MAMMO BI INCL CAD: CPT | Performed by: RADIOLOGY

## 2021-02-15 ENCOUNTER — VIRTUAL VISIT (OUTPATIENT)
Dept: PSYCHOLOGY | Facility: CLINIC | Age: 56
End: 2021-02-15
Payer: COMMERCIAL

## 2021-02-15 DIAGNOSIS — F33.1 MAJOR DEPRESSIVE DISORDER, RECURRENT EPISODE, MODERATE (H): Primary | ICD-10-CM

## 2021-02-15 DIAGNOSIS — F10.20 ALCOHOL USE DISORDER, MODERATE, DEPENDENCE (H): ICD-10-CM

## 2021-02-15 DIAGNOSIS — F41.1 GENERALIZED ANXIETY DISORDER: ICD-10-CM

## 2021-02-15 PROCEDURE — 90834 PSYTX W PT 45 MINUTES: CPT | Mod: 95 | Performed by: SOCIAL WORKER

## 2021-02-15 NOTE — PROGRESS NOTES
Progress Note    Client Name: Jeanna Steel  Date: 2/15/2021          Service Type: Individual     Session Start Time:  10:00  Session End Time: 10:45     Session Length: 45 min    Session #: 37    Attendees: Client attended alone  Telemedicine Visit: The patient's condition can be safely assessed and treated via synchronous audio and visual telemedicine encounter.      Reason for Telemedicine Visit: Services only offered telehealth    Originating Site (Patient Location): Patient's home    Distant Site (Provider Location): Provider Remote Setting home office    Consent:  The patient/guardian has verbally consented to: the potential risks and benefits of telemedicine (video visit) versus in person care; bill my insurance or make self-payment for services provided; and responsibility for payment of non-covered services.     Mode of Communication:  Video Conference via [employer]    As the provider I attest to compliance with applicable laws and regulations related to telemedicine.     Treatment Plan Last Reviewed:  To be reviewed  PHQ-9 / JESSICA-7 : see chart    DATA  Interactive Complexity: No  Crisis: No       Progress Since Last Session (Related to Symptoms / Goals / Homework):   Symptoms: Worsening some increase in anxiety and depression    Homework: Achieved / completed to satisfaction client reported maintaining boundaries with parents      Episode of Care Goals: Satisfactory progress - ACTION (Actively working towards change); Intervened by reinforcing change plan / affirming steps taken     Current / Ongoing Stressors and Concerns:   Ongoing: Client reports increased depression symptoms while caring for elderly parents.    Current: Client reported she drank almost every day in the past two weeks and she feels badly about it. She described having social situations that contributed to her drinking and how she wants to change how she addresses socialization. Client  said she is still struggling with burnout in managing her mom's care and having to repeat herself. 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      Treatment Objective(s) Addressed in This Session:   Identify negative self-talk and behaviors: challenge core beliefs, myths, and actions  Improve concentration, focus, and mindfulness in daily activities        Intervention:   DBT: Reviewed client's difficulty managing alcohol use, identified points of intervention. Discussed client's responsibility with mom and how to reduce burnout. Explored how client can find new inspiration for artwork.   Motivational Interviewing     MI Intervention: Expressed Empathy/Understanding, Supported Autonomy, Collaboration, Evocation, Permission to raise concern or advise, Open-ended questions, Reflections: simple and complex, Change talk (evoked) and Reframe     Change Talk Expressed by the Patient: Desire to change Ability to change Reasons to change Need to change Committment to change Activation Taking steps    Provider Response to Change Talk: E - Evoked more info from patient about behavior change, A - Affirmed patient's thoughts, decisions, or attempts at behavior change, R - Reflected patient's change talk and S - Summarized patient's change talk statements          ASSESSMENT: Current Emotional / Mental Status (status of significant symptoms):   Risk status (Self / Other harm or suicidal ideation)   Client denies current fears or concerns for personal safety.   Client denies current or recent suicidal ideation or behaviors.   Client denies current or recent homicidal ideation or behaviors.   Client denies current or recent self injurious behavior or ideation.   Client denies other safety concerns.   Client reports there has been no change in risk factors since their last session.     Client reports there has been no change in protective factors since their last session.     Recommended  that patient call 911 or go to the local ED should there be a change in any of these risk factors.     Appearance:   Appropriate    Eye Contact:   Good    Psychomotor Behavior: Normal     Attitude:   Cooperative  Pleasant Attentive   Orientation:   All   Speech    Rate / Production: Normal/ Responsive     Volume:  Normal    Mood:    Anxious  Normal client appeared worried   Affect:    Appropriate    Thought Content:  Clear    Thought Form:  Coherent  Goal Directed  Logical    Insight:    Good      Medication Review:   No changes to current psychiatric medication(s)     Medication Compliance:   Yes     Changes in Health Issues:   None reported     Chemical Use Review:   Substance Use: increase in alcohol .  Patient reports frequency of use almost every day.  Stage of Change: Action  Reviewed concerns related to health related substance abuse risk  Provided encouragement towards sobriety  Provided support and affirmation for steps taken towards sobriety          Tobacco Use: No change in amount of tobacco use since last session.  Contemplation  Provided encouragement to quit     Diagnosis:  1. Major depressive disorder, recurrent episode, moderate (H)    2. Generalized anxiety disorder    3. Alcohol use disorder, moderate, dependence (H)        Collateral Reports Completed:   Not Applicable    PLAN: (Client Tasks / Therapist Tasks / Other)  Client will pick four days a week to have alcohol, pick a statement to repeat with mom and why she can't drive and return for therapy in two weeks.         Maite Quinn [facility] MS  2/15/2021  Reviewed and signed by Devante Leal [facility] February 16, 2021  ___________________________________________________________________    Treatment Plan    Client's Name: Jeanna Steel  YOB: 1965    Date: to be reviewed    DSM-V Diagnoses: 296.32 (F33.1) Major Depressive Disorder, Recurrent Episode, Moderate _, 300.02 (F41.1) Generalized Anxiety Disorder or Adjustment  Disorders  309.28 (F43.23) With mixed anxiety and depressed mood  Psychosocial / Contextual Factors: Client reports continued symptoms of depression and anxiety while caretaking for her two parents.  WHODAS: see intake    Referral / Collaboration:  Referral to another professional/service is not indicated at this time..    Anticipated number of session or this episode of care: 8-12      MeasurableTreatment Goal(s) related to diagnosis / functional impairment(s)  Goal 1: Client will reduce alcohol use from 5 standard drinks a day to 2 standard drinks a week in the next three months and client reporting use of three new coping skills to manage stress in the evenings.    I will know I've met my goal when I'm only drinking two drinks and going to bed earlier.      Objective #A (Client Action)    Client will identify at least three consequences of maladaptive drinking.  Status: Continued - Date(s):  11/3/2020    Intervention(s)  Therapist will assign homework to identify impact of drinking  provide support.    Objective #B  Client will identify three coping skills to replace drinking .  Status: Continued - Date(s):  11/3/2020    Intervention(s)  Therapist will assign homework to practice coping skills  teach coping skills.    Objective #C  Client will identify whether she needs further support to reduce alcohol use.  Status: Continued - Date(s):   11/3/2020    Intervention(s)  Therapist will provide support and referrals as needed, education on alcohol use.      Goal 2: Client will maintain reduced symptoms of depression as evidenced by PHQ-9 remaining under 5 and client reporting continued motivation.    I will know I've met my goal when I feel more productive rather than paralyzed.      Objective #A (Client Action)    Status: Completed - Date: 7/21/2020       Client will Increase interest, engagement, and pleasure in doing things  Decrease frequency and intensity of feeling down, depressed,  hopeless.    Intervention(s)  Therapist will teach behavioral activation.    Objective #B  Client will Identify negative self-talk and behaviors: challenge core beliefs, myths, and actions.    Status: Completed - Date: 7/21/2020      Intervention(s)  Therapist will teach CBT skills.    Objective #C  Client will Improve concentration, focus, and mindfulness in daily activities .  Status: Completed - Date: 7/21/2020       Intervention(s)  Therapist will teach mindfulness skills.      Goal 3: Client will report continued increased acceptance towards her decision about having children as evidenced by client reporting use of effective coping skills when feeling distress or regret.    I will know I've met my goal when I can accept.      Objective #A (Client Action)    Status: Continued - Date(s):    11/3/2020    Client will use acceptance skills when feeling willful.    Intervention(s)  Therapist will teach acceptance DBT skills.    Objective #B  Client will identify coping skills that reduce distress when grieving.    Status: Continued - Date(s):   11/3/2020    Intervention(s)  Therapist will teach coping skills, self-soothing.    Goal 4: Client will improve confidence with difficult conversations as evidenced by client reporting reduce avoidance and use of three effective communication skills over the next three months.     I will know I've met my goal when I know how to talk about things.      Objective #A (Client Action)    Client will learn & utilize at least 3 assertive communication skills weekly.  Status: Continued - Date(s): 11/3/2020     Intervention(s)  Therapist will teach assertiveness skills. DBT DEAR MAN skills.    Objective #B  Client will Identify negative self-talk and behaviors: challenge core beliefs, myths, and actions.    Status: Continued - Date(s): 11/3/2020     Intervention(s)  Therapist will guide client in exploring how core belief system influences her ability to communicate and cope with  conflict.        Client has reviewed and agreed to the above plan.      Maite Quinn, [facility]  MSW, LICSW November 3, 2020  Note reviewed and clinical supervision by TROY Hunter [facility] 11/20/2020

## 2021-02-19 ENCOUNTER — ANCILLARY PROCEDURE (OUTPATIENT)
Dept: MAMMOGRAPHY | Facility: CLINIC | Age: 56
End: 2021-02-19
Attending: FAMILY MEDICINE
Payer: COMMERCIAL

## 2021-02-19 DIAGNOSIS — R92.8 ABNORMAL MAMMOGRAM OF LEFT BREAST: ICD-10-CM

## 2021-02-19 DIAGNOSIS — R92.8 ABNORMAL FINDING ON BREAST IMAGING: Primary | ICD-10-CM

## 2021-02-19 PROCEDURE — 77065 DX MAMMO INCL CAD UNI: CPT | Performed by: RADIOLOGY

## 2021-02-19 PROCEDURE — G0279 TOMOSYNTHESIS, MAMMO: HCPCS | Performed by: RADIOLOGY

## 2021-02-19 PROCEDURE — 76642 ULTRASOUND BREAST LIMITED: CPT | Mod: LT | Performed by: RADIOLOGY

## 2021-02-25 DIAGNOSIS — R92.8 ABNORMAL FINDING ON BREAST IMAGING: ICD-10-CM

## 2021-02-25 LAB
LABORATORY COMMENT REPORT: NORMAL
SARS-COV-2 RNA RESP QL NAA+PROBE: NEGATIVE
SARS-COV-2 RNA RESP QL NAA+PROBE: NORMAL
SPECIMEN SOURCE: NORMAL
SPECIMEN SOURCE: NORMAL

## 2021-02-25 PROCEDURE — U0003 INFECTIOUS AGENT DETECTION BY NUCLEIC ACID (DNA OR RNA); SEVERE ACUTE RESPIRATORY SYNDROME CORONAVIRUS 2 (SARS-COV-2) (CORONAVIRUS DISEASE [COVID-19]), AMPLIFIED PROBE TECHNIQUE, MAKING USE OF HIGH THROUGHPUT TECHNOLOGIES AS DESCRIBED BY CMS-2020-01-R: HCPCS | Performed by: FAMILY MEDICINE

## 2021-02-25 PROCEDURE — U0005 INFEC AGEN DETEC AMPLI PROBE: HCPCS | Performed by: FAMILY MEDICINE

## 2021-03-01 ENCOUNTER — ANCILLARY PROCEDURE (OUTPATIENT)
Dept: MAMMOGRAPHY | Facility: CLINIC | Age: 56
End: 2021-03-01
Attending: FAMILY MEDICINE
Payer: COMMERCIAL

## 2021-03-01 DIAGNOSIS — R92.8 ABNORMAL MAMMOGRAM OF LEFT BREAST: ICD-10-CM

## 2021-03-01 DIAGNOSIS — N63.0 BREAST MASS: Primary | ICD-10-CM

## 2021-03-01 PROCEDURE — 19083 BX BREAST 1ST LESION US IMAG: CPT | Mod: LT | Performed by: RADIOLOGY

## 2021-03-01 PROCEDURE — 77065 DX MAMMO INCL CAD UNI: CPT | Mod: LT | Performed by: RADIOLOGY

## 2021-03-01 PROCEDURE — 88305 TISSUE EXAM BY PATHOLOGIST: CPT | Mod: GC | Performed by: PATHOLOGY

## 2021-03-01 PROCEDURE — 77066 DX MAMMO INCL CAD BI: CPT | Performed by: RADIOLOGY

## 2021-03-01 PROCEDURE — 77066 DX MAMMO INCL CAD BI: CPT | Performed by: FAMILY MEDICINE

## 2021-03-01 RX ORDER — IOPAMIDOL 612 MG/ML
100 INJECTION, SOLUTION INTRAVASCULAR ONCE
Status: COMPLETED | OUTPATIENT
Start: 2021-03-01 | End: 2021-03-01

## 2021-03-01 RX ADMIN — IOPAMIDOL 100 ML: 612 INJECTION, SOLUTION INTRAVASCULAR at 10:15

## 2021-03-03 ENCOUNTER — TELEPHONE (OUTPATIENT)
Dept: MAMMOGRAPHY | Facility: CLINIC | Age: 56
End: 2021-03-03

## 2021-03-03 DIAGNOSIS — E55.9 VITAMIN D DEFICIENCY: Primary | ICD-10-CM

## 2021-03-03 LAB — COPATH REPORT: NORMAL

## 2021-03-03 NOTE — TELEPHONE ENCOUNTER
Spoke to patient regarding left breast biopsy done on 3/1/21 with finding of Benign breast tissue with fibroadenomatoid change. Notified patient that the Radiologist recommendation is annual screening mammography and follow-up on enlarged left axillary lymph nodes 6-8 weeks post covid vaccine. Patient has second dose of covid vaccine scheduled for 3/12 and US of left Axillary scheduled for 4/23/21.Patient verbalized understanding and all questions and concerns answered to patients satisfaction.

## 2021-03-05 ENCOUNTER — VIRTUAL VISIT (OUTPATIENT)
Dept: PSYCHOLOGY | Facility: CLINIC | Age: 56
End: 2021-03-05
Payer: COMMERCIAL

## 2021-03-05 DIAGNOSIS — F33.1 MAJOR DEPRESSIVE DISORDER, RECURRENT EPISODE, MODERATE (H): Primary | ICD-10-CM

## 2021-03-05 DIAGNOSIS — F41.1 GENERALIZED ANXIETY DISORDER: ICD-10-CM

## 2021-03-05 PROCEDURE — 90834 PSYTX W PT 45 MINUTES: CPT | Mod: 95 | Performed by: SOCIAL WORKER

## 2021-03-05 NOTE — PROGRESS NOTES
Progress Note    Client Name: Jeanna Steel  Date: 3/5/2021          Service Type: Individual     Session Start Time:  1:00  Session End Time: 1:45     Session Length: 45 min    Session #: 38    Attendees: Client attended alone  Telemedicine Visit: The patient's condition can be safely assessed and treated via synchronous audio and visual telemedicine encounter.      Reason for Telemedicine Visit: Services only offered telehealth    Originating Site (Patient Location): Patient's home    Distant Site (Provider Location): Provider Remote Setting home office    Consent:  The patient/guardian has verbally consented to: the potential risks and benefits of telemedicine (video visit) versus in person care; bill my insurance or make self-payment for services provided; and responsibility for payment of non-covered services.     Mode of Communication:  Video Conference via [employer]    As the provider I attest to compliance with applicable laws and regulations related to telemedicine.     Treatment Plan Last Reviewed:  3/5/2021  PHQ-9 / JESSICA-7 : see chart    DATA  Interactive Complexity: No  Crisis: No       Progress Since Last Session (Related to Symptoms / Goals / Homework):   Symptoms: No change mood is stable    Homework: Achieved / completed to satisfaction client reported maintaining boundaries with parents      Episode of Care Goals: Satisfactory progress - ACTION (Actively working towards change); Intervened by reinforcing change plan / affirming steps taken     Current / Ongoing Stressors and Concerns:   Ongoing: Client reports increased depression symptoms while caring for elderly parents.    Current: Client reported she is going to find a caregiver support group to help her cope with managing her parent's needs. She described recently feeling more burned out with them and wanting to set boundaries around how often she goes to visit. She said she has been tired and  having low motivation lately. Reviewed and updated treatment plan.     Treatment Objective(s) Addressed in This Session:   Identify negative self-talk and behaviors: challenge core beliefs, myths, and actions  Improve concentration, focus, and mindfulness in daily activities        Intervention:   DBT: reviewed client's navigation of her parents, identified successful use of communication and validated client feeling worn out. Reviewed how client can talk with family about boundaries.   Motivational Interviewing   Reviewed and updated treatment plan.  MI Intervention: Expressed Empathy/Understanding, Supported Autonomy, Collaboration, Evocation, Permission to raise concern or advise, Open-ended questions, Reflections: simple and complex, Change talk (evoked) and Reframe     Change Talk Expressed by the Patient: Desire to change Ability to change Reasons to change Need to change Committment to change Activation Taking steps    Provider Response to Change Talk: E - Evoked more info from patient about behavior change, A - Affirmed patient's thoughts, decisions, or attempts at behavior change, R - Reflected patient's change talk and S - Summarized patient's change talk statements          ASSESSMENT: Current Emotional / Mental Status (status of significant symptoms):   Risk status (Self / Other harm or suicidal ideation)   Client denies current fears or concerns for personal safety.   Client denies current or recent suicidal ideation or behaviors.   Client denies current or recent homicidal ideation or behaviors.   Client denies current or recent self injurious behavior or ideation.   Client denies other safety concerns.   Client reports there has been no change in risk factors since their last session.     Client reports there has been no change in protective factors since their last session.     Recommended that patient call 911 or go to the local ED should there be a change in any of these risk  factors.     Appearance:   Appropriate    Eye Contact:   Good    Psychomotor Behavior: Normal     Attitude:   Cooperative  Pleasant Attentive   Orientation:   All   Speech    Rate / Production: Normal/ Responsive     Volume:  Normal    Mood:    Anxious  Depressed  client presented as fatigued   Affect:    Appropriate    Thought Content:  Clear    Thought Form:  Coherent  Goal Directed  Logical    Insight:    Good      Medication Review:   No changes to current psychiatric medication(s)     Medication Compliance:   Yes     Changes in Health Issues:   None reported     Chemical Use Review:   Substance Use: increase in alcohol .  Patient reports frequency of use almost every day.  Stage of Change: Action  Reviewed concerns related to health related substance abuse risk  Provided encouragement towards sobriety  Provided support and affirmation for steps taken towards sobriety          Tobacco Use: No change in amount of tobacco use since last session.  Contemplation  Provided encouragement to quit     Diagnosis:  1. Major depressive disorder, recurrent episode, moderate (H)    2. Generalized anxiety disorder        Collateral Reports Completed:   Not Applicable    PLAN: (Client Tasks / Therapist Tasks / Other)  Client will be consistent with her parents, talk with her brothers, consider resting and rejuvenating and return for therapy in two weeks.         Maite Quinn, [facility]   3/5/2021    _Note reviewed by Devante Leal [facility] March 9, 2021 __________________________________________________________________    Treatment Plan    Client's Name: Jeanna Steel  YOB: 1965    Date: 3/5/2021    DSM-V Diagnoses: 296.32 (F33.1) Major Depressive Disorder, Recurrent Episode, Moderate _, 300.02 (F41.1) Generalized Anxiety Disorder or Adjustment Disorders  309.28 (F43.23) With mixed anxiety and depressed mood  Psychosocial / Contextual Factors: Client reports continued symptoms of depression and anxiety  while caretaking for her two parents.  WHODAS: see intake    Referral / Collaboration:  Referral to another professional/service is not indicated at this time..    Anticipated number of session or this episode of care: 8-12      MeasurableTreatment Goal(s) related to diagnosis / functional impairment(s)  Goal 1: Client will reduce alcohol use from 5 standard drinks a day to 2 standard drinks a week in the next three months and client reporting use of three new coping skills to manage stress in the evenings.    I will know I've met my goal when I'm only drinking two drinks and going to bed earlier.      Objective #A (Client Action)    Client will identify at least three consequences of maladaptive drinking.  Status: Continued - Date(s):  3/5/2021    Intervention(s)  Therapist will assign homework to identify impact of drinking  provide support.    Objective #B  Client will identify three coping skills to replace drinking .  Status: Continued - Date(s):  3/5/2021    Intervention(s)  Therapist will assign homework to practice coping skills  teach coping skills.    Objective #C  Client will identify whether she needs further support to reduce alcohol use.  Status: Continued - Date(s):   3/5/2021    Intervention(s)  Therapist will provide support and referrals as needed, education on alcohol use.      Goal 2: Client will maintain reduced symptoms of depression as evidenced by PHQ-9 remaining under 5 and client reporting continued motivation.    I will know I've met my goal when I feel more productive rather than paralyzed.      Objective #A (Client Action)    Status: Completed - Date: 7/21/2020       Client will Increase interest, engagement, and pleasure in doing things  Decrease frequency and intensity of feeling down, depressed, hopeless.    Intervention(s)  Therapist will teach behavioral activation.    Objective #B  Client will Identify negative self-talk and behaviors: challenge core beliefs, myths, and  actions.    Status: Completed - Date: 7/21/2020      Intervention(s)  Therapist will teach CBT skills.    Objective #C  Client will Improve concentration, focus, and mindfulness in daily activities .  Status: Completed - Date: 7/21/2020       Intervention(s)  Therapist will teach mindfulness skills.      Goal 3: Client will report continued increased acceptance towards her decision about having children as evidenced by client reporting use of effective coping skills when feeling distress or regret.    I will know I've met my goal when I can accept.      Objective #A (Client Action)    Status: Continued - Date(s):    3/5/2021    Client will use acceptance skills when feeling willful.    Intervention(s)  Therapist will teach acceptance DBT skills.    Objective #B  Client will identify coping skills that reduce distress when grieving.    Status: Continued - Date(s):   3/5/2021    Intervention(s)  Therapist will teach coping skills, self-soothing.    Goal 4: Client will improve confidence with difficult conversations as evidenced by client reporting reduce avoidance and use of three effective communication skills over the next three months.     I will know I've met my goal when I know how to talk about things.      Objective #A (Client Action)    Client will learn & utilize at least 3 assertive communication skills weekly.  Status: Continued - Date(s): 3/5/2021    Intervention(s)  Therapist will teach assertiveness skills. DBT DEAR MAN skills.    Objective #B  Client will Identify negative self-talk and behaviors: challenge core beliefs, myths, and actions.    Status: Continued - Date(s): 3/5/2021    Intervention(s)  Therapist will guide client in exploring how core belief system influences her ability to communicate and cope with conflict.        Client has reviewed and agreed to the above plan.      Maite Quinn, [facility]  MSW  March 5, 2021

## 2021-03-15 ENCOUNTER — VIRTUAL VISIT (OUTPATIENT)
Dept: PSYCHOLOGY | Facility: CLINIC | Age: 56
End: 2021-03-15
Payer: COMMERCIAL

## 2021-03-15 DIAGNOSIS — F10.20 ALCOHOL USE DISORDER, MODERATE, DEPENDENCE (H): ICD-10-CM

## 2021-03-15 DIAGNOSIS — F33.1 MAJOR DEPRESSIVE DISORDER, RECURRENT EPISODE, MODERATE (H): Primary | ICD-10-CM

## 2021-03-15 DIAGNOSIS — F41.1 GENERALIZED ANXIETY DISORDER: ICD-10-CM

## 2021-03-15 PROCEDURE — 90832 PSYTX W PT 30 MINUTES: CPT | Mod: 95 | Performed by: SOCIAL WORKER

## 2021-03-15 NOTE — PROGRESS NOTES
Progress Note    Client Name: Jeanna Steel  Date: 3/15/2021          Service Type: Individual     Session Start Time:  11:00  Session End Time: 11:35     Session Length: 35 min    Session #: 39    Attendees: Client attended alone  Telemedicine Visit: The patient's condition can be safely assessed and treated via synchronous audio and visual telemedicine encounter.      Reason for Telemedicine Visit: Services only offered telehealth    Originating Site (Patient Location): Patient's home    Distant Site (Provider Location): Provider Remote Setting home office    Consent:  The patient/guardian has verbally consented to: the potential risks and benefits of telemedicine (video visit) versus in person care; bill my insurance or make self-payment for services provided; and responsibility for payment of non-covered services.     Mode of Communication:  Video Conference via [employer]    As the provider I attest to compliance with applicable laws and regulations related to telemedicine.     Treatment Plan Last Reviewed:  3/5/2021  PHQ-9 / JESSICA-7 : see chart    DATA  Interactive Complexity: No  Crisis: No       Progress Since Last Session (Related to Symptoms / Goals / Homework):   Symptoms: No change mood has not changed    Homework: Achieved / completed to satisfaction client reported having consistency with her parents      Episode of Care Goals: Satisfactory progress - ACTION (Actively working towards change); Intervened by reinforcing change plan / affirming steps taken     Current / Ongoing Stressors and Concerns:   Ongoing: Client reports increased depression symptoms while caring for elderly parents.    Current: Client reported she is undergoing a reaction from her second vaccine shot and doesn't feel well. She reported realizing that she is really impacted by her environment and when others around her are drinking she has a hard time saying no. Client discussed  feeling tired of thinking of her drinking and wishing it was not an issue. She identified that her motivation for not drinking isn't the health benefits but rather wanting to have better self-discipline to improve her self-esteem.     Treatment Objective(s) Addressed in This Session:   Identify negative self-talk and behaviors: challenge core beliefs, myths, and actions  Improve concentration, focus, and mindfulness in daily activities        Intervention:   DBT: reviewed client's past week and how to frame drinking.   Motivational Interviewing     MI Intervention: Expressed Empathy/Understanding, Supported Autonomy, Collaboration, Evocation, Permission to raise concern or advise, Open-ended questions, Reflections: simple and complex, Change talk (evoked) and Reframe     Change Talk Expressed by the Patient: Desire to change Ability to change Reasons to change Need to change Committment to change Activation Taking steps    Provider Response to Change Talk: E - Evoked more info from patient about behavior change, A - Affirmed patient's thoughts, decisions, or attempts at behavior change, R - Reflected patient's change talk and S - Summarized patient's change talk statements          ASSESSMENT: Current Emotional / Mental Status (status of significant symptoms):   Risk status (Self / Other harm or suicidal ideation)   Client denies current fears or concerns for personal safety.   Client denies current or recent suicidal ideation or behaviors.   Client denies current or recent homicidal ideation or behaviors.   Client denies current or recent self injurious behavior or ideation.   Client denies other safety concerns.   Client reports there has been no change in risk factors since their last session.     Client reports there has been no change in protective factors since their last session.     Recommended that patient call 911 or go to the local ED should there be a change in any of these risk  factors.     Appearance:   Appropriate    Eye Contact:   Good    Psychomotor Behavior: Normal     Attitude:   Cooperative  Pleasant Attentive   Orientation:   All   Speech    Rate / Production: Normal/ Responsive     Volume:  Normal    Mood:    Anxious  client appeared frustrated   Affect:    Appropriate    Thought Content:  Clear    Thought Form:  Coherent  Goal Directed  Logical    Insight:    Good      Medication Review:   No changes to current psychiatric medication(s)     Medication Compliance:   Yes     Changes in Health Issues:   None reported     Chemical Use Review:   Substance Use: Problem use continues with no change since last session, Stage of Change: Preparatory  Provided encouragement towards sobriety         Tobacco Use: No change in amount of tobacco use since last session.  Contemplation  Provided encouragement to quit     Diagnosis:  1. Major depressive disorder, recurrent episode, moderate (H)    2. Generalized anxiety disorder    3. Alcohol use disorder, moderate, dependence (H)        Collateral Reports Completed:   Not Applicable    PLAN: (Client Tasks / Therapist Tasks / Other)  Client will practice her self-discipline and return for therapy in two weeks.         Maite Quinn, [facility]   3/15/2021  Reviewed and signed by Devante Leal [facility] March 15, 2021     __________________________________________________________________    Treatment Plan    Client's Name: Jeanna Steel  YOB: 1965    Date: 3/5/2021    DSM-V Diagnoses: 296.32 (F33.1) Major Depressive Disorder, Recurrent Episode, Moderate _, 300.02 (F41.1) Generalized Anxiety Disorder or Adjustment Disorders  309.28 (F43.23) With mixed anxiety and depressed mood  Psychosocial / Contextual Factors: Client reports continued symptoms of depression and anxiety while caretaking for her two parents.  WHODAS: see intake    Referral / Collaboration:  Referral to another professional/service is not indicated at this  time..    Anticipated number of session or this episode of care: 8-12      MeasurableTreatment Goal(s) related to diagnosis / functional impairment(s)  Goal 1: Client will reduce alcohol use from 5 standard drinks a day to 2 standard drinks a week in the next three months and client reporting use of three new coping skills to manage stress in the evenings.    I will know I've met my goal when I'm only drinking two drinks and going to bed earlier.      Objective #A (Client Action)    Client will identify at least three consequences of maladaptive drinking.  Status: Continued - Date(s):  3/5/2021    Intervention(s)  Therapist will assign homework to identify impact of drinking  provide support.    Objective #B  Client will identify three coping skills to replace drinking .  Status: Continued - Date(s):  3/5/2021    Intervention(s)  Therapist will assign homework to practice coping skills  teach coping skills.    Objective #C  Client will identify whether she needs further support to reduce alcohol use.  Status: Continued - Date(s):   3/5/2021    Intervention(s)  Therapist will provide support and referrals as needed, education on alcohol use.      Goal 2: Client will maintain reduced symptoms of depression as evidenced by PHQ-9 remaining under 5 and client reporting continued motivation.    I will know I've met my goal when I feel more productive rather than paralyzed.      Objective #A (Client Action)    Status: Completed - Date: 7/21/2020       Client will Increase interest, engagement, and pleasure in doing things  Decrease frequency and intensity of feeling down, depressed, hopeless.    Intervention(s)  Therapist will teach behavioral activation.    Objective #B  Client will Identify negative self-talk and behaviors: challenge core beliefs, myths, and actions.    Status: Completed - Date: 7/21/2020      Intervention(s)  Therapist will teach CBT skills.    Objective #C  Client will Improve concentration, focus, and  mindfulness in daily activities .  Status: Completed - Date: 7/21/2020       Intervention(s)  Therapist will teach mindfulness skills.      Goal 3: Client will report continued increased acceptance towards her decision about having children as evidenced by client reporting use of effective coping skills when feeling distress or regret.    I will know I've met my goal when I can accept.      Objective #A (Client Action)    Status: Continued - Date(s):    3/5/2021    Client will use acceptance skills when feeling willful.    Intervention(s)  Therapist will teach acceptance DBT skills.    Objective #B  Client will identify coping skills that reduce distress when grieving.    Status: Continued - Date(s):   3/5/2021    Intervention(s)  Therapist will teach coping skills, self-soothing.    Goal 4: Client will improve confidence with difficult conversations as evidenced by client reporting reduce avoidance and use of three effective communication skills over the next three months.     I will know I've met my goal when I know how to talk about things.      Objective #A (Client Action)    Client will learn & utilize at least 3 assertive communication skills weekly.  Status: Continued - Date(s): 3/5/2021    Intervention(s)  Therapist will teach assertiveness skills. DBT DEAR MAN skills.    Objective #B  Client will Identify negative self-talk and behaviors: challenge core beliefs, myths, and actions.    Status: Continued - Date(s): 3/5/2021    Intervention(s)  Therapist will guide client in exploring how core belief system influences her ability to communicate and cope with conflict.        Client has reviewed and agreed to the above plan.      [facility]ite Quinn [facility]  MS[facility]  March 5, 2021

## 2021-03-29 ENCOUNTER — VIRTUAL VISIT (OUTPATIENT)
Dept: PSYCHOLOGY | Facility: CLINIC | Age: 56
End: 2021-03-29
Payer: COMMERCIAL

## 2021-03-29 DIAGNOSIS — F33.1 MAJOR DEPRESSIVE DISORDER, RECURRENT EPISODE, MODERATE (H): Primary | ICD-10-CM

## 2021-03-29 DIAGNOSIS — F10.20 ALCOHOL USE DISORDER, MODERATE, DEPENDENCE (H): ICD-10-CM

## 2021-03-29 DIAGNOSIS — F41.1 GENERALIZED ANXIETY DISORDER: ICD-10-CM

## 2021-03-29 PROCEDURE — 90834 PSYTX W PT 45 MINUTES: CPT | Mod: 95 | Performed by: SOCIAL WORKER

## 2021-03-29 NOTE — PROGRESS NOTES
Progress Note    Client Name: Jeanna Steel  Date: 3/29/2021          Service Type: Individual     Session Start Time:  10:00  Session End Time: 10:45     Session Length: 45 min    Session #: 40    Attendees: Client attended alone  Telemedicine Visit: The patient's condition can be safely assessed and treated via synchronous audio and visual telemedicine encounter.      Reason for Telemedicine Visit: Services only offered telehealth    Originating Site (Patient Location): Patient's home    Distant Site (Provider Location): Provider Remote Setting home office    Consent:  The patient/guardian has verbally consented to: the potential risks and benefits of telemedicine (video visit) versus in person care; bill my insurance or make self-payment for services provided; and responsibility for payment of non-covered services.     Mode of Communication:  Video Conference via [employer]    As the provider I attest to compliance with applicable laws and regulations related to telemedicine.     Treatment Plan Last Reviewed:  3/5/2021  PHQ-9 / JESSICA-7 : see chart    DATA  Interactive Complexity: No  Crisis: No       Progress Since Last Session (Related to Symptoms / Goals / Homework):   Symptoms: No change continued stress    Homework: Partially completed client reported struggling with self-discipline       Episode of Care Goals: Satisfactory progress - ACTION (Actively working towards change); Intervened by reinforcing change plan / affirming steps taken     Current / Ongoing Stressors and Concerns:   Ongoing: Client reports increased depression symptoms while caring for elderly parents.    Current: Client reported her sister-in-law became ill with covid and the client had to drive her to the hospital, leading to the client having to quarantine for a week from seeing her parents. She reported going a lot to help her sister-in-law and getting a rude email from her was upsetting.  Client discussed that before her TERRI became ill she was dealing with her mom as well, who is struggling with memory and wanting to drink alcohol when it's not allowed in her memory care facility. She identified wanting to honor her mom's autonomy and be honest while also wanting to protect her mom from making poor decisions.      Treatment Objective(s) Addressed in This Session:   Identify negative self-talk and behaviors: challenge core beliefs, myths, and actions  Improve concentration, focus, and mindfulness in daily activities        Intervention:   DBT: reviewed effective management for interpersonal dynamics with TERRI and mom. Discussed how client wanted to get space from TERRI until she feels she can communicate effectively. Practiced coping ahead for future conflict with mom and how she can uphold her values while helping her mom.   Motivational Interviewing     MI Intervention: Expressed Empathy/Understanding, Supported Autonomy, Collaboration, Evocation, Permission to raise concern or advise, Open-ended questions, Reflections: simple and complex, Change talk (evoked) and Reframe     Change Talk Expressed by the Patient: Desire to change Ability to change Reasons to change Need to change Committment to change Activation Taking steps    Provider Response to Change Talk: E - Evoked more info from patient about behavior change, A - Affirmed patient's thoughts, decisions, or attempts at behavior change, R - Reflected patient's change talk and S - Summarized patient's change talk statements          ASSESSMENT: Current Emotional / Mental Status (status of significant symptoms):   Risk status (Self / Other harm or suicidal ideation)   Client denies current fears or concerns for personal safety.   Client denies current or recent suicidal ideation or behaviors.   Client denies current or recent homicidal ideation or behaviors.   Client denies current or recent self injurious behavior or ideation.   Client denies other  safety concerns.   Client reports there has been no change in risk factors since their last session.     Client reports there has been no change in protective factors since their last session.     Recommended that patient call 911 or go to the local ED should there be a change in any of these risk factors.     Appearance:   Appropriate    Eye Contact:   Good    Psychomotor Behavior: Normal     Attitude:   Cooperative  Pleasant Attentive   Orientation:   All   Speech    Rate / Production: Normal/ Responsive     Volume:  Normal    Mood:    Anxious  client appeared worried   Affect:    Appropriate    Thought Content:  Clear    Thought Form:  Coherent  Goal Directed  Logical    Insight:    Good      Medication Review:   No changes to current psychiatric medication(s)     Medication Compliance:   Yes     Changes in Health Issues:   None reported     Chemical Use Review:   Substance Use: Problem use continues with no change since last session, Stage of Change: Preparatory  Provided encouragement towards sobriety         Tobacco Use: No change in amount of tobacco use since last session.  Contemplation  Provided encouragement to quit     Diagnosis:  1. Major depressive disorder, recurrent episode, moderate (H)    2. Generalized anxiety disorder    3. Alcohol use disorder, moderate, dependence (H)        Collateral Reports Completed:   Not Applicable    PLAN: (Client Tasks / Therapist Tasks / Other)  Client will practice what she wants to say with her ,hold boundary with mom and return for therapy in two weeks.         Maite Quinn [facility] MSW   3/29/2021  Reviewed and signed by Dev[facility]al [facility] March 30, 2021     __________________________________________________________________    Treatment Plan    Client's Name: Jeanna Steel  YOB: 1965    Date: 3/5/2021    DSM-V Diagnoses: 296.32 (F33.1) Major Depressive Disorder, Recurrent Episode, Moderate _, 300.02 (F41.1) Generalized Anxiety  Disorder or Adjustment Disorders  309.28 (F43.23) With mixed anxiety and depressed mood  Psychosocial / Contextual Factors: Client reports continued symptoms of depression and anxiety while caretaking for her two parents.  WHODAS: see intake    Referral / Collaboration:  Referral to another professional/service is not indicated at this time..    Anticipated number of session or this episode of care: 8-12      MeasurableTreatment Goal(s) related to diagnosis / functional impairment(s)  Goal 1: Client will reduce alcohol use from 5 standard drinks a day to 2 standard drinks a week in the next three months and client reporting use of three new coping skills to manage stress in the evenings.    I will know I've met my goal when I'm only drinking two drinks and going to bed earlier.      Objective #A (Client Action)    Client will identify at least three consequences of maladaptive drinking.  Status: Continued - Date(s):  3/5/2021    Intervention(s)  Therapist will assign homework to identify impact of drinking  provide support.    Objective #B  Client will identify three coping skills to replace drinking .  Status: Continued - Date(s):  3/5/2021    Intervention(s)  Therapist will assign homework to practice coping skills  teach coping skills.    Objective #C  Client will identify whether she needs further support to reduce alcohol use.  Status: Continued - Date(s):   3/5/2021    Intervention(s)  Therapist will provide support and referrals as needed, education on alcohol use.      Goal 2: Client will maintain reduced symptoms of depression as evidenced by PHQ-9 remaining under 5 and client reporting continued motivation.    I will know I've met my goal when I feel more productive rather than paralyzed.      Objective #A (Client Action)    Status: Completed - Date: 7/21/2020       Client will Increase interest, engagement, and pleasure in doing things  Decrease frequency and intensity of feeling down, depressed,  hopeless.    Intervention(s)  Therapist will teach behavioral activation.    Objective #B  Client will Identify negative self-talk and behaviors: challenge core beliefs, myths, and actions.    Status: Completed - Date: 7/21/2020      Intervention(s)  Therapist will teach CBT skills.    Objective #C  Client will Improve concentration, focus, and mindfulness in daily activities .  Status: Completed - Date: 7/21/2020       Intervention(s)  Therapist will teach mindfulness skills.      Goal 3: Client will report continued increased acceptance towards her decision about having children as evidenced by client reporting use of effective coping skills when feeling distress or regret.    I will know I've met my goal when I can accept.      Objective #A (Client Action)    Status: Continued - Date(s):    3/5/2021    Client will use acceptance skills when feeling willful.    Intervention(s)  Therapist will teach acceptance DBT skills.    Objective #B  Client will identify coping skills that reduce distress when grieving.    Status: Continued - Date(s):   3/5/2021    Intervention(s)  Therapist will teach coping skills, self-soothing.    Goal 4: Client will improve confidence with difficult conversations as evidenced by client reporting reduce avoidance and use of three effective communication skills over the next three months.     I will know I've met my goal when I know how to talk about things.      Objective #A (Client Action)    Client will learn & utilize at least 3 assertive communication skills weekly.  Status: Continued - Date(s): 3/5/2021    Intervention(s)  Therapist will teach assertiveness skills. DBT DEAR MAN skills.    Objective #B  Client will Identify negative self-talk and behaviors: challenge core beliefs, myths, and actions.    Status: Continued - Date(s): 3/5/2021    Intervention(s)  Therapist will guide client in exploring how core belief system influences her ability to communicate and cope with  conflict.        Client has reviewed and agreed to the above plan.      Maite Quinn, [facility]  MSW  March 5, 2021

## 2021-04-12 ENCOUNTER — VIRTUAL VISIT (OUTPATIENT)
Dept: PSYCHOLOGY | Facility: CLINIC | Age: 56
End: 2021-04-12
Payer: COMMERCIAL

## 2021-04-12 DIAGNOSIS — F33.1 MAJOR DEPRESSIVE DISORDER, RECURRENT EPISODE, MODERATE (H): Primary | ICD-10-CM

## 2021-04-12 DIAGNOSIS — F41.1 GENERALIZED ANXIETY DISORDER: ICD-10-CM

## 2021-04-12 PROCEDURE — 90834 PSYTX W PT 45 MINUTES: CPT | Mod: 95 | Performed by: SOCIAL WORKER

## 2021-04-12 NOTE — PROGRESS NOTES
Progress Note    Client Name: Jeanna Steel  Date: 4/12/2021          Service Type: Individual     Session Start Time:  11:00  Session End Time: 11:45     Session Length: 45 min    Session #: 41    Attendees: Client attended alone  Telemedicine Visit: The patient's condition can be safely assessed and treated via synchronous audio and visual telemedicine encounter.      Reason for Telemedicine Visit: Services only offered telehealth    Originating Site (Patient Location): Patient's home    Distant Site (Provider Location): Provider Remote Setting home office    Consent:  The patient/guardian has verbally consented to: the potential risks and benefits of telemedicine (video visit) versus in person care; bill my insurance or make self-payment for services provided; and responsibility for payment of non-covered services.     Mode of Communication:  Video Conference via [employer]    As the provider I attest to compliance with applicable laws and regulations related to telemedicine.     Treatment Plan Last Reviewed:  3/5/2021  PHQ-9 / JESSICA-7 : see chart    DATA  Interactive Complexity: No  Crisis: No       Progress Since Last Session (Related to Symptoms / Goals / Homework):   Symptoms: Worsening increased distress    Homework: Partially completed client reported trying to practice effective communication and boundary skills      Episode of Care Goals: Satisfactory progress - ACTION (Actively working towards change); Intervened by reinforcing change plan / affirming steps taken     Current / Ongoing Stressors and Concerns:   Ongoing: Client reports increased depression symptoms while caring for 71 elderly parents.    Current: Client reported her brother now has COVID which has exacerbated his MS symptoms so she is worried about him. She said her sister-in-law has been really pushing her boundaries. Client reported having most of her stress be related to her parents. She  described her mom's recent report after getting her memory tested and how her mom will no longer be able to drive, but has already forgotten about meeting with the neurologist. Client said her father gets very distraught when he cannot find her mother but that her mother can be verbally abusive to her father. She explained that her mom forgets her  has dementia and doesn't understand why her father gets confused. Client stated they have considered  them but generally she doesn't know what to do about the situation.     Treatment Objective(s) Addressed in This Session:   Identify negative self-talk and behaviors: challenge core beliefs, myths, and actions  Improve concentration, focus, and mindfulness in daily activities        Intervention:   DBT: reviewed client's interpersonal effectiveness and validated client's frustrations. Explored client's experience with her parents and identified next steps in getting support.   Motivational Interviewing     MI Intervention: Expressed Empathy/Understanding, Supported Autonomy, Collaboration, Evocation, Permission to raise concern or advise, Open-ended questions, Reflections: simple and complex, Change talk (evoked) and Reframe     Change Talk Expressed by the Patient: Desire to change Ability to change Reasons to change Need to change Committment to change Activation Taking steps    Provider Response to Change Talk: E - Evoked more info from patient about behavior change, A - Affirmed patient's thoughts, decisions, or attempts at behavior change, R - Reflected patient's change talk and S - Summarized patient's change talk statements          ASSESSMENT: Current Emotional / Mental Status (status of significant symptoms):   Risk status (Self / Other harm or suicidal ideation)   Client denies current fears or concerns for personal safety.   Client denies current or recent suicidal ideation or behaviors.   Client denies current or recent homicidal ideation or  behaviors.   Client denies current or recent self injurious behavior or ideation.   Client denies other safety concerns.   Client reports there has been no change in risk factors since their last session.     Client reports there has been no change in protective factors since their last session.     Recommended that patient call 911 or go to the local ED should there be a change in any of these risk factors.     Appearance:   Appropriate    Eye Contact:   Good    Psychomotor Behavior: Normal     Attitude:   Cooperative  Pleasant Attentive   Orientation:   All   Speech    Rate / Production: Normal/ Responsive     Volume:  Normal    Mood:    Anxious  client appeared stressed   Affect:    Appropriate    Thought Content:  Clear    Thought Form:  Coherent  Goal Directed  Logical    Insight:    Good      Medication Review:   No changes to current psychiatric medication(s)     Medication Compliance:   Yes     Changes in Health Issues:   None reported     Chemical Use Review:   Substance Use: Problem use continues with no change since last session, Stage of Change: Preparatory  Provided encouragement towards sobriety         Tobacco Use: No change in amount of tobacco use since last session.  Contemplation  Provided encouragement to quit     Diagnosis:  1. Major depressive disorder, recurrent episode, moderate (H)    2. Generalized anxiety disorder        Collateral Reports Completed:   Not Applicable    PLAN: (Client Tasks / Therapist Tasks / Other)  Client will talk to care team about her parents, focus on her well-being and return for therapy in two weeks.         Maite Quinn [facility] MSW   4/12/2021    Reviewed and signed by Devante Leal [facility] April 21, 2021   __________________________________________________________________    Treatment Plan    Client's Name: Jeanna Steel  YOB: 1965    Date: 3/5/2021    DSM-V Diagnoses: 296.32 (F33.1) Major Depressive Disorder, Recurrent Episode, Moderate _,  300.02 (F41.1) Generalized Anxiety Disorder or Adjustment Disorders  309.28 (F43.23) With mixed anxiety and depressed mood  Psychosocial / Contextual Factors: Client reports continued symptoms of depression and anxiety while caretaking for her two parents.  WHODAS: see intake    Referral / Collaboration:  Referral to another professional/service is not indicated at this time..    Anticipated number of session or this episode of care: 8-12      MeasurableTreatment Goal(s) related to diagnosis / functional impairment(s)  Goal 1: Client will reduce alcohol use from 5 standard drinks a day to 2 standard drinks a week in the next three months and client reporting use of three new coping skills to manage stress in the evenings.    I will know I've met my goal when I'm only drinking two drinks and going to bed earlier.      Objective #A (Client Action)    Client will identify at least three consequences of maladaptive drinking.  Status: Continued - Date(s):  3/5/2021    Intervention(s)  Therapist will assign homework to identify impact of drinking  provide support.    Objective #B  Client will identify three coping skills to replace drinking .  Status: Continued - Date(s):  3/5/2021    Intervention(s)  Therapist will assign homework to practice coping skills  teach coping skills.    Objective #C  Client will identify whether she needs further support to reduce alcohol use.  Status: Continued - Date(s):   3/5/2021    Intervention(s)  Therapist will provide support and referrals as needed, education on alcohol use.      Goal 2: Client will maintain reduced symptoms of depression as evidenced by PHQ-9 remaining under 5 and client reporting continued motivation.    I will know I've met my goal when I feel more productive rather than paralyzed.      Objective #A (Client Action)    Status: Completed - Date: 7/21/2020       Client will Increase interest, engagement, and pleasure in doing things  Decrease frequency and intensity  of feeling down, depressed, hopeless.    Intervention(s)  Therapist will teach behavioral activation.    Objective #B  Client will Identify negative self-talk and behaviors: challenge core beliefs, myths, and actions.    Status: Completed - Date: 7/21/2020      Intervention(s)  Therapist will teach CBT skills.    Objective #C  Client will Improve concentration, focus, and mindfulness in daily activities .  Status: Completed - Date: 7/21/2020       Intervention(s)  Therapist will teach mindfulness skills.      Goal 3: Client will report continued increased acceptance towards her decision about having children as evidenced by client reporting use of effective coping skills when feeling distress or regret.    I will know I've met my goal when I can accept.      Objective #A (Client Action)    Status: Continued - Date(s):    3/5/2021    Client will use acceptance skills when feeling willful.    Intervention(s)  Therapist will teach acceptance DBT skills.    Objective #B  Client will identify coping skills that reduce distress when grieving.    Status: Continued - Date(s):   3/5/2021    Intervention(s)  Therapist will teach coping skills, self-soothing.    Goal 4: Client will improve confidence with difficult conversations as evidenced by client reporting reduce avoidance and use of three effective communication skills over the next three months.     I will know I've met my goal when I know how to talk about things.      Objective #A (Client Action)    Client will learn & utilize at least 3 assertive communication skills weekly.  Status: Continued - Date(s): 3/5/2021    Intervention(s)  Therapist will teach assertiveness skills. DBT DEAR MAN skills.    Objective #B  Client will Identify negative self-talk and behaviors: challenge core beliefs, myths, and actions.    Status: Continued - Date(s): 3/5/2021    Intervention(s)  Therapist will guide client in exploring how core belief system influences her ability to  communicate and cope with conflict.        Client has reviewed and agreed to the above plan.      Maite Quinn, [facility]  MSW  March 5, 2021

## 2021-04-16 ENCOUNTER — MYC REFILL (OUTPATIENT)
Dept: FAMILY MEDICINE | Facility: CLINIC | Age: 56
End: 2021-04-16

## 2021-04-16 ENCOUNTER — MYC MEDICAL ADVICE (OUTPATIENT)
Dept: FAMILY MEDICINE | Facility: CLINIC | Age: 56
End: 2021-04-16

## 2021-04-16 DIAGNOSIS — R10.13 ABDOMINAL PAIN, EPIGASTRIC: ICD-10-CM

## 2021-04-19 NOTE — TELEPHONE ENCOUNTER
Routing refill request to provider for review/approval because:  Drug not on the [facility] refill protocol   Lara OBRIEN RN

## 2021-04-21 RX ORDER — LORAZEPAM 0.5 MG/1
0.5 TABLET ORAL DAILY PRN
Qty: 10 TABLET | Refills: 0 | Status: SHIPPED | OUTPATIENT
Start: 2021-04-21 | End: 2021-06-28

## 2021-04-23 ENCOUNTER — ANCILLARY PROCEDURE (OUTPATIENT)
Dept: MAMMOGRAPHY | Facility: CLINIC | Age: 56
End: 2021-04-23
Attending: FAMILY MEDICINE
Payer: COMMERCIAL

## 2021-04-23 DIAGNOSIS — R92.8 ABNORMAL FINDING ON BREAST IMAGING: ICD-10-CM

## 2021-04-23 PROCEDURE — 76882 US LMTD JT/FCL EVL NVASC XTR: CPT | Mod: LT | Performed by: RADIOLOGY

## 2021-04-23 NOTE — LETTER
Jeanna Steel  [address]  [place] [address]              April 23, 2021  Date of Exam:     Dear Jeanna:    Thank you for your recent visit.  Breast Imaging Result: We are pleased to inform you that the results of your recent breast imaging show no evidence of malignancy (cancer).    If you are experiencing any breast problems such as a lump or localized pain we request that you discuss this with your health care team if you haven t already done so, as additional testing may be necessary.    As you know, early detection of cancer is very important. Although not all cancers are found through breast imaging, it is the most accurate method for early detection. A thorough examination includes a combination of breast imaging, physical examination and breast self-examination. Currently the American College of Radiology and the Society of Breast Imaging recommend breast imaging beginning at the age of 40.    A report of your breast imaging results was sent to: Karie Queen    Your breast imaging will become part of your medical file here at [facility] for at least 10 years. You are responsible for informing any new health care team or breast imaging facility of the date and location of this examination.    We appreciate the opportunity to participate in your health care.    Sincerely,  Florecita Nettles MD  [facility] [place]

## 2021-04-27 NOTE — RESULT ENCOUNTER NOTE
Hello,    Great news, your results were normal.  The area of concern appears to be normal and likely an enlarged node after your covid vaccine.  We can repeat your mammogram in a year    Karie Queen MD

## 2021-05-03 ENCOUNTER — VIRTUAL VISIT (OUTPATIENT)
Dept: PSYCHOLOGY | Facility: CLINIC | Age: 56
End: 2021-05-03
Payer: COMMERCIAL

## 2021-05-03 DIAGNOSIS — F10.20 ALCOHOL USE DISORDER, MODERATE, DEPENDENCE (H): ICD-10-CM

## 2021-05-03 DIAGNOSIS — F33.1 MAJOR DEPRESSIVE DISORDER, RECURRENT EPISODE, MODERATE (H): Primary | ICD-10-CM

## 2021-05-03 DIAGNOSIS — F41.1 GENERALIZED ANXIETY DISORDER: ICD-10-CM

## 2021-05-03 PROCEDURE — 90834 PSYTX W PT 45 MINUTES: CPT | Mod: 95 | Performed by: SOCIAL WORKER

## 2021-05-03 NOTE — PROGRESS NOTES
Progress Note    Client Name: Jeanna Steel  Date: 5/3/2021          Service Type: Individual     Session Start Time:  11:00  Session End Time: 11:45     Session Length: 45 min    Session #: 42    Attendees: Client attended alone  Telemedicine Visit: The patient's condition can be safely assessed and treated via synchronous audio and visual telemedicine encounter.      Reason for Telemedicine Visit: Services only offered telehealth    Originating Site (Patient Location): Patient's home    Distant Site (Provider Location): Provider Remote Setting home office    Consent:  The patient/guardian has verbally consented to: the potential risks and benefits of telemedicine (video visit) versus in person care; bill my insurance or make self-payment for services provided; and responsibility for payment of non-covered services.     Mode of Communication:  Video Conference via [employer]    As the provider I attest to compliance with applicable laws and regulations related to telemedicine.     Treatment Plan Last Reviewed:  3/5/2021  PHQ-9 / JESSICA-7 : see chart    DATA  Interactive Complexity: No  Crisis: No       Progress Since Last Session (Related to Symptoms / Goals / Homework):   Symptoms: No change mood is stable    Homework: Partially completed client reported trying to practice effective communication and boundary skills      Episode of Care Goals: Satisfactory progress - ACTION (Actively working towards change); Intervened by reinforcing change plan / affirming steps taken     Current / Ongoing Stressors and Concerns:   Ongoing: Client reports increased depression symptoms while caring for 71 elderly parents.     Current: Client reported getting a PCA for her mom and that it has helped reduce her sense of burnout as a caretaker. She said she is still working out how to manage her mom's forgetfulness around driving and alcohol, as she gets upset calls from her mom  consistently. Client reported feeling overwhelmed with consistently addressing her mom and thinks she may need to ask for help from the facility. She said she has been feeling shame around her drinking lately after starting to measure her alcohol and was drinking more than she realized.     Treatment Objective(s) Addressed in This Session:   Identify negative self-talk and behaviors: challenge core beliefs, myths, and actions  Improve concentration, focus, and mindfulness in daily activities        Intervention:   DBT: reviewed interpersonal effectiveness with parents, brothers and partner. Identified areas of support available to her. Explored how client's relationship with alcohol is impacted by family's relationship with alcohol.   Motivational Interviewing     MI Intervention: Expressed Empathy/Understanding, Supported Autonomy, Collaboration, Evocation, Permission to raise concern or advise, Open-ended questions, Reflections: simple and complex, Change talk (evoked) and Reframe     Change Talk Expressed by the Patient: Desire to change Ability to change Reasons to change Need to change Committment to change Activation Taking steps    Provider Response to Change Talk: E - Evoked more info from patient about behavior change, A - Affirmed patient's thoughts, decisions, or attempts at behavior change, R - Reflected patient's change talk and S - Summarized patient's change talk statements          ASSESSMENT: Current Emotional / Mental Status (status of significant symptoms):   Risk status (Self / Other harm or suicidal ideation)   Client denies current fears or concerns for personal safety.   Client denies current or recent suicidal ideation or behaviors.   Client denies current or recent homicidal ideation or behaviors.   Client denies current or recent self injurious behavior or ideation.   Client denies other safety concerns.   Client reports there has been no change in risk factors since their last session.      Client reports there has been no change in protective factors since their last session.     Recommended that patient call 911 or go to the local ED should there be a change in any of these risk factors.     Appearance:   Appropriate    Eye Contact:   Good    Psychomotor Behavior: Normal     Attitude:   Cooperative  Pleasant Attentive   Orientation:   All   Speech    Rate / Production: Normal/ Responsive     Volume:  Normal    Mood:    Anxious  client appeared worried and frustrated   Affect:    Appropriate    Thought Content:  Clear    Thought Form:  Coherent  Goal Directed  Logical    Insight:    Good      Medication Review:   No changes to current psychiatric medication(s)     Medication Compliance:   Yes     Changes in Health Issues:   None reported     Chemical Use Review:   Substance Use: Problem use continues with no change since last session, Stage of Change: Preparatory  Provided encouragement towards sobriety         Tobacco Use: No change in amount of tobacco use since last session.  Contemplation  Provided encouragement to quit     Diagnosis:  1. Major depressive disorder, recurrent episode, moderate (H)    2. Generalized anxiety disorder    3. Alcohol use disorder, moderate, dependence (H)        Collateral Reports Completed:   Not Applicable    PLAN: (Client Tasks / Therapist Tasks / Other)  Client will attend support group for caretakers tomorrow, continue measuring alcohol intake and return for therapy in two weeks.         Maite Quinn, [facility] MSW   5/3/2021       __________________________________________________________________    Treatment Plan    Client's Name: Jeanna Steel  YOB: 1965    Date: 3/5/2021    DSM-V Diagnoses: 296.32 (F33.1) Major Depressive Disorder, Recurrent Episode, Moderate _, 300.02 (F41.1) Generalized Anxiety Disorder or Adjustment Disorders  309.28 (F43.23) With mixed anxiety and depressed mood  Psychosocial / Contextual Factors: Client reports  continued symptoms of depression and anxiety while caretaking for her two parents.  WHODAS: see intake    Referral / Collaboration:  Referral to another professional/service is not indicated at this time..    Anticipated number of session or this episode of care: 8-12      MeasurableTreatment Goal(s) related to diagnosis / functional impairment(s)  Goal 1: Client will reduce alcohol use from 5 standard drinks a day to 2 standard drinks a week in the next three months and client reporting use of three new coping skills to manage stress in the evenings.    I will know I've met my goal when I'm only drinking two drinks and going to bed earlier.      Objective #A (Client Action)    Client will identify at least three consequences of maladaptive drinking.  Status: Continued - Date(s):  3/5/2021    Intervention(s)  Therapist will assign homework to identify impact of drinking  provide support.    Objective #B  Client will identify three coping skills to replace drinking .  Status: Continued - Date(s):  3/5/2021    Intervention(s)  Therapist will assign homework to practice coping skills  teach coping skills.    Objective #C  Client will identify whether she needs further support to reduce alcohol use.  Status: Continued - Date(s):   3/5/2021    Intervention(s)  Therapist will provide support and referrals as needed, education on alcohol use.      Goal 2: Client will maintain reduced symptoms of depression as evidenced by PHQ-9 remaining under 5 and client reporting continued motivation.    I will know I've met my goal when I feel more productive rather than paralyzed.      Objective #A (Client Action)    Status: Completed - Date: 7/21/2020       Client will Increase interest, engagement, and pleasure in doing things  Decrease frequency and intensity of feeling down, depressed, hopeless.    Intervention(s)  Therapist will teach behavioral activation.    Objective #B  Client will Identify negative self-talk and behaviors:  challenge core beliefs, myths, and actions.    Status: Completed - Date: 7/21/2020      Intervention(s)  Therapist will teach CBT skills.    Objective #C  Client will Improve concentration, focus, and mindfulness in daily activities .  Status: Completed - Date: 7/21/2020       Intervention(s)  Therapist will teach mindfulness skills.      Goal 3: Client will report continued increased acceptance towards her decision about having children as evidenced by client reporting use of effective coping skills when feeling distress or regret.    I will know I've met my goal when I can accept.      Objective #A (Client Action)    Status: Continued - Date(s):    3/5/2021    Client will use acceptance skills when feeling willful.    Intervention(s)  Therapist will teach acceptance DBT skills.    Objective #B  Client will identify coping skills that reduce distress when grieving.    Status: Continued - Date(s):   3/5/2021    Intervention(s)  Therapist will teach coping skills, self-soothing.    Goal 4: Client will improve confidence with difficult conversations as evidenced by client reporting reduce avoidance and use of three effective communication skills over the next three months.     I will know I've met my goal when I know how to talk about things.      Objective #A (Client Action)    Client will learn & utilize at least 3 assertive communication skills weekly.  Status: Continued - Date(s): 3/5/2021    Intervention(s)  Therapist will teach assertiveness skills. DBT DEAR MAN skills.    Objective #B  Client will Identify negative self-talk and behaviors: challenge core beliefs, myths, and actions.    Status: Continued - Date(s): 3/5/2021    Intervention(s)  Therapist will guide client in exploring how core belief system influences her ability to communicate and cope with conflict.        Client has reviewed and agreed to the above plan.      Maite Quinn, [facility]  MSW  March 5, 2021

## 2021-05-18 ENCOUNTER — VIRTUAL VISIT (OUTPATIENT)
Dept: PSYCHOLOGY | Facility: CLINIC | Age: 56
End: 2021-05-18
Payer: COMMERCIAL

## 2021-05-18 DIAGNOSIS — F33.1 MAJOR DEPRESSIVE DISORDER, RECURRENT EPISODE, MODERATE (H): Primary | ICD-10-CM

## 2021-05-18 DIAGNOSIS — F41.1 GENERALIZED ANXIETY DISORDER: ICD-10-CM

## 2021-05-18 DIAGNOSIS — F10.20 ALCOHOL USE DISORDER, MODERATE, DEPENDENCE (H): ICD-10-CM

## 2021-05-18 PROCEDURE — 90834 PSYTX W PT 45 MINUTES: CPT | Mod: 95 | Performed by: SOCIAL WORKER

## 2021-05-18 NOTE — PROGRESS NOTES
Progress Note    Client Name: Jeanna Steel  Date: 5/18/2021          Service Type: Individual     Session Start Time:  9:00  Session End Time: 9:45     Session Length: 45 min    Session #: 43    Attendees: Client attended alone     Telemedicine Visit: The patient's condition can be safely assessed and treated via synchronous audio and visual telemedicine encounter.      Reason for Telemedicine Visit: Services only offered telehealth    Originating Site (Patient Location): Patient's home    Distant Site (Provider Location): Provider Remote Setting home office    Consent:  The patient/guardian has verbally consented to: the potential risks and benefits of telemedicine (video visit) versus in person care; bill my insurance or make self-payment for services provided; and responsibility for payment of non-covered services.     Mode of Communication:  Video Conference via "[email or web address]    As the provider I attest to compliance with applicable laws and regulations related to telemedicine.     Treatment Plan Last Reviewed:  3/5/2021  PHQ-9 / JESSICA-7 : see chart    DATA  Interactive Complexity: No  Crisis: No       Progress Since Last Session (Related to Symptoms / Goals / Homework):   Symptoms: Improving reduced distress    Homework: Partially completed client reported measuring alcohol intake      Episode of Care Goals: Satisfactory progress - ACTION (Actively working towards change); Intervened by reinforcing change plan / affirming steps taken     Current / Ongoing Stressors and Concerns:   Ongoing: Client reports increased depression and anxiety symptoms while caring for elderly parents.     Current: Client reported she has felt less stressed the last few weeks and more effective in managing her parent's needs. She described how she has handled her mom's questions and moods, saying they will have a couple of challenges this week. Client reported she is taking her parents  to the [facility] today and she hopes it goes smoothly.      Treatment Objective(s) Addressed in This Session:   Identify negative self-talk and behaviors: challenge core beliefs, myths, and actions  Improve concentration, focus, and mindfulness in daily activities        Intervention:   DBT: identified effective use of communication and interpersonal skills with parents,  and sister-in-law. Reviewed motivation for reducing alcohol consumption.   Motivational Interviewing     MI Intervention: Expressed Empathy/Understanding, Supported Autonomy, Collaboration, Evocation, Permission to raise concern or advise, Open-ended questions, Reflections: simple and complex, Change talk (evoked) and Reframe     Change Talk Expressed by the Patient: Desire to change Ability to change Reasons to change Need to change Committment to change Activation Taking steps    Provider Response to Change Talk: E - Evoked more info from patient about behavior change, A - Affirmed patient's thoughts, decisions, or attempts at behavior change, R - Reflected patient's change talk and S - Summarized patient's change talk statements          ASSESSMENT: Current Emotional / Mental Status (status of significant symptoms):   Risk status (Self / Other harm or suicidal ideation)   Client denies current fears or concerns for personal safety.   Client denies current or recent suicidal ideation or behaviors.   Client denies current or recent homicidal ideation or behaviors.   Client denies current or recent self injurious behavior or ideation.   Client denies other safety concerns.   Client reports there has been no change in risk factors since their last session.     Client reports there has been no change in protective factors since their last session.     Recommended that patient call 911 or go to the local ED should there be a change in any of these risk factors.     Appearance:   Appropriate    Eye Contact:   Good    Psychomotor Behavior: Normal      Attitude:   Cooperative  Pleasant Attentive   Orientation:   All   Speech    Rate / Production: Normal/ Responsive     Volume:  Normal    Mood:    Anxious  client appeared nervous    Affect:    Appropriate    Thought Content:  Clear    Thought Form:  Coherent  Goal Directed  Logical    Insight:    Good      Medication Review:   No changes to current psychiatric medication(s)     Medication Compliance:   Yes     Changes in Health Issues:   None reported     Chemical Use Review:   Substance Use: decrease in alcohol .  Patient reports frequency of use almost daily, but less at each time.  Stage of Change: Action  Provided encouragement towards sobriety  Provided support and affirmation for steps taken towards sobriety          Tobacco Use: No change in amount of tobacco use since last session.  Contemplation  Provided encouragement to quit     Diagnosis:  1. Major depressive disorder, recurrent episode, moderate (H)    2. Generalized anxiety disorder    3. Alcohol use disorder, moderate, dependence (H)        Collateral Reports Completed:   Not Applicable    PLAN: (Client Tasks / Therapist Tasks / Other)  Client will maintain effectiveness, self-validate, focus on what is within her control and return for therapy in two weeks.         Maite Quinn, [facility]   5/18/2021       __________________________________________________________________    Treatment Plan    Client's Name: Jeanna Steel  YOB: 1965    Date: 3/5/2021    DSM-V Diagnoses: 296.32 (F33.1) Major Depressive Disorder, Recurrent Episode, Moderate _, 300.02 (F41.1) Generalized Anxiety Disorder or Adjustment Disorders  309.28 (F43.23) With mixed anxiety and depressed mood  Psychosocial / Contextual Factors: Client reports continued symptoms of depression and anxiety while caretaking for her two parents.  WHODAS: see intake    Referral / Collaboration:  Referral to another professional/service is not indicated at this  time..    Anticipated number of session or this episode of care: 8-12      MeasurableTreatment Goal(s) related to diagnosis / functional impairment(s)  Goal 1: Client will reduce alcohol use from 5 standard drinks a day to 2 standard drinks a week in the next three months and client reporting use of three new coping skills to manage stress in the evenings.    I will know I've met my goal when I'm only drinking two drinks and going to bed earlier.      Objective #A (Client Action)    Client will identify at least three consequences of maladaptive drinking.  Status: Continued - Date(s):  3/5/2021    Intervention(s)  Therapist will assign homework to identify impact of drinking  provide support.    Objective #B  Client will identify three coping skills to replace drinking .  Status: Continued - Date(s):  3/5/2021    Intervention(s)  Therapist will assign homework to practice coping skills  teach coping skills.    Objective #C  Client will identify whether she needs further support to reduce alcohol use.  Status: Continued - Date(s):   3/5/2021    Intervention(s)  Therapist will provide support and referrals as needed, education on alcohol use.      Goal 2: Client will maintain reduced symptoms of depression as evidenced by PHQ-9 remaining under 5 and client reporting continued motivation.    I will know I've met my goal when I feel more productive rather than paralyzed.      Objective #A (Client Action)    Status: Completed - Date: 7/21/2020       Client will Increase interest, engagement, and pleasure in doing things  Decrease frequency and intensity of feeling down, depressed, hopeless.    Intervention(s)  Therapist will teach behavioral activation.    Objective #B  Client will Identify negative self-talk and behaviors: challenge core beliefs, myths, and actions.    Status: Completed - Date: 7/21/2020      Intervention(s)  Therapist will teach CBT skills.    Objective #C  Client will Improve concentration, focus, and  mindfulness in daily activities .  Status: Completed - Date: 7/21/2020       Intervention(s)  Therapist will teach mindfulness skills.      Goal 3: Client will report continued increased acceptance towards her decision about having children as evidenced by client reporting use of effective coping skills when feeling distress or regret.    I will know I've met my goal when I can accept.      Objective #A (Client Action)    Status: Continued - Date(s):    3/5/2021    Client will use acceptance skills when feeling willful.    Intervention(s)  Therapist will teach acceptance DBT skills.    Objective #B  Client will identify coping skills that reduce distress when grieving.    Status: Continued - Date(s):   3/5/2021    Intervention(s)  Therapist will teach coping skills, self-soothing.    Goal 4: Client will improve confidence with difficult conversations as evidenced by client reporting reduce avoidance and use of three effective communication skills over the next three months.     I will know I've met my goal when I know how to talk about things.      Objective #A (Client Action)    Client will learn & utilize at least 3 assertive communication skills weekly.  Status: Continued - Date(s): 3/5/2021    Intervention(s)  Therapist will teach assertiveness skills. DBT DEAR MAN skills.    Objective #B  Client will Identify negative self-talk and behaviors: challenge core beliefs, myths, and actions.    Status: Continued - Date(s): 3/5/2021    Intervention(s)  Therapist will guide client in exploring how core belief system influences her ability to communicate and cope with conflict.        Client has reviewed and agreed to the above plan.      Maite Quinn [facility]  MSW  March 5, 2021

## 2021-06-14 ENCOUNTER — VIRTUAL VISIT (OUTPATIENT)
Dept: PSYCHOLOGY | Facility: CLINIC | Age: 56
End: 2021-06-14
Payer: COMMERCIAL

## 2021-06-14 DIAGNOSIS — F10.20 ALCOHOL USE DISORDER, MODERATE, DEPENDENCE (H): ICD-10-CM

## 2021-06-14 DIAGNOSIS — F41.1 GENERALIZED ANXIETY DISORDER: ICD-10-CM

## 2021-06-14 DIAGNOSIS — F33.1 MAJOR DEPRESSIVE DISORDER, RECURRENT EPISODE, MODERATE (H): Primary | ICD-10-CM

## 2021-06-14 PROCEDURE — 90834 PSYTX W PT 45 MINUTES: CPT | Mod: 95 | Performed by: SOCIAL WORKER

## 2021-06-14 NOTE — PROGRESS NOTES
Progress Note    Client Name: Jeanna Steel  Date: 6/14/2021          Service Type: Individual     Session Start Time:  11:00  Session End Time: 11:45     Session Length: 45 min    Session #: 44    Attendees: Client attended alone     Telemedicine Visit: The patient's condition can be safely assessed and treated via synchronous audio and visual telemedicine encounter.      Reason for Telemedicine Visit: Services only offered telehealth    Originating Site (Patient Location): Patient's home    Distant Site (Provider Location): Provider Remote Setting home office    Consent:  The patient/guardian has verbally consented to: the potential risks and benefits of telemedicine (video visit) versus in person care; bill my insurance or make self-payment for services provided; and responsibility for payment of non-covered services.     Mode of Communication:  Video Conference via [employer]    As the provider I attest to compliance with applicable laws and regulations related to telemedicine.     Treatment Plan Last Reviewed:  6/14/2021  PHQ-9 / JESSICA-7 : see chart    DATA  Interactive Complexity: No  Crisis: No       Progress Since Last Session (Related to Symptoms / Goals / Homework):   Symptoms: No change mood is stable    Homework: Achieved / completed to satisfaction client reported focusing on what is on her control       Episode of Care Goals: Satisfactory progress - ACTION (Actively working towards change); Intervened by reinforcing change plan / affirming steps taken     Current / Ongoing Stressors and Concerns:   Ongoing: Client reports increased depression and anxiety symptoms while caring for elderly parents.    Current: Client reported her mom has officially received a diagnosis of moderate Alzheimer's disease which feels bittersweet for her. Client explained that she both is glad to have a path forward but that it means she has to give up hope for another more  treatable diagnosis. She reported continuing to work on reducing her alcohol consumption. Reviewed client's goals for therapy and updated treatment plan.     Treatment Objective(s) Addressed in This Session:   Identify negative self-talk and behaviors: challenge core beliefs, myths, and actions  Improve concentration, focus, and mindfulness in daily activities        Intervention:   DBT: reviewed client interpersonal effectiveness with mom after diagnosis. Validated client's grief.     Motivational Interviewing   Reviewed client's goals for therapy and updated treatment plan.  MI Intervention: Expressed Empathy/Understanding, Supported Autonomy, Collaboration, Evocation, Permission to raise concern or advise, Open-ended questions, Reflections: simple and complex, Change talk (evoked) and Reframe     Change Talk Expressed by the Patient: Desire to change Ability to change Reasons to change Need to change Committment to change Activation Taking steps    Provider Response to Change Talk: E - Evoked more info from patient about behavior change, A - Affirmed patient's thoughts, decisions, or attempts at behavior change, R - Reflected patient's change talk and S - Summarized patient's change talk statements          ASSESSMENT: Current Emotional / Mental Status (status of significant symptoms):   Risk status (Self / Other harm or suicidal ideation)   Client denies current fears or concerns for personal safety.   Client denies current or recent suicidal ideation or behaviors.   Client denies current or recent homicidal ideation or behaviors.   Client denies current or recent self injurious behavior or ideation.   Client denies other safety concerns.   Client reports there has been no change in risk factors since their last session.     Client reports there has been no change in protective factors since their last session.     Recommended that patient call 911 or go to the local ED should there be a change in any of these  risk factors.     Appearance:   Appropriate    Eye Contact:   Good    Psychomotor Behavior: Normal     Attitude:   Cooperative  Pleasant Attentive   Orientation:   All   Speech    Rate / Production: Normal/ Responsive     Volume:  Normal    Mood:    Anxious  client appeared nervous   Affect:    Appropriate    Thought Content:  Clear    Thought Form:  Coherent  Goal Directed  Logical    Insight:    Good      Medication Review:   No changes to current psychiatric medication(s)     Medication Compliance:   Yes     Changes in Health Issues:   None reported     Chemical Use Review:   Substance Use: decrease in alcohol .  Patient reports frequency of use daily, two drinks a day.  Stage of Change: Action  Provided encouragement towards sobriety  Provided support and affirmation for steps taken towards sobriety          Tobacco Use: No change in amount of tobacco use since last session.  Contemplation  Provided encouragement to quit     Diagnosis:  1. Major depressive disorder, recurrent episode, moderate (H)    2. Generalized anxiety disorder    3. Alcohol use disorder, moderate, dependence (H)        Collateral Reports Completed:   Not Applicable    PLAN: (Client Tasks / Therapist Tasks / Other)  Client will continue attending Alzheimer's support group, continue adhering to 2 drinks a day or less and return for therapy in two weeks.         Maite Quinn, [facility]   6/14/2021       __________________________________________________________________    Treatment Plan    Client's Name: Jeanna Steel  YOB: 1965    Date: 6/14/2021    DSM-V Diagnoses: 296.32 (F33.1) Major Depressive Disorder, Recurrent Episode, Moderate _, 300.02 (F41.1) Generalized Anxiety Disorder or Adjustment Disorders  309.28 (F43.23) With mixed anxiety and depressed mood  Psychosocial / Contextual Factors: Client reports continued symptoms of depression and anxiety while caretaking for her two parents.  WHODAS: see  intake    Referral / Collaboration:  Referral to another professional/service is not indicated at this time..    Anticipated number of session or this episode of care: 8-12      MeasurableTreatment Goal(s) related to diagnosis / functional impairment(s)  Goal 1: Client will reduce alcohol use from 5 standard drinks a day to 2 standard drinks a day in the next three months and client reporting use of three new coping skills to manage stress in the evenings.    I will know I've met my goal when I'm only drinking two drinks and going to bed earlier.      Objective #A (Client Action)    Client will identify at least three consequences of maladaptive drinking.  Status: Continued - Date(s):  6/14/2021    Intervention(s)  Therapist will assign homework to identify impact of drinking  provide support.    Objective #B  Client will identify three coping skills to replace drinking .  Status: Continued - Date(s):  6/14/2021    Intervention(s)  Therapist will assign homework to practice coping skills  teach coping skills.    Objective #C  Client will identify whether she needs further support to reduce alcohol use.  Status: Continued - Date(s):   6/14/2021    Intervention(s)  Therapist will provide support and referrals as needed, education on alcohol use.      Goal 2: Client will report continued increased acceptance towards her decision about having children as evidenced by client reporting use of effective coping skills when feeling distress or regret.    I will know I've met my goal when I can accept.      Objective #A (Client Action)    Status: Completed - Date: 6/14/2021        Client will use acceptance skills when feeling willful.    Intervention(s)  Therapist will teach acceptance DBT skills.    Objective #B  Client will identify coping skills that reduce distress when grieving.    Status: Completed - Date: 6/14/2021       Intervention(s)  Therapist will teach coping skills, self-soothing.    Goal 3: Client will improve  confidence with setting and holding boundaries as evidenced by client reporting reduce avoidance and use of three effective communication skills over the next three months.     I will know I've met my goal when I know how to talk about things.      Objective #A (Client Action)    Client will learn & utilize at least 3 assertive communication skills weekly.  Status: Continued - Date(s): 6/14/2021    Intervention(s)  Therapist will teach assertiveness skills. DBT DEAR MAN skills.    Objective #B  Client will Identify negative self-talk and behaviors: challenge core beliefs, myths, and actions.    Status: Continued - Date(s): 6/14/2021    Intervention(s)  Therapist will guide client in exploring how core belief system influences her ability to communicate and cope with conflict.        Client has reviewed and agreed to the above plan.      Maite Quinn, [facility]  MSW  June 14, 2021

## 2021-06-22 ENCOUNTER — MYC MEDICAL ADVICE (OUTPATIENT)
Dept: FAMILY MEDICINE | Facility: CLINIC | Age: 56
End: 2021-06-22

## 2021-06-28 ENCOUNTER — VIRTUAL VISIT (OUTPATIENT)
Dept: PSYCHOLOGY | Facility: CLINIC | Age: 56
End: 2021-06-28
Payer: COMMERCIAL

## 2021-06-28 ENCOUNTER — VIRTUAL VISIT (OUTPATIENT)
Dept: FAMILY MEDICINE | Facility: CLINIC | Age: 56
End: 2021-06-28
Payer: COMMERCIAL

## 2021-06-28 DIAGNOSIS — F33.1 MAJOR DEPRESSIVE DISORDER, RECURRENT EPISODE, MODERATE (H): Primary | ICD-10-CM

## 2021-06-28 DIAGNOSIS — F10.20 ALCOHOL USE DISORDER, MODERATE, DEPENDENCE (H): ICD-10-CM

## 2021-06-28 DIAGNOSIS — F41.1 GENERALIZED ANXIETY DISORDER: Primary | ICD-10-CM

## 2021-06-28 DIAGNOSIS — F41.1 GENERALIZED ANXIETY DISORDER: ICD-10-CM

## 2021-06-28 DIAGNOSIS — G47.50 PARASOMNIA, UNSPECIFIED TYPE: ICD-10-CM

## 2021-06-28 DIAGNOSIS — F33.1 MAJOR DEPRESSIVE DISORDER, RECURRENT EPISODE, MODERATE (H): ICD-10-CM

## 2021-06-28 PROCEDURE — 99213 OFFICE O/P EST LOW 20 MIN: CPT | Mod: 95 | Performed by: FAMILY MEDICINE

## 2021-06-28 PROCEDURE — 90834 PSYTX W PT 45 MINUTES: CPT | Mod: 95 | Performed by: SOCIAL WORKER

## 2021-06-28 RX ORDER — LORAZEPAM 0.5 MG/1
0.5 TABLET ORAL DAILY PRN
Qty: 10 TABLET | Refills: 1 | Status: SHIPPED | OUTPATIENT
Start: 2021-06-28 | End: 2021-10-29

## 2021-06-28 ASSESSMENT — ANXIETY QUESTIONNAIRES
4. TROUBLE RELAXING: NOT AT ALL
6. BECOMING EASILY ANNOYED OR IRRITABLE: SEVERAL DAYS
GAD7 TOTAL SCORE: 2
1. FEELING NERVOUS, ANXIOUS, OR ON EDGE: SEVERAL DAYS
3. WORRYING TOO MUCH ABOUT DIFFERENT THINGS: NOT AT ALL
2. NOT BEING ABLE TO STOP OR CONTROL WORRYING: NOT AT ALL
7. FEELING AFRAID AS IF SOMETHING AWFUL MIGHT HAPPEN: NOT AT ALL
7. FEELING AFRAID AS IF SOMETHING AWFUL MIGHT HAPPEN: NOT AT ALL
5. BEING SO RESTLESS THAT IT IS HARD TO SIT STILL: NOT AT ALL
GAD7 TOTAL SCORE: 2

## 2021-06-28 NOTE — PROGRESS NOTES
Jeanna is a 55 year old who is being evaluated via a billable video visit.      How would you like to obtain your AVS? MyChart  If the video visit is dropped, the invitation should be resent by: Text to cell phone: [phone]  Will anyone else be joining your video visit? No      Video Start Time: 1:32     The video connection was very poor so we discontinued this and continued with a telephone visit as a result.    Assessment & Plan   (F41.1) Generalized anxiety disorder  (primary encounter diagnosis)  Comment: Discussed referral to psychiatry but also will have her consider the option of DBT per her therapist.  Certainly medications could be causing her side effects as well as combination of alcohol  Plan: LORazepam (ATIVAN) 0.5 MG tablet, MENTAL HEALTH        REFERRAL  - Adult; Psychiatry; Psychiatry; [facility]:        Collaborative Care Psychiatry Service/Bridge to        Long-Term Psychiatry as indicated         ([phone]); Yes; Chronic Mental Health         without improvement; Yes; We will contact you         to schedule ...             (F10.20) Alcohol use disorder, moderate, dependence (H)  Comment: We will continue to work on this this certainly could be feeding into her parasomnia  Plan: MENTAL HEALTH REFERRAL  - Adult; Psychiatry;         Psychiatry; [facility]: Collaborative Care Psychiatry         Service/Bridge to Long-Term Psychiatry as         indicated ([phone]); Yes; Chronic Mental        Health without improvement; Yes; We will         contact you to schedule ...             (F33.1) Major depressive disorder, recurrent episode, moderate (H)  Comment:    Plan: MENTAL HEALTH REFERRAL  - Adult; Psychiatry;         Psychiatry; [facility]: Collaborative Care Psychiatry         Service/Bridge to Long-Term Psychiatry as         indicated ([phone]); Yes; Chronic Mental        Health without improvement; Yes; We will         contact you to schedule ...             (G47.50) Parasomnia, unspecified  "type  Comment:    Plan: MENTAL HEALTH REFERRAL  - Adult; Psychiatry;         Psychiatry; [facility]: Collaborative Care Psychiatry         Service/Bridge to Long-Term Psychiatry as         indicated ([phone]); Yes; Chronic Mental        Health without improvement; Yes; We will         contact you to schedule ...                 10 minutes spent on the date of the encounter doing chart review, history and exam, documentation and further activities per the note        BMI:   Estimated body mass index is 28.44 kg/m  as calculated from the following:    Height as of 1/6/21: 1.689 m (5' 6.5\").    Weight as of 1/6/21: 81.1 kg (178 lb 14.4 oz).       We will have her follow-up with psychiatry    No follow-ups on file.    Karie Queen MD  [facility]    Anne-Marie Meeks is a 55 year old who presents for the following health issues     HPI     Per pt she states she has been having severe nightmares that cause me to scream out and sometimes kick and punch. Wondering if my medication is off in someway. It has been waking my  and me at least once a week now for about 3-6 months.    Was one a month and now is once a week  Has nightmare, yells out or kick  Started wellbutrin last summer  Takes 150 mg sertraline was at 100 prior to that  Went to a psychiatrist they increased the sertraline  They are actually considering DVT  Feels nightmares might be related  I did review her chart and certainly abnormal dreams are a potential side effect of this medication.  She does feel that anxiety at times is the cause for this lots of stress going on with being a caretaker for her aging parents and chronic medical problems in her family.    Lots of issues with selling the house that stressors surrounding this.  On occasion has had panic attacks has used lorazepam intermittently feels this does help in addition to her medications.      Review of Systems   Constitutional, HEENT, cardiovascular, " pulmonary, gi and gu systems are negative, except as otherwise noted.      Objective           Vitals:  No vitals were obtained today due to virtual visit.    Physical Exam   GENERAL: Healthy, alert and no distress  EYES: Eyes grossly normal to inspection.  No discharge or erythema, or obvious scleral/conjunctival abnormalities.  RESP: No audible wheeze, cough, or visible cyanosis.  No visible retractions or increased work of breathing.    SKIN: Visible skin clear. No significant rash, abnormal pigmentation or lesions.  NEURO: Cranial nerves grossly intact.  Mentation and speech appropriate for age.  PSYCH: Mentation appears normal, affect normal/bright, judgement and insight intact, normal speech and appearance well-groomed.                Video-Visit Details    Type of service:  Video Visit    Video End Time:1:42    Originating Location (pt. Location): Home    Distant Location (provider location):  [facility]     Platform used for Video Visit: [employer]  Answers for HPI/ROS submitted by the patient on 6/28/2021   JESSICA 7 TOTAL SCORE: 2  If you checked off any problems, how difficult have these problems made it for you to do your work, take care of things at home, or get along with other people?: Somewhat difficult  PHQ9 TOTAL SCORE: 3

## 2021-06-28 NOTE — PROGRESS NOTES
Progress Note    Client Name: Jeanna Steel  Date: 6/28/2021          Service Type: Individual     Session Start Time:  1:30  Session End Time: 2:15     Session Length: 45 min    Session #: 45    Attendees: Client attended alone     Telemedicine Visit: The patient's condition can be safely assessed and treated via synchronous audio and visual telemedicine encounter.      Reason for Telemedicine Visit: Services only offered telehealth    Originating Site (Patient Location): Patient's home    Distant Site (Provider Location): Provider Remote Setting- Home Office     Consent:  The patient/guardian has verbally consented to: the potential risks and benefits of telemedicine (video visit) versus in person care; bill my insurance or make self-payment for services provided; and responsibility for payment of non-covered services.     Mode of Communication:  Video Conference via [employer]    As the provider I attest to compliance with applicable laws and regulations related to telemedicine.     Treatment Plan Last Reviewed:  6/14/2021  PHQ-9 / JESSICA-7 : see chart    DATA  Interactive Complexity: No  Crisis: No       Progress Since Last Session (Related to Symptoms / Goals / Homework):   Symptoms: Improving overall good mood lately    Homework: Achieved / completed to satisfaction client reported attending support group, reducing alcohol use       Episode of Care Goals: Satisfactory progress - ACTION (Actively working towards change); Intervened by reinforcing change plan / affirming steps taken     Current / Ongoing Stressors and Concerns:   Ongoing: Client reports increased depression and anxiety symptoms while caring for elderly parents.    Current: Client reported having ups and downs lately while updating her parent home. She described having some hesitation with take bigger steps as she knows her parents wouldn't agree, even though she knows they wouldn't remember the  changes were happening. Client reported she started a program around controlling alcohol use that she is excited about and thinks it will be a good fit.     Treatment Objective(s) Addressed in This Session:   Identify negative self-talk and behaviors: challenge core beliefs, myths, and actions  Improve concentration, focus, and mindfulness in daily activities        Intervention:   DBT:checked the facts around client's guilt with making necessary changes to her parents home. Reviewed benefits of client's new program   Motivational Interviewing    MI Intervention: Expressed Empathy/Understanding, Supported Autonomy, Collaboration, Evocation, Permission to raise concern or advise, Open-ended questions, Reflections: simple and complex, Change talk (evoked) and Reframe     Change Talk Expressed by the Patient: Desire to change Ability to change Reasons to change Need to change Committment to change Activation Taking steps    Provider Response to Change Talk: E - Evoked more info from patient about behavior change, A - Affirmed patient's thoughts, decisions, or attempts at behavior change, R - Reflected patient's change talk and S - Summarized patient's change talk statements          ASSESSMENT: Current Emotional / Mental Status (status of significant symptoms):   Risk status (Self / Other harm or suicidal ideation)   Client denies current fears or concerns for personal safety.   Client denies current or recent suicidal ideation or behaviors.   Client denies current or recent homicidal ideation or behaviors.   Client denies current or recent self injurious behavior or ideation.   Client denies other safety concerns.   Client reports there has been no change in risk factors since their last session.     Client reports there has been no change in protective factors since their last session.     Recommended that patient call 911 or go to the local ED should there be a change in any of these risk  factors.     Appearance:   Appropriate    Eye Contact:   Good    Psychomotor Behavior: Normal     Attitude:   Cooperative  Pleasant Attentive   Orientation:   All   Speech    Rate / Production: Normal/ Responsive     Volume:  Normal    Mood:    Anxious  client appeared worried   Affect:    Appropriate    Thought Content:  Clear    Thought Form:  Coherent  Goal Directed  Logical    Insight:    Good      Medication Review:   No changes to current psychiatric medication(s)     Medication Compliance:   Yes     Changes in Health Issues:   None reported     Chemical Use Review:   Substance Use: Problem use continues with no change since last session, Stage of Change: Action  Provided encouragement towards sobriety  Provided support and affirmation for steps taken towards sobriety          Tobacco Use: No change in amount of tobacco use since last session.  Contemplation  Provided encouragement to quit     Diagnosis:  1. Major depressive disorder, recurrent episode, moderate (H)    2. Alcohol use disorder, moderate, dependence (H)    3. Generalized anxiety disorder        Collateral Reports Completed:   Not Applicable    PLAN: (Client Tasks / Therapist Tasks / Other)  Client will start her program, identify when she is upholding her values and return for therapy in two weeks.         Maite Quinn, [facility]   6/14/2021       __________________________________________________________________    Treatment Plan    Client's Name: Jeanna Steel  YOB: 1965    Date: 6/14/2021    DSM-V Diagnoses: 296.32 (F33.1) Major Depressive Disorder, Recurrent Episode, Moderate _, 300.02 (F41.1) Generalized Anxiety Disorder or Adjustment Disorders  309.28 (F43.23) With mixed anxiety and depressed mood  Psychosocial / Contextual Factors: Client reports continued symptoms of depression and anxiety while caretaking for her two parents.  WHODAS: see intake    Referral / Collaboration:  Referral to another  professional/service is not indicated at this time..    Anticipated number of session or this episode of care: 8-12      MeasurableTreatment Goal(s) related to diagnosis / functional impairment(s)  Goal 1: Client will reduce alcohol use from 5 standard drinks a day to 2 standard drinks a day in the next three months and client reporting use of three new coping skills to manage stress in the evenings.    I will know I've met my goal when I'm only drinking two drinks and going to bed earlier.      Objective #A (Client Action)    Client will identify at least three consequences of maladaptive drinking.  Status: Continued - Date(s):  6/14/2021    Intervention(s)  Therapist will assign homework to identify impact of drinking  provide support.    Objective #B  Client will identify three coping skills to replace drinking .  Status: Continued - Date(s):  6/14/2021    Intervention(s)  Therapist will assign homework to practice coping skills  teach coping skills.    Objective #C  Client will identify whether she needs further support to reduce alcohol use.  Status: Continued - Date(s):   6/14/2021    Intervention(s)  Therapist will provide support and referrals as needed, education on alcohol use.      Goal 2: Client will report continued increased acceptance towards her decision about having children as evidenced by client reporting use of effective coping skills when feeling distress or regret.    I will know I've met my goal when I can accept.      Objective #A (Client Action)    Status: Completed - Date: 6/14/2021        Client will use acceptance skills when feeling willful.    Intervention(s)  Therapist will teach acceptance DBT skills.    Objective #B  Client will identify coping skills that reduce distress when grieving.    Status: Completed - Date: 6/14/2021       Intervention(s)  Therapist will teach coping skills, self-soothing.    Goal 3: Client will improve confidence with setting and holding boundaries as  evidenced by client reporting reduce avoidance and use of three effective communication skills over the next three months.     I will know I've met my goal when I know how to talk about things.      Objective #A (Client Action)    Client will learn & utilize at least 3 assertive communication skills weekly.  Status: Continued - Date(s): 6/14/2021    Intervention(s)  Therapist will teach assertiveness skills. DBT DEAR MAN skills.    Objective #B  Client will Identify negative self-talk and behaviors: challenge core beliefs, myths, and actions.    Status: Continued - Date(s): 6/14/2021    Intervention(s)  Therapist will guide client in exploring how core belief system influences her ability to communicate and cope with conflict.        Client has reviewed and agreed to the above plan.      Maite Quinn, [facility]  MSW  June 14, 2021

## 2021-06-29 ASSESSMENT — PATIENT HEALTH QUESTIONNAIRE - PHQ9: SUM OF ALL RESPONSES TO PHQ QUESTIONS 1-9: 3

## 2021-06-29 ASSESSMENT — ANXIETY QUESTIONNAIRES: GAD7 TOTAL SCORE: 2

## 2021-07-05 PROBLEM — F10.20 ALCOHOL USE DISORDER, MODERATE, DEPENDENCE (H): Status: ACTIVE | Noted: 2021-07-05

## 2021-07-05 NOTE — PATIENT INSTRUCTIONS
I did place a referral for psychiatry.  I think 1 or 2 visits with them would be helpful to determine what the best course or plan is for your symptoms.  They will contact you    I do think reducing and or completely stopping alcohol will help with the parasomnias and anxiety.  Certainly you are under a lot of stress so this is probably feeding into some of those abnormal dreams that you are having.  Medications could be related to this but the alcohol is probably not helping    Lets see if you can get in to see psychiatry in the next 4 weeks and then follow-up with me in the next few months

## 2021-07-15 ENCOUNTER — VIRTUAL VISIT (OUTPATIENT)
Dept: PSYCHOLOGY | Facility: CLINIC | Age: 56
End: 2021-07-15
Payer: COMMERCIAL

## 2021-07-15 DIAGNOSIS — F41.1 GENERALIZED ANXIETY DISORDER: ICD-10-CM

## 2021-07-15 DIAGNOSIS — F10.20 ALCOHOL USE DISORDER, MODERATE, DEPENDENCE (H): ICD-10-CM

## 2021-07-15 DIAGNOSIS — F33.1 MAJOR DEPRESSIVE DISORDER, RECURRENT EPISODE, MODERATE (H): Primary | ICD-10-CM

## 2021-07-15 PROCEDURE — 90834 PSYTX W PT 45 MINUTES: CPT | Mod: 95 | Performed by: SOCIAL WORKER

## 2021-07-15 NOTE — PROGRESS NOTES
Progress Note    Client Name: Jeanna Steel  Date: 7/15/2021          Service Type: Individual     Session Start Time:  10:00  Session End Time: 10:45     Session Length: 45 min    Session #: 46    Attendees: Client attended alone     Telemedicine Visit: The patient's condition can be safely assessed and treated via synchronous audio and visual telemedicine encounter.      Reason for Telemedicine Visit: Services only offered telehealth    Originating Site (Patient Location): Patient's home    Distant Site (Provider Location): Provider Remote Setting- Home Office     Consent:  The patient/guardian has verbally consented to: the potential risks and benefits of telemedicine (video visit) versus in person care; bill my insurance or make self-payment for services provided; and responsibility for payment of non-covered services.     Mode of Communication:  Video Conference via [employer]    As the provider I attest to compliance with applicable laws and regulations related to telemedicine.     Treatment Plan Last Reviewed:  6/14/2021  PHQ-9 / JESSICA-7 : see chart    DATA  Interactive Complexity: No  Crisis: No       Progress Since Last Session (Related to Symptoms / Goals / Homework):   Symptoms: No change mood remains good    Homework: Achieved / completed to satisfaction client reported starting her program and upholding values       Episode of Care Goals: Satisfactory progress - ACTION (Actively working towards change); Intervened by reinforcing change plan / affirming steps taken     Current / Ongoing Stressors and Concerns:   Ongoing: Client reports increased depression and anxiety symptoms while caring for elderly parents.    Current: Client reported generally doing well as she is feeling more confident. Client said she has had moments of frustration with her mom but things are stable. Client reported she has had three days without alcohol and has felt positive about  it.      Treatment Objective(s) Addressed in This Session:   Identify negative self-talk and behaviors: challenge core beliefs, myths, and actions  Improve concentration, focus, and mindfulness in daily activities        Intervention:   DBT: reviewed interpersonal effectiveness with mom, how her mood is improved with reduced alcohol use   Motivational Interviewing    MI Intervention: Expressed Empathy/Understanding, Supported Autonomy, Collaboration, Evocation, Permission to raise concern or advise, Open-ended questions, Reflections: simple and complex, Change talk (evoked) and Reframe     Change Talk Expressed by the Patient: Desire to change Ability to change Reasons to change Need to change Committment to change Activation Taking steps    Provider Response to Change Talk: E - Evoked more info from patient about behavior change, A - Affirmed patient's thoughts, decisions, or attempts at behavior change, R - Reflected patient's change talk and S - Summarized patient's change talk statements          ASSESSMENT: Current Emotional / Mental Status (status of significant symptoms):   Risk status (Self / Other harm or suicidal ideation)   Client denies current fears or concerns for personal safety.   Client denies current or recent suicidal ideation or behaviors.   Client denies current or recent homicidal ideation or behaviors.   Client denies current or recent self injurious behavior or ideation.   Client denies other safety concerns.   Client reports there has been no change in risk factors since their last session.     Client reports there has been no change in protective factors since their last session.     Recommended that patient call 911 or go to the local ED should there be a change in any of these risk factors.     Appearance:   Appropriate    Eye Contact:   Good    Psychomotor Behavior: Normal     Attitude:   Cooperative  Pleasant Attentive   Orientation:   All   Speech    Rate / Production: Normal/  Responsive     Volume:  Normal    Mood:    Normal client appeared hopeful   Affect:    Appropriate    Thought Content:  Clear    Thought Form:  Coherent  Goal Directed  Logical    Insight:    Good      Medication Review:   No changes to current psychiatric medication(s)     Medication Compliance:   Yes     Changes in Health Issues:   None reported     Chemical Use Review:   Substance Use: decrease in alcohol .  Patient reports frequency of use almost nightly.  Stage of Change: Action  Provided encouragement towards sobriety  Provided support and affirmation for steps taken towards sobriety          Tobacco Use: No change in amount of tobacco use since last session.  Contemplation  Provided encouragement to quit     Diagnosis:  1. Major depressive disorder, recurrent episode, moderate (H)    2. Alcohol use disorder, moderate, dependence (H)    3. Generalized anxiety disorder        Collateral Reports Completed:   Not Applicable    PLAN: (Client Tasks / Therapist Tasks / Other)  Client will maintain recent effectiveness and return for therapy in two weeks.         Maite Quinn, [facility]   7/15/2021       __________________________________________________________________    Treatment Plan    Client's Name: Jeanna Steel  YOB: 1965    Date: 6/14/2021    DSM-V Diagnoses: 296.32 (F33.1) Major Depressive Disorder, Recurrent Episode, Moderate _, 300.02 (F41.1) Generalized Anxiety Disorder or Adjustment Disorders  309.28 (F43.23) With mixed anxiety and depressed mood  Psychosocial / Contextual Factors: Client reports continued symptoms of depression and anxiety while caretaking for her two parents.  WHODAS: see intake    Referral / Collaboration:  Referral to another professional/service is not indicated at this time..    Anticipated number of session or this episode of care: 8-12      MeasurableTreatment Goal(s) related to diagnosis / functional impairment(s)  Goal 1: Client will reduce alcohol use  from 5 standard drinks a day to 2 standard drinks a day in the next three months and client reporting use of three new coping skills to manage stress in the evenings.    I will know I've met my goal when I'm only drinking two drinks and going to bed earlier.      Objective #A (Client Action)    Client will identify at least three consequences of maladaptive drinking.  Status: Continued - Date(s):  6/14/2021    Intervention(s)  Therapist will assign homework to identify impact of drinking  provide support.    Objective #B  Client will identify three coping skills to replace drinking .  Status: Continued - Date(s):  6/14/2021    Intervention(s)  Therapist will assign homework to practice coping skills  teach coping skills.    Objective #C  Client will identify whether she needs further support to reduce alcohol use.  Status: Continued - Date(s):   6/14/2021    Intervention(s)  Therapist will provide support and referrals as needed, education on alcohol use.      Goal 2: Client will report continued increased acceptance towards her decision about having children as evidenced by client reporting use of effective coping skills when feeling distress or regret.    I will know I've met my goal when I can accept.      Objective #A (Client Action)    Status: Completed - Date: 6/14/2021        Client will use acceptance skills when feeling willful.    Intervention(s)  Therapist will teach acceptance DBT skills.    Objective #B  Client will identify coping skills that reduce distress when grieving.    Status: Completed - Date: 6/14/2021       Intervention(s)  Therapist will teach coping skills, self-soothing.    Goal 3: Client will improve confidence with setting and holding boundaries as evidenced by client reporting reduce avoidance and use of three effective communication skills over the next three months.     I will know I've met my goal when I know how to talk about things.      Objective #A (Client Action)    Client will  learn & utilize at least 3 assertive communication skills weekly.  Status: Continued - Date(s): 6/14/2021    Intervention(s)  Therapist will teach assertiveness skills. DBT DEAR MAN skills.    Objective #B  Client will Identify negative self-talk and behaviors: challenge core beliefs, myths, and actions.    Status: Continued - Date(s): 6/14/2021    Intervention(s)  Therapist will guide client in exploring how core belief system influences her ability to communicate and cope with conflict.        Client has reviewed and agreed to the above plan.      Maite Quinn, [facility]  MSW  June 14, 2021

## 2021-07-23 ENCOUNTER — MYC MEDICAL ADVICE (OUTPATIENT)
Dept: FAMILY MEDICINE | Facility: CLINIC | Age: 56
End: 2021-07-23

## 2021-07-23 DIAGNOSIS — F33.1 MAJOR DEPRESSIVE DISORDER, RECURRENT EPISODE, MODERATE (H): ICD-10-CM

## 2021-07-26 ENCOUNTER — VIRTUAL VISIT (OUTPATIENT)
Dept: PSYCHOLOGY | Facility: CLINIC | Age: 56
End: 2021-07-26
Payer: COMMERCIAL

## 2021-07-26 DIAGNOSIS — F10.20 ALCOHOL USE DISORDER, MODERATE, DEPENDENCE (H): Primary | ICD-10-CM

## 2021-07-26 DIAGNOSIS — F33.1 MAJOR DEPRESSIVE DISORDER, RECURRENT EPISODE, MODERATE (H): ICD-10-CM

## 2021-07-26 DIAGNOSIS — F41.1 GENERALIZED ANXIETY DISORDER: ICD-10-CM

## 2021-07-26 PROCEDURE — 90834 PSYTX W PT 45 MINUTES: CPT | Mod: 95 | Performed by: SOCIAL WORKER

## 2021-07-26 NOTE — PROGRESS NOTES
Progress Note    Client Name: Jeanna Steel  Date: 7/26/2021          Service Type: Individual     Session Start Time:  12:00  Session End Time: 12:45     Session Length: 45 min    Session #: 47    Attendees: Client attended alone     Telemedicine Visit: The patient's condition can be safely assessed and treated via synchronous audio and visual telemedicine encounter.      Reason for Telemedicine Visit: Services only offered telehealth    Originating Site (Patient Location): Patient's home    Distant Site (Provider Location): Provider Remote Setting- Home Office     Consent:  The patient/guardian has verbally consented to: the potential risks and benefits of telemedicine (video visit) versus in person care; bill my insurance or make self-payment for services provided; and responsibility for payment of non-covered services.     Mode of Communication:  Video Conference via [employer]    As the provider I attest to compliance with applicable laws and regulations related to telemedicine.     Treatment Plan Last Reviewed:  6/14/2021  PHQ-9 / JESSICA-7 : see chart    DATA  Interactive Complexity: No  Crisis: No       Progress Since Last Session (Related to Symptoms / Goals / Homework):   Symptoms: Worsening increased down mood    Homework: Achieved / completed to satisfaction client reported trying to maintain effectiveness      Episode of Care Goals: Satisfactory progress - ACTION (Actively working towards change); Intervened by reinforcing change plan / affirming steps taken     Current / Ongoing Stressors and Concerns:   Ongoing: Client reports increased depression and anxiety symptoms while caring for elderly parents.    Current: Client reported having a difficult time last week as she drank more than she had planned. She said she's felt guilty about it and thinks that guilt has a large role in her drinking. Client described having a pattern of guilt and self-sabotage  throughout her life, related to feeling like she's made a lot of bad decisions. She reported feeling badly about deciding to  her first  which she worries contributed to her inability to have a child.     Treatment Objective(s) Addressed in This Session:   Identify negative self-talk and behaviors: challenge core beliefs, myths, and actions  Improve concentration, focus, and mindfulness in daily activities        Intervention:   DBT: checked the facts regarding client's guilt over the past. Identified client's area of control with past decisions and myths in her belief system around infertility. Reviewed how to practice self-forgiveness   Motivational Interviewing    MI Intervention: Expressed Empathy/Understanding, Supported Autonomy, Collaboration, Evocation, Permission to raise concern or advise, Open-ended questions, Reflections: simple and complex, Change talk (evoked) and Reframe     Change Talk Expressed by the Patient: Desire to change Ability to change Reasons to change Need to change Committment to change Activation Taking steps    Provider Response to Change Talk: E - Evoked more info from patient about behavior change, A - Affirmed patient's thoughts, decisions, or attempts at behavior change, R - Reflected patient's change talk and S - Summarized patient's change talk statements          ASSESSMENT: Current Emotional / Mental Status (status of significant symptoms):   Risk status (Self / Other harm or suicidal ideation)   Client denies current fears or concerns for personal safety.   Client denies current or recent suicidal ideation or behaviors.   Client denies current or recent homicidal ideation or behaviors.   Client denies current or recent self injurious behavior or ideation.   Client denies other safety concerns.   Client reports there has been no change in risk factors since their last session.     Client reports there has been no change in protective factors since their last  session.     Recommended that patient call 911 or go to the local ED should there be a change in any of these risk factors.     Appearance:   Appropriate    Eye Contact:   Good    Psychomotor Behavior: Normal     Attitude:   Cooperative  Pleasant Attentive   Orientation:   All   Speech    Rate / Production: Normal/ Responsive     Volume:  Normal    Mood:    Anxious  Normal client appeared frustrated   Affect:    Appropriate    Thought Content:  Clear    Thought Form:  Coherent  Goal Directed  Logical    Insight:    Good      Medication Review:   No changes to current psychiatric medication(s)     Medication Compliance:   Yes     Changes in Health Issues:   None reported     Chemical Use Review:   Substance Use: increase in alcohol .  Patient reports frequency of use daily.  Stage of Change: Action  Provided encouragement towards sobriety  Provided support and affirmation for steps taken towards sobriety          Tobacco Use: No change in amount of tobacco use since last session.  Contemplation  Provided encouragement to quit     Diagnosis:  1. Alcohol use disorder, moderate, dependence (H)    2. Major depressive disorder, recurrent episode, moderate (H)    3. Generalized anxiety disorder        Collateral Reports Completed:   Not Applicable    PLAN: (Client Tasks / Therapist Tasks / Other)  Client will write down her thoughts when she feels an urge to self-sabotage, practice the self-compassion journal exercise  and return for therapy in two weeks.         Maite Quinn, [facility] MSW   7/26/2021       __________________________________________________________________    Treatment Plan    Client's Name: Jeanna Steel  YOB: 1965    Date: 6/14/2021    DSM-V Diagnoses: 296.32 (F33.1) Major Depressive Disorder, Recurrent Episode, Moderate _, 300.02 (F41.1) Generalized Anxiety Disorder or Adjustment Disorders  309.28 (F43.23) With mixed anxiety and depressed mood  Psychosocial / Contextual Factors:  Client reports continued symptoms of depression and anxiety while caretaking for her two parents.  WHODAS: see intake    Referral / Collaboration:  Referral to another professional/service is not indicated at this time..    Anticipated number of session or this episode of care: 8-12      MeasurableTreatment Goal(s) related to diagnosis / functional impairment(s)  Goal 1: Client will reduce alcohol use from 5 standard drinks a day to 2 standard drinks a day in the next three months and client reporting use of three new coping skills to manage stress in the evenings.    I will know I've met my goal when I'm only drinking two drinks and going to bed earlier.      Objective #A (Client Action)    Client will identify at least three consequences of maladaptive drinking.  Status: Continued - Date(s):  6/14/2021    Intervention(s)  Therapist will assign homework to identify impact of drinking  provide support.    Objective #B  Client will identify three coping skills to replace drinking .  Status: Continued - Date(s):  6/14/2021    Intervention(s)  Therapist will assign homework to practice coping skills  teach coping skills.    Objective #C  Client will identify whether she needs further support to reduce alcohol use.  Status: Continued - Date(s):   6/14/2021    Intervention(s)  Therapist will provide support and referrals as needed, education on alcohol use.      Goal 2: Client will report continued increased acceptance towards her decision about having children as evidenced by client reporting use of effective coping skills when feeling distress or regret.    I will know I've met my goal when I can accept.      Objective #A (Client Action)    Status: Completed - Date: 6/14/2021        Client will use acceptance skills when feeling willful.    Intervention(s)  Therapist will teach acceptance DBT skills.    Objective #B  Client will identify coping skills that reduce distress when grieving.    Status: Completed - Date:  6/14/2021       Intervention(s)  Therapist will teach coping skills, self-soothing.    Goal 3: Client will improve confidence with setting and holding boundaries as evidenced by client reporting reduce avoidance and use of three effective communication skills over the next three months.     I will know I've met my goal when I know how to talk about things.      Objective #A (Client Action)    Client will learn & utilize at least 3 assertive communication skills weekly.  Status: Continued - Date(s): 6/14/2021    Intervention(s)  Therapist will teach assertiveness skills. DBT DEAR MAN skills.    Objective #B  Client will Identify negative self-talk and behaviors: challenge core beliefs, myths, and actions.    Status: Continued - Date(s): 6/14/2021    Intervention(s)  Therapist will guide client in exploring how core belief system influences her ability to communicate and cope with conflict.        Client has reviewed and agreed to the above plan.      Maite Quinn, [facility]  MS[facility]  June 14, 2021

## 2021-07-28 RX ORDER — BUPROPION HYDROCHLORIDE 150 MG/1
TABLET ORAL
Qty: 90 TABLET | Refills: 3 | Status: SHIPPED | OUTPATIENT
Start: 2021-07-28 | End: 2021-10-29

## 2021-08-26 ENCOUNTER — VIRTUAL VISIT (OUTPATIENT)
Dept: PSYCHOLOGY | Facility: CLINIC | Age: 56
End: 2021-08-26
Payer: COMMERCIAL

## 2021-08-26 DIAGNOSIS — F10.20 ALCOHOL USE DISORDER, MODERATE, DEPENDENCE (H): Primary | ICD-10-CM

## 2021-08-26 DIAGNOSIS — F41.1 GENERALIZED ANXIETY DISORDER: ICD-10-CM

## 2021-08-26 DIAGNOSIS — F33.1 MAJOR DEPRESSIVE DISORDER, RECURRENT EPISODE, MODERATE (H): ICD-10-CM

## 2021-08-26 PROCEDURE — 90834 PSYTX W PT 45 MINUTES: CPT | Mod: 95 | Performed by: SOCIAL WORKER

## 2021-08-27 NOTE — PROGRESS NOTES
"                                           Progress Note    Client Name: Jeanna Steel  Date: 8/27/2021          Service Type: Individual     Session Start Time:  2:30  Session End Time: 3:15     Session Length: 45 min    Session #: 48    Attendees: Client attended alone     The patient has been notified of the following:      \"We have found that certain health care needs can be provided without the need for a face to face visit.  This service lets us provide the care you need with a phone conversation.       I will have full access to your [place] medical record during this entire phone call.   I will be taking notes for your medical record.      Since this is like an office visit, we will bill your insurance company for this service.       There are potential benefits and risks of telephone visits (e.g. limits to patient confidentiality) that differ from in-person visits.?  Confidentiality still applies for telephone services, and nobody will record the visit.  It is important to be in a quiet, private space that is free of distractions (including cell phone or other devices) during the visit.??      If during the course of the call I believe a telephone visit is not appropriate, you will not be charged for this service\"     Consent has been obtained for this service by care team member: Yes   As the provider I attest to compliance with applicable laws and regulations related to telemedicine.     Treatment Plan Last Reviewed:  6/14/2021  PHQ-9 / JESSICA-7 : see chart    DATA  Interactive Complexity: No  Crisis: No       Progress Since Last Session (Related to Symptoms / Goals / Homework):   Symptoms: Improving overall better mood    Homework: Achieved / completed to satisfaction client reported practicing self-compassion      Episode of Care Goals: Satisfactory progress - ACTION (Actively working towards change); Intervened by reinforcing change plan / affirming steps taken     Current / Ongoing Stressors and " Concerns:   Ongoing: Client reports increased depression and anxiety symptoms while caring for elderly parents.    Current: Client reported she has been doing overall well the past few weeks. She described her participation in group and how she's continuing to reduce how much alcohol she consumes. Client said she is navigating her relationships effectively.     Treatment Objective(s) Addressed in This Session:   Identify negative self-talk and behaviors: challenge core beliefs, myths, and actions  Improve concentration, focus, and mindfulness in daily activities        Intervention:   DBT: reviewed client's use of coping skills, increased acceptance and effective communication   Motivational Interviewing    MI Intervention: Expressed Empathy/Understanding, Supported Autonomy, Collaboration, Evocation, Permission to raise concern or advise, Open-ended questions, Reflections: simple and complex, Change talk (evoked) and Reframe     Change Talk Expressed by the Patient: Desire to change Ability to change Reasons to change Need to change Committment to change Activation Taking steps    Provider Response to Change Talk: E - Evoked more info from patient about behavior change, A - Affirmed patient's thoughts, decisions, or attempts at behavior change, R - Reflected patient's change talk and S - Summarized patient's change talk statements          ASSESSMENT: Current Emotional / Mental Status (status of significant symptoms):   Risk status (Self / Other harm or suicidal ideation)   Client denies current fears or concerns for personal safety.   Client denies current or recent suicidal ideation or behaviors.   Client denies current or recent homicidal ideation or behaviors.   Client denies current or recent self injurious behavior or ideation.   Client denies other safety concerns.   Client reports there has been no change in risk factors since their last session.     Client reports there has been no change in protective  factors since their last session.     Recommended that patient call 911 or go to the local ED should there be a change in any of these risk factors.     Appearance:   cannot assess over phone    Eye Contact:   cannot assess over phone    Psychomotor Behavior: cannot assess over phone     Attitude:   Cooperative  Pleasant Attentive   Orientation:   All   Speech    Rate / Production: Normal/ Responsive     Volume:  Normal    Mood:    Normal client sounded in a good mood   Affect:    cannot assess over phone    Thought Content:  Clear    Thought Form:  Coherent  Goal Directed  Logical    Insight:    Good      Medication Review:   No changes to current psychiatric medication(s)     Medication Compliance:   Yes     Changes in Health Issues:   None reported     Chemical Use Review:   Substance Use: decrease in alcohol .  Patient reports frequency of use daily, 2-4 drinks a time, said she was doing more previously.  Stage of Change: Action  Provided encouragement towards sobriety  Provided support and affirmation for steps taken towards sobriety          Tobacco Use: No change in amount of tobacco use since last session.  Contemplation  Provided encouragement to quit     Diagnosis:  1. Alcohol use disorder, moderate, dependence (H)    2. Generalized anxiety disorder    3. Major depressive disorder, recurrent episode, moderate (H)        Collateral Reports Completed:   Not Applicable    PLAN: (Client Tasks / Therapist Tasks / Other)  Client will maintain her recent progress, continue participation in group and return for therapy in two weeks.         Maite Quinn, [facility] MSW   8/27/2021       __________________________________________________________________    Treatment Plan    Client's Name: Jeanna Steel  YOB: 1965    Date: 6/14/2021    DSM-V Diagnoses: 296.32 (F33.1) Major Depressive Disorder, Recurrent Episode, Moderate _, 300.02 (F41.1) Generalized Anxiety Disorder or Adjustment Disorders  309.28  (F43.23) With mixed anxiety and depressed mood  Psychosocial / Contextual Factors: Client reports continued symptoms of depression and anxiety while caretaking for her two parents.  WHODAS: see intake    Referral / Collaboration:  Referral to another professional/service is not indicated at this time..    Anticipated number of session or this episode of care: 8-12      MeasurableTreatment Goal(s) related to diagnosis / functional impairment(s)  Goal 1: Client will reduce alcohol use from 5 standard drinks a day to 2 standard drinks a day in the next three months and client reporting use of three new coping skills to manage stress in the evenings.    I will know I've met my goal when I'm only drinking two drinks and going to bed earlier.      Objective #A (Client Action)    Client will identify at least three consequences of maladaptive drinking.  Status: Continued - Date(s):  6/14/2021    Intervention(s)  Therapist will assign homework to identify impact of drinking  provide support.    Objective #B  Client will identify three coping skills to replace drinking .  Status: Continued - Date(s):  6/14/2021    Intervention(s)  Therapist will assign homework to practice coping skills  teach coping skills.    Objective #C  Client will identify whether she needs further support to reduce alcohol use.  Status: Continued - Date(s):   6/14/2021    Intervention(s)  Therapist will provide support and referrals as needed, education on alcohol use.      Goal 2: Client will report continued increased acceptance towards her decision about having children as evidenced by client reporting use of effective coping skills when feeling distress or regret.    I will know I've met my goal when I can accept.      Objective #A (Client Action)    Status: Completed - Date: 6/14/2021        Client will use acceptance skills when feeling willful.    Intervention(s)  Therapist will teach acceptance DBT skills.    Objective #B  Client will identify  coping skills that reduce distress when grieving.    Status: Completed - Date: 6/14/2021       Intervention(s)  Therapist will teach coping skills, self-soothing.    Goal 3: Client will improve confidence with setting and holding boundaries as evidenced by client reporting reduce avoidance and use of three effective communication skills over the next three months.     I will know I've met my goal when I know how to talk about things.      Objective #A (Client Action)    Client will learn & utilize at least 3 assertive communication skills weekly.  Status: Continued - Date(s): 6/14/2021    Intervention(s)  Therapist will teach assertiveness skills. DBT DEAR MAN skills.    Objective #B  Client will Identify negative self-talk and behaviors: challenge core beliefs, myths, and actions.    Status: Continued - Date(s): 6/14/2021    Intervention(s)  Therapist will guide client in exploring how core belief system influences her ability to communicate and cope with conflict.        Client has reviewed and agreed to the above plan.      SHERYL Barriga  MSW  June 14, 2021

## 2021-09-05 ENCOUNTER — HEALTH MAINTENANCE LETTER (OUTPATIENT)
Age: 56
End: 2021-09-05

## 2021-09-16 ASSESSMENT — ANXIETY QUESTIONNAIRES
3. WORRYING TOO MUCH ABOUT DIFFERENT THINGS: NOT AT ALL
GAD7 TOTAL SCORE: 1
2. NOT BEING ABLE TO STOP OR CONTROL WORRYING: NOT AT ALL
5. BEING SO RESTLESS THAT IT IS HARD TO SIT STILL: NOT AT ALL
6. BECOMING EASILY ANNOYED OR IRRITABLE: SEVERAL DAYS
7. FEELING AFRAID AS IF SOMETHING AWFUL MIGHT HAPPEN: NOT AT ALL
4. TROUBLE RELAXING: NOT AT ALL
1. FEELING NERVOUS, ANXIOUS, OR ON EDGE: NOT AT ALL

## 2021-09-16 ASSESSMENT — PATIENT HEALTH QUESTIONNAIRE - PHQ9: SUM OF ALL RESPONSES TO PHQ QUESTIONS 1-9: 8

## 2021-09-17 ENCOUNTER — VIRTUAL VISIT (OUTPATIENT)
Dept: BEHAVIORAL HEALTH | Facility: CLINIC | Age: 56
End: 2021-09-17
Payer: COMMERCIAL

## 2021-09-17 ENCOUNTER — VIRTUAL VISIT (OUTPATIENT)
Dept: PSYCHIATRY | Facility: CLINIC | Age: 56
End: 2021-09-17
Attending: FAMILY MEDICINE
Payer: COMMERCIAL

## 2021-09-17 DIAGNOSIS — F41.9 ANXIETY DISORDER, UNSPECIFIED TYPE: ICD-10-CM

## 2021-09-17 DIAGNOSIS — F41.1 GAD (GENERALIZED ANXIETY DISORDER): Primary | ICD-10-CM

## 2021-09-17 DIAGNOSIS — F33.1 MAJOR DEPRESSIVE DISORDER, RECURRENT EPISODE, MODERATE (H): Primary | ICD-10-CM

## 2021-09-17 DIAGNOSIS — F10.20 ALCOHOL USE DISORDER, MODERATE, DEPENDENCE (H): ICD-10-CM

## 2021-09-17 DIAGNOSIS — F33.41 RECURRENT MAJOR DEPRESSIVE DISORDER, IN PARTIAL REMISSION (H): ICD-10-CM

## 2021-09-17 PROCEDURE — 99205 OFFICE O/P NEW HI 60 MIN: CPT | Mod: 95 | Performed by: NURSE PRACTITIONER

## 2021-09-17 PROCEDURE — 90832 PSYTX W PT 30 MINUTES: CPT | Mod: 95 | Performed by: SOCIAL WORKER

## 2021-09-17 RX ORDER — BUPROPION HYDROCHLORIDE 150 MG/1
150 TABLET, EXTENDED RELEASE ORAL DAILY
Qty: 30 TABLET | Refills: 1 | Status: SHIPPED | OUTPATIENT
Start: 2021-09-17 | End: 2021-11-06

## 2021-09-17 RX ORDER — GABAPENTIN 300 MG/1
300 CAPSULE ORAL 3 TIMES DAILY
Qty: 90 CAPSULE | Refills: 1 | Status: SHIPPED | OUTPATIENT
Start: 2021-09-17 | End: 2021-12-07

## 2021-09-17 ASSESSMENT — COLUMBIA-SUICIDE SEVERITY RATING SCALE - C-SSRS
TOTAL  NUMBER OF ABORTED OR SELF INTERRUPTED ATTEMPTS PAST LIFETIME: NO
2. HAVE YOU ACTUALLY HAD ANY THOUGHTS OF KILLING YOURSELF LIFETIME?: YES
4. HAVE YOU HAD THESE THOUGHTS AND HAD SOME INTENTION OF ACTING ON THEM?: NO
TOTAL  NUMBER OF ABORTED OR SELF INTERRUPTED ATTEMPTS PAST 3 MONTHS: NO
REASONS FOR IDEATION LIFETIME: DOES NOT APPLY
ATTEMPT PAST THREE MONTHS: NO
6. HAVE YOU EVER DONE ANYTHING, STARTED TO DO ANYTHING, OR PREPARED TO DO ANYTHING TO END YOUR LIFE?: NO
5. HAVE YOU STARTED TO WORK OUT OR WORKED OUT THE DETAILS OF HOW TO KILL YOURSELF? DO YOU INTEND TO CARRY OUT THIS PLAN?: NO
2. HAVE YOU ACTUALLY HAD ANY THOUGHTS OF KILLING YOURSELF?: NO
3. HAVE YOU BEEN THINKING ABOUT HOW YOU MIGHT KILL YOURSELF?: NO
6. HAVE YOU EVER DONE ANYTHING, STARTED TO DO ANYTHING, OR PREPARED TO DO ANYTHING TO END YOUR LIFE?: NO
4. HAVE YOU HAD THESE THOUGHTS AND HAD SOME INTENTION OF ACTING ON THEM?: NO
ATTEMPT LIFETIME: NO
1. IN THE PAST MONTH, HAVE YOU WISHED YOU WERE DEAD OR WISHED YOU COULD GO TO SLEEP AND NOT WAKE UP?: NO
TOTAL  NUMBER OF INTERRUPTED ATTEMPTS LIFETIME: NO
TOTAL  NUMBER OF INTERRUPTED ATTEMPTS PAST 3 MONTHS: NO
1. IN THE PAST MONTH, HAVE YOU WISHED YOU WERE DEAD OR WISHED YOU COULD GO TO SLEEP AND NOT WAKE UP?: YES
5. HAVE YOU STARTED TO WORK OUT OR WORKED OUT THE DETAILS OF HOW TO KILL YOURSELF? DO YOU INTEND TO CARRY OUT THIS PLAN?: NO

## 2021-09-17 ASSESSMENT — PATIENT HEALTH QUESTIONNAIRE - PHQ9
10. IF YOU CHECKED OFF ANY PROBLEMS, HOW DIFFICULT HAVE THESE PROBLEMS MADE IT FOR YOU TO DO YOUR WORK, TAKE CARE OF THINGS AT HOME, OR GET ALONG WITH OTHER PEOPLE: SOMEWHAT DIFFICULT
SUM OF ALL RESPONSES TO PHQ QUESTIONS 1-9: 8

## 2021-09-17 ASSESSMENT — ANXIETY QUESTIONNAIRES
GAD7 TOTAL SCORE: 1
8. IF YOU CHECKED OFF ANY PROBLEMS, HOW DIFFICULT HAVE THESE MADE IT FOR YOU TO DO YOUR WORK, TAKE CARE OF THINGS AT HOME, OR GET ALONG WITH OTHER PEOPLE?: NOT DIFFICULT AT ALL
7. FEELING AFRAID AS IF SOMETHING AWFUL MIGHT HAPPEN: NOT AT ALL
GAD7 TOTAL SCORE: 1

## 2021-09-17 NOTE — PROGRESS NOTES
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 (www.mentalhealth.org): 944.489.5081 or 881-065-9045.  [place] Crisis Text Line: Text [place] to 712280  Suicide LifeLine Chat: suicideLiazonline.org/chat    ADMINISTRATIVE BILLIN min spent interviewing patient, reviewing referral documents, obtaining and reviewing outside records, communication with other health specialists, and preparing this report on today's date  Video/Phone Start Time: 10:28 AM  Video/Phone End Time: 11:21 AM    Patient Status:  Our psychiatry providers act as a specialty service for Primary Care Providers in the [facility] that seek to optimize medications for unstable patients.  Once medications have been optimized, our providers discharge the patient back to the referring Primary Care Provider for ongoing medication management.  This type of system allows our providers to serve a high volume of patients. At this time  Patient will continue to be seen for ongoing consultation and stabilization.    Signed:   Shayy Randhawa, MSN, APRN,  [facility] ([facility])   Chart documentation done in part with Dragon Voice Recognition software.  Although reviewed after completion, some word and grammatical errors may remain.

## 2021-09-17 NOTE — Clinical Note
Thank you for the Psychiatry referral to the [facility] ([facility]). Our psychiatry providers act as a specialty service for Primary Care Providers in the [facility] who seek to optimize medications for unstable patients.  Once medications have been optimized, our providers discharge the patient back to the referring Primary Care Provider for ongoing medication management.  This type of system allows our providers to serve a high volume of patients.     Please see my Impression and Plan. I will continue to work with them in stabilizing their mood.  If you have any questions or concerns, please let me know. Thank you again!    Shayy Randhawa, MSN, APRN, CNP, FMHNP-BC,   [facility], [place]

## 2021-09-17 NOTE — ASSESSMENT & PLAN NOTE
Mood is currently stable.  However, the bupropion XL may be impacting sleep and nightmares, will transition to the SR formula to evaluate if this eliminates or reduces this side effects as she feels the medication is effective.  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  After two weeks on the bupropion SR and if no concerning symptoms, she can decrease the sertraline to 100 mg.  She is actively working to decrease her alcohol intake and will initiate gabapentin 300 mg three times a day which can decrease alcohol urges.  Follow-up six weeks

## 2021-09-17 NOTE — PROGRESS NOTES
PATIENT'S NAME: Jeanna Steel  PREFERRED NAME: Jeanna  PREFERRED PRONOUNS:    MRN:   [id number]  :   1965   ACCT. NUMBER: [id number]  DATE OF SERVICE: 21  START TIME: 10:03a  END TIME: 10:40a    BRIEF ADULT DIAGNOSTIC ASSESSMENT    Telemedicine Visit: The patient's condition can be safely assessed and treated via synchronous audio and visual telemedicine encounter.      Reason for Telemedicine Visit: Services only offered telehealth    Originating Site (Patient Location): Patient's home    Distant Site (Provider Location): Provider Remote Setting- Home Office    Consent:  The patient/guardian has verbally consented to: the potential risks and benefits of telemedicine (video visit) versus in person care; bill my insurance or make self-payment for services provided; and responsibility for payment of non-covered services.     Mode of Communication:  Video Conference via Sol[employer]    As the provider I attest to compliance with applicable laws and regulations related to telemedicine.    Identifying Information:  Patient is a 56 year old, .  The pronoun use throughout this assessment reflects the patient's chosen pronoun.  Patient was referred for an assessment by primary care provider.  Patient attended the session alone.     Chief Complaint:   Pt shares that she has struggled with anxiety and depression much of her life.  Pt shares that in  (3 months ago) she mentioned to her PCP that she has been having nighmares nightly where she kicks and screams.  Pt shares that she was concerned that it was possibly medication related so PCP stopped her welllbutrin for about 1 month.  During that month she noticed she was gaining weight so called her PCP and went back on wellbutrin about 6 weeks ago.  Reports that nightmares did seem to get better when she was off wellbutrin.    Pt shares that at this time, she is also reducing her alcohol use and wonders if that could be part of it.  Pt shares  that she is part of an on-line accountability group regarding her alcohol use and is working hard on cutting back.  She shares that it has been very helpful and is also seeing an individual therapist every other week.  Has cut back from daily drinking and being unsure of how much she is drinking to drinking about 4 days a week and having 4-6 drinks.   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  She shares that her parents are now in a memory care facility but she has a lot that she deals with (bills, their home, etc).    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 Now that she  hasn't had a job in a while now due to parents, is working on trying to get a paid position to care for her parents (PCA type position).  Pt shares she has struggled with long term infertility issues for years which has affected her mental health over the years as well as she very much wanted to be a mother.  Tried for years with first  and they ended up .  Pt shares that she is happily remarried now and they tried for children as well.  She shares that he is 11 years older than her.  Shares that he is very supportive and a great partner.    Pt has been on medication for years and would like to talk to Psychatric provider about her course of medications and find out if on right dosages, etc.      Does the client have any condition that is currently presenting as a potential to harm themselves or others (severe withdrawal, serious medical condition, severe emotional/behavioral problem)? No.  Proceed with assessment.    Review of Symptoms per patient report:  Depression: Change in sleep, Excessive or inappropriate guilt, Change in energy level and Low  self-worth  Maddy:  No Symptoms  Psychosis: No Symptoms  Anxiety: Excessive worry, Nervousness and Sleep disturbance (nightmares)  Panic:  No symptoms  Post Traumatic Stress Disorder:  No Symptoms   Eating Disorder: No Symptoms  ADD / ADHD:  No symptoms  Conduct Disorder: No symptoms  Autism Spectrum Disorder: No symptoms  Obsessive Compulsive Disorder: No Symptoms currently.  Used to have some compulsive thinking.    Sleep:  Went through a long period of drinking heavily before bed.  Has cut back on drinking now and doesn't feel the gets quality sleep.  Has nightmares often.  Is trying not to nap as much during the day.      Caffeine: 1-2 cups of coffee  Tobacco: n/a    Current alcohol use: cutting back from daily to about 4 days a week.  When drinks has about 4-6 drinks a day.  Current drug use: denies    Rating Scales:  PHQ-9:   [facility] [place] Follow-up to PHQ 6/28/2021 9/16/2021 9/16/2021   PHQ-9 9. Suicide Ideation past 2 weeks Not at all Not at all Not at all   Thoughts of suicide or self harm in past 2 weeks - - -   Thoughts of suicide or self harm in past 2 weeks - - -   PHQ-9 Self harm plan? - - -   PHQ-9 Self harm action? - - -   PHQ-9 Safety concerns? - - -   PHQ-9 Safety concerns? - - -      GAD7:    JESSICA-7 SCORE 6/28/2021 9/16/2021 9/16/2021   Total Score - - -   Total Score 2 (minimal anxiety) - 1 (minimal anxiety)   Total Score 2 1 1     CGI:  First:  No data recorded  Most recent:  No data recorded    WHODAS:   WHODAS 2.0 Total Score 9/17/2021 9/17/2021   Total Score 12 12   Total Score MyChart - 12        CAGE:    CAGE-AID Flowsheet 5/16/2019 9/17/2021 9/17/2021   Have you ever felt you should Cut down on your drinking or drug use? 1 1 1   Have people Annoyed you by criticizing your drinking or drug use? 0 0 0   Have you ever felt bad or Guilty about your drinking or drug use? 1 1 1   Have you ever had a drink or used drugs first thing in the morning to steady your nerves or to get rid of a hangover? (Eye  opener) 0 0 0   CAGE-AID SCORE 2 2 2   1. Have you felt you ought to cut down on your drinking or drug use? Yes - Yes   2. Have people annoyed you by criticizing your drinking or drug use? No - No   3. Have you felt bad or guilty about your drinking or drug use? Yes - Yes   4. Have you ever had a drink or used drugs first thing in the morning to steady your nerves or to get rid of a hangover (eye opener)? No - No   CAGE-AID SCORE 2 (A total score of 2 or greater is considered clinically significant) - 2 (A total score of 2 or greater is considered clinically significant)         Personal Medical History:  Past Medical History:   Diagnosis Date     Anxiety      Depressive disorder      Infertility, female        Patient has received mental health services in the past: therapy with current provider and past providers.  Psychiatric Hospitalizations: None.  Patient denies a history of civil commitment. Currently, patient is receiving other mental health services.  These include psychotherapy with provider in [place].     Patient does report a history of head injury / trauma / cognitive impairment / seizures.  Concussion in 2012, slipped on ice and hit back of head.  Did go to hospital and had some problems with verbal understanding for a short period of time.    Current Medications:  Current Outpatient Medications   Medication Sig Dispense Refill     buPROPion (WELLBUTRIN XL) 150 MG 24 hr tablet TAKE 1 TABLET(150 MG) BY MOUTH EVERY MORNING.  Please set up a video visit to review medications 90 tablet 3     diphenhydrAMINE HCl (BENADRYL PO) Take 25 mg by mouth once as needed TAKEN AT 11AM THIS MORNING. (Patient not taking: Reported on 9/17/2021)       LORazepam (ATIVAN) 0.5 MG tablet Take 1 tablet (0.5 mg) by mouth daily as needed for anxiety Try to Limit to once per week 10 tablet 1     omeprazole (PRILOSEC) 40 MG DR capsule Take 1 capsule (40 mg) by mouth daily (Patient not taking: Reported on 9/17/2021) 20 capsule 0      "sertraline (ZOLOFT) 100 MG tablet Take 1 tablet (100 mg) by mouth daily Take with 50 mg daily for daily total of 150 mg 90 tablet 3     sertraline (ZOLOFT) 50 MG tablet TAKE 1 TABLET BY MOUTH DAILY ALONG WITH 100 MG 90 tablet 3        Allergies:  Allergies   Allergen Reactions     Ragweeds        Family Psychiatric History:  Patient did report a family history of mental health concerns.     Family History     Problem (# of Occurrences) Relation (Name,Age of Onset)    Alcohol/Drug (1) Brother    Alzheimer Disease (1) Father (Matthew)    Arthritis (1) Father (Matthew)    Breast Cancer (3) Mother (Nathalie), Maternal Grandmother (Leisa, 92), Paternal Aunt (65)    Cancer (1) Paternal Aunt: Endometrial cancer    Cerebrovascular Disease (1) Father (Matthew)    Diabetes (4) Father (Matthew), Maternal Grandfather (Alona), Paternal Grandfather, Other (Aunt Christy Father's side)    Hypertension (2) Mother (Nathalie), Father (Matthew)    Mental Illness (1) Maternal Half-Brother (Kate)    Other - See Comments (1) Brother: Multiple Sclerosis    Schizophrenia (2) Brother: suicide in 1992, Maternal Half-Brother (Kate)    Substance Abuse (2) Maternal Half-Brother (Jay), Maternal Half-Brother (Sergo)          Social/Family History:  Patient reported they grew up in [place], [place].  They were raised by biological parents. Has 1 bio siblings and 3 half siblings as mother was  before.  Patient reported that   childhood was \"pretty crazy\".  Parents fought quite a bit until parents got into therapy.  Pt did witness parents fighting physically and verbally.  Patient denies experiencing childhood abuse/neglect. Patient described their current relationships with family of origin as good.  Cares for her elderly parents.      The patient has been  2 times and has 0 children. described the relationship with   spouse as, \"wonderful.\" Patient reported having few good friends.     Cultural influences and impact on patient's life structure, " "values, norms, and healthcare: Pt denies. Patient identified their preferred language to be English. Patient reported they does not need the assistance of an  or other support involved in treatment.       Educational/Occupational History:  Patient reported   highest education level was graduate school. The patient did not serve in the .  Patient is currently takes care of her parents.       Social History     Socioeconomic History     Marital status:      Spouse name: Not on file     Number of children: Not on file     Years of education: Not on file     Highest education level: Not on file   Occupational History     Occupation: Artist     Comment: Also works at [employer]   Tobacco Use     Smoking status: Never Smoker     Smokeless tobacco: Never Used   Substance and Sexual Activity     Alcohol use: Yes     Alcohol/week: 11.7 standard drinks     Comment: 2-3 on the weekends.      Drug use: Yes     Types: Marijuana     Comment: edibles very rarely- friend bakes goods     Sexual activity: Yes     Partners: Male     Birth control/protection: None   Other Topics Concern     Parent/sibling w/ CABG, MI or angioplasty before 65F 55M? No      Service Not Asked     Blood Transfusions Not Asked     Caffeine Concern Not Asked     Occupational Exposure Not Asked     Hobby Hazards Not Asked     Sleep Concern Not Asked     Stress Concern Not Asked     Weight Concern Not Asked     Special Diet Not Asked     Back Care Not Asked     Exercise Yes     Comment: Trying to get into a regular pattern     Bike Helmet Not Asked     Seat Belt Not Asked     Self-Exams Not Asked   Social History Narrative    Single, long term boyfriend.  Works as , but also has \"day job\" at [employer] store.       Social Determinants of Health     Financial Resource Strain:      Difficulty of Paying Living Expenses:    Food Insecurity:      Worried About Running Out of Food in the Last Year:      Ran Out " "of Food in the Last Year:    Transportation Needs:      Lack of Transportation (Medical):      Lack of Transportation (Non-Medical):    Physical Activity:      Days of Exercise per Week:      Minutes of Exercise per Session:    Stress:      Feeling of Stress :    Social Connections:      Frequency of Communication with Friends and Family:      Frequency of Social Gatherings with Friends and Family:      Attends Yarsanism Services:      Active Member of Clubs or Organizations:      Attends Club or Organization Meetings:      Marital Status:    Intimate Partner Violence:      Fear of Current or Ex-Partner:      Emotionally Abused:      Physically Abused:      Sexually Abused:        Patient reported that they have not been involved with the legal system.   Patient denies being on probation / parole / under the jurisdiction of the court.    Current Mental Status Exam:   Appearance:   Appropriate    Eye Contact:   Good   Psychomotor:   Normal   Attitude / Demeanor:  Cooperative   Speech      Rate / Production:  Normal/ Responsive      Volume:   Normal  volume      Language:   intact  Mood:    Normal  Affect:    Appropriate    Thought Content:  Clear   Thought Process:  Coherent       Associations:  No loosening of associations  Insight:    Good   Judgment:   Intact   Orientation:   All  Attention/concentration: Good      Safety Assessment:   Current Safety Concerns:  Harding Suicide Severity Rating Scale (Lifetime/Recent)  Harding Suicide Severity Rating (Lifetime/Recent) 2/4/2020 9/17/2021   1. Wish to be Dead (Lifetime) Yes Yes   Wish to be Dead Description (Lifetime) thought there was a miscarriage has had thoughts of \"why am I here\"   1. Wish to be Dead (Recent) No No   2. Non-Specific Active Suicidal Thoughts (Lifetime) Yes Yes   Non-Specific Active Suicidal Thought Description (Lifetime) when I was 36  -   2. Non-Specific Active Suicidal Thoughts (Recent) No No   3. Active Suicidal Ideation with any Methods (Not " Plan) Without Intent to Act (Lifetime) Yes No   Active Suicidal Ideation with any Methods (Not Plan) Description (Lifetime) glass broke, thought about using it to cut -   3. Active Suicidal Ideation with any Methods (Not Plan) Without Intent to Act (Recent) No -   4. Active Suicidal Ideation with Some Intent to Act, Without Specific Plan (Lifetime) Yes No   Active Suicidal Ideation with Some Intent to Act, Without Specific Plan Description (Lifetime) thought about cutting self with glass -   4. Active Suicidal Ideation with Some Intent to Act, Without Specific Plan (Recent) No No   5. Active Suicidal Ideation with Specific Plan and Intent (Lifetime) No No   5. Active Suicidal Ideation with Specific Plan and Intent (Recent) No No   Most Severe Ideation Rating (Lifetime) 5 3   Most Severe Ideation Description (Lifetime) moment at 36 years old -   Frequency (Lifetime) 4 3   Duration (Lifetime) 1 1   Controllability (Lifetime) 0 1   Protective Factors  (Lifetime) 2 1   Reasons for Ideation (Lifetime) 5 0   Frequency (Past Month) - NA   Duration (Past Month) - NA   Controllability (Past Month) - NA   Protective Factors (Past Month) - NA   Actual Attempt (Lifetime) Yes No   Actual Attempt Description (Lifetime) at 36, drank a bottle of wine, broke wine glass, contemplated cutting wrist with broken glass shard -   Total Number of Actual Attempts (Lifetime) 1 -   Actual Attempt (Past 3 Months) No No   Has subject engaged in non-suicidal self-injurious behavior? (Lifetime) - No   Has subject engaged in non-suicidal self-injurious behavior? (Past 3 Months) - No   Interrupted Attempts (Lifetime) - No   Interrupted Attempts (Past 3 Months) - No   Aborted or Self-Interrupted Attempt (Lifetime) - No   Aborted or Self-Interrupted Attempt (Past 3 Months) - No   Preparatory Acts or Behavior (Lifetime) - No   Preparatory Acts or Behavior (Past 3 Months) - No   Most Recent Attempt Actual Lethality Code - NA   Most Lethal Attempt  Actual Lethality Code - NA   Initial/First Attempt Actual Lethality Code - NA     Patient denies current homicidal ideation and behaviors.  Patient denies current self-injurious ideation and behaviors.    Patient denied risk behaviors associated with substance use.  Patient denies any high risk behaviors associated with mental health symptoms.  Patient reports the following current concerns for their personal safety: None.  Patient reports there no firearms in the house. n/a.     History of Safety Concerns:  Patient denied a history of homicidal ideation.     Patient denied a history of personal safety concerns.    Patient denied a history of assaultive behaviors.    Patient denied a history of sexual assault behaviors.     Patient denied a history of risk behaviors associated with substance use.  Patient denies any history of high risk behaviors associated with mental health symptoms.  Patient reports the following protective factors: positive relationships positive social network and positive family connections, forward/future oriented thinking and dedication to family/friends    Risk Plan:  See Preliminary Treatment Plan for Safety and Risk Management Plan    Diagnosis:  Diagnostic Criteria:   Mixed anxiety-depressive disorder: clinically significant symptoms of anxiety and depression, but the criteria are not met for either a specific Mood Disorder or a specific Anxiety Disorder.  Clinically significant social phobic symptoms that are related to the social impact of having a general medical condition or mental disorder  Anxiety disorder is present, but at this time therapist is unable to determine whether it is primary.  Further assessment needed.  Client reports the following symptoms of anxiety:   - Excessive anxiety and worry about a number of events or activities (such as work or school performance).    - The person finds it difficult to control the worry.   - Sleep disturbance (difficulty falling or staying  asleep, or restless unsatisfying sleep).    - The focus of the anxiety and worry is not confined to features of an Axis I disorder.   - The anxiety, worry, or physical symptoms cause clinically significant distress or impairment in social, occupational, or other important areas of functioning.   CRITERIA (A-C) REPRESENT A MAJOR DEPRESSIVE EPISODE - SELECT THESE CRITERIA  A) Recurrent episode(s) - symptoms have been present during the same 2-week period and represent a change from previous functioning 5 or more symptoms (required for diagnosis)   - Depressed mood. Note: In children and adolescents, can be irritable mood.     - Diminished interest or pleasure in all, or almost all, activities.    - Significant weight gainincrease in appetite.    - Fatigue or loss of energy.    - Feelings of worthlessness or inappropriate and excessive guilt.   B) The symptoms cause clinically significant distress or impairment in social, occupational, or other important areas of functioning  C) The episode is not attributable to the physiological effects of a substance or to another medical condition  D) The occurence of major depressive episode is not better explained by other thought / psychotic disorders  E) There has never been a manic episode or hypomanic episode    Diagnoses:  1. Major depressive disorder, recurrent episode, moderate (H)    2. Anxiety disorder, unspecified type    3. Alcohol use disorder, moderate, dependence (H)        Patient's Strengths and Limitations:  Patient identified the following strengths or resources that will help them succeed in treatment: friends / good social support, family support and intelligence. Things that may interfere with the patient's success in treatment include: none identified.     Recommendations:     1. Plan for Safety and Risk Management:Recommended that patient call 911 or go to the local ED should there be a change in any of these risk factors..  Report to child / adult  protection services was n/a.      2. Resources/Service Plan:       services are not indicated.     Modifications to assist communication are not indicated.     Additional disability accommodations are not indicated.      3. Collaboration:  Collaboration / coordination of treatment will be initiated with the following support professionals: psychiatry.      4.  Referrals:   The following referral(s) will be initiated: none at this time.  pt has an out-pt therapist and an on-line alcohol support group.       Staff Name/Credentials:  Faith Rivera on 9/17/2021 at 12:50 PM    September 17, 2021

## 2021-09-18 ASSESSMENT — PATIENT HEALTH QUESTIONNAIRE - PHQ9: SUM OF ALL RESPONSES TO PHQ QUESTIONS 1-9: 8

## 2021-09-18 ASSESSMENT — ANXIETY QUESTIONNAIRES: GAD7 TOTAL SCORE: 1

## 2021-09-22 ENCOUNTER — VIRTUAL VISIT (OUTPATIENT)
Dept: PSYCHOLOGY | Facility: CLINIC | Age: 56
End: 2021-09-22
Payer: COMMERCIAL

## 2021-09-22 DIAGNOSIS — F33.1 MAJOR DEPRESSIVE DISORDER, RECURRENT EPISODE, MODERATE (H): ICD-10-CM

## 2021-09-22 DIAGNOSIS — F10.20 ALCOHOL USE DISORDER, MODERATE, DEPENDENCE (H): Primary | ICD-10-CM

## 2021-09-22 DIAGNOSIS — F41.1 GENERALIZED ANXIETY DISORDER: ICD-10-CM

## 2021-09-22 PROCEDURE — 90834 PSYTX W PT 45 MINUTES: CPT | Mod: 95 | Performed by: SOCIAL WORKER

## 2021-09-22 NOTE — PROGRESS NOTES
Progress Note    Client Name: Jeanna Steel  Date: 9/22/2021          Service Type: Individual     Session Start Time:  5:30  Session End Time: 6:15     Session Length: 45 min    Session #: 49    Attendees: Client attended alone     Telemedicine Visit: The patient's condition can be safely assessed and treated via synchronous audio and visual telemedicine encounter.      Reason for Telemedicine Visit: Services only offered telehealth    Originating Site (Patient Location): Patient's home    Distant Site (Provider Location): Provider Remote Setting- Home Office    Consent:  The patient/guardian has verbally consented to: the potential risks and benefits of telemedicine (video visit) versus in person care; bill my insurance or make self-payment for services provided; and responsibility for payment of non-covered services.     Mode of Communication:  Video Conference via [employer]    As the provider I attest to compliance with applicable laws and regulations related to telemedicine.     Treatment Plan Last Reviewed:  To be reviewed  PHQ-9 / JESSICA-7 : see chart    DATA  Interactive Complexity: No  Crisis: No       Progress Since Last Session (Related to Symptoms / Goals / Homework):   Symptoms: Worsening increased stress    Homework: Achieved / completed to satisfaction client reported practicing self-compassion      Episode of Care Goals: Satisfactory progress - ACTION (Actively working towards change); Intervened by reinforcing change plan / affirming steps taken     Current / Ongoing Stressors and Concerns:   Ongoing: Client reports increased depression and anxiety symptoms while caring for elderly parents.    Current: Client reported she has a difficult time maintaining reduced drinking over the past week, saying she seems to have good weeks and bad weeks. She described navigating her care for her parents which is getting more complicated with time. 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 Client reported having conflict with the housesitter who watches over her parent's home and their cats, as he is unwilling to get vaccinated against Covid-19.     Treatment Objective(s) Addressed in This Session:   Identify negative self-talk and behaviors: challenge core beliefs, myths, and actions  Improve concentration, focus, and mindfulness in daily activities        Intervention:   DBT: explored client's pattern with alcohol use, how she's using her resources. Identified client's experience of guilt and shame, identified they were unjustified and how to challenge on her own. Practiced interpersonal skills with approaching housesitter.   Motivational Interviewing    MI Intervention: Expressed Empathy/Understanding, Supported Autonomy, Collaboration, Evocation, Permission to raise concern or advise, Open-ended questions, Reflections: simple and complex, Change talk (evoked) and Reframe     Change Talk Expressed by the Patient: Desire to change Ability to change Reasons to change Need to change Committment to change Activation Taking steps    Provider Response to Change Talk: E - Evoked more info from patient about behavior change, A - Affirmed patient's thoughts, decisions, or attempts at behavior change, R - Reflected patient's change talk and S - Summarized patient's change talk statements          ASSESSMENT: Current Emotional / Mental Status (status of significant symptoms):   Risk status (Self / Other harm or suicidal ideation)   Client denies current fears or concerns for personal safety.   Client denies current or recent suicidal ideation or behaviors.   Client denies current or recent homicidal ideation or behaviors.   Client denies current or recent self injurious behavior or ideation.   Client denies other safety concerns.   Client reports there has been no change in risk  factors since their last session.     Client reports there has been no change in protective factors since their last session.     Recommended that patient call 911 or go to the local ED should there be a change in any of these risk factors.     Appearance:   Appropriate    Eye Contact:   Good    Psychomotor Behavior: Normal     Attitude:   Cooperative  Pleasant Attentive   Orientation:   All   Speech    Rate / Production: Normal/ Responsive     Volume:  Normal    Mood:    Anxious  client appeared stressed   Affect:    Appropriate    Thought Content:  Clear    Thought Form:  Coherent  Goal Directed  Logical    Insight:    Good      Medication Review:   No changes to current psychiatric medication(s)     Medication Compliance:   Yes     Changes in Health Issues:   None reported     Chemical Use Review:   Substance Use: Problem use continues with no change since last session, Stage of Change: Action  Provided encouragement towards sobriety  Provided support and affirmation for steps taken towards sobriety          Tobacco Use: No change in amount of tobacco use since last session.  Contemplation  Provided encouragement to quit     Diagnosis:  1. Alcohol use disorder, moderate, dependence (H)    2. Generalized anxiety disorder    3. Major depressive disorder, recurrent episode, moderate (H)        Collateral Reports Completed:   Not Applicable    PLAN: (Client Tasks / Therapist Tasks / Other)  Client will write down what she does to help her parents, talk with brothers about conversation with , challenge guilt and return for therapy in two weeks.         Maite Quinn, [facility] MSW   9/22/2021       __________________________________________________________________    Treatment Plan    Client's Name: Jeanna Steel  YOB: 1965    Date: 6/14/2021    DSM-V Diagnoses: 296.32 (F33.1) Major Depressive Disorder, Recurrent Episode, Moderate _, 300.02 (F41.1) Generalized Anxiety Disorder or  Adjustment Disorders  309.28 (F43.23) With mixed anxiety and depressed mood  Psychosocial / Contextual Factors: Client reports continued symptoms of depression and anxiety while caretaking for her two parents.  WHODAS: see intake    Referral / Collaboration:  Referral to another professional/service is not indicated at this time..    Anticipated number of session or this episode of care: 8-12      MeasurableTreatment Goal(s) related to diagnosis / functional impairment(s)  Goal 1: Client will reduce alcohol use from 5 standard drinks a day to 2 standard drinks a day in the next three months and client reporting use of three new coping skills to manage stress in the evenings.    I will know I've met my goal when I'm only drinking two drinks and going to bed earlier.      Objective #A (Client Action)    Client will identify at least three consequences of maladaptive drinking.  Status: Continued - Date(s):  6/14/2021    Intervention(s)  Therapist will assign homework to identify impact of drinking  provide support.    Objective #B  Client will identify three coping skills to replace drinking .  Status: Continued - Date(s):  6/14/2021    Intervention(s)  Therapist will assign homework to practice coping skills  teach coping skills.    Objective #C  Client will identify whether she needs further support to reduce alcohol use.  Status: Continued - Date(s):   6/14/2021    Intervention(s)  Therapist will provide support and referrals as needed, education on alcohol use.      Goal 2: Client will report continued increased acceptance towards her decision about having children as evidenced by client reporting use of effective coping skills when feeling distress or regret.    I will know I've met my goal when I can accept.      Objective #A (Client Action)    Status: Completed - Date: 6/14/2021        Client will use acceptance skills when feeling willful.    Intervention(s)  Therapist will teach acceptance DBT  skills.    Objective #B  Client will identify coping skills that reduce distress when grieving.    Status: Completed - Date: 6/14/2021       Intervention(s)  Therapist will teach coping skills, self-soothing.    Goal 3: Client will improve confidence with setting and holding boundaries as evidenced by client reporting reduce avoidance and use of three effective communication skills over the next three months.     I will know I've met my goal when I know how to talk about things.      Objective #A (Client Action)    Client will learn & utilize at least 3 assertive communication skills weekly.  Status: Continued - Date(s): 6/14/2021    Intervention(s)  Therapist will teach assertiveness skills. DBT DEAR MAN skills.    Objective #B  Client will Identify negative self-talk and behaviors: challenge core beliefs, myths, and actions.    Status: Continued - Date(s): 6/14/2021    Intervention(s)  Therapist will guide client in exploring how core belief system influences her ability to communicate and cope with conflict.        Client has reviewed and agreed to the above plan.      Maite Quinn, SHERYL  MSW  June 14, 2021

## 2021-09-29 ENCOUNTER — TRANSFERRED RECORDS (OUTPATIENT)
Dept: HEALTH INFORMATION MANAGEMENT | Facility: CLINIC | Age: 56
End: 2021-09-29
Payer: COMMERCIAL

## 2021-10-07 ENCOUNTER — VIRTUAL VISIT (OUTPATIENT)
Dept: PSYCHOLOGY | Facility: CLINIC | Age: 56
End: 2021-10-07
Payer: COMMERCIAL

## 2021-10-07 DIAGNOSIS — F10.20 ALCOHOL USE DISORDER, MODERATE, DEPENDENCE (H): Primary | ICD-10-CM

## 2021-10-07 DIAGNOSIS — F33.1 MAJOR DEPRESSIVE DISORDER, RECURRENT EPISODE, MODERATE (H): ICD-10-CM

## 2021-10-07 DIAGNOSIS — F41.1 GENERALIZED ANXIETY DISORDER: ICD-10-CM

## 2021-10-07 PROCEDURE — 90834 PSYTX W PT 45 MINUTES: CPT | Mod: 95 | Performed by: SOCIAL WORKER

## 2021-10-07 NOTE — PROGRESS NOTES
Progress Note    Client Name: Jeanna Steel  Date: 10/7/2021          Service Type: Individual     Session Start Time:  10:00  Session End Time: 10:45     Session Length: 45 min    Session #: 50    Attendees: Client attended alone     Telemedicine Visit: The patient's condition can be safely assessed and treated via synchronous audio and visual telemedicine encounter.      Reason for Telemedicine Visit: Services only offered telehealth    Originating Site (Patient Location): Patient's home    Distant Site (Provider Location): Provider Remote Setting- Home Office    Consent:  The patient/guardian has verbally consented to: the potential risks and benefits of telemedicine (video visit) versus in person care; bill my insurance or make self-payment for services provided; and responsibility for payment of non-covered services.     Mode of Communication:  Video Conference via [employer]    As the provider I attest to compliance with applicable laws and regulations related to telemedicine.     Treatment Plan Last Reviewed:  10/7/2021  PHQ-9 / JESSICA-7 : see chart    DATA  Interactive Complexity: No  Crisis: No       Progress Since Last Session (Related to Symptoms / Goals / Homework):   Symptoms: Improving reduced immediate stress    Homework: Achieved / completed to satisfaction client reported making progress with job description, talking with family and challenging unjustified guilt      Episode of Care Goals: Satisfactory progress - ACTION (Actively working towards change); Intervened by reinforcing change plan / affirming steps taken     Current / Ongoing Stressors and Concerns:   Ongoing: Client reports increased depression and anxiety symptoms while caring for elderly parents.    Current: Client reported she's made inconsistent progress with reducing her alcohol use, saying she can sporadically not drink but she cannot maintain it. She identified having nervousness  around taking gabapentin to help her alcohol use. 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 Client reported she is trying to figure out how to ensure they have quality life while adapting to their decreasing health. Reviewed client's goals for therapy and updated treatment plan.     Treatment Objective(s) Addressed in This Session:   Identify negative self-talk and behaviors: challenge core beliefs, myths, and actions  Improve concentration, focus, and mindfulness in daily activities        Intervention:   DBT: reviewed client's goals for therapy and updated treatment plan. Explored costs and benefits of taking gabapentin, whether it's worth trying something different. Examined interpersonal effectiveness with parents.   Motivational Interviewing    MI Intervention: Expressed Empathy/Understanding, Supported Autonomy, Collaboration, Evocation, Permission to raise concern or advise, Open-ended questions, Reflections: simple and complex, Change talk (evoked) and Reframe     Change Talk Expressed by the Patient: Desire to change Ability to change Reasons to change Need to change Committment to change Activation Taking steps    Provider Response to Change Talk: E - Evoked more info from patient about behavior change, A - Affirmed patient's thoughts, decisions, or attempts at behavior change, R - Reflected patient's change talk and S - Summarized patient's change talk statements          ASSESSMENT: Current Emotional / Mental Status (status of significant symptoms):   Risk status (Self / Other harm or suicidal ideation)   Client denies current fears or concerns for personal safety.   Client denies current or recent suicidal ideation or behaviors.   Client denies current or recent homicidal ideation or behaviors.   Client denies current or recent self injurious behavior or ideation.   Client denies other safety  concerns.   Client reports there has been no change in risk factors since their last session.     Client reports there has been no change in protective factors since their last session.     Recommended that patient call 911 or go to the local ED should there be a change in any of these risk factors.     Appearance:   Appropriate    Eye Contact:   Good    Psychomotor Behavior: Normal     Attitude:   Cooperative  Pleasant Attentive   Orientation:   All   Speech    Rate / Production: Normal/ Responsive     Volume:  Normal    Mood:    Anxious  Client appeared nervous   Affect:    Appropriate    Thought Content:  Clear    Thought Form:  Coherent  Goal Directed  Logical    Insight:    Good      Medication Review:   No changes to current psychiatric medication(s)     Medication Compliance:   Yes     Changes in Health Issues:   None reported     Chemical Use Review:   Substance Use: Problem use continues with no change since last session, Stage of Change: Action  Provided encouragement towards sobriety  Provided support and affirmation for steps taken towards sobriety          Tobacco Use: No change in amount of tobacco use since last session.  Contemplation  Provided encouragement to quit     Diagnosis:  1. Alcohol use disorder, moderate, dependence (H)    2. Generalized anxiety disorder    3. Major depressive disorder, recurrent episode, moderate (H)        Collateral Reports Completed:   Not Applicable    PLAN: (Client Tasks / Therapist Tasks / Other)  Client will ask doctor questions about gabapentin prescription, start taking as prescribed, identify use of effective skills with parents and return for therapy in two weeks.         Maite Quinn, [facility]   10/7/2021       __________________________________________________________________    Treatment Plan    Client's Name: Jeanna Steel  YOB: 1965    Date: 10/7/2021    DSM-V Diagnoses: 296.32 (F33.1) Major Depressive Disorder, Recurrent Episode,  Moderate _, 300.02 (F41.1) Generalized Anxiety Disorder or Adjustment Disorders  309.28 (F43.23) With mixed anxiety and depressed mood  Psychosocial / Contextual Factors: Client reports continued symptoms of depression and anxiety while caretaking for her two parents.  WHODAS: see intake    Referral / Collaboration:  Referral to another professional/service is not indicated at this time..    Anticipated number of session or this episode of care: 8-12      MeasurableTreatment Goal(s) related to diagnosis / functional impairment(s)  Goal 1: Client will reduce alcohol use from 5 standard drinks a day to 2 standard drinks a day in the next three months and client reporting use of three new coping skills to manage stress in the evenings.    I will know I've met my goal when I'm only drinking two drinks and going to bed earlier.      Objective #A (Client Action)    Client will identify at least three consequences of maladaptive drinking.  Status: Continued - Date(s):  10/7/2021    Intervention(s)  Therapist will assign homework to identify impact of drinking  provide support.    Objective #B  Client will identify three coping skills to replace drinking .  Status: Continued - Date(s):  10/7/2021    Intervention(s)  Therapist will assign homework to practice coping skills  teach coping skills.    Objective #C  Client will identify whether she needs further support to reduce alcohol use.  Status: Continued - Date(s):   10/7/2021    Intervention(s)  Therapist will provide support and referrals as needed, education on alcohol use.      Goal 2: Client will report continued increased acceptance towards her decision about having children as evidenced by client reporting use of effective coping skills when feeling distress or regret.    I will know I've met my goal when I can accept.      Objective #A (Client Action)    Status: Completed - Date: 6/14/2021        Client will use acceptance skills when feeling  willful.    Intervention(s)  Therapist will teach acceptance DBT skills.    Objective #B  Client will identify coping skills that reduce distress when grieving.    Status: Completed - Date: 6/14/2021       Intervention(s)  Therapist will teach coping skills, self-soothing.    Goal 3: Client will improve confidence with setting and holding boundaries as evidenced by client reporting reduce avoidance and use of three effective communication skills over the next three months.     I will know I've met my goal when I know how to talk about things.      Objective #A (Client Action)    Client will learn & utilize at least 3 assertive communication skills weekly.  Status: Continued - Date(s): 10/7/2021    Intervention(s)  Therapist will teach assertiveness skills. DBT DEAR MAN skills.    Objective #B  Client will Identify negative self-talk and behaviors: challenge core beliefs, myths, and actions.    Status: Continued - Date(s): 10/7/2021    Intervention(s)  Therapist will guide client in exploring how core belief system influences her ability to communicate and cope with conflict.        Client has reviewed and agreed to the above plan.      Maite Quinn, [facility]  MSW  October 7, 2021

## 2021-10-28 NOTE — PROGRESS NOTES
Collaborative Care Psychiatry Service (CCPS)  October 29, 2021    Behavioral Health Clinician Progress Note    Patient Name: Jeanna Steel      Telemedicine Visit: The patient's condition can be safely assessed and treated via synchronous audio and visual telemedicine encounter.      Reason for Telemedicine Visit: Services only offered telehealth    Originating Site (Patient Location): Patient's home    Distant Site (Provider Location): Provider Remote Setting- Home Office    Consent:  The patient/guardian has verbally consented to: the potential risks and benefits of telemedicine (video visit) versus in person care; bill my insurance or make self-payment for services provided; and responsibility for payment of non-covered services.     Mode of Communication:  Video Conference via [employer]    As the provider I attest to compliance with applicable laws and regulations related to telemedicine.         Service Type:  Individual      Service Location:   [place] / Email (patient reached)     Session Start Time: 1030am  Session End Time: 1047am      Session Length: 16 - 37      Attendees: Client    Visit Activities (Refresh list every visit): [facility] [place] Only    Diagnostic Assessment Date: 09/17/2021  See Flowsheets for today's PHQ-9 and JESSICA-7 results  Previous PHQ-9:   PHQ-9 SCORE 9/16/2021 9/16/2021 10/29/2021   PHQ-9 Total Score - - -   PHQ-9 Total Score MyChart - 8 (Mild depression) 6 (Mild depression)   PHQ-9 Total Score 8 8 6       Previous JESSICA-7:   JESSICA-7 SCORE 9/16/2021 9/16/2021 10/29/2021   Total Score - - -   Total Score - 1 (minimal anxiety) 0 (minimal anxiety)   Total Score 1 1 0       WHODAS  WHODAS 2.0 Total Score 9/17/2021 9/17/2021   Total Score 12 12   Total Score MyChart - 12        CAGE  CAGE-AID Flowsheet 5/16/2019 9/17/2021 9/17/2021   Have you ever felt you should Cut down on your drinking or drug use? 1 1 1   Have people Annoyed you by criticizing your drinking or drug use? 0 0 0   Have you ever  "felt bad or Guilty about your drinking or drug use? 1 1 1   Have you ever had a drink or used drugs first thing in the morning to steady your nerves or to get rid of a hangover? (Eye opener) 0 0 0   CAGE-AID SCORE 2 2 2   1. Have you felt you ought to cut down on your drinking or drug use? Yes - Yes   2. Have people annoyed you by criticizing your drinking or drug use? No - No   3. Have you felt bad or guilty about your drinking or drug use? Yes - Yes   4. Have you ever had a drink or used drugs first thing in the morning to steady your nerves or to get rid of a hangover (eye opener)? No - No   CAGE-AID SCORE 2 (A total score of 2 or greater is considered clinically significant) - 2 (A total score of 2 or greater is considered clinically significant)         DATA  Extended Session (60+ minutes): No  Interactive Complexity: No  Crisis: No    Medication Compliance:  Yes      Chemical Use Review:   Substance Use: Chemical use reviewed, no active concerns identified      Tobacco Use: No current tobacco use.      Current Stressors / Issues:   symptom update: Nightmares have improved since med change. Has decreased sertraline which is going \"fine\". Isn't sure gabapentin is working, reports that she felt \"comatose\" after the third dose so she's only taking AM and PM. Feels that it has helped reduce her drinking.   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  Feels she has adequate support through therapy and online support group.   Appetite: Fine.  Sleep: No nightmares, quality is improved. Feels rested when she wakes up.   Therapy: Is in individual therapy and thinks it's been effective. Joined an online group to help reduce etoh use.   EPIFANIO: cut drinking in half from 6-8 to 3-4.  Preg:NA  Most important: Plan for gabapentin, will she develop a tolerance so she requires more to get same effect? Sometimes feels like she " stumbles over her words more.       Progress on Treatment Objective(s) / Homework:  Stable - ACTION (Actively working towards change); Intervened by reinforcing change plan / affirming steps taken    Motivational Interviewing    MI Intervention: Co-Developed Goal: maintain progress, continue in therapy, Expressed Empathy/Understanding, Supported Autonomy, Collaboration, Evocation, Permission to raise concern or advise, Open-ended questions, Reflections: simple and complex, Change talk (evoked) and Reframe     Change Talk Expressed by the Patient: Desire to change Ability to change Reasons to change Need to change Committment to change Activation Taking steps    Provider Response to Change Talk: E - Evoked more info from patient about behavior change, A - Affirmed patient's thoughts, decisions, or attempts at behavior change, R - Reflected patient's change talk and S - Summarized patient's change talk statements        Review of Symptoms per patient report: (09/17/2021)  Depression: Change in sleep, Excessive or inappropriate guilt, Change in energy level and Low self-worth  Maddy:  No Symptoms  Psychosis: No Symptoms  Anxiety: Excessive worry, Nervousness and Sleep disturbance (nightmares)  Panic:  No symptoms  Post Traumatic Stress Disorder:  No Symptoms   Eating Disorder: No Symptoms  ADD / ADHD:  No symptoms  Conduct Disorder: No symptoms  Autism Spectrum Disorder: No symptoms  Obsessive Compulsive Disorder: No Symptoms currently.  Used to have some compulsive thinking.      Changes in Health Issues:   None reported    Assessment: Current Emotional / Mental Status (status of significant symptoms):  Risk status (Self / Other harm or suicidal ideation)  Patient denies a history of suicidal ideation, suicide attempts, self-injurious behavior, homicidal ideation, homicidal behavior and and other safety concerns  Patient denies current fears or concerns for personal safety.  Patient denies current or recent suicidal  ideation or behaviors.  Patient denies current or recent homicidal ideation or behaviors.  Patient denies current or recent self injurious behavior or ideation.  Patient denies other safety concerns.  A safety and risk management plan has not been developed at this time, however patient was encouraged to call [place] [phone] should there be a change in any of these risk factors.    Appearance:   Appropriate   Eye Contact:   Good   Psychomotor Behavior: Normal   Attitude:   Cooperative   Orientation:   All  Speech   Rate / Production: Normal    Volume:  Normal   Mood:    Normal  Affect:    Appropriate   Thought Content:  Clear   Thought Form:  Coherent  Logical   Insight:    Good     Diagnoses:  No diagnosis found.    Collateral Reports Completed:  Communicated with Shayy Randhawa, MSN, APRN, CNP, FMHNP-BC, [facility]    Plan: (Homework, other):  Patient was given information about behavioral services and encouraged to schedule a follow up appointment with the clinic [facility] in conjunction with next CCPS appointment.  She was also given information about mental health symptoms and treatment options .  CD Recommendations: No indications of CD issues.  [facility]MON[facility]      SHERYL Olivera  October 29, 2021

## 2021-10-29 ENCOUNTER — VIRTUAL VISIT (OUTPATIENT)
Dept: BEHAVIORAL HEALTH | Facility: CLINIC | Age: 56
End: 2021-10-29
Payer: COMMERCIAL

## 2021-10-29 ENCOUNTER — VIRTUAL VISIT (OUTPATIENT)
Dept: PSYCHIATRY | Facility: CLINIC | Age: 56
End: 2021-10-29
Payer: COMMERCIAL

## 2021-10-29 DIAGNOSIS — F41.1 GENERALIZED ANXIETY DISORDER: ICD-10-CM

## 2021-10-29 DIAGNOSIS — F33.1 MAJOR DEPRESSIVE DISORDER, RECURRENT EPISODE, MODERATE (H): Primary | ICD-10-CM

## 2021-10-29 DIAGNOSIS — F10.20 ALCOHOL USE DISORDER, MODERATE, DEPENDENCE (H): Primary | ICD-10-CM

## 2021-10-29 DIAGNOSIS — F33.41 RECURRENT MAJOR DEPRESSIVE DISORDER, IN PARTIAL REMISSION (H): ICD-10-CM

## 2021-10-29 DIAGNOSIS — F10.20 ALCOHOL USE DISORDER, MODERATE, DEPENDENCE (H): ICD-10-CM

## 2021-10-29 DIAGNOSIS — F41.9 ANXIETY DISORDER, UNSPECIFIED TYPE: ICD-10-CM

## 2021-10-29 PROCEDURE — 90833 PSYTX W PT W E/M 30 MIN: CPT | Mod: 95 | Performed by: SOCIAL WORKER

## 2021-10-29 PROCEDURE — 99214 OFFICE O/P EST MOD 30 MIN: CPT | Mod: 95 | Performed by: NURSE PRACTITIONER

## 2021-10-29 RX ORDER — GABAPENTIN 100 MG/1
100 CAPSULE ORAL 2 TIMES DAILY
Qty: 60 CAPSULE | Refills: 1 | Status: SHIPPED | OUTPATIENT
Start: 2021-10-29 | End: 2021-10-29

## 2021-10-29 RX ORDER — LORAZEPAM 0.5 MG/1
0.5 TABLET ORAL DAILY PRN
Qty: 10 TABLET | Refills: 0 | Status: SHIPPED | OUTPATIENT
Start: 2021-10-29 | End: 2022-01-09

## 2021-10-29 ASSESSMENT — ANXIETY QUESTIONNAIRES
7. FEELING AFRAID AS IF SOMETHING AWFUL MIGHT HAPPEN: NOT AT ALL
7. FEELING AFRAID AS IF SOMETHING AWFUL MIGHT HAPPEN: NOT AT ALL
2. NOT BEING ABLE TO STOP OR CONTROL WORRYING: NOT AT ALL
1. FEELING NERVOUS, ANXIOUS, OR ON EDGE: NOT AT ALL
8. IF YOU CHECKED OFF ANY PROBLEMS, HOW DIFFICULT HAVE THESE MADE IT FOR YOU TO DO YOUR WORK, TAKE CARE OF THINGS AT HOME, OR GET ALONG WITH OTHER PEOPLE?: NOT DIFFICULT AT ALL
GAD7 TOTAL SCORE: 0
3. WORRYING TOO MUCH ABOUT DIFFERENT THINGS: NOT AT ALL
GAD7 TOTAL SCORE: 0
5. BEING SO RESTLESS THAT IT IS HARD TO SIT STILL: NOT AT ALL
4. TROUBLE RELAXING: NOT AT ALL
6. BECOMING EASILY ANNOYED OR IRRITABLE: NOT AT ALL
GAD7 TOTAL SCORE: 0

## 2021-10-29 ASSESSMENT — PATIENT HEALTH QUESTIONNAIRE - PHQ9
SUM OF ALL RESPONSES TO PHQ QUESTIONS 1-9: 6
SUM OF ALL RESPONSES TO PHQ QUESTIONS 1-9: 6
10. IF YOU CHECKED OFF ANY PROBLEMS, HOW DIFFICULT HAVE THESE PROBLEMS MADE IT FOR YOU TO DO YOUR WORK, TAKE CARE OF THINGS AT HOME, OR GET ALONG WITH OTHER PEOPLE: SOMEWHAT DIFFICULT
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
## 2021-10-30 ASSESSMENT — ANXIETY QUESTIONNAIRES: GAD7 TOTAL SCORE: 0

## 2021-10-30 ASSESSMENT — PATIENT HEALTH QUESTIONNAIRE - PHQ9: SUM OF ALL RESPONSES TO PHQ QUESTIONS 1-9: 6

## 2021-11-06 RX ORDER — BUPROPION HYDROCHLORIDE 150 MG/1
150 TABLET, EXTENDED RELEASE ORAL DAILY
Qty: 30 TABLET | Refills: 1 | Status: SHIPPED | OUTPATIENT
Start: 2021-11-06 | End: 2021-12-07

## 2021-11-06 RX ORDER — GABAPENTIN 300 MG/1
300 CAPSULE ORAL 3 TIMES DAILY
Qty: 90 CAPSULE | Refills: 1 | Status: SHIPPED | OUTPATIENT
Start: 2021-11-06 | End: 2021-12-07

## 2021-11-07 NOTE — ASSESSMENT & PLAN NOTE
Tolerated the bupropion  mg.  The gabapentin has helped with some reduction in alcohol use.  Motivational Interviewing utilized.  She is going to try to take the full amount of gabapentin for further benefit.  She is thinking about taking 300 mg one and 600 mg at bedtime for total daily dose of 900 mg.  Follow-up=

## 2021-11-10 ENCOUNTER — VIRTUAL VISIT (OUTPATIENT)
Dept: PSYCHOLOGY | Facility: CLINIC | Age: 56
End: 2021-11-10
Payer: COMMERCIAL

## 2021-11-10 DIAGNOSIS — F33.1 MAJOR DEPRESSIVE DISORDER, RECURRENT EPISODE, MODERATE (H): ICD-10-CM

## 2021-11-10 DIAGNOSIS — F10.20 ALCOHOL USE DISORDER, MODERATE, DEPENDENCE (H): Primary | ICD-10-CM

## 2021-11-10 DIAGNOSIS — F41.1 GENERALIZED ANXIETY DISORDER: ICD-10-CM

## 2021-11-10 PROCEDURE — 90834 PSYTX W PT 45 MINUTES: CPT | Mod: 95 | Performed by: SOCIAL WORKER

## 2021-11-10 NOTE — PROGRESS NOTES
Progress Note    Client Name: Jeanna Steel  Date: 11/10/2021          Service Type: Individual     Session Start Time:  10:00  Session End Time: 10:45     Session Length: 45 min    Session #: 51    Attendees: Client attended alone     Telemedicine Visit: The patient's condition can be safely assessed and treated via synchronous audio and visual telemedicine encounter.      Reason for Telemedicine Visit: Services only offered telehealth    Originating Site (Patient Location): Patient's home    Distant Site (Provider Location): Provider Remote Setting- Home Office    Consent:  The patient/guardian has verbally consented to: the potential risks and benefits of telemedicine (video visit) versus in person care; bill my insurance or make self-payment for services provided; and responsibility for payment of non-covered services.     Mode of Communication:  Video Conference via [employer]    As the provider I attest to compliance with applicable laws and regulations related to telemedicine.     Treatment Plan Last Reviewed:  10/7/2021  PHQ-9 / JESSICA-7 : see chart    DATA  Interactive Complexity: No  Crisis: No       Progress Since Last Session (Related to Symptoms / Goals / Homework):   Symptoms: No change mood remains good    Homework: Achieved / completed to satisfaction client reported taking gabapentin prescription, using skills       Episode of Care Goals: Satisfactory progress - ACTION (Actively working towards change); Intervened by reinforcing change plan / affirming steps taken     Current / Ongoing Stressors and Concerns:   Ongoing: Client reports increased depression and anxiety symptoms while caring for elderly parents.    Current: Client reported she's been doing generally well and has felt benefit from her new medication. She said her alcohol cravings are down though she's still drinking most nights. She described feeling unsure about her dynamic with her  sister-in-law as she knows it's not the healthiest relationship but finds it really difficult to set boundaries. Client identified that she feels like alcohol is a part of her connection to others and is worried about how they would change if she stopped drinking.     Treatment Objective(s) Addressed in This Session:   Identify negative self-talk and behaviors: challenge core beliefs, myths, and actions       Intervention:   DBT: reviewed improved ability to make decisions and not ruminate. Explored when client can prioritize her boundaries over connection to others.   Motivational Interviewing    MI Intervention: Expressed Empathy/Understanding, Supported Autonomy, Collaboration, Evocation, Permission to raise concern or advise, Open-ended questions, Reflections: simple and complex, Change talk (evoked) and Reframe     Change Talk Expressed by the Patient: Desire to change Ability to change Reasons to change Need to change Committment to change Activation Taking steps    Provider Response to Change Talk: E - Evoked more info from patient about behavior change, A - Affirmed patient's thoughts, decisions, or attempts at behavior change, R - Reflected patient's change talk and S - Summarized patient's change talk statements          ASSESSMENT: Current Emotional / Mental Status (status of significant symptoms):   Risk status (Self / Other harm or suicidal ideation)   Client denies current fears or concerns for personal safety.   Client denies current or recent suicidal ideation or behaviors.   Client denies current or recent homicidal ideation or behaviors.   Client denies current or recent self injurious behavior or ideation.   Client denies other safety concerns.   Client reports there has been no change in risk factors since their last session.     Client reports there has been no change in protective factors since their last session.     Recommended that patient call 911 or go to the local ED should there be a  change in any of these risk factors.     Appearance:   Appropriate    Eye Contact:   Good    Psychomotor Behavior: Normal     Attitude:   Cooperative  Pleasant Attentive   Orientation:   All   Speech    Rate / Production: Normal/ Responsive     Volume:  Normal    Mood:    Normal client presented with mostly good mood   Affect:    Appropriate    Thought Content:  Clear    Thought Form:  Coherent  Goal Directed  Logical    Insight:    Good      Medication Review:   No changes to current psychiatric medication(s)     Medication Compliance:   Yes     Changes in Health Issues:   None reported     Chemical Use Review:   Substance Use: Problem use continues with no change since last session, Stage of Change: Preparatory and Action  Provided encouragement towards sobriety  Provided support and affirmation for steps taken towards sobriety          Tobacco Use: No change in amount of tobacco use since last session.  Contemplation  Provided encouragement to quit     Diagnosis:  1. Alcohol use disorder, moderate, dependence (H)    2. Generalized anxiety disorder    3. Major depressive disorder, recurrent episode, moderate (H)        Collateral Reports Completed:   Not Applicable    PLAN: (Client Tasks / Therapist Tasks / Other)  Client will do cost/benefit analysis with relationships and alchol and return for therapy in two weeks.         Maite Quinn, [facility]   11/10/2021       __________________________________________________________________    Treatment Plan    Client's Name: Jeanna Steel  YOB: 1965    Date: 10/7/2021    DSM-V Diagnoses: 296.32 (F33.1) Major Depressive Disorder, Recurrent Episode, Moderate _, 300.02 (F41.1) Generalized Anxiety Disorder or Adjustment Disorders  309.28 (F43.23) With mixed anxiety and depressed mood  Psychosocial / Contextual Factors: Client reports continued symptoms of depression and anxiety while caretaking for her two parents.  WHODAS: see intake    Referral /  Collaboration:  Referral to another professional/service is not indicated at this time..    Anticipated number of session or this episode of care: 8-12      MeasurableTreatment Goal(s) related to diagnosis / functional impairment(s)  Goal 1: Client will reduce alcohol use from 5 standard drinks a day to 2 standard drinks a day in the next three months and client reporting use of three new coping skills to manage stress in the evenings.    I will know I've met my goal when I'm only drinking two drinks and going to bed earlier.      Objective #A (Client Action)    Client will identify at least three consequences of maladaptive drinking.  Status: Continued - Date(s):  10/7/2021    Intervention(s)  Therapist will assign homework to identify impact of drinking  provide support.    Objective #B  Client will identify three coping skills to replace drinking .  Status: Continued - Date(s):  10/7/2021    Intervention(s)  Therapist will assign homework to practice coping skills  teach coping skills.    Objective #C  Client will identify whether she needs further support to reduce alcohol use.  Status: Continued - Date(s):   10/7/2021    Intervention(s)  Therapist will provide support and referrals as needed, education on alcohol use.      Goal 2: Client will report continued increased acceptance towards her decision about having children as evidenced by client reporting use of effective coping skills when feeling distress or regret.    I will know I've met my goal when I can accept.      Objective #A (Client Action)    Status: Completed - Date: 6/14/2021        Client will use acceptance skills when feeling willful.    Intervention(s)  Therapist will teach acceptance DBT skills.    Objective #B  Client will identify coping skills that reduce distress when grieving.    Status: Completed - Date: 6/14/2021       Intervention(s)  Therapist will teach coping skills, self-soothing.    Goal 3: Client will improve confidence with  setting and holding boundaries as evidenced by client reporting reduce avoidance and use of three effective communication skills over the next three months.     I will know I've met my goal when I know how to talk about things.      Objective #A (Client Action)    Client will learn & utilize at least 3 assertive communication skills weekly.  Status: Continued - Date(s): 10/7/2021    Intervention(s)  Therapist will teach assertiveness skills. DBT DEAR MAN skills.    Objective #B  Client will Identify negative self-talk and behaviors: challenge core beliefs, myths, and actions.    Status: Continued - Date(s): 10/7/2021    Intervention(s)  Therapist will guide client in exploring how core belief system influences her ability to communicate and cope with conflict.        Client has reviewed and agreed to the above plan.      Maite Quinn, [facility]  MSW  October 7, 2021

## 2021-11-19 DIAGNOSIS — F41.1 GENERALIZED ANXIETY DISORDER: ICD-10-CM

## 2021-11-19 DIAGNOSIS — F33.1 MAJOR DEPRESSIVE DISORDER, RECURRENT EPISODE, MODERATE (H): ICD-10-CM

## 2021-11-22 RX ORDER — SERTRALINE HYDROCHLORIDE 100 MG/1
TABLET, FILM COATED ORAL
Qty: 90 TABLET | Refills: 1 | Status: SHIPPED | OUTPATIENT
Start: 2021-11-22 | End: 2022-03-08

## 2021-11-22 NOTE — TELEPHONE ENCOUNTER
Routing refill request to provider for review/approval because:  Drug not on the [facility] refill protocol for PHQ9 > 5.  Score of 6.  Please authorize if appropriate.  Thanks,  Muna Pitts RN

## 2021-11-24 ENCOUNTER — VIRTUAL VISIT (OUTPATIENT)
Dept: PSYCHOLOGY | Facility: CLINIC | Age: 56
End: 2021-11-24
Payer: COMMERCIAL

## 2021-11-24 DIAGNOSIS — F41.1 GENERALIZED ANXIETY DISORDER: ICD-10-CM

## 2021-11-24 DIAGNOSIS — F10.20 ALCOHOL USE DISORDER, MODERATE, DEPENDENCE (H): Primary | ICD-10-CM

## 2021-11-24 DIAGNOSIS — F33.1 MAJOR DEPRESSIVE DISORDER, RECURRENT EPISODE, MODERATE (H): ICD-10-CM

## 2021-11-24 PROCEDURE — 90834 PSYTX W PT 45 MINUTES: CPT | Mod: 95 | Performed by: SOCIAL WORKER

## 2021-11-24 NOTE — PROGRESS NOTES
Progress Note    Client Name: Jeanna Steel  Date: 11/24/2021          Service Type: Individual     Session Start Time:  10:00  Session End Time: 10:45     Session Length: 45 min    Session #: 52    Attendees: Client attended alone     Telemedicine Visit: The patient's condition can be safely assessed and treated via synchronous audio and visual telemedicine encounter.      Reason for Telemedicine Visit: Services only offered telehealth    Originating Site (Patient Location): Patient's home    Distant Site (Provider Location): Provider Remote Setting- Home Office    Consent:  The patient/guardian has verbally consented to: the potential risks and benefits of telemedicine (video visit) versus in person care; bill my insurance or make self-payment for services provided; and responsibility for payment of non-covered services.     Mode of Communication:  Video Conference via [employer]    As the provider I attest to compliance with applicable laws and regulations related to telemedicine.     Treatment Plan Last Reviewed:  10/7/2021  PHQ-9 / JESSICA-7 : see chart    DATA  Interactive Complexity: No  Crisis: No       Progress Since Last Session (Related to Symptoms / Goals / Homework):   Symptoms: Improving good mood lately    Homework: Achieved / completed to satisfaction client reported exploring relationship with alcohol      Episode of Care Goals: Satisfactory progress - ACTION (Actively working towards change); Intervened by reinforcing change plan / affirming steps taken     Current / Ongoing Stressors and Concerns:   Ongoing: Client reports increased depression and anxiety symptoms while caring for elderly parents.    Current: Client reported she's been generally well and has been practicing using effective skills. She described her 5 day challenge to not drink alcohol, saying she didn't abstain completely but that it went better than it has in the past. Client said  she was able to resist her urges several days by waiting for them to pass with time. She reported feeling uninspired with art these days.     Treatment Objective(s) Addressed in This Session:   Identify negative self-talk and behaviors: challenge core beliefs, myths, and actions       Intervention:   DBT: reviewed how client was better able to tolerate distress while resisting urge for alcohol. Explored client's art practice.   Motivational Interviewing    MI Intervention: Expressed Empathy/Understanding, Supported Autonomy, Collaboration, Evocation, Permission to raise concern or advise, Open-ended questions, Reflections: simple and complex, Change talk (evoked) and Reframe     Change Talk Expressed by the Patient: Desire to change Ability to change Reasons to change Need to change Committment to change Activation Taking steps    Provider Response to Change Talk: E - Evoked more info from patient about behavior change, A - Affirmed patient's thoughts, decisions, or attempts at behavior change, R - Reflected patient's change talk and S - Summarized patient's change talk statements          ASSESSMENT: Current Emotional / Mental Status (status of significant symptoms):   Risk status (Self / Other harm or suicidal ideation)   Client denies current fears or concerns for personal safety.   Client denies current or recent suicidal ideation or behaviors.   Client denies current or recent homicidal ideation or behaviors.   Client denies current or recent self injurious behavior or ideation.   Client denies other safety concerns.   Client reports there has been no change in risk factors since their last session.     Client reports there has been no change in protective factors since their last session.     Recommended that patient call 911 or go to the local ED should there be a change in any of these risk factors.     Appearance:   Appropriate    Eye Contact:   Good    Psychomotor Behavior: Normal      Attitude:   Cooperative  Pleasant Attentive   Orientation:   All   Speech    Rate / Production: Normal/ Responsive     Volume:  Normal    Mood:    Anxious  Normal client presented with some frustration    Affect:    Appropriate    Thought Content:  Clear    Thought Form:  Coherent  Goal Directed  Logical    Insight:    Good      Medication Review:   No changes to current psychiatric medication(s)     Medication Compliance:   Yes     Changes in Health Issues:   None reported     Chemical Use Review:   Substance Use: Problem use continues with no change since last session, Stage of Change: Preparatory and Action  Provided encouragement towards sobriety  Provided support and affirmation for steps taken towards sobriety          Tobacco Use: No change in amount of tobacco use since last session.  Contemplation  Provided encouragement to quit     Diagnosis:  1. Alcohol use disorder, moderate, dependence (H)    2. Major depressive disorder, recurrent episode, moderate (H)    3. Generalized anxiety disorder        Collateral Reports Completed:   Not Applicable    PLAN: (Client Tasks / Therapist Tasks / Other)  Client will continue working on habitual use of alcohol, communicate openly and return for therapy in two weeks.         Maite Quinn, [facility]   11/24/2021       __________________________________________________________________    Treatment Plan    Client's Name: Jeanna Steel  YOB: 1965    Date: 10/7/2021    DSM-V Diagnoses: 296.32 (F33.1) Major Depressive Disorder, Recurrent Episode, Moderate _, 300.02 (F41.1) Generalized Anxiety Disorder or Adjustment Disorders  309.28 (F43.23) With mixed anxiety and depressed mood  Psychosocial / Contextual Factors: Client reports continued symptoms of depression and anxiety while caretaking for her two parents.  WHODAS: see intake    Referral / Collaboration:  Referral to another professional/service is not indicated at this time..    Anticipated  number of session or this episode of care: 8-12      MeasurableTreatment Goal(s) related to diagnosis / functional impairment(s)  Goal 1: Client will reduce alcohol use from 5 standard drinks a day to 2 standard drinks a day in the next three months and client reporting use of three new coping skills to manage stress in the evenings.    I will know I've met my goal when I'm only drinking two drinks and going to bed earlier.      Objective #A (Client Action)    Client will identify at least three consequences of maladaptive drinking.  Status: Continued - Date(s):  10/7/2021    Intervention(s)  Therapist will assign homework to identify impact of drinking  provide support.    Objective #B  Client will identify three coping skills to replace drinking .  Status: Continued - Date(s):  10/7/2021    Intervention(s)  Therapist will assign homework to practice coping skills  teach coping skills.    Objective #C  Client will identify whether she needs further support to reduce alcohol use.  Status: Continued - Date(s):   10/7/2021    Intervention(s)  Therapist will provide support and referrals as needed, education on alcohol use.      Goal 2: Client will report continued increased acceptance towards her decision about having children as evidenced by client reporting use of effective coping skills when feeling distress or regret.    I will know I've met my goal when I can accept.      Objective #A (Client Action)    Status: Completed - Date: 6/14/2021        Client will use acceptance skills when feeling willful.    Intervention(s)  Therapist will teach acceptance DBT skills.    Objective #B  Client will identify coping skills that reduce distress when grieving.    Status: Completed - Date: 6/14/2021       Intervention(s)  Therapist will teach coping skills, self-soothing.    Goal 3: Client will improve confidence with setting and holding boundaries as evidenced by client reporting reduce avoidance and use of three effective  communication skills over the next three months.     I will know I've met my goal when I know how to talk about things.      Objective #A (Client Action)    Client will learn & utilize at least 3 assertive communication skills weekly.  Status: Continued - Date(s): 10/7/2021    Intervention(s)  Therapist will teach assertiveness skills. DBT DEAR MAN skills.    Objective #B  Client will Identify negative self-talk and behaviors: challenge core beliefs, myths, and actions.    Status: Continued - Date(s): 10/7/2021    Intervention(s)  Therapist will guide client in exploring how core belief system influences her ability to communicate and cope with conflict.        Client has reviewed and agreed to the above plan.      Maite Quinn, [facility]  MSW  October 7, 2021

## 2021-11-30 ENCOUNTER — TELEPHONE (OUTPATIENT)
Dept: PSYCHIATRY | Facility: CLINIC | Age: 56
End: 2021-11-30
Payer: COMMERCIAL

## 2021-11-30 NOTE — TELEPHONE ENCOUNTER
Pt states at her last appt a prescription for 100mg gabapentin was discussed, but her pharmacy never received an order.  She is wondering if this can still be sent.

## 2021-12-01 NOTE — TELEPHONE ENCOUNTER
I show that I put a script in for 300 mg taking 1 in the day and two at night for a total daily dose of 900 mg which is where we landed for the dose of gabapentin.  It looks like I see her on 12/7/2021.  Lets keep it this way and I will discuss with her next Tuesday.    Shayy Randhawa, MSN, APRN, CNP, FMHNP-BC,
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
## 2021-12-06 ENCOUNTER — TRANSFERRED RECORDS (OUTPATIENT)
Dept: HEALTH INFORMATION MANAGEMENT | Facility: CLINIC | Age: 56
End: 2021-12-06
Payer: COMMERCIAL

## 2021-12-07 ENCOUNTER — VIRTUAL VISIT (OUTPATIENT)
Dept: BEHAVIORAL HEALTH | Facility: CLINIC | Age: 56
End: 2021-12-07
Payer: COMMERCIAL

## 2021-12-07 ENCOUNTER — VIRTUAL VISIT (OUTPATIENT)
Dept: PSYCHIATRY | Facility: CLINIC | Age: 56
End: 2021-12-07
Payer: COMMERCIAL

## 2021-12-07 DIAGNOSIS — F33.1 MAJOR DEPRESSIVE DISORDER, RECURRENT EPISODE, MODERATE (H): Primary | ICD-10-CM

## 2021-12-07 DIAGNOSIS — F33.1 MAJOR DEPRESSIVE DISORDER, RECURRENT EPISODE, MODERATE (H): ICD-10-CM

## 2021-12-07 DIAGNOSIS — F33.41 RECURRENT MAJOR DEPRESSIVE DISORDER, IN PARTIAL REMISSION (H): ICD-10-CM

## 2021-12-07 DIAGNOSIS — F10.20 ALCOHOL USE DISORDER, MODERATE, DEPENDENCE (H): ICD-10-CM

## 2021-12-07 DIAGNOSIS — F41.9 ANXIETY DISORDER, UNSPECIFIED TYPE: ICD-10-CM

## 2021-12-07 DIAGNOSIS — F41.1 GENERALIZED ANXIETY DISORDER: ICD-10-CM

## 2021-12-07 PROCEDURE — 99213 OFFICE O/P EST LOW 20 MIN: CPT | Mod: 95 | Performed by: NURSE PRACTITIONER

## 2021-12-07 PROCEDURE — 90832 PSYTX W PT 30 MINUTES: CPT | Mod: 95 | Performed by: SOCIAL WORKER

## 2021-12-07 RX ORDER — GABAPENTIN 300 MG/1
300 CAPSULE ORAL 3 TIMES DAILY
Qty: 90 CAPSULE | Refills: 1 | Status: SHIPPED | OUTPATIENT
Start: 2021-12-07 | End: 2022-03-08

## 2021-12-07 RX ORDER — BUPROPION HYDROCHLORIDE 150 MG/1
150 TABLET, EXTENDED RELEASE ORAL DAILY
Qty: 90 TABLET | Refills: 0 | Status: SHIPPED | OUTPATIENT
Start: 2021-12-07 | End: 2022-03-08

## 2021-12-07 NOTE — ASSESSMENT & PLAN NOTE
Psychiatrically stable at present. The current medical regimen is effective; continue present plan and medications. Follow-up 3 months.  Ongoing Motivational Interviewing
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
## 2021-12-08 ENCOUNTER — VIRTUAL VISIT (OUTPATIENT)
Dept: PSYCHOLOGY | Facility: CLINIC | Age: 56
End: 2021-12-08
Payer: COMMERCIAL

## 2021-12-08 DIAGNOSIS — F10.20 ALCOHOL USE DISORDER, MODERATE, DEPENDENCE (H): Primary | ICD-10-CM

## 2021-12-08 DIAGNOSIS — F33.1 MAJOR DEPRESSIVE DISORDER, RECURRENT EPISODE, MODERATE (H): ICD-10-CM

## 2021-12-08 DIAGNOSIS — F41.1 GENERALIZED ANXIETY DISORDER: ICD-10-CM

## 2021-12-08 PROCEDURE — 90832 PSYTX W PT 30 MINUTES: CPT | Mod: 95 | Performed by: SOCIAL WORKER

## 2021-12-08 NOTE — PROGRESS NOTES
Progress Note    Client Name: Jeanna Steel  Date: 12/8/2021          Service Type: Individual     Session Start Time:  10:00  Session End Time: 10:35     Session Length: 35 min    Session #: 53    Attendees: Client attended alone     Telemedicine Visit: The patient's condition can be safely assessed and treated via synchronous audio and visual telemedicine encounter.      Reason for Telemedicine Visit: Services only offered telehealth    Originating Site (Patient Location): Patient's home    Distant Site (Provider Location): Provider Remote Setting- Home Office    Consent:  The patient/guardian has verbally consented to: the potential risks and benefits of telemedicine (video visit) versus in person care; bill my insurance or make self-payment for services provided; and responsibility for payment of non-covered services.     Mode of Communication:  Video Conference via [employer]    As the provider I attest to compliance with applicable laws and regulations related to telemedicine.     Treatment Plan Last Reviewed:  10/7/2021  PHQ-9 / JESSICA-7 : see chart    DATA  Interactive Complexity: No  Crisis: No       Progress Since Last Session (Related to Symptoms / Goals / Homework):   Symptoms: Worsening feeling ill    Homework: Achieved / completed to satisfaction client reported exploring relationship with alcohol      Episode of Care Goals: Satisfactory progress - ACTION (Actively working towards change); Intervened by reinforcing change plan / affirming steps taken     Current / Ongoing Stressors and Concerns:   Ongoing: Client reports increased depression and anxiety symptoms while caring for elderly parents.    Current: Client reported she's come down with illness and still isn't feeling well. She described having nervous anticipation for January as she plans on not drinking for the month. She identified potential barriers to staying sober and reported her main issue  will be holding her boundary with her sister-in-law. Client said she was feeling annoyed with having to separate her parents at their memory facility. She reported while she knows it may be better in the long run, she anticipates it will be stressful during the transition.     Treatment Objective(s) Addressed in This Session:   Identify negative self-talk and behaviors: challenge core beliefs, myths, and actions       Intervention:   DBT: reviewed client's experience of vulnerability to emotion while being ill. Practiced coping ahead for January and changing her parent's living situation   Motivational Interviewing    MI Intervention: Expressed Empathy/Understanding, Supported Autonomy, Collaboration, Evocation, Permission to raise concern or advise, Open-ended questions, Reflections: simple and complex, Change talk (evoked) and Reframe     Change Talk Expressed by the Patient: Desire to change Ability to change Reasons to change Need to change Committment to change Activation Taking steps    Provider Response to Change Talk: E - Evoked more info from patient about behavior change, A - Affirmed patient's thoughts, decisions, or attempts at behavior change, R - Reflected patient's change talk and S - Summarized patient's change talk statements          ASSESSMENT: Current Emotional / Mental Status (status of significant symptoms):   Risk status (Self / Other harm or suicidal ideation)   Client denies current fears or concerns for personal safety.   Client denies current or recent suicidal ideation or behaviors.   Client denies current or recent homicidal ideation or behaviors.   Client denies current or recent self injurious behavior or ideation.   Client denies other safety concerns.   Client reports there has been no change in risk factors since their last session.     Client reports there has been no change in protective factors since their last session.     Recommended that patient call 911 or go to the local ED  should there be a change in any of these risk factors.     Appearance:   Appropriate    Eye Contact:   Fair    Psychomotor Behavior: Normal  Retarded (Slowed)     Attitude:   Cooperative  Pleasant Attentive   Orientation:   All   Speech    Rate / Production: Normal/ Responsive     Volume:  Normal    Mood:    Anxious  Normal client appeared fatigued and worried   Affect:    Appropriate    Thought Content:  Clear    Thought Form:  Coherent  Goal Directed  Logical    Insight:    Good      Medication Review:   No changes to current psychiatric medication(s)     Medication Compliance:   Yes     Changes in Health Issues:   None reported     Chemical Use Review:   Substance Use: Problem use continues with no change since last session, Stage of Change: Preparatory and Action  Provided encouragement towards sobriety  Provided support and affirmation for steps taken towards sobriety          Tobacco Use: No change in amount of tobacco use since last session.  Contemplation  Provided encouragement to quit     Diagnosis:  1. Alcohol use disorder, moderate, dependence (H)    2. Major depressive disorder, recurrent episode, moderate (H)    3. Generalized anxiety disorder        Collateral Reports Completed:   Not Applicable    PLAN: (Client Tasks / Therapist Tasks / Other)  Client will continue coping ahead for future stressors, rest and return for therapy in two weeks.         Maite Quinn, [facility]   12/8/2021       __________________________________________________________________    Treatment Plan    Client's Name: Jeanna Steel  YOB: 1965    Date: 10/7/2021    DSM-V Diagnoses: 296.32 (F33.1) Major Depressive Disorder, Recurrent Episode, Moderate _, 300.02 (F41.1) Generalized Anxiety Disorder or Adjustment Disorders  309.28 (F43.23) With mixed anxiety and depressed mood  Psychosocial / Contextual Factors: Client reports continued symptoms of depression and anxiety while caretaking for her two  parents.  WHODAS: see intake    Referral / Collaboration:  Referral to another professional/service is not indicated at this time..    Anticipated number of session or this episode of care: 8-12      MeasurableTreatment Goal(s) related to diagnosis / functional impairment(s)  Goal 1: Client will reduce alcohol use from 5 standard drinks a day to 2 standard drinks a day in the next three months and client reporting use of three new coping skills to manage stress in the evenings.    I will know I've met my goal when I'm only drinking two drinks and going to bed earlier.      Objective #A (Client Action)    Client will identify at least three consequences of maladaptive drinking.  Status: Continued - Date(s):  10/7/2021    Intervention(s)  Therapist will assign homework to identify impact of drinking  provide support.    Objective #B  Client will identify three coping skills to replace drinking .  Status: Continued - Date(s):  10/7/2021    Intervention(s)  Therapist will assign homework to practice coping skills  teach coping skills.    Objective #C  Client will identify whether she needs further support to reduce alcohol use.  Status: Continued - Date(s):   10/7/2021    Intervention(s)  Therapist will provide support and referrals as needed, education on alcohol use.      Goal 2: Client will report continued increased acceptance towards her decision about having children as evidenced by client reporting use of effective coping skills when feeling distress or regret.    I will know I've met my goal when I can accept.      Objective #A (Client Action)    Status: Completed - Date: 6/14/2021        Client will use acceptance skills when feeling willful.    Intervention(s)  Therapist will teach acceptance DBT skills.    Objective #B  Client will identify coping skills that reduce distress when grieving.    Status: Completed - Date: 6/14/2021       Intervention(s)  Therapist will teach coping skills, self-soothing.    Goal  3: Client will improve confidence with setting and holding boundaries as evidenced by client reporting reduce avoidance and use of three effective communication skills over the next three months.     I will know I've met my goal when I know how to talk about things.      Objective #A (Client Action)    Client will learn & utilize at least 3 assertive communication skills weekly.  Status: Continued - Date(s): 10/7/2021    Intervention(s)  Therapist will teach assertiveness skills. DBT DEAR MAN skills.    Objective #B  Client will Identify negative self-talk and behaviors: challenge core beliefs, myths, and actions.    Status: Continued - Date(s): 10/7/2021    Intervention(s)  Therapist will guide client in exploring how core belief system influences her ability to communicate and cope with conflict.        Client has reviewed and agreed to the above plan.      Maite Quinn, [facility]  MSW  October 7, 2021

## 2021-12-22 ENCOUNTER — TRANSFERRED RECORDS (OUTPATIENT)
Dept: HEALTH INFORMATION MANAGEMENT | Facility: CLINIC | Age: 56
End: 2021-12-22
Payer: COMMERCIAL

## 2021-12-27 ENCOUNTER — VIRTUAL VISIT (OUTPATIENT)
Dept: PSYCHOLOGY | Facility: CLINIC | Age: 56
End: 2021-12-27
Payer: COMMERCIAL

## 2021-12-27 DIAGNOSIS — F10.20 ALCOHOL USE DISORDER, MODERATE, DEPENDENCE (H): Primary | ICD-10-CM

## 2021-12-27 DIAGNOSIS — F33.1 MAJOR DEPRESSIVE DISORDER, RECURRENT EPISODE, MODERATE (H): ICD-10-CM

## 2021-12-27 PROCEDURE — 90834 PSYTX W PT 45 MINUTES: CPT | Mod: 95 | Performed by: SOCIAL WORKER

## 2021-12-27 ASSESSMENT — PATIENT HEALTH QUESTIONNAIRE - PHQ9
SUM OF ALL RESPONSES TO PHQ QUESTIONS 1-9: 3
SUM OF ALL RESPONSES TO PHQ QUESTIONS 1-9: 3
10. IF YOU CHECKED OFF ANY PROBLEMS, HOW DIFFICULT HAVE THESE PROBLEMS MADE IT FOR YOU TO DO YOUR WORK, TAKE CARE OF THINGS AT HOME, OR GET ALONG WITH OTHER PEOPLE: SOMEWHAT DIFFICULT

## 2021-12-27 NOTE — PROGRESS NOTES
Progress Note    Client Name: Jeanna Steel  Date: 12/27/2021          Service Type: Individual     Session Start Time:  12:00  Session End Time: 12:45     Session Length: 45 min    Session #: 54    Attendees: Client attended alone     Telemedicine Visit: The patient's condition can be safely assessed and treated via synchronous audio and visual telemedicine encounter.      Reason for Telemedicine Visit: Services only offered telehealth    Originating Site (Patient Location): Patient's home    Distant Site (Provider Location): Provider Remote Setting- Home Office    Consent:  The patient/guardian has verbally consented to: the potential risks and benefits of telemedicine (video visit) versus in person care; bill my insurance or make self-payment for services provided; and responsibility for payment of non-covered services.     Mode of Communication:  Video Conference via [employer]    As the provider I attest to compliance with applicable laws and regulations related to telemedicine.     Treatment Plan Last Reviewed:  10/7/2021  PHQ-9 / JESSICA-7 : see chart    DATA  Interactive Complexity: No  Crisis: No       Progress Since Last Session (Related to Symptoms / Goals / Homework):   Symptoms: Improving overall better mood    Homework: Achieved / completed to satisfaction client reported coping ahead for future stressors      Episode of Care Goals: Satisfactory progress - ACTION (Actively working towards change); Intervened by reinforcing change plan / affirming steps taken     Current / Ongoing Stressors and Concerns:   Ongoing: Client reports increased depression and anxiety symptoms while caring for elderly parents.    Current: Client reported she has been doing okay lately while navigating stress with her parents. She said she has a plan for approaching January and wanting to hold boundaries with her sister-in-law.     Treatment Objective(s) Addressed in This  Session:   Increase interest, engagement, and pleasure in doing things  Identify negative self-talk and behaviors: challenge core beliefs, myths, and actions       Intervention:   DBT: reviewed client's effective communication skills with parents, how to resist answering repeated phone calls. Explored plan for navigating drinking in January, practiced boundary setting.   Motivational Interviewing    MI Intervention: Expressed Empathy/Understanding, Supported Autonomy, Collaboration, Evocation, Permission to raise concern or advise, Open-ended questions, Reflections: simple and complex, Change talk (evoked) and Reframe     Change Talk Expressed by the Patient: Desire to change Ability to change Reasons to change Need to change Committment to change Activation Taking steps    Provider Response to Change Talk: E - Evoked more info from patient about behavior change, A - Affirmed patient's thoughts, decisions, or attempts at behavior change, R - Reflected patient's change talk and S - Summarized patient's change talk statements          ASSESSMENT: Current Emotional / Mental Status (status of significant symptoms):   Risk status (Self / Other harm or suicidal ideation)   Client denies current fears or concerns for personal safety.   Client denies current or recent suicidal ideation or behaviors.   Client denies current or recent homicidal ideation or behaviors.   Client denies current or recent self injurious behavior or ideation.   Client denies other safety concerns.   Client reports there has been no change in risk factors since their last session.     Client reports there has been no change in protective factors since their last session.     Recommended that patient call 911 or go to the local ED should there be a change in any of these risk factors.     Appearance:   Appropriate    Eye Contact:   Fair    Psychomotor Behavior: Normal     Attitude:   Cooperative  Pleasant  Attentive   Orientation:   All   Speech    Rate / Production: Normal/ Responsive     Volume:  Normal    Mood:    Anxious  Normal Client presented with some nervousness   Affect:    Appropriate    Thought Content:  Clear    Thought Form:  Coherent  Goal Directed  Logical    Insight:    Good      Medication Review:   No changes to current psychiatric medication(s)     Medication Compliance:   Yes     Changes in Health Issues:   None reported     Chemical Use Review:   Substance Use: Problem use continues with no change since last session, Stage of Change: Action  Provided encouragement towards sobriety  Provided support and affirmation for steps taken towards sobriety          Tobacco Use: No change in amount of tobacco use since last session.  Contemplation  Provided encouragement to quit     Diagnosis:  1. Alcohol use disorder, moderate, dependence (H)    2. Major depressive disorder, recurrent episode, moderate (H)        Collateral Reports Completed:   Not Applicable    PLAN: (Client Tasks / Therapist Tasks / Other)  Client will remind herself of her strengths, make plan for boundary setting and return for therapy in two weeks.         Maite Quinn, [facility]   12/27/2021       __________________________________________________________________    Treatment Plan    Client's Name: Jeanna Steel  YOB: 1965    Date: 10/7/2021    DSM-V Diagnoses: 296.32 (F33.1) Major Depressive Disorder, Recurrent Episode, Moderate _, 300.02 (F41.1) Generalized Anxiety Disorder or Adjustment Disorders  309.28 (F43.23) With mixed anxiety and depressed mood  Psychosocial / Contextual Factors: Client reports continued symptoms of depression and anxiety while caretaking for her two parents.  WHODAS: see intake    Referral / Collaboration:  Referral to another professional/service is not indicated at this time..    Anticipated number of session or this episode of care: 8-12      MeasurableTreatment Goal(s) related to  diagnosis / functional impairment(s)  Goal 1: Client will reduce alcohol use from 5 standard drinks a day to 2 standard drinks a day in the next three months and client reporting use of three new coping skills to manage stress in the evenings.    I will know I've met my goal when I'm only drinking two drinks and going to bed earlier.      Objective #A (Client Action)    Client will identify at least three consequences of maladaptive drinking.  Status: Continued - Date(s):  10/7/2021    Intervention(s)  Therapist will assign homework to identify impact of drinking  provide support.    Objective #B  Client will identify three coping skills to replace drinking .  Status: Continued - Date(s):  10/7/2021    Intervention(s)  Therapist will assign homework to practice coping skills  teach coping skills.    Objective #C  Client will identify whether she needs further support to reduce alcohol use.  Status: Continued - Date(s):   10/7/2021    Intervention(s)  Therapist will provide support and referrals as needed, education on alcohol use.      Goal 2: Client will report continued increased acceptance towards her decision about having children as evidenced by client reporting use of effective coping skills when feeling distress or regret.    I will know I've met my goal when I can accept.      Objective #A (Client Action)    Status: Completed - Date: 6/14/2021        Client will use acceptance skills when feeling willful.    Intervention(s)  Therapist will teach acceptance DBT skills.    Objective #B  Client will identify coping skills that reduce distress when grieving.    Status: Completed - Date: 6/14/2021       Intervention(s)  Therapist will teach coping skills, self-soothing.    Goal 3: Client will improve confidence with setting and holding boundaries as evidenced by client reporting reduce avoidance and use of three effective communication skills over the next three months.     I will know I've met my goal when I  know how to talk about things.      Objective #A (Client Action)    Client will learn & utilize at least 3 assertive communication skills weekly.  Status: Continued - Date(s): 10/7/2021    Intervention(s)  Therapist will teach assertiveness skills. DBT DEAR MAN skills.    Objective #B  Client will Identify negative self-talk and behaviors: challenge core beliefs, myths, and actions.    Status: Continued - Date(s): 10/7/2021    Intervention(s)  Therapist will guide client in exploring how core belief system influences her ability to communicate and cope with conflict.        Client has reviewed and agreed to the above plan.      Maite Quinn, [facility]  MSW  October 7, 2021

## 2021-12-28 ASSESSMENT — PATIENT HEALTH QUESTIONNAIRE - PHQ9: SUM OF ALL RESPONSES TO PHQ QUESTIONS 1-9: 3

## 2022-01-09 ENCOUNTER — MYC REFILL (OUTPATIENT)
Dept: PSYCHIATRY | Facility: CLINIC | Age: 57
End: 2022-01-09
Payer: COMMERCIAL

## 2022-01-09 DIAGNOSIS — F41.1 GENERALIZED ANXIETY DISORDER: ICD-10-CM

## 2022-01-10 RX ORDER — LORAZEPAM 0.5 MG/1
0.5 TABLET ORAL DAILY PRN
Qty: 10 TABLET | Refills: 0 | Status: SHIPPED | OUTPATIENT
Start: 2022-01-10 | End: 2022-03-08

## 2022-01-10 NOTE — TELEPHONE ENCOUNTER
Date of Last Office Visit: 12/7/21  Date of Next Office Visit: 3/8/22  No shows since last visit: none  Cancellations since last visit: none    Medication requested: lorazepam 0.5mg Date last ordered: 10/29/21 Qty: 10 Refills: 0     Review of [place] ?: yes  Medication last filled date: 10/29/21 Qty filled: 10  Other [place] substance on [place] ?: yes  If yes, is this a new medication?: no    Lapse in medication adherence greater than 5 days?: yes  If yes, call patient and gather details: PRN med  Medication refill request verified as identical to current order?: yes  Result of Last DAM, VPA, Li+ Level, CBC, or Carbamazepine Level (at or since last visit): N/A    Last visit treatment plan:   ASSESSMENT AND PLAN            Problem List Items Addressed This Visit                 Nervous and Auditory      Alcohol use disorder, moderate, dependence (H)      Relevant Medications      gabapentin (NEURONTIN) 300 MG capsule            Behavioral        Psychiatrically stable at present. The current medical regimen is effective; continue present plan and medications. Follow-up 3 months.  Ongoing Motivational Interviewing             Major depressive disorder, recurrent episode, moderate (H)      Relevant Medications      buPROPion (WELLBUTRIN SR) 150 MG 12 hr tablet      Generalized anxiety disorder      Relevant Medications      gabapentin (NEURONTIN) 300 MG capsule      buPROPion (WELLBUTRIN SR) 150 MG 12 hr tablet      Recurrent major depressive disorder, in partial remission (H)      Relevant Medications      buPROPion (WELLBUTRIN SR) 150 MG 12 hr tablet               CONSULTS/REFERRALS:   Continue therapy   Coordinate care with therapist as needed     MEDICAL:   None at this time  Coordinate care with PCP (Karie Queen) as needed     FOLLOW UP: Schedule an appointment with me in six weeks or sooner as needed.    Call the psychiatric nurse line with medication questions or concerns at [phone] or  [phone]  Follow up with primary care provider as planned or for acute medical concerns.        []Medication refilled per  Medication Refill in Ambulatory Care  policy.  [x]Medication unable to be refilled by RN due to criteria not met as indicated below:    []Eligibility - not seen in the last year   []Supervision - no future appointment   []Compliance - no shows, cancellations or lapse in therapy   []Verification - order discrepancy   [x]Controlled medication   []Medication not included in policy   []90-day supply request   []Other

## 2022-01-14 ENCOUNTER — VIRTUAL VISIT (OUTPATIENT)
Dept: PSYCHOLOGY | Facility: CLINIC | Age: 57
End: 2022-01-14
Payer: COMMERCIAL

## 2022-01-14 DIAGNOSIS — F10.20 ALCOHOL USE DISORDER, MODERATE, DEPENDENCE (H): Primary | ICD-10-CM

## 2022-01-14 DIAGNOSIS — F33.1 MAJOR DEPRESSIVE DISORDER, RECURRENT EPISODE, MODERATE (H): ICD-10-CM

## 2022-01-14 DIAGNOSIS — F41.1 GENERALIZED ANXIETY DISORDER: ICD-10-CM

## 2022-01-14 PROCEDURE — 90834 PSYTX W PT 45 MINUTES: CPT | Mod: 95 | Performed by: SOCIAL WORKER

## 2022-01-14 ASSESSMENT — PATIENT HEALTH QUESTIONNAIRE - PHQ9
10. IF YOU CHECKED OFF ANY PROBLEMS, HOW DIFFICULT HAVE THESE PROBLEMS MADE IT FOR YOU TO DO YOUR WORK, TAKE CARE OF THINGS AT HOME, OR GET ALONG WITH OTHER PEOPLE: SOMEWHAT DIFFICULT
SUM OF ALL RESPONSES TO PHQ QUESTIONS 1-9: 3
SUM OF ALL RESPONSES TO PHQ QUESTIONS 1-9: 3

## 2022-01-14 NOTE — PROGRESS NOTES
Progress Note    Client Name: Jeanna Steel  Date: 1/14/2022          Service Type: Individual     Session Start Time:  10:00  Session End Time: 10:45     Session Length: 45 min    Session #: 55    Attendees: Client attended alone     Telemedicine Visit: The patient's condition can be safely assessed and treated via synchronous audio and visual telemedicine encounter.      Reason for Telemedicine Visit: Services only offered telehealth    Originating Site (Patient Location): Patient's home    Distant Site (Provider Location): Provider Remote Setting- Home Office    Consent:  The patient/guardian has verbally consented to: the potential risks and benefits of telemedicine (video visit) versus in person care; bill my insurance or make self-payment for services provided; and responsibility for payment of non-covered services.     Mode of Communication:  Video Conference via [employer]    As the provider I attest to compliance with applicable laws and regulations related to telemedicine.     Treatment Plan Last Reviewed:  1/14/2022  PHQ-9 / JESSICA-7 : see chart    DATA  Interactive Complexity: No  Crisis: No       Progress Since Last Session (Related to Symptoms / Goals / Homework):   Symptoms: Improving overall better mood    Homework: Achieved / completed to satisfaction client reported reminding herself of her strengths, boundary setting       Episode of Care Goals: Satisfactory progress - ACTION (Actively working towards change); Intervened by reinforcing change plan / affirming steps taken     Current / Ongoing Stressors and Concerns:   Ongoing: Client reports increased depression and anxiety symptoms while caring for elderly parents.    Current: Client reported she has been doing her 4X5 method for drinking in January and that it has gone well. She described boundary setting and the support she has received from family. Client reported she has also emailed her  brothers her plan for managing her parent's care over the next year and they responded positively. She said she was surprised how much they supported her. Client reported she still has an urge to alter her mindset in the evening and she wants to be able to relax on her own.     Treatment Objective(s) Addressed in This Session:   Increase interest, engagement, and pleasure in doing things  Identify negative self-talk and behaviors: challenge core beliefs, myths, and actions       Intervention:   DBT: reviewed effective interpersonal skills and boundary holding   Motivational Interviewing    MI Intervention: Expressed Empathy/Understanding, Supported Autonomy, Collaboration, Evocation, Permission to raise concern or advise, Open-ended questions, Reflections: simple and complex, Change talk (evoked) and Reframe     Change Talk Expressed by the Patient: Desire to change Ability to change Reasons to change Need to change Committment to change Activation Taking steps    Provider Response to Change Talk: E - Evoked more info from patient about behavior change, A - Affirmed patient's thoughts, decisions, or attempts at behavior change, R - Reflected patient's change talk and S - Summarized patient's change talk statements          ASSESSMENT: Current Emotional / Mental Status (status of significant symptoms):   Risk status (Self / Other harm or suicidal ideation)   Client denies current fears or concerns for personal safety.   Client denies current or recent suicidal ideation or behaviors.   Client denies current or recent homicidal ideation or behaviors.   Client denies current or recent self injurious behavior or ideation.   Client denies other safety concerns.   Client reports there has been no change in risk factors since their last session.     Client reports there has been no change in protective factors since their last session.     Recommended that patient call 911 or go to the local ED should there be a change in  any of these risk factors.     Appearance:   Appropriate    Eye Contact:   Fair    Psychomotor Behavior: Normal     Attitude:   Cooperative  Pleasant Attentive   Orientation:   All   Speech    Rate / Production: Normal/ Responsive     Volume:  Normal    Mood:    Anxious  Normal Client presented with good mood   Affect:    Appropriate    Thought Content:  Clear    Thought Form:  Coherent  Goal Directed  Logical    Insight:    Good      Medication Review:   No changes to current psychiatric medication(s)     Medication Compliance:   Yes     Changes in Health Issues:   None reported     Chemical Use Review:   Substance Use: Problem use continues with no change since last session, Stage of Change: Action  Provided encouragement towards sobriety  Provided support and affirmation for steps taken towards sobriety          Tobacco Use: No change in amount of tobacco use since last session.  Contemplation  Provided encouragement to quit     Diagnosis:  1. Alcohol use disorder, moderate, dependence (H)    2. Major depressive disorder, recurrent episode, moderate (H)    3. 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         Maite Quinn, [facility]   1/14/2022       __________________________________________________________________    Treatment Plan    Client's Name: Jeanna Steel  YOB: 1965    Date: 1/14/2022    DSM-V Diagnoses: 296.32 (F33.1) Major Depressive Disorder, Recurrent Episode, Moderate _, 300.02 (F41.1) Generalized Anxiety Disorder or Adjustment Disorders  309.28 (F43.23) With mixed anxiety and depressed mood  Psychosocial / Contextual Factors: Client reports continued symptoms of depression and anxiety while caretaking for her two parents.  WHODAS: see  intake    Referral / Collaboration:  Referral to another professional/service is not indicated at this time..    Anticipated number of session or this episode of care: 8-12      MeasurableTreatment Goal(s) related to diagnosis / functional impairment(s)  Goal 1: Client will continue reduce alcohol use to 4-5 days a week alcohol free in the next three months and client reporting use of three new coping skills to manage stress in the evenings.    I will know I've met my goal when I'm only drinking two drinks and going to bed earlier.      Objective #A (Client Action)    Client will identify at least three consequences of maladaptive drinking.  Status: Continued - Date(s):  1/14/2022    Intervention(s)  Therapist will assign homework to identify impact of drinking  provide support.    Objective #B  Client will identify three coping skills to replace drinking .  Status: Continued - Date(s):  1/14/2022    Intervention(s)  Therapist will assign homework to practice coping skills  teach coping skills.    Objective #C  Client will identify whether she needs further support to reduce alcohol use.  Status: Continued - Date(s):   1/14/2022    Intervention(s)  Therapist will provide support and referrals as needed, education on alcohol use.      Goal 2: Client will report continued increased acceptance towards her decision about having children as evidenced by client reporting use of effective coping skills when feeling distress or regret.    I will know I've met my goal when I can accept.      Objective #A (Client Action)    Status: Completed - Date: 6/14/2021        Client will use acceptance skills when feeling willful.    Intervention(s)  Therapist will teach acceptance DBT skills.    Objective #B  Client will identify coping skills that reduce distress when grieving.    Status: Completed - Date: 6/14/2021       Intervention(s)  Therapist will teach coping skills, self-soothing.    Goal 3: Client will improve confidence  with setting and holding boundaries as evidenced by client reporting reduce avoidance and use of three effective communication skills over the next three months.     I will know I've met my goal when I know how to talk about things.      Objective #A (Client Action)    Client will learn & utilize at least 3 assertive communication skills weekly.  Status: Continued - Date(s): 1/14/2022    Intervention(s)  Therapist will teach assertiveness skills. DBT DEAR MAN skills.    Objective #B  Client will Identify negative self-talk and behaviors: challenge core beliefs, myths, and actions.    Status: Continued - Date(s): 1/14/2022    Intervention(s)  Therapist will guide client in exploring how core belief system influences her ability to communicate and cope with conflict.        Client has reviewed and agreed to the above plan.      Maite Quinn, [facility]  MS[facility]  January 14, 2022

## 2022-01-15 ASSESSMENT — PATIENT HEALTH QUESTIONNAIRE - PHQ9: SUM OF ALL RESPONSES TO PHQ QUESTIONS 1-9: 3

## 2022-01-24 ENCOUNTER — VIRTUAL VISIT (OUTPATIENT)
Dept: PSYCHOLOGY | Facility: CLINIC | Age: 57
End: 2022-01-24
Payer: COMMERCIAL

## 2022-01-24 DIAGNOSIS — F10.20 ALCOHOL USE DISORDER, MODERATE, DEPENDENCE (H): Primary | ICD-10-CM

## 2022-01-24 DIAGNOSIS — F33.1 MAJOR DEPRESSIVE DISORDER, RECURRENT EPISODE, MODERATE (H): ICD-10-CM

## 2022-01-24 DIAGNOSIS — F41.1 GENERALIZED ANXIETY DISORDER: ICD-10-CM

## 2022-01-24 PROCEDURE — 90834 PSYTX W PT 45 MINUTES: CPT | Mod: 95 | Performed by: SOCIAL WORKER

## 2022-01-24 NOTE — PROGRESS NOTES
Progress Note    Client Name: Jeanna Steel  Date: 1/24/2022          Service Type: Individual     Session Start Time:  10:00  Session End Time: 10:45     Session Length: 45 min    Session #: 55    Attendees: Client attended alone     Telemedicine Visit: The patient's condition can be safely assessed and treated via synchronous audio and visual telemedicine encounter.      Reason for Telemedicine Visit: Services only offered telehealth    Originating Site (Patient Location): Patient's home    Distant Site (Provider Location): Provider Remote Setting- Home Office    Consent:  The patient/guardian has verbally consented to: the potential risks and benefits of telemedicine (video visit) versus in person care; bill my insurance or make self-payment for services provided; and responsibility for payment of non-covered services.     Mode of Communication:  Video Conference via [employer]    As the provider I attest to compliance with applicable laws and regulations related to telemedicine.     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    Current: Client reported feeling good about her alcohol choices lately, though frustrated with tedium of caring for her parents. She described how frustrating it can be to repeat herself constantly to her parents while  knowing it's not their fault they can't remember. Client reported she's been trying to have more fun with her art rather than focus on what critics would think.      Treatment Objective(s) Addressed in This Session:   Increase interest, engagement, and pleasure in doing things  Identify negative self-talk and behaviors: challenge core beliefs, myths, and actions       Intervention:   DBT: reviewed stress management with parents, validated frustration and practiced coping ahead for managing parents while she's out of town.    Motivational Interviewing    MI Intervention: Expressed Empathy/Understanding, Supported Autonomy, Collaboration, Evocation, Permission to raise concern or advise, Open-ended questions, Reflections: simple and complex, Change talk (evoked) and Reframe     Change Talk Expressed by the Patient: Desire to change Ability to change Reasons to change Need to change Committment to change Activation Taking steps    Provider Response to Change Talk: E - Evoked more info from patient about behavior change, A - Affirmed patient's thoughts, decisions, or attempts at behavior change, R - Reflected patient's change talk and S - Summarized patient's change talk statements          ASSESSMENT: Current Emotional / Mental Status (status of significant symptoms):   Risk status (Self / Other harm or suicidal ideation)   Client denies current fears or concerns for personal safety.   Client denies current or recent suicidal ideation or behaviors.   Client denies current or recent homicidal ideation or behaviors.   Client denies current or recent self injurious behavior or ideation.   Client denies other safety concerns.   Client reports there has been no change in risk factors since their last session.     Client reports there has been no change in protective factors since their last session.     Recommended that patient call 911 or go to the local ED should there be a change in any of these risk  factors.     Appearance:   Appropriate    Eye Contact:   Fair    Psychomotor Behavior: Normal     Attitude:   Cooperative  Pleasant Attentive   Orientation:   All   Speech    Rate / Production: Normal/ Responsive     Volume:  Normal    Mood:    Irritable  Normal Client presented with frustration   Affect:    Appropriate    Thought Content:  Clear    Thought Form:  Coherent  Goal Directed  Logical    Insight:    Good      Medication Review:   No changes to current psychiatric medication(s)     Medication Compliance:   Yes     Changes in Health Issues:   None reported     Chemical Use Review:   Substance Use: Problem use continues with no change since last session, Stage of Change: Action  Provided encouragement towards sobriety  Provided support and affirmation for steps taken towards sobriety          Tobacco Use: No change in amount of tobacco use since last session.  Contemplation  Provided encouragement to quit     Diagnosis:  1. Alcohol use disorder, moderate, dependence (H)    2. Major depressive disorder, recurrent episode, moderate (H)    3. Generalized anxiety disorder        Collateral Reports Completed:   Not Applicable    PLAN: (Client Tasks / Therapist Tasks / Other)  Client will practice not having CBD to relax one night a week, practice validating herself rather than seeking reassurance and return for therapy in two weeks.         Maite Quinn, [facility] MSW   1/24/2022       __________________________________________________________________    Treatment Plan    Client's Name: Jeanna Steel  YOB: 1965    Date: 1/14/2022    DSM-V Diagnoses: 296.32 (F33.1) Major Depressive Disorder, Recurrent Episode, Moderate _, 300.02 (F41.1) Generalized Anxiety Disorder or Adjustment Disorders  309.28 (F43.23) With mixed anxiety and depressed mood  Psychosocial / Contextual Factors: Client reports continued symptoms of depression and anxiety while caretaking for her two parents.  WHODAS: see  intake    Referral / Collaboration:  Referral to another professional/service is not indicated at this time..    Anticipated number of session or this episode of care: 8-12      MeasurableTreatment Goal(s) related to diagnosis / functional impairment(s)  Goal 1: Client will continue reduce alcohol use to 4-5 days a week alcohol free in the next three months and client reporting use of three new coping skills to manage stress in the evenings.    I will know I've met my goal when I'm only drinking two drinks and going to bed earlier.      Objective #A (Client Action)    Client will identify at least three consequences of maladaptive drinking.  Status: Continued - Date(s):  1/14/2022    Intervention(s)  Therapist will assign homework to identify impact of drinking  provide support.    Objective #B  Client will identify three coping skills to replace drinking .  Status: Continued - Date(s):  1/14/2022    Intervention(s)  Therapist will assign homework to practice coping skills  teach coping skills.    Objective #C  Client will identify whether she needs further support to reduce alcohol use.  Status: Continued - Date(s):   1/14/2022    Intervention(s)  Therapist will provide support and referrals as needed, education on alcohol use.      Goal 2: Client will report continued increased acceptance towards her decision about having children as evidenced by client reporting use of effective coping skills when feeling distress or regret.    I will know I've met my goal when I can accept.      Objective #A (Client Action)    Status: Completed - Date: 6/14/2021        Client will use acceptance skills when feeling willful.    Intervention(s)  Therapist will teach acceptance DBT skills.    Objective #B  Client will identify coping skills that reduce distress when grieving.    Status: Completed - Date: 6/14/2021       Intervention(s)  Therapist will teach coping skills, self-soothing.    Goal 3: Client will improve confidence  with setting and holding boundaries as evidenced by client reporting reduce avoidance and use of three effective communication skills over the next three months.     I will know I've met my goal when I know how to talk about things.      Objective #A (Client Action)    Client will learn & utilize at least 3 assertive communication skills weekly.  Status: Continued - Date(s): 1/14/2022    Intervention(s)  Therapist will teach assertiveness skills. DBT DEAR MAN skills.    Objective #B  Client will Identify negative self-talk and behaviors: challenge core beliefs, myths, and actions.    Status: Continued - Date(s): 1/14/2022    Intervention(s)  Therapist will guide client in exploring how core belief system influences her ability to communicate and cope with conflict.        Client has reviewed and agreed to the above plan.      Maite Quinn, [facility]  [facility]  January 14, 2022

## 2022-02-08 ENCOUNTER — VIRTUAL VISIT (OUTPATIENT)
Dept: PSYCHOLOGY | Facility: CLINIC | Age: 57
End: 2022-02-08
Payer: COMMERCIAL

## 2022-02-08 DIAGNOSIS — F10.20 ALCOHOL USE DISORDER, MODERATE, DEPENDENCE (H): Primary | ICD-10-CM

## 2022-02-08 DIAGNOSIS — F41.1 GENERALIZED ANXIETY DISORDER: ICD-10-CM

## 2022-02-08 DIAGNOSIS — F33.1 MAJOR DEPRESSIVE DISORDER, RECURRENT EPISODE, MODERATE (H): ICD-10-CM

## 2022-02-08 PROCEDURE — 90834 PSYTX W PT 45 MINUTES: CPT | Mod: 95 | Performed by: SOCIAL WORKER

## 2022-02-08 NOTE — PROGRESS NOTES
Progress Note    Client Name: Jeanna Steel  Date: 2/8/2022          Service Type: Individual     Session Start Time:  12:30  Session End Time: 1:15     Session Length: 45 min    Session #: 56    Attendees: Client attended alone     Telemedicine Visit: The patient's condition can be safely assessed and treated via synchronous audio and visual telemedicine encounter.      Reason for Telemedicine Visit: Services only offered telehealth    Originating Site (Patient Location): Patient's home    Distant Site (Provider Location): Provider Remote Setting- Home Office    Consent:  The patient/guardian has verbally consented to: the potential risks and benefits of telemedicine (video visit) versus in person care; bill my insurance or make self-payment for services provided; and responsibility for payment of non-covered services.     Mode of Communication:  Video Conference via [employer]    As the provider I attest to compliance with applicable laws and regulations related to telemedicine.     Treatment Plan Last Reviewed:  1/14/2022  PHQ-9 / JESSICA-7 : see chart    DATA  Interactive Complexity: No  Crisis: No       Progress Since Last Session (Related to Symptoms / Goals / Homework):   Symptoms: Worsening increased nervousness    Homework: Achieved / completed to satisfaction client reported practicing relaxation      Episode of Care Goals: Satisfactory progress - ACTION (Actively working towards change); Intervened by reinforcing change plan / affirming steps taken     Current / Ongoing Stressors and Concerns:   Ongoing: Client reports increased depression and anxiety symptoms while caring for elderly parents.    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 She described what she's worried about and how she doesn't have a lot of information yet. Client said she is trying to get better at navigating her mom's changing memory though it's  difficult as her mom can be argumentative.     Treatment Objective(s) Addressed in This Session:   Increase interest, engagement, and pleasure in doing things  Identify negative self-talk and behaviors: challenge core beliefs, myths, and actions       Intervention:   DBT: practiced how client can communicate around memory issues with mom, what to do versus what the facility should take care of, how to prepare for retreat    Motivational Interviewing    MI Intervention: Expressed Empathy/Understanding, Supported Autonomy, Collaboration, Evocation, Permission to raise concern or advise, Open-ended questions, Reflections: simple and complex, Change talk (evoked) and Reframe     Change Talk Expressed by the Patient: Desire to change Ability to change Reasons to change Need to change Committment to change Activation Taking steps    Provider Response to Change Talk: E - Evoked more info from patient about behavior change, A - Affirmed patient's thoughts, decisions, or attempts at behavior change, R - Reflected patient's change talk and S - Summarized patient's change talk statements          ASSESSMENT: Current Emotional / Mental Status (status of significant symptoms):   Risk status (Self / Other harm or suicidal ideation)   Client denies current fears or concerns for personal safety.   Client denies current or recent suicidal ideation or behaviors.   Client denies current or recent homicidal ideation or behaviors.   Client denies current or recent self injurious behavior or ideation.   Client denies other safety concerns.   Client reports there has been no change in risk factors since their last session.     Client reports there has been no change in protective factors since their last session.     Recommended that patient call 911 or go to the local ED should there be a change in any of these risk factors.     Appearance:   Appropriate    Eye Contact:   Good    Psychomotor Behavior: Normal     Attitude:   Cooperative   Pleasant Attentive   Orientation:   All   Speech    Rate / Production: Normal/ Responsive     Volume:  Normal    Mood:    Anxious  Normal Client appeared nervous   Affect:    Appropriate    Thought Content:  Clear    Thought Form:  Coherent  Goal Directed  Logical    Insight:    Good      Medication Review:   No changes to current psychiatric medication(s)     Medication Compliance:   Yes     Changes in Health Issues:   None reported     Chemical Use Review:   Substance Use: Problem use continues with no change since last session, Stage of Change: Action  Provided encouragement towards sobriety  Provided support and affirmation for steps taken towards sobriety          Tobacco Use: No change in amount of tobacco use since last session.  Contemplation  Provided encouragement to quit     Diagnosis:  1. Alcohol use disorder, moderate, dependence (H)    2. Major depressive disorder, recurrent episode, moderate (H)    3. Generalized anxiety disorder        Collateral Reports Completed:   Not Applicable    PLAN: (Client Tasks / Therapist Tasks / Other)  Client will check the facts when nervous, trust herself to navigate the unknown and return for therapy in two weeks.         Maite Quinn, [facility]   2/8/2022       __________________________________________________________________    Treatment Plan    Client's Name: Jeanna Steel  YOB: 1965    Date: 1/14/2022    DSM-V Diagnoses: 296.32 (F33.1) Major Depressive Disorder, Recurrent Episode, Moderate _, 300.02 (F41.1) Generalized Anxiety Disorder or Adjustment Disorders  309.28 (F43.23) With mixed anxiety and depressed mood  Psychosocial / Contextual Factors: Client reports continued symptoms of depression and anxiety while caretaking for her two parents.  WHODAS: see intake    Referral / Collaboration:  Referral to another professional/service is not indicated at this time..    Anticipated number of session or this episode of care:  8-12      MeasurableTreatment Goal(s) related to diagnosis / functional impairment(s)  Goal 1: Client will continue reduce alcohol use to 4-5 days a week alcohol free in the next three months and client reporting use of three new coping skills to manage stress in the evenings.    I will know I've met my goal when I'm only drinking two drinks and going to bed earlier.      Objective #A (Client Action)    Client will identify at least three consequences of maladaptive drinking.  Status: Continued - Date(s):  1/14/2022    Intervention(s)  Therapist will assign homework to identify impact of drinking  provide support.    Objective #B  Client will identify three coping skills to replace drinking .  Status: Continued - Date(s):  1/14/2022    Intervention(s)  Therapist will assign homework to practice coping skills  teach coping skills.    Objective #C  Client will identify whether she needs further support to reduce alcohol use.  Status: Continued - Date(s):   1/14/2022    Intervention(s)  Therapist will provide support and referrals as needed, education on alcohol use.      Goal 2: Client will report continued increased acceptance towards her decision about having children as evidenced by client reporting use of effective coping skills when feeling distress or regret.    I will know I've met my goal when I can accept.      Objective #A (Client Action)    Status: Completed - Date: 6/14/2021        Client will use acceptance skills when feeling willful.    Intervention(s)  Therapist will teach acceptance DBT skills.    Objective #B  Client will identify coping skills that reduce distress when grieving.    Status: Completed - Date: 6/14/2021       Intervention(s)  Therapist will teach coping skills, self-soothing.    Goal 3: Client will improve confidence with setting and holding boundaries as evidenced by client reporting reduce avoidance and use of three effective communication skills over the next three months.     I  will know I've met my goal when I know how to talk about things.      Objective #A (Client Action)    Client will learn & utilize at least 3 assertive communication skills weekly.  Status: Continued - Date(s): 1/14/2022    Intervention(s)  Therapist will teach assertiveness skills. DBT DEAR MAN skills.    Objective #B  Client will Identify negative self-talk and behaviors: challenge core beliefs, myths, and actions.    Status: Continued - Date(s): 1/14/2022    Intervention(s)  Therapist will guide client in exploring how core belief system influences her ability to communicate and cope with conflict.        Client has reviewed and agreed to the above plan.      Maite Quinn, [facility]  MSW  January 14, 2022

## 2022-02-09 NOTE — TELEPHONE ENCOUNTER
127 Refill request received from pharmacy    Sertraline 100 mg and 25 mg tabs      Last Written Prescription Date:  4/2/18  Last Fill Quantity: 30,   # refills: 1  Last Office Visit: 7/21/17  Future Office visit:   No future appt    Medication is being filled for 1 time refill only due to:  has been one year since last seen. Reminder letter to schedule appointment mailed.     Taina Larkin RN   [facility], [facility]

## 2022-02-20 ENCOUNTER — HEALTH MAINTENANCE LETTER (OUTPATIENT)
Age: 57
End: 2022-02-20

## 2022-02-22 ENCOUNTER — VIRTUAL VISIT (OUTPATIENT)
Dept: PSYCHOLOGY | Facility: CLINIC | Age: 57
End: 2022-02-22
Payer: COMMERCIAL

## 2022-02-22 DIAGNOSIS — F10.20 ALCOHOL USE DISORDER, MODERATE, DEPENDENCE (H): Primary | ICD-10-CM

## 2022-02-22 DIAGNOSIS — F41.1 GENERALIZED ANXIETY DISORDER: ICD-10-CM

## 2022-02-22 DIAGNOSIS — F33.1 MAJOR DEPRESSIVE DISORDER, RECURRENT EPISODE, MODERATE (H): ICD-10-CM

## 2022-02-22 PROCEDURE — 90834 PSYTX W PT 45 MINUTES: CPT | Mod: 95 | Performed by: SOCIAL WORKER

## 2022-02-22 NOTE — PROGRESS NOTES
"                                           Progress Note    Client Name: Jeanna Steel  Date: 2/22/2022          Service Type: Individual     Session Start Time:  10:00  Session End Time: 10:45     Session Length: 45 min    Session #: 57    Attendees: Client attended alone     The patient has been notified of the following:      \"We have found that certain health care needs can be provided without the need for a face to face visit.  This service lets us provide the care you need with a phone conversation.       I will have full access to your [place] medical record during this entire phone call.   I will be taking notes for your medical record.      Since this is like an office visit, we will bill your insurance company for this service.       There are potential benefits and risks of telephone visits (e.g. limits to patient confidentiality) that differ from in-person visits.?  Confidentiality still applies for telephone services, and nobody will record the visit.  It is important to be in a quiet, private space that is free of distractions (including cell phone or other devices) during the visit.??      If during the course of the call I believe a telephone visit is not appropriate, you will not be charged for this service\"     Consent has been obtained for this service by care team member: Yes      Treatment Plan Last Reviewed:  1/14/2022  PHQ-9 / JESSICA-7 : see chart    DATA  Interactive Complexity: No  Crisis: No       Progress Since Last Session (Related to Symptoms / Goals / Homework):   Symptoms: Improving improved mood    Homework: Achieved / completed to satisfaction client reported checking the facts when nervous about the unknown      Episode of Care Goals: Satisfactory progress - ACTION (Actively working towards change); Intervened by reinforcing change plan / affirming steps taken     Current / Ongoing Stressors and Concerns:   Ongoing: Client reports increased depression and anxiety symptoms while " caring for elderly parents.    Current: Client reported she had a difficult time with her drinking the past few weeks but is recommitting to working on her habits. Client said she was feeling badly about herself at first but that she talked to others about her experience, which made her feel better. She described how being honest helped her get support and also helped her feel closer with her sister-in-law. She reported after having a night without drinking she felt better about herself and wants to maintain improved self-esteem.     Treatment Objective(s) Addressed in This Session:   Increase interest, engagement, and pleasure in doing things  Identify negative self-talk and behaviors: challenge core beliefs, myths, and actions       Intervention:   DBT: reviewed client's ability to practice self-forgiveness with mistake, how holding herself accountable helped her feel better about herself   Motivational Interviewing    MI Intervention: Expressed Empathy/Understanding, Supported Autonomy, Collaboration, Evocation, Permission to raise concern or advise, Open-ended questions, Reflections: simple and complex, Change talk (evoked) and Reframe     Change Talk Expressed by the Patient: Desire to change Ability to change Reasons to change Need to change Committment to change Activation Taking steps    Provider Response to Change Talk: E - Evoked more info from patient about behavior change, A - Affirmed patient's thoughts, decisions, or attempts at behavior change, R - Reflected patient's change talk and S - Summarized patient's change talk statements          ASSESSMENT: Current Emotional / Mental Status (status of significant symptoms):   Risk status (Self / Other harm or suicidal ideation)   Client denies current fears or concerns for personal safety.   Client denies current or recent suicidal ideation or behaviors.   Client denies current or recent homicidal ideation or behaviors.   Client denies current or recent  self injurious behavior or ideation.   Client denies other safety concerns.   Client reports there has been no change in risk factors since their last session.     Client reports there has been no change in protective factors since their last session.     Recommended that patient call 911 or go to the local ED should there be a change in any of these risk factors.     Appearance:   Cannot assess over phone    Eye Contact:   Cannot assess over phone    Psychomotor Behavior: Cannot assess over phone     Attitude:   Cooperative  Pleasant Attentive   Orientation:   All   Speech    Rate / Production: Normal/ Responsive     Volume:  Normal    Mood:    Normal Client sounded hopeful   Affect:    Appropriate    Thought Content:  Clear    Thought Form:  Coherent  Goal Directed  Logical    Insight:    Good      Medication Review:   No changes to current psychiatric medication(s)     Medication Compliance:   Yes     Changes in Health Issues:   None reported     Chemical Use Review:   Substance Use: Problem use continues with no change since last session, Stage of Change: Action  Provided encouragement towards sobriety  Provided support and affirmation for steps taken towards sobriety          Tobacco Use: No change in amount of tobacco use since last session.  Contemplation  Provided encouragement to quit     Diagnosis:  1. Alcohol use disorder, moderate, dependence (H)    2. Major depressive disorder, recurrent episode, moderate (H)    3. Generalized anxiety disorder        Collateral Reports Completed:   Not Applicable    PLAN: (Client Tasks / Therapist Tasks / Other)  Client will work on her helpful habits, continue making decisions that increase her confidence and return for therapy in three weeks.         Maite Quinn, [facility] MSW   2/22/2022       __________________________________________________________________    Treatment Plan    Client's Name: Jeanna Steel  YOB: 1965    Date: 1/14/2022    DSM-V  Diagnoses: 296.32 (F33.1) Major Depressive Disorder, Recurrent Episode, Moderate _, 300.02 (F41.1) Generalized Anxiety Disorder or Adjustment Disorders  309.28 (F43.23) With mixed anxiety and depressed mood  Psychosocial / Contextual Factors: Client reports continued symptoms of depression and anxiety while caretaking for her two parents.  WHODAS: see intake    Referral / Collaboration:  Referral to another professional/service is not indicated at this time..    Anticipated number of session or this episode of care: 8-12      MeasurableTreatment Goal(s) related to diagnosis / functional impairment(s)  Goal 1: Client will continue reduce alcohol use to 4-5 days a week alcohol free in the next three months and client reporting use of three new coping skills to manage stress in the evenings.    I will know I've met my goal when I'm only drinking two drinks and going to bed earlier.      Objective #A (Client Action)    Client will identify at least three consequences of maladaptive drinking.  Status: Continued - Date(s):  1/14/2022    Intervention(s)  Therapist will assign homework to identify impact of drinking  provide support.    Objective #B  Client will identify three coping skills to replace drinking .  Status: Continued - Date(s):  1/14/2022    Intervention(s)  Therapist will assign homework to practice coping skills  teach coping skills.    Objective #C  Client will identify whether she needs further support to reduce alcohol use.  Status: Continued - Date(s):   1/14/2022    Intervention(s)  Therapist will provide support and referrals as needed, education on alcohol use.      Goal 2: Client will report continued increased acceptance towards her decision about having children as evidenced by client reporting use of effective coping skills when feeling distress or regret.    I will know I've met my goal when I can accept.      Objective #A (Client Action)    Status: Completed - Date: 6/14/2021        Client will  use acceptance skills when feeling willful.    Intervention(s)  Therapist will teach acceptance DBT skills.    Objective #B  Client will identify coping skills that reduce distress when grieving.    Status: Completed - Date: 6/14/2021       Intervention(s)  Therapist will teach coping skills, self-soothing.    Goal 3: Client will improve confidence with setting and holding boundaries as evidenced by client reporting reduce avoidance and use of three effective communication skills over the next three months.     I will know I've met my goal when I know how to talk about things.      Objective #A (Client Action)    Client will learn & utilize at least 3 assertive communication skills weekly.  Status: Continued - Date(s): 1/14/2022    Intervention(s)  Therapist will teach assertiveness skills. DBT DEAR MAN skills.    Objective #B  Client will Identify negative self-talk and behaviors: challenge core beliefs, myths, and actions.    Status: Continued - Date(s): 1/14/2022    Intervention(s)  Therapist will guide client in exploring how core belief system influences her ability to communicate and cope with conflict.        Client has reviewed and agreed to the above plan.      Maite Quinn, [facility]  MSW  January 14, 2022

## 2022-03-08 ENCOUNTER — VIRTUAL VISIT (OUTPATIENT)
Dept: BEHAVIORAL HEALTH | Facility: CLINIC | Age: 57
End: 2022-03-08
Payer: COMMERCIAL

## 2022-03-08 ENCOUNTER — VIRTUAL VISIT (OUTPATIENT)
Dept: PSYCHIATRY | Facility: CLINIC | Age: 57
End: 2022-03-08
Payer: COMMERCIAL

## 2022-03-08 DIAGNOSIS — F33.1 MAJOR DEPRESSIVE DISORDER, RECURRENT EPISODE, MODERATE (H): ICD-10-CM

## 2022-03-08 DIAGNOSIS — F41.1 GENERALIZED ANXIETY DISORDER: ICD-10-CM

## 2022-03-08 DIAGNOSIS — F33.41 RECURRENT MAJOR DEPRESSIVE DISORDER, IN PARTIAL REMISSION (H): ICD-10-CM

## 2022-03-08 DIAGNOSIS — F41.9 ANXIETY DISORDER, UNSPECIFIED TYPE: Primary | ICD-10-CM

## 2022-03-08 PROCEDURE — 90832 PSYTX W PT 30 MINUTES: CPT | Mod: 95 | Performed by: SOCIAL WORKER

## 2022-03-08 PROCEDURE — 99214 OFFICE O/P EST MOD 30 MIN: CPT | Mod: 95 | Performed by: NURSE PRACTITIONER

## 2022-03-08 RX ORDER — SERTRALINE HYDROCHLORIDE 100 MG/1
100 TABLET, FILM COATED ORAL DAILY
Qty: 90 TABLET | Refills: 0 | Status: SHIPPED | OUTPATIENT
Start: 2022-03-08 | End: 2022-05-12

## 2022-03-08 RX ORDER — BUPROPION HYDROCHLORIDE 150 MG/1
150 TABLET, EXTENDED RELEASE ORAL DAILY
Qty: 90 TABLET | Refills: 0 | Status: SHIPPED | OUTPATIENT
Start: 2022-03-08 | End: 2022-05-12

## 2022-03-08 RX ORDER — LORAZEPAM 0.5 MG/1
0.5 TABLET ORAL DAILY PRN
Qty: 10 TABLET | Refills: 0 | Status: SHIPPED | OUTPATIENT
Start: 2022-03-08 | End: 2022-05-03

## 2022-03-08 ASSESSMENT — ANXIETY QUESTIONNAIRES
6. BECOMING EASILY ANNOYED OR IRRITABLE: SEVERAL DAYS
7. FEELING AFRAID AS IF SOMETHING AWFUL MIGHT HAPPEN: SEVERAL DAYS
2. NOT BEING ABLE TO STOP OR CONTROL WORRYING: NOT AT ALL
GAD7 TOTAL SCORE: 3
3. WORRYING TOO MUCH ABOUT DIFFERENT THINGS: NOT AT ALL
IF YOU CHECKED OFF ANY PROBLEMS ON THIS QUESTIONNAIRE, HOW DIFFICULT HAVE THESE PROBLEMS MADE IT FOR YOU TO DO YOUR WORK, TAKE CARE OF THINGS AT HOME, OR GET ALONG WITH OTHER PEOPLE: SOMEWHAT DIFFICULT
5. BEING SO RESTLESS THAT IT IS HARD TO SIT STILL: NOT AT ALL
1. FEELING NERVOUS, ANXIOUS, OR ON EDGE: SEVERAL DAYS

## 2022-03-08 ASSESSMENT — PATIENT HEALTH QUESTIONNAIRE - PHQ9: 5. POOR APPETITE OR OVEREATING: NOT AT ALL

## 2022-03-08 NOTE — Clinical Note
The patient is being returned to the referring provider for ongoing care and medication prescribing.  The patient can be referred back to this service for further consultation as needed.  Please make a note should they call to schedule for follow-up.  Thank you!    Shayy Randhawa, MSN, APRN, CNP, HNP-BC,   [facility]

## 2022-03-08 NOTE — ASSESSMENT & PLAN NOTE
Doing well overall.  Requesting to discontinue the gabapentin due to not being effective and side effects.  Continue the sertraline 100 mg and bupropion  mg.  Using as needed lorazepam appropriately.  Will return to PCP for ongoing care, medication prescribing and future medication refills.   3 months of medications fills provided.  Follow up with Karie Queen in about  3 months. Patient can be re-referred back to this service for further consultation in the future if needed but a new referral will need to be placed.
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
## 2022-03-08 NOTE — PROGRESS NOTES
[facility]  March 8, 2022      Behavioral Health Clinician Progress Note    Patient Name: Jeanna Steel           Service Type:  Individual      Service Location:   [employer] / Email (patient reached)     Session Start Time: 1030am  Session End Time: 1024am      Session Length: 16 - 37      Attendees: Patient    Visit Activities (Refresh list every visit): [facility] Only    Diagnostic Assessment Date: 09/17/2021  Treatment Plan Review Date: 09/17/2022  See Flowsheets for today's PHQ-9 and JESSICA-7 results  Previous PHQ-9:   PHQ-9 SCORE 10/29/2021 12/27/2021 1/14/2022   PHQ-9 Total Score - - -   PHQ-9 Total Score MyChart 6 (Mild depression) 3 (Minimal depression) 3 (Minimal depression)   PHQ-9 Total Score 6 3 3     Previous JESSICA-7:   JESSICA-7 SCORE 9/16/2021 10/29/2021 3/8/2022   Total Score - - -   Total Score 1 (minimal anxiety) 0 (minimal anxiety) -   Total Score 1 0 3       INEZ LEVEL:  No flowsheet data found.    DATA  Extended Session (60+ minutes): No  Interactive Complexity: No  Crisis: No  [facility] Patient: No    Treatment Objective(s) Addressed in This Session:  Target Behavior(s): manage drinking behavior    Anxiety: will experience a reduction in anxiety    Current Stressors / Issues:  MH update: Things have been going well. Thinks she wants to get off gabapentin because she's gained a lot of weight and she's having a difficulty remembering to take all 3 doses. States that she's had an increase in panic attacks due to world events and they way those have impacted her financially. States that her drinking behavior had been improving but she's recently noticed returning to old patterns. Feels that her support online group(Stop Drinking Start Living) is helping with a focus on harm reduction. States that she's been working to change her routine to manage her drinking such as exercising or doing crafts when she normally drinks. Explored how to manage anxiety. Focus on things she has  control over or finding constructive ways to use that anxious energy. Occasional nightmares.  Stresses: Financial concerns  Appetite: increased with weight gain  Sleep: unremarkable  Therapy: Yes with Maite Quinn bi weekly and online support group to manage drinking behavior  EPIFANIO: yes, on weekends  Preg: No  Interventions: anxiety management skills  Most important: change or eliminating gabapentin, refill lorazepam?        Progress on Treatment Objective(s) / Homework:  Satisfactory progress - ACTION (Actively working towards change); Intervened by reinforcing change plan / affirming steps taken    Motivational Interviewing    MI Intervention: Co-Developed Goal: manage drinking behavior, Expressed Empathy/Understanding, Open-ended questions, Reflections: simple and complex, Change talk (evoked) and Reframe     Change Talk Expressed by the Patient: Desire to change Ability to change Reasons to change Need to change Committment to change Activation Taking steps    Provider Response to Change Talk: E - Evoked more info from patient about behavior change, A - Affirmed patient's thoughts, decisions, or attempts at behavior change, R - Reflected patient's change talk and S - Summarized patient's change talk statements      Care Plan review completed: Yes    Medication Review:  No changes to current psychiatric medication(s)    Medication Compliance:  Yes    Changes in Health Issues:   None reported    Chemical Use Review:   Substance Use: Chemical use reviewed, no active concerns identified      Tobacco Use: No current tobacco use.      Assessment: Current Emotional / Mental Status (status of significant symptoms):  Risk status (Self / Other harm or suicidal ideation)  Patient denies a history of suicidal ideation, suicide attempts, self-injurious behavior, homicidal ideation, homicidal behavior and and other safety concerns  Patient denies current fears or concerns for personal safety.  Patient denies current or recent  suicidal ideation or behaviors.  Patient denies current or recent homicidal ideation or behaviors.  Patient denies current or recent self injurious behavior or ideation.  Patient denies other safety concerns.  A safety and risk management plan has not been developed at this time, however patient was encouraged to call [place] [phone] should there be a change in any of these risk factors.    Appearance:   Appropriate   Eye Contact:   Good   Psychomotor Behavior: Normal   Attitude:   Cooperative   Orientation:   All  Speech   Rate / Production: Normal    Volume:  Normal   Mood:    Anxious   Affect:    Appropriate   Thought Content:  Clear   Thought Form:  Coherent  Logical   Insight:    Good     Diagnoses:  1. Anxiety disorder, unspecified type        Collateral Reports Completed:  Collaborated with Shayy Randhawa, MSN, APRN, CNP, FMHNP-BC, [place] [facility]    Plan: (Homework, other):  Patient was given information about behavioral services and encouraged to schedule a follow up appointment with the clinic [facility] in 1 month.  She was also given information about mental health symptoms and treatment options .  CD Recommendations: No indications of CD issues.  Unique Chavez MSW [facility]      ______________________________________________________________________    Integrated Primary Care Behavioral Health Treatment Plan    Patient's Name: Jeanna Steel  YOB: 1965    Date of Creation: 03/08/2022  Date Treatment Plan Last Reviewed/Revised: NA    DSM5 Diagnoses: 300.02 (F41.1) Generalized Anxiety Disorder  Psychosocial / Contextual Factors: high stress  PROMIS (reviewed every 90 days): 34    Referral / Collaboration:  Referral to another professional/service is not indicated at this time..    Anticipated number of session for this episode of care: 4-6  Anticipation frequency of session: Monthly  Anticipated Duration of each session: 16-37 minutes  Treatment plan will be reviewed in 90 days or when  goals have been changed.       MeasurableTreatment Goal(s) related to diagnosis / functional impairment(s)  Goal 1: Patient will experience improved anxiety    I will know I've met my goal when I have fewer cravings to drink.      Objective #A (Patient Action)    Patient will use at least 3 coping skills for anxiety management in the next 4 weeks.  Status: Continued - Date(s): 06/08/2022    Intervention(s)  Therapist will teach distraction skills. to manage anxiety.    Patient has reviewed and agreed to the above plan.      SHERYL Olivera  March 8, 2022

## 2022-03-09 ASSESSMENT — ANXIETY QUESTIONNAIRES: GAD7 TOTAL SCORE: 3

## 2022-03-16 ENCOUNTER — VIRTUAL VISIT (OUTPATIENT)
Dept: PSYCHOLOGY | Facility: CLINIC | Age: 57
End: 2022-03-16
Payer: COMMERCIAL

## 2022-03-16 DIAGNOSIS — F41.1 GENERALIZED ANXIETY DISORDER: ICD-10-CM

## 2022-03-16 DIAGNOSIS — F10.20 ALCOHOL USE DISORDER, MODERATE, DEPENDENCE (H): Primary | ICD-10-CM

## 2022-03-16 DIAGNOSIS — F33.1 MAJOR DEPRESSIVE DISORDER, RECURRENT EPISODE, MODERATE (H): ICD-10-CM

## 2022-03-16 PROCEDURE — 90834 PSYTX W PT 45 MINUTES: CPT | Mod: 95 | Performed by: SOCIAL WORKER

## 2022-03-16 NOTE — PROGRESS NOTES
[facility]                                     Progress Note    Patient Name: Jeanna Steel  Date: 3/16/2022          Service Type: Individual     Session Start Time:  1:30  Session End Time: 2:15     Session Length: 45 min    Session #: 58    Attendees: Client attended alone     Telemedicine Visit: The patient's condition can be safely assessed and treated via synchronous audio and visual telemedicine encounter.      Reason for Telemedicine Visit: Services only offered telehealth    Originating Site (Patient Location): Patient's home    Distant Site (Provider Location): Provider Remote Setting- Home Office    Consent:  The patient/guardian has verbally consented to: the potential risks and benefits of telemedicine (video visit) versus in person care; bill my insurance or make self-payment for services provided; and responsibility for payment of non-covered services.     Mode of Communication:  Video Conference via [employer]    As the provider I attest to compliance with applicable laws and regulations related to telemedicine.       DATA  Interactive Complexity: No  Crisis: No       Progress Since Last Session (Related to Symptoms / Goals / Homework):   Symptoms: No change mood is stable    Homework: Achieved / completed to satisfaction client reported working habits that increase her confidence      Episode of Care Goals: Satisfactory progress - ACTION (Actively working towards change); Intervened by reinforcing change plan / affirming steps taken     Current / Ongoing Stressors and Concerns:   Ongoing: Client reports increased depression and anxiety symptoms while caring for elderly parents.    Current: Client reported she is feeling ill with a kind of flu but tested negative for Covid. She said her art retreat has been rescheduled for May and she's greatly looking forward to it. Client reported having her cousin visit for a week and how it emphasized they have different lifestyles.  She identified feeling recommitted to adjusting her alcohol use to feel good in her body. Client described thinking about her first  recently and how she wonders whether her life would be different if she never  him.     Treatment Objective(s) Addressed in This Session:   Increase interest, engagement, and pleasure in doing things  Identify negative self-talk and behaviors: challenge core beliefs, myths, and actions       Intervention:   DBT: reviewed client's recent insights and exploring her values. Discussed how client can find peace with decisions she made in the past.   Motivational Interviewing    MI Intervention: Expressed Empathy/Understanding, Supported Autonomy, Collaboration, Evocation, Permission to raise concern or advise, Open-ended questions, Reflections: simple and complex, Change talk (evoked) and Reframe     Change Talk Expressed by the Patient: Desire to change Ability to change Reasons to change Need to change Committment to change Activation Taking steps    Provider Response to Change Talk: E - Evoked more info from patient about behavior change, A - Affirmed patient's thoughts, decisions, or attempts at behavior change, R - Reflected patient's change talk and S - Summarized patient's change talk statements      Assessments completed prior to visit:  The following assessments were completed by patient for this visit:  GAD7:   JESSICA-7 SCORE 5/28/2020 1/6/2021 6/28/2021 9/16/2021 9/16/2021 10/29/2021 3/8/2022   Total Score - - - - - - -   Total Score 1 (minimal anxiety) - 2 (minimal anxiety) - 1 (minimal anxiety) 0 (minimal anxiety) -   Total Score 1 3 2 1 1 0 3         ASSESSMENT: Current Emotional / Mental Status (status of significant symptoms):   Risk status (Self / Other harm or suicidal ideation)   Client denies current fears or concerns for personal safety.   Client denies current or recent suicidal ideation or behaviors.   Client denies current or recent homicidal ideation or  behaviors.   Client denies current or recent self injurious behavior or ideation.   Client denies other safety concerns.   Client reports there has been no change in risk factors since their last session.     Client reports there has been no change in protective factors since their last session.     Recommended that patient call 911 or go to the local ED should there be a change in any of these risk factors.     Appearance:   Appropriate    Eye Contact:   Fair    Psychomotor Behavior: Normal     Attitude:   Cooperative  Pleasant Attentive   Orientation:   All   Speech    Rate / Production: Normal/ Responsive     Volume:  Normal    Mood:    Normal Client appeared thoughtful   Affect:    Appropriate    Thought Content:  Clear    Thought Form:  Coherent  Goal Directed  Logical    Insight:    Good      Medication Review:   No changes to current psychiatric medication(s)     Medication Compliance:   Yes     Changes in Health Issues:   None reported     Chemical Use Review:   Substance Use: Problem use continues with no change since last session, Stage of Change: Action  Provided encouragement towards sobriety  Provided support and affirmation for steps taken towards sobriety          Tobacco Use: No change in amount of tobacco use since last session.  Contemplation  Provided encouragement to quit     Diagnosis:  1. Alcohol use disorder, moderate, dependence (H)    2. Major depressive disorder, recurrent episode, moderate (H)    3. Generalized anxiety disorder        Collateral Reports Completed:   Not Applicable    PLAN: (Client Tasks / Therapist Tasks / Other)  Client will practice self-forgiveness for the past, uphold her values when making decisions and return for therapy in two weeks.         Maite Quinn, [facility] MSW   3/16/2022       __________________________________________________________________    Treatment Plan    Client's Name: Jeanna Steel  YOB: 1965    Date: 1/14/2022    DSM-V  Diagnoses: 296.32 (F33.1) Major Depressive Disorder, Recurrent Episode, Moderate _, 300.02 (F41.1) Generalized Anxiety Disorder or Adjustment Disorders  309.28 (F43.23) With mixed anxiety and depressed mood  Psychosocial / Contextual Factors: Client reports continued symptoms of depression and anxiety while caretaking for her two parents.  WHODAS: see intake    Referral / Collaboration:  Referral to another professional/service is not indicated at this time..    Anticipated number of session or this episode of care: 8-12      MeasurableTreatment Goal(s) related to diagnosis / functional impairment(s)  Goal 1: Client will continue reduce alcohol use to 4-5 days a week alcohol free in the next three months and client reporting use of three new coping skills to manage stress in the evenings.    I will know I've met my goal when I'm only drinking two drinks and going to bed earlier.      Objective #A (Client Action)    Client will identify at least three consequences of maladaptive drinking.  Status: Continued - Date(s):  1/14/2022    Intervention(s)  Therapist will assign homework to identify impact of drinking  provide support.    Objective #B  Client will identify three coping skills to replace drinking .  Status: Continued - Date(s):  1/14/2022    Intervention(s)  Therapist will assign homework to practice coping skills  teach coping skills.    Objective #C  Client will identify whether she needs further support to reduce alcohol use.  Status: Continued - Date(s):   1/14/2022    Intervention(s)  Therapist will provide support and referrals as needed, education on alcohol use.      Goal 2: Client will report continued increased acceptance towards her decision about having children as evidenced by client reporting use of effective coping skills when feeling distress or regret.    I will know I've met my goal when I can accept.      Objective #A (Client Action)    Status: Completed - Date: 6/14/2021        Client will  use acceptance skills when feeling willful.    Intervention(s)  Therapist will teach acceptance DBT skills.    Objective #B  Client will identify coping skills that reduce distress when grieving.    Status: Completed - Date: 6/14/2021       Intervention(s)  Therapist will teach coping skills, self-soothing.    Goal 3: Client will improve confidence with setting and holding boundaries as evidenced by client reporting reduce avoidance and use of three effective communication skills over the next three months.     I will know I've met my goal when I know how to talk about things.      Objective #A (Client Action)    Client will learn & utilize at least 3 assertive communication skills weekly.  Status: Continued - Date(s): 1/14/2022    Intervention(s)  Therapist will teach assertiveness skills. DBT DEAR MAN skills.    Objective #B  Client will Identify negative self-talk and behaviors: challenge core beliefs, myths, and actions.    Status: Continued - Date(s): 1/14/2022    Intervention(s)  Therapist will guide client in exploring how core belief system influences her ability to communicate and cope with conflict.        Client has reviewed and agreed to the above plan.      Maite Quinn, [facility]  [facility]  January 14, 2022

## 2022-03-30 ENCOUNTER — VIRTUAL VISIT (OUTPATIENT)
Dept: PSYCHOLOGY | Facility: CLINIC | Age: 57
End: 2022-03-30
Payer: COMMERCIAL

## 2022-03-30 DIAGNOSIS — F10.20 ALCOHOL USE DISORDER, MODERATE, DEPENDENCE (H): Primary | ICD-10-CM

## 2022-03-30 DIAGNOSIS — F33.1 MAJOR DEPRESSIVE DISORDER, RECURRENT EPISODE, MODERATE (H): ICD-10-CM

## 2022-03-30 PROCEDURE — 90834 PSYTX W PT 45 MINUTES: CPT | Mod: 95 | Performed by: SOCIAL WORKER

## 2022-03-30 NOTE — PROGRESS NOTES
[facility]                                     Progress Note    Patient Name: Jeanna Steel  Date: 3/30/2022          Service Type: Individual     Session Start Time:  10:00  Session End Time: 10:45     Session Length: 45 min    Session #: 59    There has been demonstrated improvement in functioning while patient has been engaged in psychotherapy/psychological service- if withdrawn the patient would deteriorate and/or relapse.     Attendees: Client attended alone     Telemedicine Visit: The patient's condition can be safely assessed and treated via synchronous audio and visual telemedicine encounter.      Reason for Telemedicine Visit: Services only offered telehealth    Originating Site (Patient Location): Patient's home    Distant Site (Provider Location): Provider Remote Setting- Home Office    Consent:  The patient/guardian has verbally consented to: the potential risks and benefits of telemedicine (video visit) versus in person care; bill my insurance or make self-payment for services provided; and responsibility for payment of non-covered services.     Mode of Communication:  Video Conference via [employer]    As the provider I attest to compliance with applicable laws and regulations related to telemedicine.       DATA  Interactive Complexity: No  Crisis: No       Progress Since Last Session (Related to Symptoms / Goals / Homework):   Symptoms: Worsening increased frustration    Homework: Achieved / completed to satisfaction client reported practicing self-forgiveness, upholding values       Episode of Care Goals: Satisfactory progress - ACTION (Actively working towards change); Intervened by reinforcing change plan / affirming steps taken     Current / Ongoing Stressors and Concerns:   Ongoing: Client reports increased depression and anxiety symptoms while caring for elderly parents.    Current: Client reported she is having a really frustrating time with her mom lately as her  dementia is getting worse, making her more aggressive and confused. She described having several arguments with her mom and how her mom's dementia is exacerbating her dad's condition. Client said she's feeling burnout in caring for her mom. She reported she's doing a 5 day sober challenge with her group and is feeling optimistic.     Treatment Objective(s) Addressed in This Session:   Identify negative self-talk and behaviors: challenge core beliefs, myths, and actions  Improve concentration, focus, and mindfulness in daily activities        Intervention:   DBT: explored interpersonal effectiveness with parents and facility providers. Reviewed how client can increase support to reduce burnout.   Motivational Interviewing    MI Intervention: Expressed Empathy/Understanding, Supported Autonomy, Collaboration, Evocation, Permission to raise concern or advise, Open-ended questions, Reflections: simple and complex, Change talk (evoked) and Reframe     Change Talk Expressed by the Patient: Desire to change Ability to change Reasons to change Need to change Committment to change Activation Taking steps    Provider Response to Change Talk: E - Evoked more info from patient about behavior change, A - Affirmed patient's thoughts, decisions, or attempts at behavior change, R - Reflected patient's change talk and S - Summarized patient's change talk statements      Assessments completed prior to visit:  The following assessments were completed by patient for this visit:  GAD7:   JESSICA-7 SCORE 5/28/2020 1/6/2021 6/28/2021 9/16/2021 9/16/2021 10/29/2021 3/8/2022   Total Score - - - - - - -   Total Score 1 (minimal anxiety) - 2 (minimal anxiety) - 1 (minimal anxiety) 0 (minimal anxiety) -   Total Score 1 3 2 1 1 0 3         ASSESSMENT: Current Emotional / Mental Status (status of significant symptoms):   Risk status (Self / Other harm or suicidal ideation)   Client denies current fears or concerns for personal safety.   Client  denies current or recent suicidal ideation or behaviors.   Client denies current or recent homicidal ideation or behaviors.   Client denies current or recent self injurious behavior or ideation.   Client denies other safety concerns.   Client reports there has been a change in risk factors since their last session.  increased frustration   Client reports there has been no change in protective factors since their last session.     Recommended that patient call 911 or go to the local ED should there be a change in any of these risk factors.     Appearance:   Appropriate    Eye Contact:   Fair    Psychomotor Behavior: Normal     Attitude:   Cooperative  Pleasant Attentive   Orientation:   All   Speech    Rate / Production: Normal/ Responsive     Volume:  Normal    Mood:    Anxious  Irritable  Client appeared frustrated   Affect:    Appropriate    Thought Content:  Clear    Thought Form:  Coherent  Goal Directed  Logical    Insight:    Good      Medication Review:   No changes to current psychiatric medication(s)     Medication Compliance:   Yes     Changes in Health Issues:   None reported     Chemical Use Review:   Substance Use: Problem use continues with no change since last session, Stage of Change: Action  Provided encouragement towards sobriety  Provided support and affirmation for steps taken towards sobriety          Tobacco Use: No change in amount of tobacco use since last session.  Contemplation  Provided encouragement to quit     Diagnosis:  1. Alcohol use disorder, moderate, dependence (H)    2. Major depressive disorder, recurrent episode, moderate (H)        Collateral Reports Completed:   Not Applicable    PLAN: (Client Tasks / Therapist Tasks / Other)  Client will look into support groups for caregivers, talk with providers at memory care facility and return for therapy in two weeks.         SHERYL Barriga MSW   3/30/2022        __________________________________________________________________    Treatment Plan    Client's Name: Jeanna Steel  YOB: 1965    Date: 1/14/2022    DSM-V Diagnoses: 296.32 (F33.1) Major Depressive Disorder, Recurrent Episode, Moderate _, 300.02 (F41.1) Generalized Anxiety Disorder or Adjustment Disorders  309.28 (F43.23) With mixed anxiety and depressed mood  Psychosocial / Contextual Factors: Client reports continued symptoms of depression and anxiety while caretaking for her two parents.  WHODAS: see intake    Referral / Collaboration:  Referral to another professional/service is not indicated at this time..    Anticipated number of session or this episode of care: 8-12      MeasurableTreatment Goal(s) related to diagnosis / functional impairment(s)  Goal 1: Client will continue reduce alcohol use to 4-5 days a week alcohol free in the next three months and client reporting use of three new coping skills to manage stress in the evenings.    I will know I've met my goal when I'm only drinking two drinks and going to bed earlier.      Objective #A (Client Action)    Client will identify at least three consequences of maladaptive drinking.  Status: Continued - Date(s):  1/14/2022    Intervention(s)  Therapist will assign homework to identify impact of drinking  provide support.    Objective #B  Client will identify three coping skills to replace drinking .  Status: Continued - Date(s):  1/14/2022    Intervention(s)  Therapist will assign homework to practice coping skills  teach coping skills.    Objective #C  Client will identify whether she needs further support to reduce alcohol use.  Status: Continued - Date(s):   1/14/2022    Intervention(s)  Therapist will provide support and referrals as needed, education on alcohol use.      Goal 2: Client will report continued increased acceptance towards her decision about having children as evidenced by client reporting use of effective coping  skills when feeling distress or regret.    I will know I've met my goal when I can accept.      Objective #A (Client Action)    Status: Completed - Date: 6/14/2021        Client will use acceptance skills when feeling willful.    Intervention(s)  Therapist will teach acceptance DBT skills.    Objective #B  Client will identify coping skills that reduce distress when grieving.    Status: Completed - Date: 6/14/2021       Intervention(s)  Therapist will teach coping skills, self-soothing.    Goal 3: Client will improve confidence with setting and holding boundaries as evidenced by client reporting reduce avoidance and use of three effective communication skills over the next three months.     I will know I've met my goal when I know how to talk about things.      Objective #A (Client Action)    Client will learn & utilize at least 3 assertive communication skills weekly.  Status: Continued - Date(s): 1/14/2022    Intervention(s)  Therapist will teach assertiveness skills. DBT DEAR MAN skills.    Objective #B  Client will Identify negative self-talk and behaviors: challenge core beliefs, myths, and actions.    Status: Continued - Date(s): 1/14/2022    Intervention(s)  Therapist will guide client in exploring how core belief system influences her ability to communicate and cope with conflict.        Client has reviewed and agreed to the above plan.      SHERYL Barriga  MSW  January 14, 2022

## 2022-04-13 ENCOUNTER — VIRTUAL VISIT (OUTPATIENT)
Dept: PSYCHOLOGY | Facility: CLINIC | Age: 57
End: 2022-04-13
Payer: COMMERCIAL

## 2022-04-13 DIAGNOSIS — F41.1 GENERALIZED ANXIETY DISORDER: ICD-10-CM

## 2022-04-13 DIAGNOSIS — F33.1 MAJOR DEPRESSIVE DISORDER, RECURRENT EPISODE, MODERATE (H): ICD-10-CM

## 2022-04-13 DIAGNOSIS — F10.20 ALCOHOL USE DISORDER, MODERATE, DEPENDENCE (H): Primary | ICD-10-CM

## 2022-04-13 PROCEDURE — 90834 PSYTX W PT 45 MINUTES: CPT | Mod: 95 | Performed by: SOCIAL WORKER

## 2022-04-13 NOTE — PROGRESS NOTES
[facility]                                     Progress Note    Patient Name: Jeanna Steel  Date: 4/13/2022          Service Type: Individual     Session Start Time:  10:00  Session End Time: 10:45     Session Length: 45 min    Session #: 60    There has been demonstrated improvement in functioning while patient has been engaged in psychotherapy/psychological service- if withdrawn the patient would deteriorate and/or relapse.     Attendees: Client attended alone     Telemedicine Visit: The patient's condition can be safely assessed and treated via synchronous audio and visual telemedicine encounter.      Reason for Telemedicine Visit: Services only offered telehealth    Originating Site (Patient Location): Patient's home    Distant Site (Provider Location): Provider Remote Setting- Home Office    Consent:  The patient/guardian has verbally consented to: the potential risks and benefits of telemedicine (video visit) versus in person care; bill my insurance or make self-payment for services provided; and responsibility for payment of non-covered services.     Mode of Communication:  Video Conference via [employer]    As the provider I attest to compliance with applicable laws and regulations related to telemedicine.       DATA  Interactive Complexity: No  Crisis: No       Progress Since Last Session (Related to Symptoms / Goals / Homework):   Symptoms: No change continued frustration    Homework: Did not complete client reported not looking into caregiver support groups      Episode of Care Goals: Satisfactory progress - ACTION (Actively working towards change); Intervened by reinforcing change plan / affirming steps taken     Current / Ongoing Stressors and Concerns:   Ongoing: Client reports increased depression and anxiety symptoms while caring for elderly parents.    Current: Client reported having a difficult week after a friend of hers was going through a hard time. She described  what happened, how she tried to be there for her but that it was really difficult to withstand her friend's negativity. Client reported afterwards she ended up drinking and had a hard time staying intentional with alcohol throughout the week. 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     Treatment Objective(s) Addressed in This Session:   Identify negative self-talk and behaviors: challenge core beliefs, myths, and actions  Improve concentration, focus, and mindfulness in daily activities        Intervention:   DBT: reviewed interpersonal effectiveness with friend, how to identify what kind of help she can provide while not crossing her own boundary.    Motivational Interviewing    MI Intervention: Expressed Empathy/Understanding, Supported Autonomy, Collaboration, Evocation, Permission to raise concern or advise, Open-ended questions, Reflections: simple and complex, Change talk (evoked) and Reframe     Change Talk Expressed by the Patient: Desire to change Ability to change Reasons to change Need to change Committment to change Activation Taking steps    Provider Response to Change Talk: E - Evoked more info from patient about behavior change, A - Affirmed patient's thoughts, decisions, or attempts at behavior change, R - Reflected patient's change talk and S - Summarized patient's change talk statements      Assessments completed prior to visit:  The following assessments were completed by patient for this visit:  GAD7:   JESSICA-7 SCORE 5/28/2020 1/6/2021 6/28/2021 9/16/2021 9/16/2021 10/29/2021 3/8/2022   Total Score - - - - - - -   Total Score 1 (minimal anxiety) - 2 (minimal anxiety) - 1 (minimal anxiety) 0 (minimal anxiety) -   Total Score 1 3 2 1 1 0 3         ASSESSMENT: Current Emotional / Mental Status (status of significant symptoms):   Risk status (Self / Other harm or suicidal ideation)   Client  denies current fears or concerns for personal safety.   Client denies current or recent suicidal ideation or behaviors.   Client denies current or recent homicidal ideation or behaviors.   Client denies current or recent self injurious behavior or ideation.   Client denies other safety concerns.   Client reports there has been no change in risk factors since their last session.     Client reports there has been no change in protective factors since their last session.     Recommended that patient call 911 or go to the local ED should there be a change in any of these risk factors.     Appearance:   Appropriate    Eye Contact:   Good    Psychomotor Behavior: Normal     Attitude:   Cooperative  Pleasant Attentive   Orientation:   All   Speech    Rate / Production: Normal/ Responsive     Volume:  Normal    Mood:    Anxious  Client appeared nervous   Affect:    Appropriate    Thought Content:  Clear    Thought Form:  Coherent  Goal Directed  Logical    Insight:    Good      Medication Review:   No changes to current psychiatric medication(s)     Medication Compliance:   Yes     Changes in Health Issues:   None reported     Chemical Use Review:   Substance Use: Problem use continues with no change since last session, Stage of Change: Action  Provided encouragement towards sobriety  Provided support and affirmation for steps taken towards sobriety          Tobacco Use: No change in amount of tobacco use since last session.  Contemplation  Provided encouragement to quit     Diagnosis:  1. Alcohol use disorder, moderate, dependence (H)    2. Major depressive disorder, recurrent episode, moderate (H)    3. Generalized anxiety disorder        Collateral Reports Completed:   Not Applicable    PLAN: (Client Tasks / Therapist Tasks / Other)  Client will talk with her family about managing parents while she's out of town, consider boundaries with her friend and return for therapy in two weeks.         SHERYL Barriga  MSW   4/13/2022       __________________________________________________________________    Treatment Plan    Client's Name: Jeanna Steel  YOB: 1965    Date: 1/14/2022    DSM-V Diagnoses: 296.32 (F33.1) Major Depressive Disorder, Recurrent Episode, Moderate _, 300.02 (F41.1) Generalized Anxiety Disorder or Adjustment Disorders  309.28 (F43.23) With mixed anxiety and depressed mood  Psychosocial / Contextual Factors: Client reports continued symptoms of depression and anxiety while caretaking for her two parents.  WHODAS: see intake    Referral / Collaboration:  Referral to another professional/service is not indicated at this time..    Anticipated number of session or this episode of care: 8-12      MeasurableTreatment Goal(s) related to diagnosis / functional impairment(s)  Goal 1: Client will continue reduce alcohol use to 4-5 days a week alcohol free in the next three months and client reporting use of three new coping skills to manage stress in the evenings.    I will know I've met my goal when I'm only drinking two drinks and going to bed earlier.      Objective #A (Client Action)    Client will identify at least three consequences of maladaptive drinking.  Status: Continued - Date(s):  1/14/2022    Intervention(s)  Therapist will assign homework to identify impact of drinking  provide support.    Objective #B  Client will identify three coping skills to replace drinking .  Status: Continued - Date(s):  1/14/2022    Intervention(s)  Therapist will assign homework to practice coping skills  teach coping skills.    Objective #C  Client will identify whether she needs further support to reduce alcohol use.  Status: Continued - Date(s):   1/14/2022    Intervention(s)  Therapist will provide support and referrals as needed, education on alcohol use.      Goal 2: Client will report continued increased acceptance towards her decision about having children as evidenced by client reporting use of  effective coping skills when feeling distress or regret.    I will know I've met my goal when I can accept.      Objective #A (Client Action)    Status: Completed - Date: 6/14/2021        Client will use acceptance skills when feeling willful.    Intervention(s)  Therapist will teach acceptance DBT skills.    Objective #B  Client will identify coping skills that reduce distress when grieving.    Status: Completed - Date: 6/14/2021       Intervention(s)  Therapist will teach coping skills, self-soothing.    Goal 3: Client will improve confidence with setting and holding boundaries as evidenced by client reporting reduce avoidance and use of three effective communication skills over the next three months.     I will know I've met my goal when I know how to talk about things.      Objective #A (Client Action)    Client will learn & utilize at least 3 assertive communication skills weekly.  Status: Continued - Date(s): 1/14/2022    Intervention(s)  Therapist will teach assertiveness skills. DBT DEAR MAN skills.    Objective #B  Client will Identify negative self-talk and behaviors: challenge core beliefs, myths, and actions.    Status: Continued - Date(s): 1/14/2022    Intervention(s)  Therapist will guide client in exploring how core belief system influences her ability to communicate and cope with conflict.        Client has reviewed and agreed to the above plan.      Maite Quinn, [facility]  MSW  January 14, 2022

## 2022-04-27 ENCOUNTER — VIRTUAL VISIT (OUTPATIENT)
Dept: PSYCHOLOGY | Facility: CLINIC | Age: 57
End: 2022-04-27
Payer: COMMERCIAL

## 2022-04-27 DIAGNOSIS — F41.1 GENERALIZED ANXIETY DISORDER: ICD-10-CM

## 2022-04-27 DIAGNOSIS — F10.20 ALCOHOL USE DISORDER, MODERATE, DEPENDENCE (H): Primary | ICD-10-CM

## 2022-04-27 DIAGNOSIS — F33.1 MAJOR DEPRESSIVE DISORDER, RECURRENT EPISODE, MODERATE (H): ICD-10-CM

## 2022-04-27 PROCEDURE — 90834 PSYTX W PT 45 MINUTES: CPT | Mod: 95 | Performed by: SOCIAL WORKER

## 2022-04-27 NOTE — PROGRESS NOTES
[facility]                                     Progress Note     Patient Name: Jeanna Steel  Date: 4/27/2022          Service Type: Individual     Session Start Time:  10:00  Session End Time: 10:45     Session Length: 45 min    Session #: 61    There has been demonstrated improvement in functioning while patient has been engaged in psychotherapy/psychological service- if withdrawn the patient would deteriorate and/or relapse.     Attendees: Client attended alone     Telemedicine Visit: The patient's condition can be safely assessed and treated via synchronous audio and visual telemedicine encounter.      Reason for Telemedicine Visit: Services only offered telehealth    Originating Site (Patient Location): Patient's home    Distant Site (Provider Location): Provider Remote Setting- Home Office    Consent:  The patient/guardian has verbally consented to: the potential risks and benefits of telemedicine (video visit) versus in person care; bill my insurance or make self-payment for services provided; and responsibility for payment of non-covered services.     Mode of Communication:  Video Conference via [employer]    As the provider I attest to compliance with applicable laws and regulations related to telemedicine.       DATA  Interactive Complexity: No  Crisis: No       Progress Since Last Session (Related to Symptoms / Goals / Homework):   Symptoms: No change stable mood    Homework: Achieved / completed to satisfaction client reported setting and holding boundaries effectively      Episode of Care Goals: Satisfactory progress - ACTION (Actively working towards change); Intervened by reinforcing change plan / affirming steps taken     Current / Ongoing Stressors and Concerns:   Ongoing: Client reports continued symptoms of depression and anxiety while caretaking for her two parents, wanting to manage alcohol use.    Current: Client reported she's generally doing well, working on  having more sober nights and holding boundaries. She described feeling more confident in her boundary setting though she anticipates her sister-in-law being a challenge in the future. Client said she is feeling tire of having her progress upset by someone else. Reviewed and updated treatment plan.     Treatment Objective(s) Addressed in This Session:   Identify negative self-talk and behaviors: challenge core beliefs, myths, and actions  Improve concentration, focus, and mindfulness in daily activities        Intervention:   DBT: reviewed client's interpersonal effectiveness, how to protect her boundaries   Motivational Interviewing  Reviewed and updated treatment plan.  MI Intervention: Expressed Empathy/Understanding, Supported Autonomy, Collaboration, Evocation, Permission to raise concern or advise, Open-ended questions, Reflections: simple and complex, Change talk (evoked) and Reframe     Change Talk Expressed by the Patient: Desire to change Ability to change Reasons to change Need to change Committment to change Activation Taking steps    Provider Response to Change Talk: E - Evoked more info from patient about behavior change, A - Affirmed patient's thoughts, decisions, or attempts at behavior change, R - Reflected patient's change talk and S - Summarized patient's change talk statements      Assessments completed prior to visit:  The following assessments were completed by patient for this visit:  GAD7:   JESSICA-7 SCORE 5/28/2020 1/6/2021 6/28/2021 9/16/2021 9/16/2021 10/29/2021 3/8/2022   Total Score - - - - - - -   Total Score 1 (minimal anxiety) - 2 (minimal anxiety) - 1 (minimal anxiety) 0 (minimal anxiety) -   Total Score 1 3 2 1 1 0 3         ASSESSMENT: Current Emotional / Mental Status (status of significant symptoms):   Risk status (Self / Other harm or suicidal ideation)   Client denies current fears or concerns for personal safety.   Client denies current or recent suicidal ideation or  behaviors.   Client denies current or recent homicidal ideation or behaviors.   Client denies current or recent self injurious behavior or ideation.   Client denies other safety concerns.   Client reports there has been no change in risk factors since their last session.     Client reports there has been no change in protective factors since their last session.     Recommended that patient call 911 or go to the local ED should there be a change in any of these risk factors.     Appearance:   Appropriate    Eye Contact:   Good    Psychomotor Behavior: Normal     Attitude:   Cooperative  Pleasant Attentive   Orientation:   All   Speech    Rate / Production: Normal/ Responsive     Volume:  Normal    Mood:    Anxious  Client presented with some worry   Affect:    Appropriate    Thought Content:  Clear    Thought Form:  Coherent  Goal Directed  Logical    Insight:    Good      Medication Review:   No changes to current psychiatric medication(s)     Medication Compliance:   Yes     Changes in Health Issues:   None reported     Chemical Use Review:   Substance Use: Problem use continues with no change since last session, Stage of Change: Action  Provided encouragement towards sobriety  Provided support and affirmation for steps taken towards sobriety          Tobacco Use: No change in amount of tobacco use since last session.  Contemplation  Provided encouragement to quit     Diagnosis:  1. Alcohol use disorder, moderate, dependence (H)    2. Major depressive disorder, recurrent episode, moderate (H)    3. Generalized anxiety disorder        Collateral Reports Completed:   Not Applicable    PLAN: (Client Tasks / Therapist Tasks / Other)  Client will identify when her boundaries are challenged and return for therapy in two weeks.         Maite Quinn, [facility] MSW   4/27/2022       __________________________________________________________________              Individual Treatment Plan    Patient's Name: Jeanna S  Damián  YOB: 1965    Date of Creation: 6/14/2021  Date Treatment Plan Last Reviewed/Revised: 4/27/2022    DSM5 Diagnoses: 296.31 (F33.0) Major Depressive Disorder, Recurrent Episode, Mild _, 300.02 (F41.1) Generalized Anxiety Disorder or Substance-Related & Addictive Disorders Alcohol Use Disorder   303.90 (F10.20) Moderate In early remission,   Psychosocial / Contextual Factors: Client reports continued symptoms of depression and anxiety while caretaking for her two parents, wanting to manage alcohol use.  PROMIS (reviewed every 90 days):     Referral / Collaboration:  The following referral(s) will be initiated: community support with alcohol reduction.    Anticipated number of session for this episode of care: 100-150  Anticipation frequency of session: Every other week  Anticipated Duration of each session: 38-52 minutes  Treatment plan will be reviewed in 90 days or when goals have been changed.       MeasurableTreatment Goal(s) related to diagnosis / functional impairment(s)  Goal 1: Client will continue reduce alcohol use to 4-5 days a week alcohol free in the next three months and client reporting use of three new coping skills to manage stress in the evenings.    I will know I've met my goal when I'm only drinking two drinks and going to bed earlier.      Objective #A (Client Action)    Client will identify at least three consequences of maladaptive drinking.  Status: Continued - Date(s):  4/27/2022    Intervention(s)  Therapist will assign homework to identify impact of drinking  provide support.    Objective #B  Client will identify three coping skills to replace drinking .  Status: Continued - Date(s):  4/27/2022    Intervention(s)  Therapist will assign homework to practice coping skills  teach coping skills.    Objective #C  Client will identify whether she needs further support to reduce alcohol use.  Status: Continued - Date(s):   4/27/2022    Intervention(s)  Therapist will provide  support and referrals as needed, education on alcohol use.    Goal 2: Client will improve confidence with setting and holding boundaries as evidenced by client reporting reduce avoidance and use of three effective communication skills over the next three months.     I will know I've met my goal when I know how to talk about things.      Objective #A (Client Action)    Client will learn & utilize at least 3 assertive communication skills weekly.  Status: Continued - Date(s): 4/27/2022    Intervention(s)  Therapist will teach assertiveness skills. DBT DEAR MAN skills.    Objective #B  Client will Identify negative self-talk and behaviors: challenge core beliefs, myths, and actions.    Status: Continued - Date(s): 4/27/2022    Intervention(s)14  Therapist will guide client in exploring how core belief system influences her ability to communicate and cope with conflict.        Client has reviewed and agreed to the above plan.      Maite Quinn, [facility]  MSW  April, 27, 2022

## 2022-05-03 ENCOUNTER — MYC REFILL (OUTPATIENT)
Dept: PSYCHIATRY | Facility: CLINIC | Age: 57
End: 2022-05-03
Payer: COMMERCIAL

## 2022-05-03 DIAGNOSIS — F41.1 GENERALIZED ANXIETY DISORDER: ICD-10-CM

## 2022-05-03 NOTE — TELEPHONE ENCOUNTER
Date of Last Office Visit: 3/8/22 with Shayy Edis  Date of Next Office Visit:  None.  Note indicated future  refills through PCP Provider. Routed to  Karie Queen MD    No shows since last visit: no  Cancellations since last visit: no    Medication requested: Lorazepam 0.5 mg Date last ordered: 10 Qty: 10 Refills: 0    LORazepam (ATIVAN) 0.5 MG tablet 10 tablet 0 3/8/2022  No   Sig - Route: Take 1 tablet (0.5 mg) by mouth daily as needed for anxiety Try to Limit to once per week - Oral   Sent to pharmacy as: LORazepam 0.5 MG Oral Tablet (ATIVAN)   Class: E-Prescribe   Order: [id number]   E-Prescribing Status: Receipt confirmed by pharmacy (3/8/2022 11:11 AM CST)          Review of MN ?: Not authorized.      Lapse in medication adherence greater than 5 days?: Unknown  If yes, call patient and gather details: NA  Medication refill request verified as identical to current order?: Yes  Result of Last DAM, VPA, Li+ Level, CBC, or Carbamazepine Level (at or since last visit): NA    Last visit treatment plan:      Behavioral        Doing well overall.  Requesting to discontinue the gabapentin due to not being effective and side effects.  Continue the sertraline 100 mg and bupropion  mg.  Using as needed lorazepam appropriately.  Will return to PCP for ongoing care, medication prescribing and future medication refills.   3 months of medications fills provided.  Follow up with Karie Queen in about  3 months. Patient can be re-referred back to this service for further consultation in the future if needed but a new referral will need to be placed.            Major depressive disorder, recurrent episode, moderate (H)     Relevant Medications     LORazepam (ATIVAN) 0.5 MG tablet     sertraline (ZOLOFT) 100 MG tablet     buPROPion (WELLBUTRIN SR) 150 MG 12 hr tablet     Generalized anxiety disorder     Relevant Medications     LORazepam (ATIVAN) 0.5 MG tablet     sertraline (ZOLOFT) 100 MG tablet      buPROPion (WELLBUTRIN SR) 150 MG 12 hr tablet     Recurrent major depressive disorder, in partial remission (H)     Relevant Medications     LORazepam (ATIVAN) 0.5 MG tablet     sertraline (ZOLOFT) 100 MG tablet     buPROPion (WELLBUTRIN SR) 150 MG 12 hr tablet           []Medication refilled per  Medication Refill in Ambulatory Care  policy.  [x]Medication unable to be refilled by RN due to criteria not met as indicated below:    []Eligibility - not seen in the last year   [x]Supervision - no future appointment   []Compliance - no shows, cancellations or lapse in therapy   []Verification - order discrepancy   [x]Controlled medication   [x]Medication not included in policy   []90-day supply request   []Other

## 2022-05-05 RX ORDER — LORAZEPAM 0.5 MG/1
0.5 TABLET ORAL DAILY PRN
Qty: 10 TABLET | Refills: 0 | Status: SHIPPED | OUTPATIENT
Start: 2022-05-05 | End: 2022-08-24

## 2022-05-11 ENCOUNTER — VIRTUAL VISIT (OUTPATIENT)
Dept: PSYCHOLOGY | Facility: CLINIC | Age: 57
End: 2022-05-11
Payer: COMMERCIAL

## 2022-05-11 DIAGNOSIS — F10.20 ALCOHOL USE DISORDER, MODERATE, DEPENDENCE (H): ICD-10-CM

## 2022-05-11 DIAGNOSIS — F41.1 GENERALIZED ANXIETY DISORDER: Primary | ICD-10-CM

## 2022-05-11 PROCEDURE — 90834 PSYTX W PT 45 MINUTES: CPT | Mod: 95 | Performed by: SOCIAL WORKER

## 2022-05-11 NOTE — PROGRESS NOTES
"[facility]                                     Progress Note     Patient Name: Jeanna Steel  Date: 5/11/2022          Service Type: Individual     Session Start Time:  10:00  Session End Time: 10:45     Session Length: 45 min    Session #: 62    There has been demonstrated improvement in functioning while patient has been engaged in psychotherapy/psychological service- if withdrawn the patient would deteriorate and/or relapse.     Attendees: Client attended alone      Session Type: The patient has been notified of the following:      \"We have found that certain health care needs can be provided without the need for a face to face visit.  This service lets us provide the care you need with a phone conversation.       I will have full access to your [place] medical record during this entire phone call.   I will be taking notes for your medical record.      Since this is like an office visit, we will bill your insurance company for this service.       There are potential benefits and risks of telephone visits (e.g. limits to patient confidentiality) that differ from in-person visits.?  Confidentiality still applies for telephone services, and nobody will record the visit.  It is important to be in a quiet, private space that is free of distractions (including cell phone or other devices) during the visit.??      If during the course of the call I believe a telephone visit is not appropriate, you will not be charged for this service\"     Consent has been obtained for this service by care team member: Yes        DATA  Interactive Complexity: No  Crisis: No       Progress Since Last Session (Related to Symptoms / Goals / Homework):   Symptoms: Worsening nervousness for future    Homework: Achieved / completed to satisfaction client reported better understanding her boundaries      Episode of Care Goals: Satisfactory progress - ACTION (Actively working towards change); Intervened by " reinforcing change plan / affirming steps taken     Current / Ongoing Stressors and Concerns:   Ongoing: Client reports continued symptoms of depression and anxiety while caretaking for her two parents, wanting to manage alcohol use.    Current: Client reported she has a conflict with boundaries between wanting to support a friend and the friend's father having concern. Client said she wants to be mindful of her friend's wishes while also not taking on responsibility for managing her communication with her friend's dad. 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 Client described worrying about how her parents will handle her being gone, thinking about changing up their living situation when she gets home.     Treatment Objective(s) Addressed in This Session:   Identify negative self-talk and behaviors: challenge core beliefs, myths, and actions  Improve concentration, focus, and mindfulness in daily activities        Intervention:   DBT: reviewed client's interpersonal effectiveness with friend, practiced how she wants to communicate with her. Reviewed how client can be present rather than worry about the future.   Motivational Interviewing    MI Intervention: Expressed Empathy/Understanding, Supported Autonomy, Collaboration, Evocation, Permission to raise concern or advise, Open-ended questions, Reflections: simple and complex, Change talk (evoked) and Reframe     Change Talk Expressed by the Patient: Desire to change Ability to change Reasons to change Need to change Committment to change Activation Taking steps    Provider Response to Change Talk: E - Evoked more info from patient about behavior change, A - Affirmed patient's thoughts, decisions, or attempts at behavior change, R - Reflected patient's change talk and S - Summarized patient's change talk statements      Assessments completed prior to visit:  The following  assessments were completed by patient for this visit:  GAD7:   JESSICA-7 SCORE 5/28/2020 1/6/2021 6/28/2021 9/16/2021 9/16/2021 10/29/2021 3/8/2022   Total Score - - - - - - -   Total Score 1 (minimal anxiety) - 2 (minimal anxiety) - 1 (minimal anxiety) 0 (minimal anxiety) -   Total Score 1 3 2 1 1 0 3         ASSESSMENT: Current Emotional / Mental Status (status of significant symptoms):   Risk status (Self / Other harm or suicidal ideation)   Client denies current fears or concerns for personal safety.   Client denies current or recent suicidal ideation or behaviors.   Client denies current or recent homicidal ideation or behaviors.   Client denies current or recent self injurious behavior or ideation.   Client denies other safety concerns.   Client reports there has been no change in risk factors since their last session.     Client reports there has been no change in protective factors since their last session.     Recommended that patient call 911 or go to the local ED should there be a change in any of these risk factors.     Appearance:   cannot assess over phone    Eye Contact:   cannot assess over phone     Psychomotor Behavior: cannot assess over phone     Attitude:   Cooperative  Pleasant Attentive   Orientation:   All   Speech    Rate / Production: Normal/ Responsive     Volume:  Normal    Mood:    Anxious   Client sounded nervous   Affect:    cannot assess over phone     Thought Content:  Clear    Thought Form:  Coherent  Goal Directed  Logical    Insight:    Good      Medication Review:   No changes to current psychiatric medication(s)     Medication Compliance:   Yes     Changes in Health Issues:   None reported     Chemical Use Review:   Substance Use: Problem use continues with no change since last session, Stage of Change: Action  Provided encouragement towards sobriety  Provided support and affirmation for steps taken towards sobriety     Client reported not feeling motivated to resist drinking while  preparing for trip.     Tobacco Use: No change in amount of tobacco use since last session.  Contemplation  Provided encouragement to quit     Diagnosis:  1. Generalized anxiety disorder    2. Alcohol use disorder, moderate, dependence (H)        Collateral Reports Completed:   Not Applicable    PLAN: (Client Tasks / Therapist Tasks / Other)  Client will talk with her friend, practice being present while on residency and return for therapy in two weeks.         Maite Quinn, [facility] MSW   5/11/2022       __________________________________________________________________              Individual Treatment Plan    Patient's Name: Jeanna Steel  YOB: 1965    Date of Creation: 6/14/2021  Date Treatment Plan Last Reviewed/Revised: 4/27/2022    DSM5 Diagnoses: 296.31 (F33.0) Major Depressive Disorder, Recurrent Episode, Mild _, 300.02 (F41.1) Generalized Anxiety Disorder or Substance-Related & Addictive Disorders Alcohol Use Disorder   303.90 (F10.20) Moderate In early remission,   Psychosocial / Contextual Factors: Client reports continued symptoms of depression and anxiety while caretaking for her two parents, wanting to manage alcohol use.  PROMIS (reviewed every 90 days):     Referral / Collaboration:  The following referral(s) will be initiated: community support with alcohol reduction.    Anticipated number of session for this episode of care: 100-150  Anticipation frequency of session: Every other week  Anticipated Duration of each session: 38-52 minutes  Treatment plan will be reviewed in 90 days or when goals have been changed.       MeasurableTreatment Goal(s) related to diagnosis / functional impairment(s)  Goal 1: Client will continue reduce alcohol use to 4-5 days a week alcohol free in the next three months and client reporting use of three new coping skills to manage stress in the evenings.    I will know I've met my goal when I'm only drinking two drinks and going to bed earlier.       Objective #A (Client Action)    Client will identify at least three consequences of maladaptive drinking.  Status: Continued - Date(s):  4/27/2022    Intervention(s)  Therapist will assign homework to identify impact of drinking  provide support.    Objective #B  Client will identify three coping skills to replace drinking .  Status: Continued - Date(s):  4/27/2022    Intervention(s)  Therapist will assign homework to practice coping skills  teach coping skills.    Objective #C  Client will identify whether she needs further support to reduce alcohol use.  Status: Continued - Date(s):   4/27/2022    Intervention(s)  Therapist will provide support and referrals as needed, education on alcohol use.    Goal 2: Client will improve confidence with setting and holding boundaries as evidenced by client reporting reduce avoidance and use of three effective communication skills over the next three months.     I will know I've met my goal when I know how to talk about things.      Objective #A (Client Action)    Client will learn & utilize at least 3 assertive communication skills weekly.  Status: Continued - Date(s): 4/27/2022    Intervention(s)  Therapist will teach assertiveness skills. DBT DEAR MAN skills.    Objective #B  Client will Identify negative self-talk and behaviors: challenge core beliefs, myths, and actions.    Status: Continued - Date(s): 4/27/2022    Intervention(s)14  Therapist will guide client in exploring how core belief system influences her ability to communicate and cope with conflict.        Client has reviewed and agreed to the above plan.      Maite Quinn, [facility]  MSW  April, 27, 2022

## 2022-05-11 NOTE — PROGRESS NOTES
Jeanna is a 56 year old who is being evaluated via a billable video visit.      How would you like to obtain your AVS? MyChart  If the video visit is dropped, the invitation should be resent by: Text to cell phone: [phone]  Will anyone else be joining your video visit? No     Video Start Time: 1154    Assessment & Plan     Generalized anxiety disorder     - EMOTIONAL / BEHAVIORAL ASSESSMENT  - sertraline (ZOLOFT) 100 MG tablet; Take 1 tablet (100 mg) by mouth daily    Major depressive disorder, recurrent episode, moderate (H)     - EMOTIONAL / BEHAVIORAL ASSESSMENT  - sertraline (ZOLOFT) 100 MG tablet; Take 1 tablet (100 mg) by mouth daily    Recurrent major depressive disorder, in partial remission (H)     - buPROPion (WELLBUTRIN SR) 150 MG 12 hr tablet; Take 1 tablet (150 mg) by mouth daily    Special screening for malignant neoplasms, colon     - Adult Gastro Ref - Procedure Only; Future    Encounter for screening mammogram for breast cancer     - *MA Screening Digital Bilateral; Future    Prescription drug management  16 minutes spent on the date of the encounter doing chart review, history and exam, documentation and further activities per the note        See Patient Instructions    No follow-ups on file.    Karie Queen MD  [facility]   Jeanna is a 56 year old who presents for the following health issues     History of Present Illness       Mental Health Follow-up:                    Today's PHQ-9         PHQ-9 Total Score: 4  PHQ-9 Q9 Thoughts of better off dead/self-harm past 2 weeks :   (P) Not at all    How difficult have these problems made it for you to do your work, take care of things at home, or get along with other people: Somewhat difficult    Today's JESSICA-7 Score: 2    Reason for visit:  Follow up regarding a medication refill.  Symptom onset:  Today  Symptoms include:  No symptoms  Symptom intensity:  Mild  Symptom progression:  Staying the  same  Had these symptoms before:  No  What makes it worse:  N/A  What makes it better:  N/A    She eats 2-3 servings of fruits and vegetables daily.She consumes 0 sweetened beverage(s) daily.She exercises with enough effort to increase her heart rate 9 or less minutes per day.  She exercises with enough effort to increase her heart rate 3 or less days per week.   She is taking medications regularly.     Has been good since march no longer having night terrors  started seeing therapist sees her weekly    Uses lorazepam infrequently  Continues to work on sobriety  Hs been pretty successful  Part of an online group stop drinking start living  avoids use with alcohol  Tends to need this only with anxiety os sober periods  Has not had withdrawals or DTs  Is aware that alcohol can feed into anxiety and potentially slight withdrawal anxiety    Due for WWE, mammogram and colonoscopy    Social History     Tobacco Use     Smoking status: Never Smoker     Smokeless tobacco: Never Used   Substance Use Topics     Alcohol use: Yes     Alcohol/week: 11.7 standard drinks     Comment: cutting back.  was drinking daily.  now drinking about 4 days a week and having 4-6 a day.     Drug use: Not Currently     JESSICA-7 SCORE 9/16/2021 10/29/2021 3/8/2022   Total Score - - -   Total Score 1 (minimal anxiety) 0 (minimal anxiety) -   Total Score 1 0 3     PHQ 10/29/2021 12/27/2021 1/14/2022   PHQ-9 Total Score 6 3 3   Q9: Thoughts of better off dead/self-harm past 2 weeks Not at all Not at all Not at all   F/U: Thoughts of suicide or self-harm - - -   F/U: Safety concerns - - -     Last PHQ-9 5/12/2022   1.  Little interest or pleasure in doing things 1   2.  Feeling down, depressed, or hopeless 0   3.  Trouble falling or staying asleep, or sleeping too much 1   4.  Feeling tired or having little energy 1   5.  Poor appetite or overeating 1   6.  Feeling bad about yourself 0   7.  Trouble concentrating 0   8.  Moving slowly or restless 0   Q9:  Thoughts of better off dead/self-harm past 2 weeks 0   PHQ-9 Total Score 4   Difficulty at work, home, or with people -   In the past two weeks have you had thoughts of suicide or self harm? -   Do you have concerns about your personal safety or the safety of others? -     JESSICA-7  5/12/2022   1. Feeling nervous, anxious, or on edge 1   2. Not being able to stop or control worrying 0   3. Worrying too much about different things 0   4. Trouble relaxing 0   5. Being so restless that it is hard to sit still 0   6. Becoming easily annoyed or irritable 1   7. Feeling afraid, as if something awful might happen 0   JESSICA-7 Total Score 2   If you checked any problems, how difficult have they made it for you to do your work, take care of things at home, or get along with other people? -             Review of Systems   Constitutional, HEENT, cardiovascular, pulmonary, gi and gu systems are negative, except as otherwise noted.      Objective           Vitals:  No vitals were obtained today due to virtual visit.    Physical Exam   GENERAL: Healthy, alert and no distress  EYES: Eyes grossly normal to inspection.  No discharge or erythema, or obvious scleral/conjunctival abnormalities.  RESP: No audible wheeze, cough, or visible cyanosis.  No visible retractions or increased work of breathing.    SKIN: Visible skin clear. No significant rash, abnormal pigmentation or lesions.  NEURO: Cranial nerves grossly intact.  Mentation and speech appropriate for age.  PSYCH: Mentation appears normal, affect normal/bright, judgement and insight intact, normal speech and appearance well-groomed.    Ancillary Procedure on 03/01/2021   Component Date Value Ref Range Status     Copath Report 03/01/2021    Final                    Value:Patient Name: VINCE CARO  MR#: [id number]  Specimen #: [id number]  Collected: 3/1/2021  Received: 3/1/2021  Reported: 3/3/2021 09:33  Ordering Phy(s): KEARA FRANCO    For improved result formatting,  "select 'View Enhanced Report Format' under   Linked Documents section.    SPECIMEN(S):  Breast needle biopsy, left 10 o'clock 8cm FN    FINAL DIAGNOSIS:  Breast, LEFT, 10:00, 8 cm from nipple, ultrasound guided core biopsy:  -Benign breast tissue with fibroadenomatoid change  -Negative for atypia or malignancy    COMMENT:  Correlation with imaging findings is required.    I have personally reviewed all specimens and/or slides, including the   listed special stains, and used them  with my medical judgement to determine or confirm the final diagnosis.    Electronically signed out by:    Drea Leung M.D., [facility]    CLINICAL HISTORY:  The patient is a 55 year old woman with a left breast developing asymmetry   detected by screening mammogram  (medial, at about 9:00),                           and further characterized by diagnostic mammogram   (1.5 cm asymmetry, medial).  Ultrasound shows a left breast 1.3 cm vague hypoechoic mass at 10:00, 8 cm   from the nipple, which may be the  correlate to the mammographic finding.  Contrast enhanced mammogram shows   no suspicious enhancement.  Procedure: Ultrasound-guided left breast core biopsy (barbell shaped   biopsy marker).    GROSS:  The specimen is received in formalin with proper patient identification,   labeled \"left breast 10:00 8 cm FN\".  It consists of four yellow-tan tissue cores ranging from 0.5-1.0 cm in   length and averaging 0.1 cm in  diameter.  It is wrapped and entirely submitted in cassette A1.    The specimen is collected and placed in formalin at 1102 on 3/1/2021.   (Dictated by: Cristina OCHOA [facility]  3/1/2021 12:36 PM)    MICROSCOPIC:  Microscopic examination is performed.    The technical component of this testing was completed at the [facility], wit                          h the professional component performed   at the [facility], " [address] [place] [id number] ([phone])    CPT Codes:  A: 41010-AN1    COLLECTION SITE:  Client: [facility]  Location: [facility] (B)    Resident  SMW                   Video-Visit Details    Type of service:  Video Visit    Video End Time:1207    Originating Location (pt. Location): Home    Distant Location (provider location):  [facility]     Platform used for Video Visit: [employer]

## 2022-05-12 ENCOUNTER — VIRTUAL VISIT (OUTPATIENT)
Dept: FAMILY MEDICINE | Facility: CLINIC | Age: 57
End: 2022-05-12
Payer: COMMERCIAL

## 2022-05-12 DIAGNOSIS — Z12.31 ENCOUNTER FOR SCREENING MAMMOGRAM FOR BREAST CANCER: ICD-10-CM

## 2022-05-12 DIAGNOSIS — Z12.11 SPECIAL SCREENING FOR MALIGNANT NEOPLASMS, COLON: Primary | ICD-10-CM

## 2022-05-12 DIAGNOSIS — F33.1 MAJOR DEPRESSIVE DISORDER, RECURRENT EPISODE, MODERATE (H): ICD-10-CM

## 2022-05-12 DIAGNOSIS — F41.1 GENERALIZED ANXIETY DISORDER: ICD-10-CM

## 2022-05-12 DIAGNOSIS — F33.41 RECURRENT MAJOR DEPRESSIVE DISORDER, IN PARTIAL REMISSION (H): ICD-10-CM

## 2022-05-12 PROCEDURE — 96127 BRIEF EMOTIONAL/BEHAV ASSMT: CPT | Mod: 95 | Performed by: FAMILY MEDICINE

## 2022-05-12 PROCEDURE — 99213 OFFICE O/P EST LOW 20 MIN: CPT | Mod: 95 | Performed by: FAMILY MEDICINE

## 2022-05-12 RX ORDER — BUPROPION HYDROCHLORIDE 150 MG/1
150 TABLET, EXTENDED RELEASE ORAL DAILY
Qty: 90 TABLET | Refills: 3 | Status: ON HOLD | OUTPATIENT
Start: 2022-05-12 | End: 2022-10-03

## 2022-05-12 RX ORDER — SERTRALINE HYDROCHLORIDE 100 MG/1
100 TABLET, FILM COATED ORAL DAILY
Qty: 90 TABLET | Refills: 3 | Status: SHIPPED | OUTPATIENT
Start: 2022-05-12 | End: 2023-05-23

## 2022-05-12 ASSESSMENT — ANXIETY QUESTIONNAIRES
7. FEELING AFRAID AS IF SOMETHING AWFUL MIGHT HAPPEN: NOT AT ALL
GAD7 TOTAL SCORE: 2
GAD7 TOTAL SCORE: 2
5. BEING SO RESTLESS THAT IT IS HARD TO SIT STILL: NOT AT ALL
8. IF YOU CHECKED OFF ANY PROBLEMS, HOW DIFFICULT HAVE THESE MADE IT FOR YOU TO DO YOUR WORK, TAKE CARE OF THINGS AT HOME, OR GET ALONG WITH OTHER PEOPLE?: NOT DIFFICULT AT ALL
6. BECOMING EASILY ANNOYED OR IRRITABLE: SEVERAL DAYS
2. NOT BEING ABLE TO STOP OR CONTROL WORRYING: NOT AT ALL
GAD7 TOTAL SCORE: 2
4. TROUBLE RELAXING: NOT AT ALL
7. FEELING AFRAID AS IF SOMETHING AWFUL MIGHT HAPPEN: NOT AT ALL
1. FEELING NERVOUS, ANXIOUS, OR ON EDGE: SEVERAL DAYS
3. WORRYING TOO MUCH ABOUT DIFFERENT THINGS: NOT AT ALL

## 2022-05-12 ASSESSMENT — PATIENT HEALTH QUESTIONNAIRE - PHQ9
10. IF YOU CHECKED OFF ANY PROBLEMS, HOW DIFFICULT HAVE THESE PROBLEMS MADE IT FOR YOU TO DO YOUR WORK, TAKE CARE OF THINGS AT HOME, OR GET ALONG WITH OTHER PEOPLE: SOMEWHAT DIFFICULT
SUM OF ALL RESPONSES TO PHQ QUESTIONS 1-9: 4
SUM OF ALL RESPONSES TO PHQ QUESTIONS 1-9: 4

## 2022-05-13 ASSESSMENT — ANXIETY QUESTIONNAIRES: GAD7 TOTAL SCORE: 2

## 2022-05-20 ENCOUNTER — TELEPHONE (OUTPATIENT)
Dept: GASTROENTEROLOGY | Facility: CLINIC | Age: 57
End: 2022-05-20
Payer: COMMERCIAL

## 2022-05-20 NOTE — TELEPHONE ENCOUNTER
Screening Questions  BlueKIND OF PREP RedLOCATION [review exclusion criteria] GreenSEDATION TYPE  1. Have you had a positive covid test in the last 90 days? Y     2. Do you have a legal guardian or medical Power of ?  Are you able to give consent for your medical care?Y (Sedation review/consideration needed)    3. Are you active on mychart? Y    4. What insurance is in the chart? [employer]     3.   Ordering/Referring Provider: Karie Queen MD    4. BMI 28.7 [BMI OVER 40-EXTENDED PREP]  If greater than 40 review exclusion criteria [PAC APPT IF @ [facility]]        5.  Respiratory Screening :  [If yes to any of the following HOSPITAL setting only]     Do you use daily home oxygen? N    Do you have mod to severe Obstructive Sleep Apnea? N  [OKAY @ [facility] SH PH RI]   Do you have Pulmonary Hypertension? N     Do you have UNCONTROLLED asthma? N        6.   Have you had a heart or lung transplant? N      7.   Are you currently on dialysis? N [ If yes, G-PREP & HOSPITAL setting only]     8.   Do you have chronic kidney disease? N [ If yes, G-PREP ]    9.   Have you had a stroke or Transient ischemic attack (TIA - aka  mini stroke ) within 6 months?  N (If yes, please review exclusion criteria)    10.   In the past 6 months, have you had any heart related issues including cardiomyopathy or heart attack? N           If yes, did it require cardiac stenting or other implantable device? N      11.   Do you have any implantable devices in your body (pacemaker, defib, LVAD)? N (If yes, please review exclusion criteria)    12.   Do you take nitroglycerin? N           If yes, how often? N  (if yes, HOSPITAL setting ONLY)    13.   Are you currently taking any blood thinners? N           [IF YES, INFORM PATIENT TO FOLLOW UP W/ ORDERING PROVIDER FOR BRIDGING INSTRUCTIONS]     14.   Do you have a diagnosis of diabetes? N   [ If yes, G-PREP ]    15.   [FEMALES] Are you currently pregnant? N    If yes, how many weeks?  N    16.   Are you taking any prescription pain medications on a routine schedule?  N  [ If yes, EXTENDED PREP.] [If yes, MAC]    17.   Do you have any chemical dependencies such as alcohol, street drugs, or methadone?  N [If yes, MAC]    18.   Do you have any history of post-traumatic stress syndrome, severe anxiety or history of psychosis?  N  [If yes, MAC]    19.   Do you transfer independently?  Y    20.  On a regular basis do you go 3-5 days between bowel movements? N   [ If yes, EXTENDED PREP.]    21.   Preferred LOCAL Pharmacy for Pre Prescription   [facility][id number] [address]      Scheduling Details      Caller : VINCE   (Please ask for phone number if not scheduled by patient)    Type of Procedure Scheduled: COLON  Which Colonoscopy Prep was Sent?: SAI CRABTREE CF PATIENTS & GROEN'S PATIENTS NEEDS EXTENDED PREP  Surgeon: CODY  Date of Procedure: 10/3  Location:       Sedation Type: CS  Conscious Sedation- Needs  for 6 hours after the procedure  MAC/General-Needs  for 24 hours after procedure    Pre-op Required at [facility], [place], [place] and OR for MAC sedation: N  (advise patient they will need a pre-op prior to procedure -)      Informed patient they will need an adult  Y  Cannot take any type of public or medical transportation alone    [facility]Pro[facility] or [facility]ternally: Y    Confirmed Nurse will call to complete assessment Y    Additional comments: N

## 2022-06-15 ENCOUNTER — VIRTUAL VISIT (OUTPATIENT)
Dept: PSYCHOLOGY | Facility: CLINIC | Age: 57
End: 2022-06-15
Payer: COMMERCIAL

## 2022-06-15 DIAGNOSIS — F10.20 ALCOHOL USE DISORDER, MODERATE, DEPENDENCE (H): ICD-10-CM

## 2022-06-15 DIAGNOSIS — F41.1 GENERALIZED ANXIETY DISORDER: Primary | ICD-10-CM

## 2022-06-15 PROCEDURE — 90834 PSYTX W PT 45 MINUTES: CPT | Mod: 95 | Performed by: SOCIAL WORKER

## 2022-06-15 ASSESSMENT — PATIENT HEALTH QUESTIONNAIRE - PHQ9
SUM OF ALL RESPONSES TO PHQ QUESTIONS 1-9: 2
SUM OF ALL RESPONSES TO PHQ QUESTIONS 1-9: 2
10. IF YOU CHECKED OFF ANY PROBLEMS, HOW DIFFICULT HAVE THESE PROBLEMS MADE IT FOR YOU TO DO YOUR WORK, TAKE CARE OF THINGS AT HOME, OR GET ALONG WITH OTHER PEOPLE: NOT DIFFICULT AT ALL

## 2022-06-15 NOTE — PROGRESS NOTES
[facility]                                     Progress Note     Patient Name: Jeanna Steel  Date: 6/15/2022          Service Type: Individual     Session Start Time:  10:00  Session End Time: 10:45     Session Length: 45 min    Session #: 63    There has been demonstrated improvement in functioning while patient has been engaged in psychotherapy/psychological service- if withdrawn the patient would deteriorate and/or relapse.     Attendees: Client attended alone      Session Type: Telemedicine Visit: The patient's condition can be safely assessed and treated via synchronous audio and visual telemedicine encounter.      Reason for Telemedicine Visit: Services only offered telehealth    Originating Site (Patient Location): Patient's home    Distant Site (Provider Location): Provider Remote Setting- Home Office    Consent:  The patient/guardian has verbally consented to: the potential risks and benefits of telemedicine (video visit) versus in person care; bill my insurance or make self-payment for services provided; and responsibility for payment of non-covered services.     Mode of Communication:  Video Conference via [employer]    As the provider I attest to compliance with applicable laws and regulations related to telemedicine.       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 She described how it went and the ideas she plans on implementing with her career. Client said that it was good to get a break from caretaking with her parents. She reported talking further with her brothers about trying to move them to their home for a trial and they think they'll go forward with it. Client said she also has a plan for not drinking alcohol a few days a week.     Treatment Objective(s) Addressed in This Session:   Identify negative self-talk and behaviors: challenge core beliefs, myths, and actions  Improve concentration, focus, and mindfulness in daily activities        Intervention:   DBT: reviewed positive events from residency and how client is motivated for change upon returning home. Explored how client can generalize skills she learned while gone. Discussed interpersonal effectiveness with family.    Motivational Interviewing    MI Intervention: Expressed Empathy/Understanding, Supported Autonomy, Collaboration, Evocation, Permission to raise concern or advise, Open-ended questions, Reflections: simple and complex, Change talk (evoked) and Reframe     Change Talk Expressed by the Patient: Desire to change Ability to change Reasons to change Need to change Committment to change Activation Taking steps    Provider Response to Change Talk: E - Evoked more info from patient about behavior change, A - Affirmed patient's thoughts, decisions, or attempts at behavior change, R - Reflected patient's change talk and S - Summarized patient's change talk statements      Assessments completed prior to visit:  The following assessments were completed by patient for this visit:  GAD7:   JESSICA-7 SCORE 1/6/2021 6/28/2021 9/16/2021 9/16/2021 10/29/2021 3/8/2022 5/12/2022   Total Score - - - - - - -   Total Score - 2 (minimal anxiety) - 1 (minimal anxiety) 0 (minimal anxiety) - 2 (minimal anxiety)   Total Score 3 2 1 1 0 3 2          ASSESSMENT: Current Emotional / Mental Status (status of significant symptoms):   Risk status (Self / Other harm or suicidal ideation)   Client denies current fears or concerns for personal safety.   Client denies current or recent suicidal ideation or behaviors.   Client denies current or recent homicidal ideation or behaviors.   Client denies current or recent self injurious behavior or ideation.   Client denies other safety concerns.   Client reports there has been no change in risk factors since their last session.     Client reports there has been a chance in protective factors since their last session.  good mood lately   Recommended that patient call 911 or go to the local ED should there be a change in any of these risk factors.     Appearance:   Appropriate    Eye Contact:   Good    Psychomotor Behavior: Normal     Attitude:   Cooperative  Pleasant Attentive   Orientation:   All   Speech    Rate / Production: Normal/ Responsive     Volume:  Normal    Mood:    Normal  Client appeared happy and determined   Affect:    Appropriate    Thought Content:  Clear    Thought Form:  Coherent  Goal Directed  Logical    Insight:    Good      Medication Review:   No changes to current psychiatric medication(s)     Medication Compliance:   Yes     Changes in Health Issues:   None reported     Chemical Use Review:   Substance Use: Problem use continues with no change since last session, Stage of Change: Action  Provided encouragement towards sobriety  Provided support and affirmation for steps taken towards sobriety          Tobacco Use: No change in amount of tobacco use since last session.  Contemplation  Provided encouragement to quit     Diagnosis:  1. Generalized anxiety disorder    2. Alcohol use disorder, moderate, dependence (H)        Collateral Reports Completed:   Not Applicable    PLAN: (Client Tasks / Therapist Tasks / Other)  Client will use effective communication skills navigating plan with parents,  take steps towards career goals and return for therapy in two weeks.         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  PROMIS (reviewed every 90 days):     Referral / Collaboration:  The following referral(s) will be initiated: community support with alcohol reduction.    Anticipated number of session for this episode of care: 100-150  Anticipation frequency of session: Every other week  Anticipated Duration of each session: 38-52 minutes  Treatment plan will be reviewed in 90 days or when goals have been changed.       MeasurableTreatment Goal(s) related to diagnosis / functional impairment(s)  Goal 1: Client will continue reduce alcohol use to 4-5 days a week alcohol free in the next three months and client reporting use of three new coping skills to manage stress in the evenings.    I will know I've met my goal when I'm only drinking two drinks and going to bed earlier.      Objective #A (Client Action)    Client will identify at least three consequences of maladaptive drinking.  Status: Continued - Date(s):  4/27/2022    Intervention(s)  Therapist will assign homework to identify impact of drinking  provide support.    Objective #B  Client will identify three coping skills to replace drinking .  Status: Continued - Date(s):   4/27/2022    Intervention(s)  Therapist will assign homework to practice coping skills  teach coping skills.    Objective #C  Client will identify whether she needs further support to reduce alcohol use.  Status: Continued - Date(s):   4/27/2022    Intervention(s)  Therapist will provide support and referrals as needed, education on alcohol use.    Goal 2: Client will improve confidence with setting and holding boundaries as evidenced by client reporting reduce avoidance and use of three effective communication skills over the next three months.     I will know I've met my goal when I know how to talk about things.      Objective #A (Client Action)    Client will learn & utilize at least 3 assertive communication skills weekly.  Status: Continued - Date(s): 4/27/2022    Intervention(s)  Therapist will teach assertiveness skills. DBT DEAR MAN skills.    Objective #B  Client will Identify negative self-talk and behaviors: challenge core beliefs, myths, and actions.    Status: Continued - Date(s): 4/27/2022    Intervention(s)14  Therapist will guide client in exploring how core belief system influences her ability to communicate and cope with conflict.        Client has reviewed and agreed to the above plan.      Maite Quinn, [facility]  MSW  April, 27, 2022

## 2022-06-20 ENCOUNTER — TRANSFERRED RECORDS (OUTPATIENT)
Dept: HEALTH INFORMATION MANAGEMENT | Facility: CLINIC | Age: 57
End: 2022-06-20

## 2022-07-01 ENCOUNTER — TRANSFERRED RECORDS (OUTPATIENT)
Dept: HEALTH INFORMATION MANAGEMENT | Facility: CLINIC | Age: 57
End: 2022-07-01

## 2022-07-12 ENCOUNTER — VIRTUAL VISIT (OUTPATIENT)
Dept: PSYCHOLOGY | Facility: CLINIC | Age: 57
End: 2022-07-12
Payer: COMMERCIAL

## 2022-07-12 DIAGNOSIS — F41.1 GENERALIZED ANXIETY DISORDER: Primary | ICD-10-CM

## 2022-07-12 PROCEDURE — 90834 PSYTX W PT 45 MINUTES: CPT | Mod: 95 | Performed by: SOCIAL WORKER

## 2022-07-14 ENCOUNTER — ANCILLARY PROCEDURE (OUTPATIENT)
Dept: MAMMOGRAPHY | Facility: CLINIC | Age: 57
End: 2022-07-14
Attending: FAMILY MEDICINE
Payer: COMMERCIAL

## 2022-07-14 DIAGNOSIS — Z12.31 ENCOUNTER FOR SCREENING MAMMOGRAM FOR BREAST CANCER: ICD-10-CM

## 2022-07-14 PROCEDURE — 77067 SCR MAMMO BI INCL CAD: CPT | Performed by: STUDENT IN AN ORGANIZED HEALTH CARE EDUCATION/TRAINING PROGRAM

## 2022-07-14 PROCEDURE — 77063 BREAST TOMOSYNTHESIS BI: CPT | Performed by: STUDENT IN AN ORGANIZED HEALTH CARE EDUCATION/TRAINING PROGRAM

## 2022-08-09 ENCOUNTER — VIRTUAL VISIT (OUTPATIENT)
Dept: PSYCHOLOGY | Facility: CLINIC | Age: 57
End: 2022-08-09
Payer: COMMERCIAL

## 2022-08-09 DIAGNOSIS — F41.1 GENERALIZED ANXIETY DISORDER: Primary | ICD-10-CM

## 2022-08-09 PROCEDURE — 90834 PSYTX W PT 45 MINUTES: CPT | Mod: 95 | Performed by: SOCIAL WORKER

## 2022-08-09 NOTE — PROGRESS NOTES
[facility]                                     Progress Note     Patient Name: Jeanna Steel  Date: 8/9/2022          Service Type: Individual     Session Start Time:  11:00  Session End Time: 11:45     Session Length: 45 min    Session #: 65    There has been demonstrated improvement in functioning while patient has been engaged in psychotherapy/psychological service- if withdrawn the patient would deteriorate and/or relapse.     Attendees: Client attended alone      Session Type: Telemedicine Visit: The patient's condition can be safely assessed and treated via synchronous audio and visual telemedicine encounter.      Reason for Telemedicine Visit: Services only offered telehealth    Originating Site (Patient Location): Patient's home    Distant Site (Provider Location): Provider Remote Setting- Home Office    Consent:  The patient/guardian has verbally consented to: the potential risks and benefits of telemedicine (video visit) versus in person care; bill my insurance or make self-payment for services provided; and responsibility for payment of non-covered services.     Mode of Communication:  Video Conference via [employer]    As the provider I attest to compliance with applicable laws and regulations related to telemedicine.       DATA  Interactive Complexity: No  Crisis: No       Progress Since Last Session (Related to Symptoms / Goals / Homework):   Symptoms: No change mood is stable    Homework: Achieved / completed to satisfaction client reported holding boundaries and self-validating      Episode of Care Goals: Satisfactory progress - ACTION (Actively working towards change); Intervened by reinforcing change plan / affirming steps taken     Current / Ongoing Stressors and Concerns:   Ongoing: Client reports continued symptoms of depression and anxiety while caretaking for her two parents, wanting to manage alcohol use.    Current: Client reported that her parents have  continued to be difficult, made worse by her father falling. She described being unsure their current facility can take care of their needs but she's also not sure there is another option. Client said she's stressed out trying to make decisions. Reviewed and updated treatment plan.     Treatment Objective(s) Addressed in This Session:   Decrease frequency and intensity of feeling down, depressed, hopeless  Identify negative self-talk and behaviors: challenge core beliefs, myths, and actions  Improve concentration, focus, and mindfulness in daily activities        Intervention:   DBT: reviewed client's ability to manage stress, how to make decisions and get more information    Motivational Interviewing   Reviewed and updated treatment plan.  MI Intervention: Expressed Empathy/Understanding, Supported Autonomy, Collaboration, Evocation, Permission to raise concern or advise, Open-ended questions, Reflections: simple and complex, Change talk (evoked) and Reframe     Change Talk Expressed by the Patient: Desire to change Ability to change Reasons to change Need to change Committment to change Activation Taking steps    Provider Response to Change Talk: E - Evoked more info from patient about behavior change, A - Affirmed patient's thoughts, decisions, or attempts at behavior change, R - Reflected patient's change talk and S - Summarized patient's change talk statements      Assessments completed prior to visit:  The following assessments were completed by patient for this visit:  GAD7:   JESSICA-7 SCORE 1/6/2021 6/28/2021 9/16/2021 9/16/2021 10/29/2021 3/8/2022 5/12/2022   Total Score - - - - - - -   Total Score - 2 (minimal anxiety) - 1 (minimal anxiety) 0 (minimal anxiety) - 2 (minimal anxiety)   Total Score 3 2 1 1 0 3 2         ASSESSMENT: Current Emotional / Mental Status (status of significant symptoms):   Risk status (Self / Other harm or suicidal ideation)   Client denies current fears or concerns for personal  safety.   Client denies current or recent suicidal ideation or behaviors.   Client denies current or recent homicidal ideation or behaviors.   Client denies current or recent self injurious behavior or ideation.   Client denies other safety concerns.   Client reports there has been no change in risk factors since their last session.     Client reports there has been no change in protective factors since their last session.     Recommended that patient call 911 or go to the local ED should there be a change in any of these risk factors.     Appearance:   Appropriate    Eye Contact:   Fair    Psychomotor Behavior: Normal     Attitude:   Cooperative  Pleasant Attentive   Orientation:   All   Speech    Rate / Production: Normal/ Responsive     Volume:  Normal    Mood:    Anxious  Agitated  Client appeared upset   Affect:    Appropriate  Tearful   Thought Content:  Clear    Thought Form:  Coherent  Goal Directed  Logical    Insight:    Fair      Medication Review:   No changes to current psychiatric medication(s)     Medication Compliance:   Yes     Changes in Health Issues:   None reported     Chemical Use Review:   Substance Use: Problem use continues with no change since last session, Stage of Change: Action  Provided encouragement towards sobriety  Provided support and affirmation for steps taken towards sobriety          Tobacco Use: No change in amount of tobacco use since last session.  Contemplation  Provided encouragement to quit     Diagnosis:  1. Generalized anxiety disorder        Collateral Reports Completed:   Not Applicable    PLAN: (Client Tasks / Therapist Tasks / Other)  Client will make a list of her questions, get more information and return for therapy in two weeks.         Maite Quinn, 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  PROMIS (reviewed every 90 days):     Referral / Collaboration:  The following referral(s) will be initiated: community support with alcohol reduction.    Anticipated number of session for this episode of care: 100-150  Anticipation frequency of session: Every other week  Anticipated Duration of each session: 38-52 minutes  Treatment plan will be reviewed in 90 days or when goals have been changed.       MeasurableTreatment Goal(s) related to diagnosis / functional impairment(s)  Goal 1: Client will continue reduce alcohol use to 4-5 days a week alcohol free in the next three months and client reporting use of three new coping skills to manage stress in the evenings.    I will know I've met my goal when I'm only drinking two drinks and going to bed earlier.      Objective #A (Client Action)    Client will identify at least three consequences of maladaptive drinking.  Status: Continued - Date(s):  8/9/2022    Intervention(s)  Therapist will assign homework to identify impact of drinking  provide support.    Objective #B  Client will identify three coping skills to replace drinking .  Status: Continued - Date(s):  8/9/2022    Intervention(s)  Therapist will assign homework to practice coping skills  teach coping skills.    Objective #C  Client will identify whether she needs further support to reduce alcohol use.  Status: Continued - Date(s):   8/9/2022    Intervention(s)  Therapist will provide support and referrals as needed, education on  alcohol use.    Goal 2: Client will improve confidence with setting and holding boundaries as evidenced by client reporting reduce avoidance and use of three effective communication skills over the next three months.     I will know I've met my goal when I know how to talk about things.      Objective #A (Client Action)    Client will learn & utilize at least 3 assertive communication skills weekly.  Status: Continued - Date(s): 8/9/2022    Intervention(s)  Therapist will teach assertiveness skills. DBT DEAR MAN skills.    Objective #B  Client will Identify negative self-talk and behaviors: challenge core beliefs, myths, and actions.    Status: Continued - Date(s): 8/9/2022    Intervention(s)14  Therapist will guide client in exploring how core belief system influences her ability to communicate and cope with conflict.        Client has reviewed and agreed to the above plan.      Maite Quinn, [facility]  MSW  August 9, 2022

## 2022-08-16 ENCOUNTER — TRANSFERRED RECORDS (OUTPATIENT)
Dept: HEALTH INFORMATION MANAGEMENT | Facility: CLINIC | Age: 57
End: 2022-08-16

## 2022-08-23 ENCOUNTER — VIRTUAL VISIT (OUTPATIENT)
Dept: PSYCHOLOGY | Facility: CLINIC | Age: 57
End: 2022-08-23
Payer: COMMERCIAL

## 2022-08-23 DIAGNOSIS — F10.20 ALCOHOL USE DISORDER, MODERATE, DEPENDENCE (H): ICD-10-CM

## 2022-08-23 DIAGNOSIS — F33.1 MAJOR DEPRESSIVE DISORDER, RECURRENT EPISODE, MODERATE (H): ICD-10-CM

## 2022-08-23 DIAGNOSIS — F41.1 GENERALIZED ANXIETY DISORDER: Primary | ICD-10-CM

## 2022-08-23 PROCEDURE — 90834 PSYTX W PT 45 MINUTES: CPT | Mod: 95 | Performed by: SOCIAL WORKER

## 2022-08-23 NOTE — PROGRESS NOTES
[facility]                                     Progress Note     Patient Name: Jeanna Steel  Date: 8/23/2022          Service Type: Individual     Session Start Time:  11:00  Session End Time: 11:45     Session Length: 45 min    Session #: 66    There has been demonstrated improvement in functioning while patient has been engaged in psychotherapy/psychological service- if withdrawn the patient would deteriorate and/or relapse.     Attendees: Client attended alone      Session Type: Telemedicine Visit: The patient's condition can be safely assessed and treated via synchronous audio and visual telemedicine encounter.      Reason for Telemedicine Visit: Services only offered telehealth    Originating Site (Patient Location): Patient's home    Distant Site (Provider Location): Provider Remote Setting- Home Office    Consent:  The patient/guardian has verbally consented to: the potential risks and benefits of telemedicine (video visit) versus in person care; bill my insurance or make self-payment for services provided; and responsibility for payment of non-covered services.     Mode of Communication:  Video Conference via [employer]    As the provider I attest to compliance with applicable laws and regulations related to telemedicine.       DATA  Interactive Complexity: No  Crisis: No       Progress Since Last Session (Related to Symptoms / Goals / Homework):   Symptoms: No change mood is stable    Homework: Achieved / completed to satisfaction client reported getting organized      Episode of Care Goals: Satisfactory progress - ACTION (Actively working towards change); Intervened by reinforcing change plan / affirming steps taken     Current / Ongoing Stressors and Concerns:   Ongoing: Client reports continued symptoms of depression and anxiety while caretaking for her two parents, wanting to manage alcohol use.    Current: Client reported she has gotten more information to make decisions  for her parents but that the stress has continued. She described trying to navigate their care. Client reported she feels able to manage it but that she wishes it weren't so hard.      Treatment Objective(s) Addressed in This Session:   Decrease frequency and intensity of feeling down, depressed, hopeless  Identify negative self-talk and behaviors: challenge core beliefs, myths, and actions  Improve concentration, focus, and mindfulness in daily activities        Intervention:   DBT: reviewed client's interpersonal effectiveness with parents, brothers and the care team. Reviewed stress management.   Motivational Interviewing    MI Intervention: Expressed Empathy/Understanding, Supported Autonomy, Collaboration, Evocation, Permission to raise concern or advise, Open-ended questions, Reflections: simple and complex, Change talk (evoked) and Reframe     Change Talk Expressed by the Patient: Desire to change Ability to change Reasons to change Need to change Committment to change Activation Taking steps    Provider Response to Change Talk: E - Evoked more info from patient about behavior change, A - Affirmed patient's thoughts, decisions, or attempts at behavior change, R - Reflected patient's change talk and S - Summarized patient's change talk statements      Assessments completed prior to visit:  The following assessments were completed by patient for this visit:  GAD7:   JESSICA-7 SCORE 1/6/2021 6/28/2021 9/16/2021 9/16/2021 10/29/2021 3/8/2022 5/12/2022   Total Score - - - - - - -   Total Score - 2 (minimal anxiety) - 1 (minimal anxiety) 0 (minimal anxiety) - 2 (minimal anxiety)   Total Score 3 2 1 1 0 3 2         ASSESSMENT: Current Emotional / Mental Status (status of significant symptoms):   Risk status (Self / Other harm or suicidal ideation)   Client denies current fears or concerns for personal safety.   Client denies current or recent suicidal ideation or behaviors.   Client denies current or recent homicidal  ideation or behaviors.   Client denies current or recent self injurious behavior or ideation.   Client denies other safety concerns.   Client reports there has been no change in risk factors since their last session.     Client reports there has been no change in protective factors since their last session.     Recommended that patient call 911 or go to the local ED should there be a change in any of these risk factors.     Appearance:   Appropriate    Eye Contact:   Fair    Psychomotor Behavior: Normal     Attitude:   Cooperative  Pleasant Attentive   Orientation:   All   Speech    Rate / Production: Normal/ Responsive     Volume:  Normal    Mood:    Anxious  Agitated  Client appeared worried   Affect:    Appropriate  Tearful   Thought Content:  Clear    Thought Form:  Coherent  Goal Directed  Logical    Insight:    Fair      Medication Review:   No changes to current psychiatric medication(s)     Medication Compliance:   Yes     Changes in Health Issues:   None reported     Chemical Use Review:   Substance Use: Problem use continues with no change since last session, Stage of Change: Action  Provided encouragement towards sobriety  Provided support and affirmation for steps taken towards sobriety          Tobacco Use: No change in amount of tobacco use since last session.  Contemplation  Provided encouragement to quit     Diagnosis:  1. Generalized anxiety disorder    2. Alcohol use disorder, moderate, dependence (H)    3. Major depressive disorder, recurrent episode, moderate (H)        Collateral Reports Completed:   Not Applicable    PLAN: (Client Tasks / Therapist Tasks / Other)  Client will trust her instincts, maintain open conversation and return for therapy in two weeks.         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  PROMIS (reviewed every 90 days):     Referral / Collaboration:  The following referral(s) will be initiated: community support with alcohol reduction.    Anticipated number of session for this episode of care: 100-150  Anticipation frequency of session: Every other week  Anticipated Duration of each session: 38-52 minutes  Treatment plan will be reviewed in 90 days or when goals have been changed.       MeasurableTreatment Goal(s) related to diagnosis / functional impairment(s)  Goal 1: Client will continue reduce alcohol use to 4-5 days a week alcohol free in the next three months and client reporting use of three new coping skills to manage stress in the evenings.    I will know I've met my goal when I'm only drinking two drinks and going to bed earlier.      Objective #A (Client Action)    Client will identify at least three consequences of maladaptive drinking.  Status: Continued - Date(s):  8/9/2022    Intervention(s)  Therapist will assign homework to identify impact of drinking  provide support.    Objective #B  Client will identify three coping skills to replace drinking .  Status: Continued - Date(s):  8/9/2022    Intervention(s)  Therapist will assign homework to practice coping skills  teach coping skills.    Objective #C  Client will identify whether she needs further support to reduce alcohol use.  Status: Continued - Date(s):   8/9/2022    Intervention(s)  Therapist will provide support and referrals as needed, education on  alcohol use.    Goal 2: Client will improve confidence with setting and holding boundaries as evidenced by client reporting reduce avoidance and use of three effective communication skills over the next three months.     I will know I've met my goal when I know how to talk about things.      Objective #A (Client Action)    Client will learn & utilize at least 3 assertive communication skills weekly.  Status: Continued - Date(s): 8/9/2022    Intervention(s)  Therapist will teach assertiveness skills. DBT DEAR MAN skills.    Objective #B  Client will Identify negative self-talk and behaviors: challenge core beliefs, myths, and actions.    Status: Continued - Date(s): 8/9/2022    Intervention(s)14  Therapist will guide client in exploring how core belief system influences her ability to communicate and cope with conflict.        Client has reviewed and agreed to the above plan.      Maite Quinn, [facility]  MSW  August 9, 2022

## 2022-08-24 ENCOUNTER — MYC REFILL (OUTPATIENT)
Dept: PSYCHIATRY | Facility: CLINIC | Age: 57
End: 2022-08-24

## 2022-08-24 DIAGNOSIS — F41.1 GENERALIZED ANXIETY DISORDER: ICD-10-CM

## 2022-08-24 RX ORDER — LORAZEPAM 0.5 MG/1
0.5 TABLET ORAL DAILY PRN
Qty: 10 TABLET | Refills: 0 | Status: SHIPPED | OUTPATIENT
Start: 2022-08-24 | End: 2022-11-04

## 2022-08-24 NOTE — TELEPHONE ENCOUNTER
DE (DOD),     Please see Gigyat message.    Patient called.  Crying on phone.  Father ill and needs to make decision if he will come home or not/hospice etc.    Extremely stressed and anxious.  Asking for refill of Lorazepam.    Last Rx 5/5/22 for #10,  Is aware directions are to take on one a week  Has future OV with SN 9/28 for physical     Please route back to triage so we can call patient and let her know when sent in    Thank you,  Anastacia Pizarro RN

## 2022-09-07 ENCOUNTER — VIRTUAL VISIT (OUTPATIENT)
Dept: PSYCHOLOGY | Facility: CLINIC | Age: 57
End: 2022-09-07
Payer: COMMERCIAL

## 2022-09-07 DIAGNOSIS — F41.1 GENERALIZED ANXIETY DISORDER: Primary | ICD-10-CM

## 2022-09-07 DIAGNOSIS — F10.20 ALCOHOL USE DISORDER, MODERATE, DEPENDENCE (H): ICD-10-CM

## 2022-09-07 PROCEDURE — 90834 PSYTX W PT 45 MINUTES: CPT | Mod: 95 | Performed by: SOCIAL WORKER

## 2022-09-07 NOTE — PROGRESS NOTES
[facility]                                     Progress Note     Patient Name: Jeanna Steel  Date: 9/7/2022          Service Type: Individual     Session Start Time:  10:00  Session End Time: 10:45     Session Length: 45 min    Session #: 67    There has been demonstrated improvement in functioning while patient has been engaged in psychotherapy/psychological service- if withdrawn the patient would deteriorate and/or relapse.     Attendees: Client attended alone      Session Type: Telemedicine Visit: The patient's condition can be safely assessed and treated via synchronous audio and visual telemedicine encounter.      Reason for Telemedicine Visit: Services only offered telehealth    Originating Site (Patient Location): Patient's home    Distant Site (Provider Location): Provider Remote Setting- Home Office    Consent:  The patient/guardian has verbally consented to: the potential risks and benefits of telemedicine (video visit) versus in person care; bill my insurance or make self-payment for services provided; and responsibility for payment of non-covered services.     Mode of Communication:  Video Conference via [employer]    As the provider I attest to compliance with applicable laws and regulations related to telemedicine.       DATA  Interactive Complexity: No  Crisis: No       Progress Since Last Session (Related to Symptoms / Goals / Homework):   Symptoms: No change mood is stable    Homework: Achieved / completed to satisfaction client reported trusting her instincts, maintaining open conversation      Episode of Care Goals: Satisfactory progress - ACTION (Actively working towards change); Intervened by reinforcing change plan / affirming steps taken     Current / Ongoing Stressors and Concerns:   Ongoing: Client reports continued symptoms of depression and anxiety while caretaking for her two parents, wanting to manage alcohol use.    Current: Client reported that she's  feeling some clarity on navigating her parents care and talking with her parents. Client said she's struggling a bit with her willpower around alcohol though she's trying to be patient with herself.      Treatment Objective(s) Addressed in This Session:   Decrease frequency and intensity of feeling down, depressed, hopeless  Identify negative self-talk and behaviors: challenge core beliefs, myths, and actions  Improve concentration, focus, and mindfulness in daily activities        Intervention:   DBT: reviewed recent use of skills to navigate stress, examined self-doubt   Motivational Interviewing    MI Intervention: Expressed Empathy/Understanding, Supported Autonomy, Collaboration, Evocation, Permission to raise concern or advise, Open-ended questions, Reflections: simple and complex, Change talk (evoked) and Reframe     Change Talk Expressed by the Patient: Desire to change Ability to change Reasons to change Need to change Committment to change Activation Taking steps    Provider Response to Change Talk: E - Evoked more info from patient about behavior change, A - Affirmed patient's thoughts, decisions, or attempts at behavior change, R - Reflected patient's change talk and S - Summarized patient's change talk statements      Assessments completed prior to visit:  The following assessments were completed by patient for this visit:  GAD7:   JESSICA-7 SCORE 1/6/2021 6/28/2021 9/16/2021 9/16/2021 10/29/2021 3/8/2022 5/12/2022   Total Score - - - - - - -   Total Score - 2 (minimal anxiety) - 1 (minimal anxiety) 0 (minimal anxiety) - 2 (minimal anxiety)   Total Score 3 2 1 1 0 3 2         ASSESSMENT: Current Emotional / Mental Status (status of significant symptoms):   Risk status (Self / Other harm or suicidal ideation)   Client denies current fears or concerns for personal safety.   Client denies current or recent suicidal ideation or behaviors.   Client denies current or recent homicidal ideation or  behaviors.   Client denies current or recent self injurious behavior or ideation.   Client denies other safety concerns.   Client reports there has been no change in risk factors since their last session.     Client reports there has been no change in protective factors since their last session.     Recommended that patient call 911 or go to the local ED should there be a change in any of these risk factors.     Appearance:   Appropriate    Eye Contact:   Fair    Psychomotor Behavior: Normal     Attitude:   Cooperative  Pleasant Attentive   Orientation:   All   Speech    Rate / Production: Normal/ Responsive     Volume:  Normal    Mood:    Anxious  Agitated  Client appeared somewhat frustrated   Affect:    Appropriate  Tearful   Thought Content:  Clear    Thought Form:  Coherent  Goal Directed  Logical    Insight:    Fair      Medication Review:   No changes to current psychiatric medication(s)     Medication Compliance:   Yes     Changes in Health Issues:   None reported     Chemical Use Review:   Substance Use: Problem use continues with no change since last session, Stage of Change: Action  Provided encouragement towards sobriety  Provided support and affirmation for steps taken towards sobriety          Tobacco Use: No change in amount of tobacco use since last session.  Contemplation  Provided encouragement to quit     Diagnosis:  1. Generalized anxiety disorder    2. Alcohol use disorder, moderate, dependence (H)        Collateral Reports Completed:   Not Applicable    PLAN: (Client Tasks / Therapist Tasks / Other)  Client will identify the skills she uses that work and return for therapy in two weeks.         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  PROMIS (reviewed every 90 days):     Referral / Collaboration:  The following referral(s) will be initiated: community support with alcohol reduction.    Anticipated number of session for this episode of care: 100-150  Anticipation frequency of session: Every other week  Anticipated Duration of each session: 38-52 minutes  Treatment plan will be reviewed in 90 days or when goals have been changed.       MeasurableTreatment Goal(s) related to diagnosis / functional impairment(s)  Goal 1: Client will continue reduce alcohol use to 4-5 days a week alcohol free in the next three months and client reporting use of three new coping skills to manage stress in the evenings.    I will know I've met my goal when I'm only drinking two drinks and going to bed earlier.      Objective #A (Client Action)    Client will identify at least three consequences of maladaptive drinking.  Status: Continued - Date(s):  8/9/2022    Intervention(s)  Therapist will assign homework to identify impact of drinking  provide support.    Objective #B  Client will identify three coping skills to replace drinking .  Status: Continued - Date(s):  8/9/2022    Intervention(s)  Therapist will assign homework to practice coping skills  teach coping skills.    Objective #C  Client will identify whether she needs further support to reduce alcohol use.  Status: Continued - Date(s):   8/9/2022    Intervention(s)  Therapist will provide support and referrals as needed, education on alcohol use.    Goal 2: Client will improve confidence with setting and holding  boundaries as evidenced by client reporting reduce avoidance and use of three effective communication skills over the next three months.     I will know I've met my goal when I know how to talk about things.      Objective #A (Client Action)    Client will learn & utilize at least 3 assertive communication skills weekly.  Status: Continued - Date(s): 8/9/2022    Intervention(s)  Therapist will teach assertiveness skills. DBT DEAR MAN skills.    Objective #B  Client will Identify negative self-talk and behaviors: challenge core beliefs, myths, and actions.    Status: Continued - Date(s): 8/9/2022    Intervention(s)14  Therapist will guide client in exploring how core belief system influences her ability to communicate and cope with conflict.        Client has reviewed and agreed to the above plan.      Maite Quinn, [facility]  MSW  August 9, 2022

## 2022-09-21 ENCOUNTER — VIRTUAL VISIT (OUTPATIENT)
Dept: PSYCHOLOGY | Facility: CLINIC | Age: 57
End: 2022-09-21
Payer: COMMERCIAL

## 2022-09-21 DIAGNOSIS — F41.1 GENERALIZED ANXIETY DISORDER: Primary | ICD-10-CM

## 2022-09-21 PROCEDURE — 90834 PSYTX W PT 45 MINUTES: CPT | Mod: 95 | Performed by: SOCIAL WORKER

## 2022-09-21 NOTE — PROGRESS NOTES
[facility]                                     Progress Note     Patient Name: Jeanna Steel  Date: 9/21/2022          Service Type: Individual     Session Start Time:  10:00  Session End Time: 10:45     Session Length: 45 min    Session #: 68    There has been demonstrated improvement in functioning while patient has been engaged in psychotherapy/psychological service- if withdrawn the patient would deteriorate and/or relapse.     Attendees: Client attended alone      Session Type: Telemedicine Visit: The patient's condition can be safely assessed and treated via synchronous audio and visual telemedicine encounter.      Reason for Telemedicine Visit: Services only offered telehealth    Originating Site (Patient Location): Patient's home    Distant Site (Provider Location): Provider Remote Setting- Home Office    Consent:  The patient/guardian has verbally consented to: the potential risks and benefits of telemedicine (video visit) versus in person care; bill my insurance or make self-payment for services provided; and responsibility for payment of non-covered services.     Mode of Communication:  Video Conference via [employer]    As the provider I attest to compliance with applicable laws and regulations related to telemedicine.       DATA  Interactive Complexity: No  Crisis: No       Progress Since Last Session (Related to Symptoms / Goals / Homework):   Symptoms: No change mood is stable    Homework: Achieved / completed to satisfaction client reported trusting her instincts, maintaining open conversation      Episode of Care Goals: Satisfactory progress - ACTION (Actively working towards change); Intervened by reinforcing change plan / affirming steps taken     Current / Ongoing Stressors and Concerns:   Ongoing: Client reports continued symptoms of depression and anxiety while caretaking for her two parents, wanting to manage alcohol use.    Current: Client reported she has made  some decisions regarding her parents care and that it's a relief. Client described arriving to her decision with the help of her brothers and providers at her parent's facility. She said she is having a hard time managing her alcohol use at the moment but she feels sure her resources will help her get back on track.      Treatment Objective(s) Addressed in This Session:   Decrease frequency and intensity of feeling down, depressed, hopeless  Identify negative self-talk and behaviors: challenge core beliefs, myths, and actions  Improve concentration, focus, and mindfulness in daily activities        Intervention:   DBT; reviewed client's effective interpersonal skills and decision making. Explored alcohol use, role of stress and how to engage in supports   Motivational Interviewing    MI Intervention: Expressed Empathy/Understanding, Supported Autonomy, Collaboration, Evocation, Permission to raise concern or advise, Open-ended questions, Reflections: simple and complex, Change talk (evoked) and Reframe     Change Talk Expressed by the Patient: Desire to change Ability to change Reasons to change Need to change Committment to change Activation Taking steps    Provider Response to Change Talk: E - Evoked more info from patient about behavior change, A - Affirmed patient's thoughts, decisions, or attempts at behavior change, R - Reflected patient's change talk and S - Summarized patient's change talk statements      Assessments completed prior to visit:  The following assessments were completed by patient for this visit:  GAD7:   JESSICA-7 SCORE 1/6/2021 6/28/2021 9/16/2021 9/16/2021 10/29/2021 3/8/2022 5/12/2022   Total Score - - - - - - -   Total Score - 2 (minimal anxiety) - 1 (minimal anxiety) 0 (minimal anxiety) - 2 (minimal anxiety)   Total Score 3 2 1 1 0 3 2         ASSESSMENT: Current Emotional / Mental Status (status of significant symptoms):   Risk status (Self / Other harm or suicidal ideation)   Client denies  current fears or concerns for personal safety.   Client denies current or recent suicidal ideation or behaviors.   Client denies current or recent homicidal ideation or behaviors.   Client denies current or recent self injurious behavior or ideation.   Client denies other safety concerns.   Client reports there has been no change in risk factors since their last session.     Client reports there has been no change in protective factors since their last session.     Recommended that patient call 911 or go to the local ED should there be a change in any of these risk factors.     Appearance:   Appropriate    Eye Contact:   Fair    Psychomotor Behavior: Normal     Attitude:   Cooperative  Pleasant Attentive   Orientation:   All   Speech    Rate / Production: Normal/ Responsive     Volume:  Normal    Mood:    Anxious  Agitated  Client appeared to have good mood, some frustration    Affect:    Appropriate  Tearful   Thought Content:  Clear    Thought Form:  Coherent  Goal Directed  Logical    Insight:    Fair      Medication Review:   No changes to current psychiatric medication(s)     Medication Compliance:   Yes     Changes in Health Issues:   None reported     Chemical Use Review:   Substance Use: Problem use continues with no change since last session, Stage of Change: Action  Provided encouragement towards sobriety  Provided support and affirmation for steps taken towards sobriety          Tobacco Use: No change in amount of tobacco use since last session.  Contemplation  Provided encouragement to quit     Diagnosis:  1. Generalized anxiety disorder        Collateral Reports Completed:   Not Applicable    PLAN: (Client Tasks / Therapist Tasks / Other)  Client will talk with her group honestly about alcohol use, monitor for changes in use related to stress and return for therapy in two weeks.         Maite Quinn, [facility] MSW   9/21/2022       __________________________________________________________________               Individual Treatment Plan    Patient's Name: Jeanna Steel  YOB: 1965    Date of Creation: 6/14/2021  Date Treatment Plan Last Reviewed/Revised: 8/9/2022    DSM5 Diagnoses: 296.31 (F33.0) Major Depressive Disorder, Recurrent Episode, Mild _, 300.02 (F41.1) Generalized Anxiety Disorder or Substance-Related & Addictive Disorders Alcohol Use Disorder   303.90 (F10.20) Moderate In early remission,   Psychosocial / Contextual Factors: Client reports continued symptoms of depression and anxiety while caretaking for her two parents, wanting to manage alcohol use.  PROMIS (reviewed every 90 days):     Referral / Collaboration:  The following referral(s) will be initiated: community support with alcohol reduction.    Anticipated number of session for this episode of care: 100-150  Anticipation frequency of session: Every other week  Anticipated Duration of each session: 38-52 minutes  Treatment plan will be reviewed in 90 days or when goals have been changed.       MeasurableTreatment Goal(s) related to diagnosis / functional impairment(s)  Goal 1: Client will continue reduce alcohol use to 4-5 days a week alcohol free in the next three months and client reporting use of three new coping skills to manage stress in the evenings.    I will know I've met my goal when I'm only drinking two drinks and going to bed earlier.      Objective #A (Client Action)    Client will identify at least three consequences of maladaptive drinking.  Status: Continued - Date(s):  8/9/2022    Intervention(s)  Therapist will assign homework to identify impact of drinking  provide support.    Objective #B  Client will identify three coping skills to replace drinking .  Status: Continued - Date(s):  8/9/2022    Intervention(s)  Therapist will assign homework to practice coping skills  teach coping skills.    Objective #C  Client will identify whether she needs further support to reduce alcohol use.  Status: Continued -  Date(s):   8/9/2022    Intervention(s)  Therapist will provide support and referrals as needed, education on alcohol use.    Goal 2: Client will improve confidence with setting and holding boundaries as evidenced by client reporting reduce avoidance and use of three effective communication skills over the next three months.     I will know I've met my goal when I know how to talk about things.      Objective #A (Client Action)    Client will learn & utilize at least 3 assertive communication skills weekly.  Status: Continued - Date(s): 8/9/2022    Intervention(s)  Therapist will teach assertiveness skills. DBT DEAR MAN skills.    Objective #B  Client will Identify negative self-talk and behaviors: challenge core beliefs, myths, and actions.    Status: Continued - Date(s): 8/9/2022    Intervention(s)14  Therapist will guide client in exploring how core belief system influences her ability to communicate and cope with conflict.        Client has reviewed and agreed to the above plan.      Maite Quinn, SHERYL  MSW  August 9, 2022

## 2022-09-28 ENCOUNTER — OFFICE VISIT (OUTPATIENT)
Dept: FAMILY MEDICINE | Facility: CLINIC | Age: 57
End: 2022-09-28
Payer: COMMERCIAL

## 2022-09-28 VITALS
TEMPERATURE: 98.2 F | DIASTOLIC BLOOD PRESSURE: 76 MMHG | RESPIRATION RATE: 16 BRPM | WEIGHT: 189 LBS | HEART RATE: 84 BPM | BODY MASS INDEX: 30.37 KG/M2 | SYSTOLIC BLOOD PRESSURE: 116 MMHG | HEIGHT: 66 IN | OXYGEN SATURATION: 98 %

## 2022-09-28 DIAGNOSIS — F51.01 PRIMARY INSOMNIA: ICD-10-CM

## 2022-09-28 DIAGNOSIS — Z12.11 SCREEN FOR COLON CANCER: ICD-10-CM

## 2022-09-28 DIAGNOSIS — Z00.00 WELL ADULT EXAM: Primary | ICD-10-CM

## 2022-09-28 DIAGNOSIS — N93.8 DUB (DYSFUNCTIONAL UTERINE BLEEDING): ICD-10-CM

## 2022-09-28 DIAGNOSIS — F10.11 ALCOHOL USE DISORDER, MILD, IN EARLY REMISSION: ICD-10-CM

## 2022-09-28 PROBLEM — F10.20 ALCOHOL USE DISORDER, MODERATE, DEPENDENCE (H): Status: RESOLVED | Noted: 2021-07-05 | Resolved: 2022-09-28

## 2022-09-28 PROCEDURE — 99396 PREV VISIT EST AGE 40-64: CPT | Performed by: FAMILY MEDICINE

## 2022-09-28 RX ORDER — TRAZODONE HYDROCHLORIDE 50 MG/1
50 TABLET, FILM COATED ORAL AT BEDTIME
Qty: 30 TABLET | Refills: 1 | Status: SHIPPED | OUTPATIENT
Start: 2022-09-28 | End: 2023-03-22

## 2022-09-28 ASSESSMENT — ENCOUNTER SYMPTOMS
ABDOMINAL PAIN: 0
JOINT SWELLING: 0
DIZZINESS: 0
MYALGIAS: 0
HEMATOCHEZIA: 0
CHILLS: 0
CONSTIPATION: 0
COUGH: 0
BREAST MASS: 0
WEAKNESS: 0
NAUSEA: 0
PALPITATIONS: 0
SORE THROAT: 0
HEARTBURN: 0
ARTHRALGIAS: 0
FEVER: 0
HEADACHES: 0
DIARRHEA: 0
NERVOUS/ANXIOUS: 0
SHORTNESS OF BREATH: 0
EYE PAIN: 0
DYSURIA: 0
PARESTHESIAS: 0
HEMATURIA: 0
FREQUENCY: 0

## 2022-09-28 ASSESSMENT — PATIENT HEALTH QUESTIONNAIRE - PHQ9
SUM OF ALL RESPONSES TO PHQ QUESTIONS 1-9: 3
SUM OF ALL RESPONSES TO PHQ QUESTIONS 1-9: 3
10. IF YOU CHECKED OFF ANY PROBLEMS, HOW DIFFICULT HAVE THESE PROBLEMS MADE IT FOR YOU TO DO YOUR WORK, TAKE CARE OF THINGS AT HOME, OR GET ALONG WITH OTHER PEOPLE: NOT DIFFICULT AT ALL

## 2022-09-28 ASSESSMENT — PAIN SCALES - GENERAL: PAINLEVEL: NO PAIN (0)

## 2022-09-28 NOTE — PROGRESS NOTES
"   SUBJECTIVE:   CC: Jeanna is an 57 year old who presents for preventive health visit.       Patient has been advised of split billing requirements and indicates understanding: Yes  Healthy Habits:     Getting at least 3 servings of Calcium per day:  Yes    Bi-annual eye exam:  Yes    Dental care twice a year:  Yes    Sleep apnea or symptoms of sleep apnea:  None    Diet:  Regular (no restrictions)    Frequency of exercise:  2-3 days/week    Duration of exercise:  15-30 minutes    Taking medications regularly:  Yes    Medication side effects:  None    PHQ-2 Total Score: 0    Additional concerns today:  Yes          PROBLEMS TO ADD ON...    (Z00.00) Well adult exam  (primary encounter diagnosis)  Comment:   Plan:     (N93.8) DUB (dysfunctional uterine bleeding)  Comment: persistent periods  They are intermittent - skips months   Not sure if ovulatory  Feels like regular periods  Started periods at age 15    Plan: US Pelvic Complete with Transvaginal, Follicle         stimulating hormone, Luteinizing Hormone,         Adult, Estradiol, TSH with free T4 reflex            (Z68.30) BMI 30.0-30.9,adult  Comment:   Plan: Lipid panel reflex to direct LDL Fasting,         Glucose, Hemoglobin A1c            (F10.11) Alcohol use disorder, mild, in early remission  Comment: drinks 2-3 drinks 1-2 times per week and some weeks skips this entirely  Feels working with a group is good  Has an herbal - \"Quit your wining\" and this helps  Plan:     (Z12.11) Screen for colon cancer  Comment:   Plan: scheduled for this        (F51.01) Primary insomnia  Comment: longstanding difficulty with this  Has fed into alcohol use  Tried OTC meds  Plan: traZODone (DESYREL) 50 MG tablet               Today's PHQ-2 Score:   PHQ-2 ( 1999 Pfizer) 9/28/2022   Q1: Little interest or pleasure in doing things 0   Q2: Feeling down, depressed or hopeless 0   PHQ-2 Score 0   PHQ-2 Total Score (12-17 Years)- Positive if 3 or more points; Administer PHQ-A if " positive -   Q1: Little interest or pleasure in doing things Not at all   Q2: Feeling down, depressed or hopeless Not at all   PHQ-2 Score 0       Abuse: Current or Past (Physical, Sexual or Emotional) - No  Do you feel safe in your environment? Yes        Social History     Tobacco Use     Smoking status: Never Smoker     Smokeless tobacco: Never Used   Substance Use Topics     Alcohol use: Yes     Alcohol/week: 11.7 standard drinks     Comment: cutting back.  was drinking daily.  now drinking about 4 days a week and having 4-6 a day.     If you drink alcohol do you typically have >3 drinks per day or >7 drinks per week? No    Alcohol Use 9/28/2022   Prescreen: >3 drinks/day or >7 drinks/week? No   Prescreen: >3 drinks/day or >7 drinks/week? -   AUDIT SCORE  -     AUDIT - Alcohol Use Disorders Identification Test - Reproduced from the World Health Organization Audit 2001 (Second Edition) 1/6/2021   1.  How often do you have a drink containing alcohol? 4 or more times a week   2.  How many drinks containing alcohol do you have on a typical day when you are drinking? 3 or 4   3.  How often do you have five or more drinks on one occasion? Less than monthly   4.  How often during the last year have you found that you were not able to stop drinking once you had started? Never   5.  How often during the last year have you failed to do what was normally expected of you because of drinking? Never   6.  How often during the last year have you needed a first drink in the morning to get yourself going after a heavy drinking session? Never   7.  How often during the last year have you had a feeling of guilt or remorse after drinking? Monthly   8.  How often during the last year have you been unable to remember what happened the night before because of your drinking? Never   9.  Have you or someone else been injured because of your drinking? No   10. Has a relative, friend, doctor or other health care worker been concerned  about your drinking or suggested you cut down? Yes, during the last year   TOTAL SCORE 12       Reviewed orders with patient.  Reviewed health maintenance and updated orders accordingly - Yes  Labs reviewed in EPIC    Breast Cancer Screening:    FHS-7:   Breast CA Risk Assessment (FHS-7) 7/14/2022 9/28/2022   Did any of your first-degree relatives have breast or ovarian cancer? No No   Did any of your relatives have bilateral breast cancer? No No   Did any man in your family have breast cancer? No No   Did any woman in your family have breast and ovarian cancer? No Yes   Did any woman in your family have breast cancer before age 50 y? No No   Do you have 2 or more relatives with breast and/or ovarian cancer? No No   Do you have 2 or more relatives with breast and/or bowel cancer? No No       Mammogram Screening: Recommended mammography every 1-2 years with patient discussion and risk factor consideration  Pertinent mammograms are reviewed under the imaging tab.    History of abnormal Pap smear: NO - age 30-65 PAP every 5 years with negative HPV co-testing recommended  PAP / HPV Latest Ref Rng & Units 1/6/2021 5/2/2016 5/31/2012   PAP (Historical) - NIL NIL NIL   HPV16 NEG:Negative Negative Negative -   HPV18 NEG:Negative Negative Negative -   HRHPV NEG:Negative Negative Negative -     Reviewed and updated as needed this visit by clinical staff   Tobacco  Allergies                 Reviewed and updated as needed this visit by Provider                   Past Medical History:   Diagnosis Date     Anxiety      Depressive disorder      Infertility, female       Past Surgical History:   Procedure Laterality Date     BACK SURGERY  2006     ECTOPIC PREGNANCY SURGERY  2003     EYE SURGERY  1969     GYN SURGERY  2003       Review of Systems   Constitutional: Negative for chills and fever.   HENT: Negative for congestion, ear pain, hearing loss and sore throat.    Eyes: Negative for pain and visual disturbance.   Respiratory:  "Negative for cough and shortness of breath.    Cardiovascular: Negative for chest pain, palpitations and peripheral edema.   Gastrointestinal: Negative for abdominal pain, constipation, diarrhea, heartburn, hematochezia and nausea.   Breasts:  Negative for tenderness, breast mass and discharge.   Genitourinary: Negative for dysuria, frequency, genital sores, hematuria, pelvic pain, urgency, vaginal bleeding and vaginal discharge.   Musculoskeletal: Negative for arthralgias, joint swelling and myalgias.   Skin: Negative for rash.   Neurological: Negative for dizziness, weakness, headaches and paresthesias.   Psychiatric/Behavioral: Negative for mood changes. The patient is not nervous/anxious.           OBJECTIVE:   Ht 1.683 m (5' 6.25\")   Wt 85.7 kg (189 lb)   LMP 09/21/2022   BMI 30.28 kg/m    Physical Exam  GENERAL: healthy, alert and no distress  EYES: Eyes grossly normal to inspection, PERRL and conjunctivae and sclerae normal  HENT: ear canals and TM's normal, nose and mouth without ulcers or lesions  NECK: no adenopathy, no asymmetry, masses, or scars and thyroid normal to palpation  RESP: lungs clear to auscultation - no rales, rhonchi or wheezes  BREAST: normal without masses, tenderness or nipple discharge and no palpable axillary masses or adenopathy  CV: regular rate and rhythm, normal S1 S2, no S3 or S4, no murmur, click or rub, no peripheral edema and peripheral pulses strong  ABDOMEN: soft, nontender, no hepatosplenomegaly, no masses and bowel sounds normal  MS: no gross musculoskeletal defects noted, no edema  SKIN: no suspicious lesions or rashes  NEURO: Normal strength and tone, mentation intact and speech normal  PSYCH: mentation appears normal, affect normal/bright    Diagnostic Test Results:  Labs reviewed in Epic    ASSESSMENT/PLAN:   (Z00.00) Well adult exam  (primary encounter diagnosis)  Comment:    Plan: will get flu and covid booster in a few weeks from pharmacy  Will get zoster as " "well    (N93.8) DUB (dysfunctional uterine bleeding)  Comment: persistent periods  Started menses at age 15  Will ensure normal uterine lining with US and labs to see if bleeding appears ovulatory  Plan: US Pelvic Complete with Transvaginal, Follicle         stimulating hormone, Luteinizing Hormone,         Adult, Estradiol, TSH with free T4 reflex            (Z68.30) BMI 30.0-30.9,adult  Comment:    Plan: Lipid panel reflex to direct LDL Fasting,         Glucose, Hemoglobin A1c             (F10.11) Alcohol use disorder, mild, in early remission  Comment: stable - works with a group on thisuses an herbal medication that ha helped reduce cravings  Plan:     (Z12.11) Screen for colon cancer  Comment:   Plan:     (F51.01) Primary insomnia  Comment: difficulty with sleep longstanding  Hard to fall alseep and stay asleep  Hs tried herbals and OTC meds  Plan: traZODone (DESYREL) 50 MG tablet              Patient has been advised of split billing requirements and indicates understanding: Yes    COUNSELING:  Reviewed preventive health counseling, as reflected in patient instructions       Regular exercise       Healthy diet/nutrition    Estimated body mass index is 30.28 kg/m  as calculated from the following:    Height as of this encounter: 1.683 m (5' 6.25\").    Weight as of this encounter: 85.7 kg (189 lb).    Weight management plan: Discussed healthy diet and exercise guidelines    She reports that she has never smoked. She has never used smokeless tobacco.      Counseling Resources:  ATP IV Guidelines  Pooled Cohorts Equation Calculator  Breast Cancer Risk Calculator  BRCA-Related Cancer Risk Assessment: FHS-7 Tool  FRAX Risk Assessment  ICSI Preventive Guidelines  Dietary Guidelines for Americans, 2010  USDA's MyPlate  ASA Prophylaxis  Lung CA Screening    Karie Queen MD  [facility]  "

## 2022-09-29 ENCOUNTER — LAB (OUTPATIENT)
Dept: LAB | Facility: CLINIC | Age: 57
End: 2022-09-29
Payer: COMMERCIAL

## 2022-09-29 DIAGNOSIS — N93.8 DUB (DYSFUNCTIONAL UTERINE BLEEDING): ICD-10-CM

## 2022-09-29 LAB
CHOLEST SERPL-MCNC: 286 MG/DL
ESTRADIOL SERPL-MCNC: 27 PG/ML
FASTING STATUS PATIENT QL REPORTED: YES
FASTING STATUS PATIENT QL REPORTED: YES
FSH SERPL IRP2-ACNC: 29.7 MIU/ML
GLUCOSE BLD-MCNC: 118 MG/DL (ref 70–99)
HBA1C MFR BLD: 5.4 % (ref 0–5.6)
HDLC SERPL-MCNC: 85 MG/DL
LDLC SERPL CALC-MCNC: 159 MG/DL
LH SERPL-ACNC: 23.1 MIU/ML
NONHDLC SERPL-MCNC: 201 MG/DL
TRIGL SERPL-MCNC: 208 MG/DL
TSH SERPL DL<=0.005 MIU/L-ACNC: 2.82 MU/L (ref 0.4–4)

## 2022-09-29 PROCEDURE — 82947 ASSAY GLUCOSE BLOOD QUANT: CPT

## 2022-09-29 PROCEDURE — 83001 ASSAY OF GONADOTROPIN (FSH): CPT

## 2022-09-29 PROCEDURE — 84443 ASSAY THYROID STIM HORMONE: CPT

## 2022-09-29 PROCEDURE — 80061 LIPID PANEL: CPT

## 2022-09-29 PROCEDURE — 36415 COLL VENOUS BLD VENIPUNCTURE: CPT

## 2022-09-29 PROCEDURE — 83002 ASSAY OF GONADOTROPIN (LH): CPT

## 2022-09-29 PROCEDURE — 82670 ASSAY OF TOTAL ESTRADIOL: CPT

## 2022-09-29 PROCEDURE — 83036 HEMOGLOBIN GLYCOSYLATED A1C: CPT

## 2022-10-03 ENCOUNTER — HOSPITAL ENCOUNTER (OUTPATIENT)
Facility: CLINIC | Age: 57
Discharge: HOME | End: 2022-10-03
Attending: COLON & RECTAL SURGERY | Admitting: COLON & RECTAL SURGERY
Payer: COMMERCIAL

## 2022-10-03 VITALS
RESPIRATION RATE: 34 BRPM | DIASTOLIC BLOOD PRESSURE: 74 MMHG | HEIGHT: 66 IN | WEIGHT: 189 LBS | SYSTOLIC BLOOD PRESSURE: 103 MMHG | BODY MASS INDEX: 30.37 KG/M2 | HEART RATE: 54 BPM | OXYGEN SATURATION: 97 %

## 2022-10-03 LAB — COLONOSCOPY: NORMAL

## 2022-10-03 PROCEDURE — 88305 TISSUE EXAM BY PATHOLOGIST: CPT | Mod: TC | Performed by: COLON & RECTAL SURGERY

## 2022-10-03 PROCEDURE — 45385 COLONOSCOPY W/LESION REMOVAL: CPT | Performed by: COLON & RECTAL SURGERY

## 2022-10-03 PROCEDURE — 88305 TISSUE EXAM BY PATHOLOGIST: CPT | Mod: 26 | Performed by: PATHOLOGY

## 2022-10-03 PROCEDURE — G0500 MOD SEDAT ENDO SERVICE >5YRS: HCPCS | Performed by: COLON & RECTAL SURGERY

## 2022-10-03 PROCEDURE — 250N000011 HC RX IP 250 OP 636: Performed by: COLON & RECTAL SURGERY

## 2022-10-03 RX ORDER — ONDANSETRON 2 MG/ML
4 INJECTION INTRAMUSCULAR; INTRAVENOUS
Status: DISCONTINUED | OUTPATIENT
Start: 2022-10-03 | End: 2022-10-03 | Stop reason: HOSPADM

## 2022-10-03 RX ORDER — FENTANYL CITRATE 50 UG/ML
INJECTION, SOLUTION INTRAMUSCULAR; INTRAVENOUS PRN
Status: COMPLETED | OUTPATIENT
Start: 2022-10-03 | End: 2022-10-03

## 2022-10-03 RX ORDER — LIDOCAINE 40 MG/G
CREAM TOPICAL
Status: DISCONTINUED | OUTPATIENT
Start: 2022-10-03 | End: 2022-10-03 | Stop reason: HOSPADM

## 2022-10-03 RX ADMIN — FENTANYL CITRATE 100 MCG: 50 INJECTION, SOLUTION INTRAMUSCULAR; INTRAVENOUS at 08:01

## 2022-10-03 RX ADMIN — MIDAZOLAM 2 MG: 1 INJECTION INTRAMUSCULAR; INTRAVENOUS at 08:01

## 2022-10-03 ASSESSMENT — ACTIVITIES OF DAILY LIVING (ADL): ADLS_ACUITY_SCORE: 35

## 2022-10-03 NOTE — H&P
Colon & Rectal Surgery History and Physical  Pre-Endoscopy Procedure Note    History of Present Illness   I have been asked by Dr. Queen to evaluate this 57 year old female for colorectal cancer screening. She currently denies any abdominal pain, weight loss, bleeding per rectum, or recent change in bowel habits.    Past Medical History  Diagnosis Date     Anxiety      Depressive disorder      Infertility, female        Past Surgical History  Procedure Laterality Date     ARTHROSCOPY ANKLE, REPAIR TENDON Right 2020     BACK SURGERY  2006     ECTOPIC PREGNANCY SURGERY  2003     EYE SURGERY  1969     GYN SURGERY  2003        Medications  Medication Sig     LORazepam (ATIVAN) 0.5 MG tablet Take 1 tablet (0.5 mg) by mouth daily as needed for anxiety     sertraline (ZOLOFT) 100 MG tablet Take 1 tablet (100 mg) by mouth daily     traZODone (DESYREL) 50 MG tablet Take 1 tablet (50 mg) by mouth At Bedtime       Allergies  Allergen Reactions     Ragweeds         Family History   Family history includes Alcohol/Drug Use in her brother; Alzheimer Disease in her father; Arthritis in her father; Breast Cancer in her mother; Breast Cancer (age of onset: 65) in her paternal aunt; Breast Cancer (age of onset: 92) in her maternal grandmother; Cancer in her paternal aunt; Cerebrovascular Disease in her father; Diabetes in her father, maternal grandfather, paternal grandfather, and another family member; Hypertension in her father and mother; Mental Illness in her maternal half-brother; Schizophrenia in her brother and maternal half-brother; Substance Abuse in her maternal half-brother and maternal half-brother.     Social History   She reports that she has never smoked. She has never used smokeless tobacco. She reports current alcohol use of about 11.7 standard drinks of alcohol per week. She reports previous drug use.    Review of Systems   Constitutional:  No fever, weight change or fatigue.    Eyes:     No dry eyes or vision  "changes.   Ears/Nose/Throat/Neck:  No oral ulcers, sore throat or voice change.    Cardiovascular:   No palpitations, syncope, angina or edema.   Respiratory:    No chest pain, excessive sleepiness, shortness of breath or hemoptysis.    Gastrointestinal:   No abdominal pain, nausea, vomiting, diarrhea or heartburn.    Genitourinary:   No dysuria, hematuria, urinary retention or urinary frequency.   Musculoskeletal:  No joint swelling or arthralgias.    Dermatologic:  No skin rash or other skin changes.   Neurologic:    No focal weakness or numbness. No neuropathy.   Psychiatric:    No depression, anxiety, suicidal ideation, or paranoid ideation.   Endocrine:   No cold or heat intolerance, polydipsia, hirsutism, change in libido, or flushing.   Hematology/Lymphatic:  No bleeding or lymphadenopathy.    Allergy/Immunology:  No rhinitis or hives.     Physical Exam   Vitals:  /77, HR 59, RR 16, height 1.683 m (5' 6.25\"), weight 85.7 kg (189 lb), last menstrual period 09/21/2022, SpO2 98 %, not currently breastfeeding.    General:  Alert and oriented to person, place and time   Airway: Normal oropharyngeal airway and neck mobility   Lungs:  Clear bilaterally   Heart:  Regular rate and rhythm   Abdomen: Soft, NT, ND, no masses   Extremities: Warm, good capillary refill    ASA Grade: II (mild systemic disease)    Impression: Cleared for use of conscious sedation for colorectal cancer screneing    Plan: Proceed with colonoscopy     Halle Pillai MD  [facility]  [phone]  "

## 2022-10-04 LAB
PATH REPORT.COMMENTS IMP SPEC: NORMAL
PATH REPORT.COMMENTS IMP SPEC: NORMAL
PATH REPORT.FINAL DX SPEC: NORMAL
PATH REPORT.GROSS SPEC: NORMAL
PATH REPORT.MICROSCOPIC SPEC OTHER STN: NORMAL
PATH REPORT.RELEVANT HX SPEC: NORMAL
PHOTO IMAGE: NORMAL

## 2022-10-04 NOTE — RESULT ENCOUNTER NOTE
Abel Meeks,    Normal thyroid level - it is slightly higher side of normal so let's see if you can recheck this in 3 months.  Weight loss will help this dose to work better.    The cholesterol was a bit better but still high.  Weight loss - even 5# helps this to improve.    The blood sugar was also elevated indicating you might be heading into prediabetes.    If you can follow a low carb, high fruits vegetable lean protein diet this will help all of the above.  Let me know if I can help in any way.  We should repeat these in 6 months.    The labs do not show menopause yet.      Karie Queen MD

## 2022-10-05 ENCOUNTER — VIRTUAL VISIT (OUTPATIENT)
Dept: PSYCHOLOGY | Facility: CLINIC | Age: 57
End: 2022-10-05
Payer: COMMERCIAL

## 2022-10-05 DIAGNOSIS — F41.1 GENERALIZED ANXIETY DISORDER: Primary | ICD-10-CM

## 2022-10-05 DIAGNOSIS — F10.20 ALCOHOL USE DISORDER, MODERATE, DEPENDENCE (H): ICD-10-CM

## 2022-10-05 PROCEDURE — 90834 PSYTX W PT 45 MINUTES: CPT | Mod: 95 | Performed by: SOCIAL WORKER

## 2022-10-05 NOTE — PROGRESS NOTES
[facility]                                     Progress Note     Patient Name: Jeanna Steel  Date: 10/5/2022          Service Type: Individual     Session Start Time:  10:00  Session End Time: 10:45     Session Length: 45 min    Session #: 69    There has been demonstrated improvement in functioning while patient has been engaged in psychotherapy/psychological service- if withdrawn the patient would deteriorate and/or relapse.     Attendees: Client attended alone      Session Type: Telemedicine Visit: The patient's condition can be safely assessed and treated via synchronous audio and visual telemedicine encounter.      Reason for Telemedicine Visit: Services only offered telehealth    Originating Site (Patient Location): Patient's home    Distant Site (Provider Location): Provider Remote Setting- Home Office    Consent:  The patient/guardian has verbally consented to: the potential risks and benefits of telemedicine (video visit) versus in person care; bill my insurance or make self-payment for services provided; and responsibility for payment of non-covered services.     Mode of Communication:  Video Conference via [employer]    As the provider I attest to compliance with applicable laws and regulations related to telemedicine.       DATA  Interactive Complexity: No  Crisis: No       Progress Since Last Session (Related to Symptoms / Goals / Homework):   Symptoms: No change mood is stable    Homework: Achieved / completed to satisfaction client reported talking with group about alcohol use      Episode of Care Goals: Satisfactory progress - ACTION (Actively working towards change); Intervened by reinforcing change plan / affirming steps taken     Current / Ongoing Stressors and Concerns:   Ongoing: Client reports continued symptoms of depression and anxiety while caretaking for her two parents, wanting to manage alcohol use.    Current: Client reported she's mostly focused on  preparing for her upcoming high school reunion. She described her nervousness and feeling self-conscious about physical changes. Client said she generally is feeling better about herself as she makes more choices that encourage her well-being.     Treatment Objective(s) Addressed in This Session:   Decrease frequency and intensity of feeling down, depressed, hopeless  Identify negative self-talk and behaviors: challenge core beliefs, myths, and actions  Improve concentration, focus, and mindfulness in daily activities        Intervention:   DBT: reviewed client's worries and checked the facts. Explored how to maintain positive self-talk   Motivational Interviewing    MI Intervention: Expressed Empathy/Understanding, Supported Autonomy, Collaboration, Evocation, Permission to raise concern or advise, Open-ended questions, Reflections: simple and complex, Change talk (evoked) and Reframe     Change Talk Expressed by the Patient: Desire to change Ability to change Reasons to change Need to change Committment to change Activation Taking steps    Provider Response to Change Talk: E - Evoked more info from patient about behavior change, A - Affirmed patient's thoughts, decisions, or attempts at behavior change, R - Reflected patient's change talk and S - Summarized patient's change talk statements      Assessments completed prior to visit:  The following assessments were completed by patient for this visit:  GAD7:   JESSICA-7 SCORE 1/6/2021 6/28/2021 9/16/2021 9/16/2021 10/29/2021 3/8/2022 5/12/2022   Total Score - - - - - - -   Total Score - 2 (minimal anxiety) - 1 (minimal anxiety) 0 (minimal anxiety) - 2 (minimal anxiety)   Total Score 3 2 1 1 0 3 2         ASSESSMENT: Current Emotional / Mental Status (status of significant symptoms):   Risk status (Self / Other harm or suicidal ideation)   Client denies current fears or concerns for personal safety.   Client denies current or recent suicidal ideation or  behaviors.   Client denies current or recent homicidal ideation or behaviors.   Client denies current or recent self injurious behavior or ideation.   Client denies other safety concerns.   Client reports there has been no change in risk factors since their last session.     Client reports there has been no change in protective factors since their last session.     Recommended that patient call 911 or go to the local ED should there be a change in any of these risk factors.     Appearance:   Appropriate    Eye Contact:   Fair    Psychomotor Behavior: Normal     Attitude:   Cooperative  Pleasant Attentive   Orientation:   All   Speech    Rate / Production: Normal/ Responsive     Volume:  Normal    Mood:    Anxious  Normal  Client appeared to have good mood, some nervousness   Affect:    Appropriate    Thought Content:  Clear    Thought Form:  Coherent  Goal Directed  Logical    Insight:    Fair      Medication Review:   No changes to current psychiatric medication(s)     Medication Compliance:   Yes     Changes in Health Issues:   None reported     Chemical Use Review:   Substance Use: Problem use continues with no change since last session, Stage of Change: Action  Provided encouragement towards sobriety  Provided support and affirmation for steps taken towards sobriety          Tobacco Use: No change in amount of tobacco use since last session.  Contemplation  Provided encouragement to quit     Diagnosis:  1. Generalized anxiety disorder    2. Alcohol use disorder, moderate, dependence (H)        Collateral Reports Completed:   Not Applicable    PLAN: (Client Tasks / Therapist Tasks / Other)  Client will ask herself what if negative thoughts aren't true, practice being present at the [facility] and return for therapy in two weeks.         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  PROMIS (reviewed every 90 days):     Referral / Collaboration:  The following referral(s) will be initiated: community support with alcohol reduction.    Anticipated number of session for this episode of care: 100-150  Anticipation frequency of session: Every other week  Anticipated Duration of each session: 38-52 minutes  Treatment plan will be reviewed in 90 days or when goals have been changed.       MeasurableTreatment Goal(s) related to diagnosis / functional impairment(s)  Goal 1: Client will continue reduce alcohol use to 4-5 days a week alcohol free in the next three months and client reporting use of three new coping skills to manage stress in the evenings.    I will know I've met my goal when I'm only drinking two drinks and going to bed earlier.      Objective #A (Client Action)    Client will identify at least three consequences of maladaptive drinking.  Status: Continued - Date(s):  8/9/2022    Intervention(s)  Therapist will assign homework to identify impact of drinking  provide support.    Objective #B  Client will identify three coping skills to replace drinking .  Status: Continued - Date(s):  8/9/2022    Intervention(s)  Therapist will assign homework to practice coping skills  teach coping skills.    Objective #C  Client will identify whether she needs further support to reduce alcohol use.  Status: Continued - Date(s):   8/9/2022    Intervention(s)  Therapist will provide  support and referrals as needed, education on alcohol use.    Goal 2: Client will improve confidence with setting and holding boundaries as evidenced by client reporting reduce avoidance and use of three effective communication skills over the next three months.     I will know I've met my goal when I know how to talk about things.      Objective #A (Client Action)    Client will learn & utilize at least 3 assertive communication skills weekly.  Status: Continued - Date(s): 8/9/2022    Intervention(s)  Therapist will teach assertiveness skills. DBT DEAR MAN skills.    Objective #B  Client will Identify negative self-talk and behaviors: challenge core beliefs, myths, and actions.    Status: Continued - Date(s): 8/9/2022    Intervention(s)14  Therapist will guide client in exploring how core belief system influences her ability to communicate and cope with conflict.        Client has reviewed and agreed to the above plan.      Maite Quinn, [facility]  MSW  August 9, 2022

## 2022-10-23 ENCOUNTER — HEALTH MAINTENANCE LETTER (OUTPATIENT)
Age: 57
End: 2022-10-23

## 2022-10-24 ENCOUNTER — VIRTUAL VISIT (OUTPATIENT)
Dept: PSYCHOLOGY | Facility: CLINIC | Age: 57
End: 2022-10-24
Payer: COMMERCIAL

## 2022-10-24 DIAGNOSIS — F41.1 GENERALIZED ANXIETY DISORDER: Primary | ICD-10-CM

## 2022-10-24 DIAGNOSIS — F10.20 ALCOHOL USE DISORDER, MODERATE, DEPENDENCE (H): ICD-10-CM

## 2022-10-24 PROCEDURE — 90834 PSYTX W PT 45 MINUTES: CPT | Mod: 95 | Performed by: SOCIAL WORKER

## 2022-10-24 NOTE — PROGRESS NOTES
[facility]                                     Progress Note     Patient Name: Jeanna Steel  Date: 10/24/2022          Service Type: Individual     Session Start Time:  10:00  Session End Time: 10:45     Session Length: 45 min    Session #: 70    There has been demonstrated improvement in functioning while patient has been engaged in psychotherapy/psychological service- if withdrawn the patient would deteriorate and/or relapse.     Attendees: Client attended alone      Session Type: Telemedicine Visit: The patient's condition can be safely assessed and treated via synchronous audio and visual telemedicine encounter.      Reason for Telemedicine Visit: Services only offered telehealth    Originating Site (Patient Location): Patient's home    Distant Site (Provider Location): Provider Remote Setting- Home Office    Consent:  The patient/guardian has verbally consented to: the potential risks and benefits of telemedicine (video visit) versus in person care; bill my insurance or make self-payment for services provided; and responsibility for payment of non-covered services.     Mode of Communication:  Video Conference via [employer]    As the provider I attest to compliance with applicable laws and regulations related to telemedicine.       DATA  Interactive Complexity: No  Crisis: No       Progress Since Last Session (Related to Symptoms / Goals / Homework):   Symptoms: No change mood remains stable    Homework: Achieved / completed to satisfaction client reported talking with group about alcohol use      Episode of Care Goals: Satisfactory progress - ACTION (Actively working towards change); Intervened by reinforcing change plan / affirming steps taken     Current / Ongoing Stressors and Concerns:   Ongoing: Client reports continued symptoms of depression and anxiety while caretaking for her two parents, wanting to manage alcohol use.    Current: Client reported her father passed away  recently and they have had the . She described the events around his passing and how the family navigated his death. Client reported she thinks she's handled things well, also getting along with her brothers. She said her mom has a hard time remembering her dad passed which can be difficult to manage.     Treatment Objective(s) Addressed in This Session:   Decrease frequency and intensity of feeling down, depressed, hopeless       Intervention:   DBT: reviewed client's processing with loss, identified effective use of skills and explored how to navigate mom   Motivational Interviewing    MI Intervention: Expressed Empathy/Understanding, Supported Autonomy, Collaboration, Evocation, Permission to raise concern or advise, Open-ended questions, Reflections: simple and complex, Change talk (evoked) and Reframe     Change Talk Expressed by the Patient: Desire to change Ability to change Reasons to change Need to change Committment to change Activation Taking steps    Provider Response to Change Talk: E - Evoked more info from patient about behavior change, A - Affirmed patient's thoughts, decisions, or attempts at behavior change, R - Reflected patient's change talk and S - Summarized patient's change talk statements      Assessments completed prior to visit:  The following assessments were completed by patient for this visit:  GAD7:   JESSICA-7 SCORE 2021 2021 2021 2021 10/29/2021 3/8/2022 2022   Total Score - - - - - - -   Total Score - 2 (minimal anxiety) - 1 (minimal anxiety) 0 (minimal anxiety) - 2 (minimal anxiety)   Total Score 3 2 1 1 0 3 2         ASSESSMENT: Current Emotional / Mental Status (status of significant symptoms):   Risk status (Self / Other harm or suicidal ideation)   Client denies current fears or concerns for personal safety.   Client denies current or recent suicidal ideation or behaviors.   Client denies current or recent homicidal ideation or behaviors.   Client  denies current or recent self injurious behavior or ideation.   Client denies other safety concerns.   Client reports there has been no change in risk factors since their last session.     Client reports there has been no change in protective factors since their last session.     Recommended that patient call 911 or go to the local ED should there be a change in any of these risk factors.     Appearance:   Appropriate    Eye Contact:   Fair    Psychomotor Behavior: Normal     Attitude:   Cooperative  Pleasant Attentive   Orientation:   All   Speech    Rate / Production: Normal/ Responsive     Volume:  Normal    Mood:    Normal Grieving  Client presented as a bit down   Affect:    Appropriate    Thought Content:  Clear    Thought Form:  Coherent  Goal Directed  Logical    Insight:    Fair      Medication Review:   No changes to current psychiatric medication(s)     Medication Compliance:   Yes     Changes in Health Issues:   None reported     Chemical Use Review:   Substance Use: Problem use continues with no change since last session, Stage of Change: Action  Provided encouragement towards sobriety  Provided support and affirmation for steps taken towards sobriety          Tobacco Use: No change in amount of tobacco use since last session.  Contemplation  Provided encouragement to quit     Diagnosis:  1. Generalized anxiety disorder    2. Alcohol use disorder, moderate, dependence (H)        Collateral Reports Completed:   Not Applicable    PLAN: (Client Tasks / Therapist Tasks / Other)  Client will remain flexible with mom, continue trusting her instincts with boundaries and return for therapy in two weeks.         Maite Quinn, [facility] MSW   10/24/2022       __________________________________________________________________              Individual Treatment Plan    Patient's Name: Jeanna Steel  YOB: 1965    Date of Creation: 6/14/2021  Date Treatment Plan Last Reviewed/Revised:  8/9/2022    DSM5 Diagnoses: 296.31 (F33.0) Major Depressive Disorder, Recurrent Episode, Mild _, 300.02 (F41.1) Generalized Anxiety Disorder or Substance-Related & Addictive Disorders Alcohol Use Disorder   303.90 (F10.20) Moderate In early remission,   Psychosocial / Contextual Factors: Client reports continued symptoms of depression and anxiety while caretaking for her two parents, wanting to manage alcohol use.  PROMIS (reviewed every 90 days):     Referral / Collaboration:  The following referral(s) will be initiated: community support with alcohol reduction.    Anticipated number of session for this episode of care: 100-150  Anticipation frequency of session: Every other week  Anticipated Duration of each session: 38-52 minutes  Treatment plan will be reviewed in 90 days or when goals have been changed.       MeasurableTreatment Goal(s) related to diagnosis / functional impairment(s)  Goal 1: Client will continue reduce alcohol use to 4-5 days a week alcohol free in the next three months and client reporting use of three new coping skills to manage stress in the evenings.    I will know I've met my goal when I'm only drinking two drinks and going to bed earlier.      Objective #A (Client Action)    Client will identify at least three consequences of maladaptive drinking.  Status: Continued - Date(s):  8/9/2022    Intervention(s)  Therapist will assign homework to identify impact of drinking  provide support.    Objective #B  Client will identify three coping skills to replace drinking .  Status: Continued - Date(s):  8/9/2022    Intervention(s)  Therapist will assign homework to practice coping skills  teach coping skills.    Objective #C  Client will identify whether she needs further support to reduce alcohol use.  Status: Continued - Date(s):   8/9/2022    Intervention(s)  Therapist will provide support and referrals as needed, education on alcohol use.    Goal 2: Client will improve confidence with setting  and holding boundaries as evidenced by client reporting reduce avoidance and use of three effective communication skills over the next three months.     I will know I've met my goal when I know how to talk about things.      Objective #A (Client Action)    Client will learn & utilize at least 3 assertive communication skills weekly.  Status: Continued - Date(s): 8/9/2022    Intervention(s)  Therapist will teach assertiveness skills. DBT DEAR MAN skills.    Objective #B  Client will Identify negative self-talk and behaviors: challenge core beliefs, myths, and actions.    Status: Continued - Date(s): 8/9/2022    Intervention(s)14  Therapist will guide client in exploring how core belief system influences her ability to communicate and cope with conflict.        Client has reviewed and agreed to the above plan.      Maite Quinn, [facility]  MSW  August 9, 2022

## 2022-11-04 DIAGNOSIS — F41.1 GENERALIZED ANXIETY DISORDER: ICD-10-CM

## 2022-11-04 RX ORDER — LORAZEPAM 0.5 MG/1
0.5 TABLET ORAL DAILY PRN
Qty: 10 TABLET | Refills: 0 | Status: SHIPPED | OUTPATIENT
Start: 2022-11-04 | End: 2023-08-28

## 2022-11-04 NOTE — TELEPHONE ENCOUNTER
States the prescription is urgent.  Dealing with the stress of caring for her mother who has alzheimer's and her father recently passed away.   Would like Rx filled before the weekend.   Please advise.   Thanks!  Marla BRANDON

## 2022-11-04 NOTE — TELEPHONE ENCOUNTER
Routing refill request to provider for review/approval because:  Drug not on the [facility] refill protocol   See below Sharonda OBRIEN RN

## 2022-11-08 ENCOUNTER — VIRTUAL VISIT (OUTPATIENT)
Dept: PSYCHOLOGY | Facility: CLINIC | Age: 57
End: 2022-11-08
Payer: COMMERCIAL

## 2022-11-08 DIAGNOSIS — F41.1 GENERALIZED ANXIETY DISORDER: Primary | ICD-10-CM

## 2022-11-08 PROCEDURE — 90834 PSYTX W PT 45 MINUTES: CPT | Mod: 95 | Performed by: SOCIAL WORKER

## 2022-11-08 ASSESSMENT — ANXIETY QUESTIONNAIRES
3. WORRYING TOO MUCH ABOUT DIFFERENT THINGS: SEVERAL DAYS
7. FEELING AFRAID AS IF SOMETHING AWFUL MIGHT HAPPEN: NOT AT ALL
5. BEING SO RESTLESS THAT IT IS HARD TO SIT STILL: NOT AT ALL
4. TROUBLE RELAXING: SEVERAL DAYS
GAD7 TOTAL SCORE: 5
GAD7 TOTAL SCORE: 5
2. NOT BEING ABLE TO STOP OR CONTROL WORRYING: SEVERAL DAYS
IF YOU CHECKED OFF ANY PROBLEMS ON THIS QUESTIONNAIRE, HOW DIFFICULT HAVE THESE PROBLEMS MADE IT FOR YOU TO DO YOUR WORK, TAKE CARE OF THINGS AT HOME, OR GET ALONG WITH OTHER PEOPLE: SOMEWHAT DIFFICULT
1. FEELING NERVOUS, ANXIOUS, OR ON EDGE: SEVERAL DAYS
6. BECOMING EASILY ANNOYED OR IRRITABLE: SEVERAL DAYS
8. IF YOU CHECKED OFF ANY PROBLEMS, HOW DIFFICULT HAVE THESE MADE IT FOR YOU TO DO YOUR WORK, TAKE CARE OF THINGS AT HOME, OR GET ALONG WITH OTHER PEOPLE?: SOMEWHAT DIFFICULT
7. FEELING AFRAID AS IF SOMETHING AWFUL MIGHT HAPPEN: NOT AT ALL
GAD7 TOTAL SCORE: 5

## 2022-11-08 NOTE — PROGRESS NOTES
[facility]                                     Progress Note     Patient Name: Jeanna Steel  Date: 11/8/2022          Service Type: Individual     Session Start Time:  11:00  Session End Time: 11:45     Session Length: 45 min    Session #: 71    There has been demonstrated improvement in functioning while patient has been engaged in psychotherapy/psychological service- if withdrawn the patient would deteriorate and/or relapse.     Attendees: Client attended alone      Session Type: Telemedicine Visit: The patient's condition can be safely assessed and treated via synchronous audio and visual telemedicine encounter.      Reason for Telemedicine Visit: Services only offered telehealth    Originating Site (Patient Location): Patient's home    Distant Site (Provider Location): Provider Remote Setting- Home Office    Consent:  The patient/guardian has verbally consented to: the potential risks and benefits of telemedicine (video visit) versus in person care; bill my insurance or make self-payment for services provided; and responsibility for payment of non-covered services.     Mode of Communication:  Video Conference via "[employer]"    As the provider I attest to compliance with applicable laws and regulations related to telemedicine.       DATA  Interactive Complexity: No  Crisis: No       Progress Since Last Session (Related to Symptoms / Goals / Homework):   Symptoms: No change mood remains stable    Homework: Achieved / completed to satisfaction client reported remaining flexible with mom, trusting her instincts with boundaries      Episode of Care Goals: Satisfactory progress - ACTION (Actively working towards change); Intervened by reinforcing change plan / affirming steps taken     Current / Ongoing Stressors and Concerns:   Ongoing: Client reports continued symptoms of depression and anxiety while caretaking for her two parents, wanting to manage alcohol use.    Current: Client  reported navigating her mom has been difficult since her dad passed away. She described how difficult it is to remind her mom that he  and that her mom has been really clingy to her. Client said she's been leaning on her brothers more, who are being more helpful than she expected.     Treatment Objective(s) Addressed in This Session:   Decrease frequency and intensity of feeling down, depressed, hopeless       Intervention:   DBT: reviewed interpersonal dynamics with mom and siblings, how to reduce guilt over boundary setting and addressing personal needs   Motivational Interviewing    MI Intervention: Expressed Empathy/Understanding, Supported Autonomy, Collaboration, Evocation, Permission to raise concern or advise, Open-ended questions, Reflections: simple and complex, Change talk (evoked) and Reframe     Change Talk Expressed by the Patient: Desire to change Ability to change Reasons to change Need to change Committment to change Activation Taking steps    Provider Response to Change Talk: E - Evoked more info from patient about behavior change, A - Affirmed patient's thoughts, decisions, or attempts at behavior change, R - Reflected patient's change talk and S - Summarized patient's change talk statements      Assessments completed prior to visit:  The following assessments were completed by patient for this visit:  GAD7:   JESSICA-7 SCORE 2021 2021 2021 10/29/2021 3/8/2022 2022 2022   Total Score - - - - - - -   Total Score 2 (minimal anxiety) - 1 (minimal anxiety) 0 (minimal anxiety) - 2 (minimal anxiety) 5 (mild anxiety)   Total Score 2 1 1 0 3 2 5         ASSESSMENT: Current Emotional / Mental Status (status of significant symptoms):   Risk status (Self / Other harm or suicidal ideation)   Client denies current fears or concerns for personal safety.   Client denies current or recent suicidal ideation or behaviors.   Client denies current or recent homicidal ideation or  behaviors.   Client denies current or recent self injurious behavior or ideation.   Client denies other safety concerns.   Client reports there has been no change in risk factors since their last session.     Client reports there has been no change in protective factors since their last session.     Recommended that patient call 911 or go to the local ED should there be a change in any of these risk factors.     Appearance:   Appropriate    Eye Contact:   Fair    Psychomotor Behavior: Normal     Attitude:   Cooperative  Pleasant Attentive   Orientation:   All   Speech    Rate / Production: Normal/ Responsive     Volume:  Normal    Mood:    Normal Grieving  Client presented with frustration   Affect:    Appropriate    Thought Content:  Clear    Thought Form:  Coherent  Goal Directed  Logical    Insight:    Fair      Medication Review:   No changes to current psychiatric medication(s)     Medication Compliance:   Yes     Changes in Health Issues:   None reported     Chemical Use Review:   Substance Use: Problem use continues with no change since last session, Stage of Change: Action  Provided encouragement towards sobriety  Provided support and affirmation for steps taken towards sobriety          Tobacco Use: No change in amount of tobacco use since last session.  Contemplation  Provided encouragement to quit     Diagnosis:  1. Generalized anxiety disorder        Collateral Reports Completed:   Not Applicable    PLAN: (Client Tasks / Therapist Tasks / Other)  Client will validate her need for time to care for herself, continue delegating with brothers and return for therapy in two weeks.         Maite Quinn, [facility] MSW   11/8/2022       __________________________________________________________________              Individual Treatment Plan    Patient's Name: Jeanna Steel  YOB: 1965    Date of Creation: 6/14/2021  Date Treatment Plan Last Reviewed/Revised: 11/8/2022    DSM5 Diagnoses: 296.31  (F33.0) Major Depressive Disorder, Recurrent Episode, Mild _, 300.02 (F41.1) Generalized Anxiety Disorder or Substance-Related & Addictive Disorders Alcohol Use Disorder   303.90 (F10.20) Moderate In early remission,   Psychosocial / Contextual Factors: Client reports continued symptoms of depression and anxiety while caretaking for her two parents, wanting to manage alcohol use.  PROMIS (reviewed every 90 days):     Referral / Collaboration:  The following referral(s) will be initiated: community support with alcohol reduction.    Anticipated number of session for this episode of care: 100-150  Anticipation frequency of session: Every other week  Anticipated Duration of each session: 38-52 minutes  Treatment plan will be reviewed in 90 days or when goals have been changed.       MeasurableTreatment Goal(s) related to diagnosis / functional impairment(s)  Goal 1: Client will continue reduce alcohol use to 4-5 days a week alcohol free in the next three months and client reporting use of three new coping skills to manage stress in the evenings.    I will know I've met my goal when I'm only drinking two drinks and going to bed earlier.      Objective #A (Client Action)    Client will identify at least three consequences of maladaptive drinking.  Status: Continued - Date(s):  11/8/2022    Intervention(s)  Therapist will assign homework to identify impact of drinking  provide support.    Objective #B  Client will identify three coping skills to replace drinking .  Status: Continued - Date(s):  11/8/2022    Intervention(s)  Therapist will assign homework to practice coping skills  teach coping skills.    Objective #C  Client will identify whether she needs further support to reduce alcohol use.  Status: Continued - Date(s):   11/8/2022    Intervention(s)  Therapist will provide support and referrals as needed, education on alcohol use.    Goal 2: Client will improve confidence with setting and holding boundaries as  evidenced by client reporting reduce avoidance and use of three effective communication skills over the next three months.     I will know I've met my goal when I know how to talk about things.      Objective #A (Client Action)    Client will learn & utilize at least 3 assertive communication skills weekly.  Status: Continued - Date(s): 11/8/2022    Intervention(s)  Therapist will teach assertiveness skills. DBT DEAR MAN skills.    Objective #B  Client will Identify negative self-talk and behaviors: challenge core beliefs, myths, and actions.    Status: Continued - Date(s): 11/8/2022    Intervention(s)14  Therapist will guide client in exploring how core belief system influences her ability to communicate and cope with conflict.        Client has reviewed and agreed to the above plan.      Maite Quinn, [facility]  MSW  November 8, 2022

## 2022-11-11 ENCOUNTER — TELEPHONE (OUTPATIENT)
Dept: FAMILY MEDICINE | Facility: CLINIC | Age: 57
End: 2022-11-11

## 2022-11-11 NOTE — TELEPHONE ENCOUNTER
"Patient calling with flu-like symptoms.  Started Wednesday afternoon with a tickle in throat.  Felt \"bad\" when she got up Thursday morning.  Temp was 102 this morning.  Patient has not yet taken Tylenol.  RN advised to take Tylenol and see if that helps to bring down fever.  Coughing a lot, has a runny nose/drainage.  No NV, diarrhea.  COVID test was negative, both rapid and PCR.  RN advised that a cough syrup would also likely be helpful.  Patient asked if she has flu or RSV- RN advised can't know without diagnostic testing, but if she doesn't feel better after trying at home remedies, should call back and be seen.  Kristin SOLIZ RN    "

## 2022-11-18 NOTE — TELEPHONE ENCOUNTER
Pt calling about this. Still coughing, no fever.   Alert, appropriate. Discussed normal course of illness including cough up to 3 weeks. Went over RTC guidelines per cough protocol.  Katelynn JO RN  [facility]

## 2022-11-22 ENCOUNTER — VIRTUAL VISIT (OUTPATIENT)
Dept: PSYCHOLOGY | Facility: CLINIC | Age: 57
End: 2022-11-22
Payer: COMMERCIAL

## 2022-11-22 DIAGNOSIS — F41.1 GENERALIZED ANXIETY DISORDER: Primary | ICD-10-CM

## 2022-11-22 PROCEDURE — 90834 PSYTX W PT 45 MINUTES: CPT | Mod: 95 | Performed by: SOCIAL WORKER

## 2022-11-22 NOTE — PROGRESS NOTES
[facility]                                     Progress Note     Patient Name: Jeanna Steel  Date: 11/22/2022          Service Type: Individual     Session Start Time:  11:00  Session End Time: 11:45     Session Length: 45 min    Session #: 72    There has been demonstrated improvement in functioning while patient has been engaged in psychotherapy/psychological service- if withdrawn the patient would deteriorate and/or relapse.     Attendees: Client attended alone      Session Type: Telemedicine Visit: The patient's condition can be safely assessed and treated via synchronous audio and visual telemedicine encounter.      Reason for Telemedicine Visit: Services only offered telehealth    Originating Site (Patient Location): Patient's home    Distant Site (Provider Location): Provider Remote Setting- Home Office    Consent:  The patient/guardian has verbally consented to: the potential risks and benefits of telemedicine (video visit) versus in person care; bill my insurance or make self-payment for services provided; and responsibility for payment of non-covered services.     Mode of Communication:  Video Conference via [employer]    As the provider I attest to compliance with applicable laws and regulations related to telemedicine.       DATA  Interactive Complexity: No  Crisis: No       Progress Since Last Session (Related to Symptoms / Goals / Homework):   Symptoms: No change mood remains stable    Homework: Achieved / completed to satisfaction client reported validating herself, delegating with brothers      Episode of Care Goals: Satisfactory progress - ACTION (Actively working towards change); Intervened by reinforcing change plan / affirming steps taken     Current / Ongoing Stressors and Concerns:   Ongoing: Client reports continued symptoms of depression and anxiety while caretaking for her two parents, wanting to manage alcohol use.    Current: Client reported she has had a  rough time after her mom fell and broke her hip. She said they are re-evaluating her living situation again which is making her mom really confused and more agitated. Client reported her  is also experiencing health issues so she's feeling generally stressed.     Treatment Objective(s) Addressed in This Session:   Decrease frequency and intensity of feeling down, depressed, hopeless       Intervention:   DBT: reviewed client's recent stressors, how she is managing her well-being   Motivational Interviewing    MI Intervention: Expressed Empathy/Understanding, Supported Autonomy, Collaboration, Evocation, Permission to raise concern or advise, Open-ended questions, Reflections: simple and complex, Change talk (evoked) and Reframe     Change Talk Expressed by the Patient: Desire to change Ability to change Reasons to change Need to change Committment to change Activation Taking steps    Provider Response to Change Talk: E - Evoked more info from patient about behavior change, A - Affirmed patient's thoughts, decisions, or attempts at behavior change, R - Reflected patient's change talk and S - Summarized patient's change talk statements      Assessments completed prior to visit:  The following assessments were completed by patient for this visit:  GAD7:   JESSICA-7 SCORE 6/28/2021 9/16/2021 9/16/2021 10/29/2021 3/8/2022 5/12/2022 11/8/2022   Total Score - - - - - - -   Total Score 2 (minimal anxiety) - 1 (minimal anxiety) 0 (minimal anxiety) - 2 (minimal anxiety) 5 (mild anxiety)   Total Score 2 1 1 0 3 2 5         ASSESSMENT: Current Emotional / Mental Status (status of significant symptoms):   Risk status (Self / Other harm or suicidal ideation)   Client denies current fears or concerns for personal safety.   Client denies current or recent suicidal ideation or behaviors.   Client denies current or recent homicidal ideation or behaviors.   Client denies current or recent self injurious behavior or  ideation.   Client denies other safety concerns.   Client reports there has been no change in risk factors since their last session.     Client reports there has been no change in protective factors since their last session.     Recommended that patient call 911 or go to the local ED should there be a change in any of these risk factors.     Appearance:   Appropriate    Eye Contact:   Fair    Psychomotor Behavior: Normal     Attitude:   Cooperative  Pleasant Attentive   Orientation:   All   Speech    Rate / Production: Normal/ Responsive     Volume:  Normal    Mood:    Anxious  Grieving  Client presented with stress   Affect:    Appropriate    Thought Content:  Clear    Thought Form:  Coherent  Goal Directed  Logical    Insight:    Fair      Medication Review:   No changes to current psychiatric medication(s)     Medication Compliance:   Yes     Changes in Health Issues:   None reported     Chemical Use Review:   Substance Use: Problem use continues with no change since last session, Stage of Change: Action  Provided encouragement towards sobriety  Provided support and affirmation for steps taken towards sobriety          Tobacco Use: No change in amount of tobacco use since last session.  Contemplation  Provided encouragement to quit     Diagnosis:  1. Generalized anxiety disorder        Collateral Reports Completed:   Not Applicable    PLAN: (Client Tasks / Therapist Tasks / Other)  Client will protect her need for some rest, focus on what is in her control and return for therapy in two weeks.         Maite Quinn, [facility] MSW   11/22/2022       __________________________________________________________________              Individual Treatment Plan    Patient's Name: Jeanna Steel  YOB: 1965    Date of Creation: 6/14/2021  Date Treatment Plan Last Reviewed/Revised: 11/8/2022    DSM5 Diagnoses: 296.31 (F33.0) Major Depressive Disorder, Recurrent Episode, Mild _, 300.02 (F41.1) Generalized  Anxiety Disorder or Substance-Related & Addictive Disorders Alcohol Use Disorder   303.90 (F10.20) Moderate In early remission,   Psychosocial / Contextual Factors: Client reports continued symptoms of depression and anxiety while caretaking for her two parents, wanting to manage alcohol use.  PROMIS (reviewed every 90 days):     Referral / Collaboration:  The following referral(s) will be initiated: community support with alcohol reduction.    Anticipated number of session for this episode of care: 100-150  Anticipation frequency of session: Every other week  Anticipated Duration of each session: 38-52 minutes  Treatment plan will be reviewed in 90 days or when goals have been changed.       MeasurableTreatment Goal(s) related to diagnosis / functional impairment(s)  Goal 1: Client will continue reduce alcohol use to 4-5 days a week alcohol free in the next three months and client reporting use of three new coping skills to manage stress in the evenings.    I will know I've met my goal when I'm only drinking two drinks and going to bed earlier.      Objective #A (Client Action)    Client will identify at least three consequences of maladaptive drinking.  Status: Continued - Date(s):  11/8/2022    Intervention(s)  Therapist will assign homework to identify impact of drinking  provide support.    Objective #B  Client will identify three coping skills to replace drinking .  Status: Continued - Date(s):  11/8/2022    Intervention(s)  Therapist will assign homework to practice coping skills  teach coping skills.    Objective #C  Client will identify whether she needs further support to reduce alcohol use.  Status: Continued - Date(s):   11/8/2022    Intervention(s)  Therapist will provide support and referrals as needed, education on alcohol use.    Goal 2: Client will improve confidence with setting and holding boundaries as evidenced by client reporting reduce avoidance and use of three effective communication skills  over the next three months.     I will know I've met my goal when I know how to talk about things.      Objective #A (Client Action)    Client will learn & utilize at least 3 assertive communication skills weekly.  Status: Continued - Date(s): 11/8/2022    Intervention(s)  Therapist will teach assertiveness skills. DBT DEAR MAN skills.    Objective #B  Client will Identify negative self-talk and behaviors: challenge core beliefs, myths, and actions.    Status: Continued - Date(s): 11/8/2022    Intervention(s)14  Therapist will guide client in exploring how core belief system influences her ability to communicate and cope with conflict.        Client has reviewed and agreed to the above plan.      Maite Quinn, [facility]  MSW  November 8, 2022

## 2022-12-06 ENCOUNTER — VIRTUAL VISIT (OUTPATIENT)
Dept: PSYCHOLOGY | Facility: CLINIC | Age: 57
End: 2022-12-06
Payer: COMMERCIAL

## 2022-12-06 DIAGNOSIS — F41.1 GENERALIZED ANXIETY DISORDER: Primary | ICD-10-CM

## 2022-12-06 DIAGNOSIS — F10.20 ALCOHOL USE DISORDER, MODERATE, DEPENDENCE (H): ICD-10-CM

## 2022-12-06 PROCEDURE — 90834 PSYTX W PT 45 MINUTES: CPT | Mod: 95 | Performed by: SOCIAL WORKER

## 2022-12-06 NOTE — PROGRESS NOTES
[facility]                                     Progress Note     Patient Name: Jeanna Steel  Date: 12/6/2022          Service Type: Individual     Session Start Time:  11:00  Session End Time: 11:45     Session Length: 45 min    Session #: 73    There has been demonstrated improvement in functioning while patient has been engaged in psychotherapy/psychological service- if withdrawn the patient would deteriorate and/or relapse.     Attendees: Client attended alone      Session Type: Telemedicine Visit: The patient's condition can be safely assessed and treated via synchronous audio and visual telemedicine encounter.      Reason for Telemedicine Visit: Services only offered telehealth    Originating Site (Patient Location): Patient's home    Distant Site (Provider Location): Provider Remote Setting- Home Office    Consent:  The patient/guardian has verbally consented to: the potential risks and benefits of telemedicine (video visit) versus in person care; bill my insurance or make self-payment for services provided; and responsibility for payment of non-covered services.     Mode of Communication:  Video Conference via [employer]    As the provider I attest to compliance with applicable laws and regulations related to telemedicine.       DATA  Interactive Complexity: No  Crisis: No       Progress Since Last Session (Related to Symptoms / Goals / Homework):   Symptoms: No change mood remains stable    Homework: Achieved / completed to satisfaction client reported validating herself, delegating with brothers      Episode of Care Goals: Satisfactory progress - ACTION (Actively working towards change); Intervened by reinforcing change plan / affirming steps taken     Current / Ongoing Stressors and Concerns:   Ongoing: Client reports continued symptoms of depression and anxiety while caretaking for her two parents, wanting to manage alcohol use.    Current: Client reported she has continued  feeling stressed about what to do with her mom, but feels relief for now while she's at a rehab facility. She said she wants to make sure she feels more balanced with her mom going forward. Client reported she thinks she wants to take a break from therapy rather than transferring as this provider is leaving the health system.     Treatment Objective(s) Addressed in This Session:   Decrease frequency and intensity of feeling down, depressed, hopeless       Intervention:   DBT: reviewed recent positive events, how client can navigate care for mom and hold boundaries moving forward   Motivational Interviewing    MI Intervention: Expressed Empathy/Understanding, Supported Autonomy, Collaboration, Evocation, Permission to raise concern or advise, Open-ended questions, Reflections: simple and complex, Change talk (evoked) and Reframe     Change Talk Expressed by the Patient: Desire to change Ability to change Reasons to change Need to change Committment to change Activation Taking steps    Provider Response to Change Talk: E - Evoked more info from patient about behavior change, A - Affirmed patient's thoughts, decisions, or attempts at behavior change, R - Reflected patient's change talk and S - Summarized patient's change talk statements      Assessments completed prior to visit:  The following assessments were completed by patient for this visit:  GAD7:   JESSICA-7 SCORE 6/28/2021 9/16/2021 9/16/2021 10/29/2021 3/8/2022 5/12/2022 11/8/2022   Total Score - - - - - - -   Total Score 2 (minimal anxiety) - 1 (minimal anxiety) 0 (minimal anxiety) - 2 (minimal anxiety) 5 (mild anxiety)   Total Score 2 1 1 0 3 2 5         ASSESSMENT: Current Emotional / Mental Status (status of significant symptoms):   Risk status (Self / Other harm or suicidal ideation)   Client denies current fears or concerns for personal safety.   Client denies current or recent suicidal ideation or behaviors.   Client denies current or recent homicidal  ideation or behaviors.   Client denies current or recent self injurious behavior or ideation.   Client denies other safety concerns.   Client reports there has been no change in risk factors since their last session.     Client reports there has been no change in protective factors since their last session.     Recommended that patient call 911 or go to the local ED should there be a change in any of these risk factors.     Appearance:   Appropriate    Eye Contact:   Fair    Psychomotor Behavior: Normal     Attitude:   Cooperative  Pleasant Attentive   Orientation:   All   Speech    Rate / Production: Normal/ Responsive     Volume:  Normal    Mood:    Anxious   Client presented with some nervousness   Affect:    Appropriate    Thought Content:  Clear    Thought Form:  Coherent  Goal Directed  Logical    Insight:    Fair      Medication Review:   No changes to current psychiatric medication(s)     Medication Compliance:   Yes     Changes in Health Issues:   None reported     Chemical Use Review:   Substance Use: Problem use continues with no change since last session, Stage of Change: Action  Provided encouragement towards sobriety  Provided support and affirmation for steps taken towards sobriety          Tobacco Use: No change in amount of tobacco use since last session.  Contemplation  Provided encouragement to quit     Diagnosis:  1. Generalized anxiety disorder    2. Alcohol use disorder, moderate, dependence (H)        Collateral Reports Completed:   Not Applicable    PLAN: (Client Tasks / Therapist Tasks / Other)  Client will refocus on her art, be open with brothers about her needs with mom and return for therapy in two weeks.         Maite Quinn, [facility]   12/6/2022       __________________________________________________________________              Individual Treatment Plan    Patient's Name: Jeanna Steel  YOB: 1965    Date of Creation: 6/14/2021  Date Treatment Plan Last  Reviewed/Revised: 11/8/2022    DSM5 Diagnoses: 296.31 (F33.0) Major Depressive Disorder, Recurrent Episode, Mild _, 300.02 (F41.1) Generalized Anxiety Disorder or Substance-Related & Addictive Disorders Alcohol Use Disorder   303.90 (F10.20) Moderate In early remission,   Psychosocial / Contextual Factors: Client reports continued symptoms of depression and anxiety while caretaking for her two parents, wanting to manage alcohol use.  PROMIS (reviewed every 90 days):     Referral / Collaboration:  The following referral(s) will be initiated: community support with alcohol reduction.    Anticipated number of session for this episode of care: 100-150  Anticipation frequency of session: Every other week  Anticipated Duration of each session: 38-52 minutes  Treatment plan will be reviewed in 90 days or when goals have been changed.       MeasurableTreatment Goal(s) related to diagnosis / functional impairment(s)  Goal 1: Client will continue reduce alcohol use to 4-5 days a week alcohol free in the next three months and client reporting use of three new coping skills to manage stress in the evenings.    I will know I've met my goal when I'm only drinking two drinks and going to bed earlier.      Objective #A (Client Action)    Client will identify at least three consequences of maladaptive drinking.  Status: Continued - Date(s):  11/8/2022    Intervention(s)  Therapist will assign homework to identify impact of drinking  provide support.    Objective #B  Client will identify three coping skills to replace drinking .  Status: Continued - Date(s):  11/8/2022    Intervention(s)  Therapist will assign homework to practice coping skills  teach coping skills.    Objective #C  Client will identify whether she needs further support to reduce alcohol use.  Status: Continued - Date(s):   11/8/2022    Intervention(s)  Therapist will provide support and referrals as needed, education on alcohol use.    Goal 2: Client will improve  confidence with setting and holding boundaries as evidenced by client reporting reduce avoidance and use of three effective communication skills over the next three months.     I will know I've met my goal when I know how to talk about things.      Objective #A (Client Action)    Client will learn & utilize at least 3 assertive communication skills weekly.  Status: Continued - Date(s): 11/8/2022    Intervention(s)  Therapist will teach assertiveness skills. DBT DEAR MAN skills.    Objective #B  Client will Identify negative self-talk and behaviors: challenge core beliefs, myths, and actions.    Status: Continued - Date(s): 11/8/2022    Intervention(s)14  Therapist will guide client in exploring how core belief system influences her ability to communicate and cope with conflict.        Client has reviewed and agreed to the above plan.      Maite Quinn, [facility]  MSW  November 8, 2022

## 2022-12-18 ENCOUNTER — OFFICE VISIT (OUTPATIENT)
Dept: URGENT CARE | Facility: URGENT CARE | Age: 57
End: 2022-12-18
Payer: COMMERCIAL

## 2022-12-18 VITALS
TEMPERATURE: 98 F | SYSTOLIC BLOOD PRESSURE: 119 MMHG | RESPIRATION RATE: 18 BRPM | DIASTOLIC BLOOD PRESSURE: 80 MMHG | BODY MASS INDEX: 30.88 KG/M2 | OXYGEN SATURATION: 97 % | WEIGHT: 192.8 LBS | HEART RATE: 83 BPM

## 2022-12-18 DIAGNOSIS — H66.001 NON-RECURRENT ACUTE SUPPURATIVE OTITIS MEDIA OF RIGHT EAR WITHOUT SPONTANEOUS RUPTURE OF TYMPANIC MEMBRANE: Primary | ICD-10-CM

## 2022-12-18 PROCEDURE — 99213 OFFICE O/P EST LOW 20 MIN: CPT | Performed by: PHYSICIAN ASSISTANT

## 2022-12-18 ASSESSMENT — ENCOUNTER SYMPTOMS
SORE THROAT: 0
HEADACHES: 0
FATIGUE: 0
COUGH: 0
RHINORRHEA: 0
TROUBLE SWALLOWING: 0
FEVER: 0
CHILLS: 0

## 2022-12-18 NOTE — PATIENT INSTRUCTIONS
Follow up if no improvement in symptoms after 72 hours.   If symptoms recur within 2 weeks, follow up with PCP

## 2022-12-18 NOTE — PROGRESS NOTES
Assessment & Plan     Non-recurrent acute suppurative otitis media of right ear without spontaneous rupture of tympanic membrane    - amoxicillin-clavulanate (AUGMENTIN) 875-125 MG tablet  Dispense: 20 tablet; Refill: 0       Patient Instructions   Follow up if no improvement in symptoms after 72 hours.   If symptoms recur within 2 weeks, follow up with PCP        Return if symptoms worsen or fail to improve, for 3- 5 days.    At the end of the encounter, I discussed results, diagnosis, medications. Discussed red flags for immediate return to clinic/ER, as well as indications for follow up if no improvement. Patient understood and agreed to plan. Patient was stable for discharge.    Anne-Marie Meeks is a 57 year old female who presents to clinic today with for the following health issues:  Chief Complaint   Patient presents with     Ear Problem     Ongoing right ear infection. Was seem by a ENT in October and November      Patient reports right ear pain which started 3 days ago. She reports preceding cold symptoms prior to the ear pain. She notes flu like symptoms which started on 11/09/2022, and was sick for 2 weeks, then a lingering cough which just resolved. She has a history of otitis externa of the left ear. She was seen by ENT in 10/2022 and 11/2022. She had no issues with the right ear except for one ear infection in 06/2022 which resolved. She notes swallowing and chewing worsens the ear pain. She notes the right ear feels swollen and is tender to touch. No treatments tried.             Review of Systems   Constitutional: Negative for chills, fatigue and fever.   HENT: Positive for ear pain and tinnitus. Negative for congestion, rhinorrhea, sore throat and trouble swallowing.    Respiratory: Negative for cough.    Neurological: Negative for headaches.   All other systems reviewed and are negative.      Problem List:  2021-09: Recurrent major depressive disorder, in partial remission (H)  2021-07:  Alcohol use disorder, moderate, dependence (H)  2017-07: Microscopic hematuria  2016-08: NO SHOW  2015-05: Vitamin D deficiency  2012-06: Major depressive disorder, recurrent episode, moderate (H)  2012-06: Generalized anxiety disorder      Past Medical History:   Diagnosis Date     Anxiety      Depressive disorder      Infertility, female        Social History     Tobacco Use     Smoking status: Never     Smokeless tobacco: Never   Substance Use Topics     Alcohol use: Yes     Alcohol/week: 11.7 standard drinks     Comment: cutting back.  was drinking daily.  now drinking about 4 days a week and having 4-6 a day.           Objective    /80 (BP Location: Left arm, Patient Position: Chair, Cuff Size: Adult Large)   Pulse 83   Temp 98  F (36.7  C) (Oral)   Resp 18   Wt 87.5 kg (192 lb 12.8 oz)   SpO2 97%   BMI 30.88 kg/m    Physical Exam  Constitutional:       Appearance: Normal appearance.   HENT:      Head: Normocephalic.      Right Ear: A middle ear effusion is present. Tympanic membrane is erythematous.      Left Ear: Tympanic membrane normal.      Nose: Nose normal.      Right Sinus: No maxillary sinus tenderness or frontal sinus tenderness.      Left Sinus: No maxillary sinus tenderness or frontal sinus tenderness.      Mouth/Throat:      Mouth: Mucous membranes are moist.      Pharynx: Oropharynx is clear. Uvula midline. No posterior oropharyngeal erythema.   Eyes:      Conjunctiva/sclera: Conjunctivae normal.      Pupils: Pupils are equal, round, and reactive to light.   Cardiovascular:      Rate and Rhythm: Normal rate and regular rhythm.      Heart sounds: Normal heart sounds.   Pulmonary:      Effort: Pulmonary effort is normal.      Breath sounds: Normal breath sounds.   Musculoskeletal:      Cervical back: Normal range of motion and neck supple.   Lymphadenopathy:      Head:      Right side of head: No submental, submandibular, tonsillar, preauricular or posterior auricular adenopathy.       Left side of head: No submental, submandibular, tonsillar, preauricular or posterior auricular adenopathy.   Skin:     General: Skin is warm and dry.   Neurological:      Mental Status: She is alert and oriented to person, place, and time.   Psychiatric:         Mood and Affect: Mood normal.         Behavior: Behavior normal.              Lavern Grijalva PA-C

## 2022-12-20 ENCOUNTER — VIRTUAL VISIT (OUTPATIENT)
Dept: PSYCHOLOGY | Facility: CLINIC | Age: 57
End: 2022-12-20
Payer: COMMERCIAL

## 2022-12-20 DIAGNOSIS — F41.1 GENERALIZED ANXIETY DISORDER: Primary | ICD-10-CM

## 2022-12-20 PROCEDURE — 99207 PR NO BILLABLE SERVICE THIS VISIT: CPT | Mod: 95 | Performed by: SOCIAL WORKER

## 2022-12-20 NOTE — PROGRESS NOTES
Discharge Summary  Multiple Sessions    Client Name: Jeanna Steel MRN#: [id number] YOB: 1965    Discharge Date:   December 20, 2022    Service Modality: In-person    Service Type: Individual      Session Start Time: 11:00  Session End Time: 11:30      Session Length: 20 - 30     Session #: 74     Attendees: Client attended alone      Focus of Treatment Objective(s):  Client's presenting concerns included: Anxiety - client reported ruminating thoughts, catastrophic thinking, tearfulness  Stage of Change at time of Discharge: MAINTENANCE (Working to maintain change, with risk of relapse)    Medication Adherence:  Yes    Chemical Use:  Yes working on reducing alcohol use    Assessment: Current Emotional / Mental Status (status of significant symptoms):    Risk status (Self / Other harm or suicidal ideation)  Client denies current fears or concerns for personal safety.  Client denies current or recent suicidal ideation or behaviors.  Client denies current or recent homicidal ideation or behaviors.  Client denies current or recent self injurious behavior or ideation.  Client denies other safety concerns.  A safety and risk management plan has not been developed at this time, however client was given the after-hours number should there be a change in any of these risk factors.    Appearance:   Appropriate   Eye Contact:   Good   Psychomotor Behavior: Normal   Attitude:   Cooperative  Interested Pleasant  Orientation:   All  Speech   Rate / Production: Normal/ Responsive   Volume:  Normal   Mood:    Normal  Affect:    Appropriate   Thought Content:  Clear   Thought Form:  Coherent  Logical   Insight:   Good     DSM5 Diagnoses: (Sustained by DSM5 Criteria Listed Above)  Diagnoses: 300.02 (F41.1) Generalized Anxiety Disorder  Psychosocial & Contextual Factors: Client reported feeling good about plan to reduce alcohol use and managing mom's care    Reason for Discharge:  Client is  satisfied with progress and therapist is leaving [facility]      Aftercare Plan:  Client may resume counseling services at any time in the future by calling the [facility] Intake Office, [phone].      SHERYL Barriga MSW December 20, 2022

## 2023-01-08 ENCOUNTER — OFFICE VISIT (OUTPATIENT)
Dept: URGENT CARE | Facility: URGENT CARE | Age: 58
End: 2023-01-08

## 2023-01-08 VITALS
BODY MASS INDEX: 31.53 KG/M2 | TEMPERATURE: 98.1 F | HEART RATE: 76 BPM | WEIGHT: 196.8 LBS | SYSTOLIC BLOOD PRESSURE: 124 MMHG | RESPIRATION RATE: 18 BRPM | OXYGEN SATURATION: 98 % | DIASTOLIC BLOOD PRESSURE: 82 MMHG

## 2023-01-08 DIAGNOSIS — H60.393 INFECTIVE OTITIS EXTERNA, BILATERAL: Primary | ICD-10-CM

## 2023-01-08 PROCEDURE — 99213 OFFICE O/P EST LOW 20 MIN: CPT | Performed by: FAMILY MEDICINE

## 2023-01-08 RX ORDER — CIPROFLOXACIN AND DEXAMETHASONE 3; 1 MG/ML; MG/ML
4 SUSPENSION/ DROPS AURICULAR (OTIC) 2 TIMES DAILY
Qty: 2.8 ML | Refills: 0 | Status: SHIPPED | OUTPATIENT
Start: 2023-01-08 | End: 2023-01-15

## 2023-01-08 NOTE — PROGRESS NOTES
SUBJECTIVE:Jeanna Steel is a 57 year old female who presents with bilateral ear pain for day(s).   History of recurrent otitis: reoccurring otits    Past Medical History:   Diagnosis Date     Anxiety      Depressive disorder      Infertility, female      Allergies   Allergen Reactions     Ragweeds      Social History     Tobacco Use     Smoking status: Never     Smokeless tobacco: Never   Substance Use Topics     Alcohol use: Yes     Alcohol/week: 11.7 standard drinks     Comment: cutting back.  was drinking daily.  now drinking about 4 days a week and having 4-6 a day.       ROS: CONSTITUTIONAL:NEGATIVE for fever, chills, change in weight    OBJECTIVE:  /82 (BP Location: Right arm, Patient Position: Sitting, Cuff Size: Adult Large)   Pulse 76   Temp 98.1  F (36.7  C) (Oral)   Resp 18   Wt 89.3 kg (196 lb 12.8 oz)   SpO2 98%   BMI 31.53 kg/m     The right TM is normal: no effusions, no erythema, and normal landmarks     The right auditory canal is erythematous, swollen, tender  The left TM is normal: no effusions, no erythema, and normal landmarks  The left auditory canal is erythematous, swollen, tender  Oropharynx exam is normal: no lesions, erythema, adenopathy or exudate.GENERAL: no acute distress  EYES: EOMI,  PERRL, conjunctiva clear  RESP: lungs clear to auscultation - no rales, rhonchi or wheezes  SKIN: no suspicious lesions or rashes       ICD-10-CM    1. Infective otitis externa, bilateral  H60.393 ciprofloxacin-dexamethasone (CIPRODEX) 0.3-0.1 % otic suspension        F/u ENt if cont  Discontinue q tips in ears  F/U PCP/IM/FP/UC if worse, not any better

## 2023-01-25 ENCOUNTER — MYC MEDICAL ADVICE (OUTPATIENT)
Dept: FAMILY MEDICINE | Facility: CLINIC | Age: 58
End: 2023-01-25

## 2023-02-20 ENCOUNTER — OFFICE VISIT (OUTPATIENT)
Dept: URGENT CARE | Facility: URGENT CARE | Age: 58
End: 2023-02-20
Payer: COMMERCIAL

## 2023-02-20 VITALS
SYSTOLIC BLOOD PRESSURE: 159 MMHG | HEART RATE: 101 BPM | WEIGHT: 194 LBS | DIASTOLIC BLOOD PRESSURE: 79 MMHG | OXYGEN SATURATION: 98 % | BODY MASS INDEX: 31.08 KG/M2 | RESPIRATION RATE: 26 BRPM | TEMPERATURE: 98 F

## 2023-02-20 DIAGNOSIS — R06.2 WHEEZE: ICD-10-CM

## 2023-02-20 DIAGNOSIS — R05.9 COUGH, UNSPECIFIED TYPE: Primary | ICD-10-CM

## 2023-02-20 PROCEDURE — 99213 OFFICE O/P EST LOW 20 MIN: CPT | Performed by: FAMILY MEDICINE

## 2023-02-20 RX ORDER — ALBUTEROL SULFATE 90 UG/1
2 AEROSOL, METERED RESPIRATORY (INHALATION) EVERY 6 HOURS
Qty: 18 G | Refills: 0 | Status: SHIPPED | OUTPATIENT
Start: 2023-02-20 | End: 2023-04-14

## 2023-02-20 NOTE — PROGRESS NOTES
SUBJECTIVE: Jeanna Steel is a 57 year old female presenting with a chief complaint of cough .  Onset of symptoms was 5 day(s) ago.  Predisposing factors include None.  2 home neg covid tests    Past Medical History:   Diagnosis Date     Anxiety      Depressive disorder      Infertility, female      Allergies   Allergen Reactions     Ragweeds      Social History     Tobacco Use     Smoking status: Never     Smokeless tobacco: Never   Substance Use Topics     Alcohol use: Yes     Alcohol/week: 11.7 standard drinks     Comment: cutting back.  was drinking daily.  now drinking about 4 days a week and having 4-6 a day.       ROS:  SKIN: no rash  GI: no vomiting    OBJECTIVE:  BP (!) 159/79   Pulse 101   Temp 98  F (36.7  C) (Oral)   Resp 26   Wt 88 kg (194 lb)   SpO2 98%   BMI 31.08 kg/m  GENERAL APPEARANCE: healthy, alert and no distress  EYES: EOMI,  PERRL, conjunctiva clear  HENT: ear canals and TM's normal.  Nose and mouth without ulcers, erythema or lesions  RESP: lungs clear to auscultation - no rales, rhonchi or wheezes  SKIN: no suspicious lesions or rashes      ICD-10-CM    1. Cough, unspecified type  R05.9       2. Wheeze  R06.2 albuterol (PROAIR HFA) 108 (90 Base) MCG/ACT inhaler        Fluids/Rest, f/u if worse/not any better

## 2023-03-01 ENCOUNTER — OFFICE VISIT (OUTPATIENT)
Dept: FAMILY MEDICINE | Facility: CLINIC | Age: 58
End: 2023-03-01
Payer: COMMERCIAL

## 2023-03-01 ENCOUNTER — MYC MEDICAL ADVICE (OUTPATIENT)
Dept: FAMILY MEDICINE | Facility: CLINIC | Age: 58
End: 2023-03-01

## 2023-03-01 VITALS
OXYGEN SATURATION: 96 % | HEART RATE: 79 BPM | SYSTOLIC BLOOD PRESSURE: 126 MMHG | BODY MASS INDEX: 29.82 KG/M2 | DIASTOLIC BLOOD PRESSURE: 76 MMHG | HEIGHT: 67 IN | WEIGHT: 190 LBS | TEMPERATURE: 97.9 F

## 2023-03-01 DIAGNOSIS — F41.1 GENERALIZED ANXIETY DISORDER: ICD-10-CM

## 2023-03-01 DIAGNOSIS — R05.3 PERSISTENT COUGH FOR 3 WEEKS OR LONGER: Primary | ICD-10-CM

## 2023-03-01 PROCEDURE — 99214 OFFICE O/P EST MOD 30 MIN: CPT | Performed by: FAMILY MEDICINE

## 2023-03-01 RX ORDER — AZITHROMYCIN 250 MG/1
TABLET, FILM COATED ORAL
Qty: 6 TABLET | Refills: 0 | Status: SHIPPED | OUTPATIENT
Start: 2023-03-01 | End: 2023-03-06

## 2023-03-01 RX ORDER — LORAZEPAM 0.5 MG/1
0.5 TABLET ORAL DAILY PRN
Qty: 20 TABLET | Refills: 0 | Status: SHIPPED | OUTPATIENT
Start: 2023-03-01 | End: 2023-04-14

## 2023-03-01 RX ORDER — BENZONATATE 100 MG/1
200 CAPSULE ORAL 3 TIMES DAILY PRN
Qty: 21 CAPSULE | Refills: 0 | Status: SHIPPED | OUTPATIENT
Start: 2023-03-01 | End: 2023-04-14

## 2023-03-01 RX ORDER — BUPROPION HYDROCHLORIDE 150 MG/1
TABLET, EXTENDED RELEASE ORAL
COMMUNITY
Start: 2023-02-18 | End: 2023-05-23

## 2023-03-01 ASSESSMENT — PATIENT HEALTH QUESTIONNAIRE - PHQ9
SUM OF ALL RESPONSES TO PHQ QUESTIONS 1-9: 3
10. IF YOU CHECKED OFF ANY PROBLEMS, HOW DIFFICULT HAVE THESE PROBLEMS MADE IT FOR YOU TO DO YOUR WORK, TAKE CARE OF THINGS AT HOME, OR GET ALONG WITH OTHER PEOPLE: NOT DIFFICULT AT ALL
SUM OF ALL RESPONSES TO PHQ QUESTIONS 1-9: 3

## 2023-03-01 ASSESSMENT — ANXIETY QUESTIONNAIRES
6. BECOMING EASILY ANNOYED OR IRRITABLE: NOT AT ALL
3. WORRYING TOO MUCH ABOUT DIFFERENT THINGS: NOT AT ALL
8. IF YOU CHECKED OFF ANY PROBLEMS, HOW DIFFICULT HAVE THESE MADE IT FOR YOU TO DO YOUR WORK, TAKE CARE OF THINGS AT HOME, OR GET ALONG WITH OTHER PEOPLE?: SOMEWHAT DIFFICULT
IF YOU CHECKED OFF ANY PROBLEMS ON THIS QUESTIONNAIRE, HOW DIFFICULT HAVE THESE PROBLEMS MADE IT FOR YOU TO DO YOUR WORK, TAKE CARE OF THINGS AT HOME, OR GET ALONG WITH OTHER PEOPLE: SOMEWHAT DIFFICULT
5. BEING SO RESTLESS THAT IT IS HARD TO SIT STILL: NOT AT ALL
4. TROUBLE RELAXING: NOT AT ALL
GAD7 TOTAL SCORE: 0
7. FEELING AFRAID AS IF SOMETHING AWFUL MIGHT HAPPEN: NOT AT ALL
1. FEELING NERVOUS, ANXIOUS, OR ON EDGE: NOT AT ALL
GAD7 TOTAL SCORE: 0
GAD7 TOTAL SCORE: 0
7. FEELING AFRAID AS IF SOMETHING AWFUL MIGHT HAPPEN: NOT AT ALL
2. NOT BEING ABLE TO STOP OR CONTROL WORRYING: NOT AT ALL

## 2023-03-01 ASSESSMENT — PAIN SCALES - GENERAL: PAINLEVEL: NO PAIN (0)

## 2023-03-01 NOTE — PROGRESS NOTES
Assessment & Plan     Persistent cough for 3 weeks or longer  Azithromycin sent over for possible bronchitis in addition to cough suppressant  - benzonatate (TESSALON) 100 MG capsule; Take 2 capsules (200 mg) by mouth 3 times daily as needed for cough    Generalized anxiety disorder  Has been using medications infrequently has had a lot of stress with her father recently passing and her mother having worsening dementia and with being sick has used this sparingly  - LORazepam (ATIVAN) 0.5 MG tablet; Take 1 tablet (0.5 mg) by mouth daily as needed for anxiety (panic)    Prescription drug management  20 minutes spent on the date of the encounter doing chart review, history and exam, documentation and further activities per the note        See Patient Instructions  If this is not helping over the next 2 weeks would encourage her to come back for x-ray  No follow-ups on file.    Karie Queen MD  [facility]    Anne-Marie Meeks is a 57 year old, presenting for the following health issues:  URI and Cough      History of Present Illness       Reason for visit:  Cough  Symptom onset:  3-4 weeks ago    She eats 2-3 servings of fruits and vegetables daily.She consumes 0 sweetened beverage(s) daily.She exercises with enough effort to increase her heart rate 10 to 19 minutes per day.  She exercises with enough effort to increase her heart rate 3 or less days per week.   She is taking medications regularly.    Today's PHQ-9         PHQ-9 Total Score: 3    PHQ-9 Q9 Thoughts of better off dead/self-harm past 2 weeks :   Not at all    How difficult have these problems made it for you to do your work, take care of things at home, or get along with other people: Not difficult at all  Today's JESSICA-7 Score: 0       Concern - ongoing cough  Onset: 3 weeks ago   Now causing headache and fatigue  Now ahs a productive cough slight green phlegm  Had sluf in end of October  Then ear infetion  Was seing ENT  "and noted to have eczema in ears and fungal infection  Lots of stress   Description: coughing with wheezing ( headache and ear pressure)  Intensity: moderate  Progression of Symptoms:  Slightly getting better  Accompanying Signs & Symptoms: exhausted  Previous history of similar problem: n/a  Precipitating factors:        Worsened by: n/a  Alleviating factors:        Improved by: time  Therapies tried and outcome: Has tried over-the-counter medications for flu and cough persistently coughing    Patient Active Problem List   Diagnosis     Major depressive disorder, recurrent episode, moderate (H)     Generalized anxiety disorder     Vitamin D deficiency     Microscopic hematuria     Recurrent major depressive disorder, in partial remission (H)        Review of Systems   Constitutional, HEENT, cardiovascular, pulmonary, gi and gu systems are negative, except as otherwise noted.      Objective    /76 (BP Location: Left arm, Patient Position: Sitting, Cuff Size: Adult Regular)   Pulse 79   Temp 97.9  F (36.6  C) (Temporal)   Ht 1.702 m (5' 7\")   Wt 86.2 kg (190 lb)   LMP 08/01/2022   SpO2 96%   BMI 29.76 kg/m    Body mass index is 29.76 kg/m .  Physical Exam   GENERAL: fatigued  HENT: ear canals and TM's normal, nose and mouth without ulcers or lesions  RESP: no rales , no rhonchi and expiratory wheezes bibasilar  CV: regular rate and rhythm, normal S1 S2, no S3 or S4, no murmur, click or rub, no peripheral edema and peripheral pulses strong                    "

## 2023-03-02 ENCOUNTER — ANCILLARY PROCEDURE (OUTPATIENT)
Dept: ULTRASOUND IMAGING | Facility: CLINIC | Age: 58
End: 2023-03-02
Attending: FAMILY MEDICINE
Payer: COMMERCIAL

## 2023-03-02 DIAGNOSIS — N93.8 DUB (DYSFUNCTIONAL UTERINE BLEEDING): ICD-10-CM

## 2023-03-02 PROCEDURE — 76830 TRANSVAGINAL US NON-OB: CPT | Performed by: OBSTETRICS & GYNECOLOGY

## 2023-03-02 PROCEDURE — 76856 US EXAM PELVIC COMPLETE: CPT | Performed by: OBSTETRICS & GYNECOLOGY

## 2023-03-02 NOTE — TELEPHONE ENCOUNTER
Called pharmacy earlier  Azithromycin was received  Will get ready for patient to       Patient informed via phone    Anastacia Pizarro RN

## 2023-03-20 ENCOUNTER — TELEPHONE (OUTPATIENT)
Dept: OBGYN | Facility: CLINIC | Age: 58
End: 2023-03-20
Payer: COMMERCIAL

## 2023-03-20 DIAGNOSIS — F41.9 ANXIETY DUE TO INVASIVE PROCEDURE: Primary | ICD-10-CM

## 2023-03-20 DIAGNOSIS — N93.9 ABNORMAL UTERINE BLEEDING: Primary | ICD-10-CM

## 2023-03-20 RX ORDER — DIAZEPAM 10 MG
10 TABLET ORAL EVERY 6 HOURS PRN
Status: CANCELLED | OUTPATIENT
Start: 2023-03-20

## 2023-03-20 NOTE — RESULT ENCOUNTER NOTE
Hello,    The ultrasound did show some uterine myomas/fibroids which are not necessarily concerning but overall given the thickness of the uterus and your age I would like to refer you to OB/GYN to have endometrial sampling.  This is an office procedure to see what the cells of your uterus look like.    Please call [phone] to schedule this.    Karie Queen MD

## 2023-03-20 NOTE — TELEPHONE ENCOUNTER
[facility] Call Center    Phone Message    May a detailed message be left on voicemail: yes     Reason for Call: Other: . pt has an endometrial bx on 4/4/2023- pt is wondering the pain level/if she will be sedated, etc- please call pt, thank you    Action Taken: Message routed to:  Other: whs rn    Travel Screening: Not Applicable

## 2023-03-20 NOTE — TELEPHONE ENCOUNTER
Appointment times moved to accommodate early consent signing. Appointment moved to 3:00pm with plans to arrive at 2:00 for consenting.

## 2023-03-20 NOTE — TELEPHONE ENCOUNTER
Called patient per call center request below. Patient is wondering how painful the EMB will be, and if there is anyway to get medication prior to the procedure. Patient states Valium would be helpful. Is able to come in early and have consents signed prior to appointment. Introduced the idea of EMB in the OR, but patient states this probably would not be needed. Patient also wondering if topical or injectable lidocaine would be used.     Rx pended for provider approval.

## 2023-03-27 RX ORDER — LORAZEPAM 1 MG/1
1 TABLET ORAL
Qty: 1 TABLET | Refills: 0 | Status: SHIPPED | OUTPATIENT
Start: 2023-03-27 | End: 2023-04-14

## 2023-04-03 NOTE — PROGRESS NOTES
[facility]  Gynecology Visit    Reason for Visit: AUB    HPI:    Jeanna Steel is a 57 year old , here for AUB.    Patient has been followed by her PCP for AUB. She reports having regular periods until 2022, when her periods stopped. Since that time, she has had intermittent blood on toilet paper with wiping but no other vaginal bleeding. She endorses hot flashes which are worsening. Patient had lab work with her PCP in 2022 with FSH 29 and estradiol 27, although unclear where patient was in her cycle. Pelvic US was performed 3/2 which showed irregular cystic endometrium, thickness 11.8 mm. Her PCP referred her to OBGYN for an endometrial biopsy.     GYN History    Hx D&C x2  Hx ectopic     Lab Results   Component Value Date    PAP NIL 2021    PAP NIL 2016    PAP NIL 2012     History of abnormal paps: no    OBHx  OB History    Para Term  AB Living   4 0 0 0 0 0   SAB IAB Ectopic Multiple Live Births   0 0 0 0 0      # Outcome Date GA Lbr Olman/2nd Weight Sex Delivery Anes PTL Lv   4             3             2             1                 Past Medical History:   Diagnosis Date     Anxiety      Depressive disorder      Infertility, female        Past Surgical History:   Procedure Laterality Date     ARTHROSCOPY ANKLE, REPAIR TENDON Right 2020     BACK SURGERY       COLONOSCOPY N/A 10/3/2022    Procedure: COLONOSCOPY, FLEXIBLE, WITH LESION REMOVAL USING SNARE;  Surgeon: Halle Pillai MD;  Location:  GI     ECTOPIC PREGNANCY SURGERY       EYE SURGERY       GYN SURGERY           Current Outpatient Medications:      albuterol (PROAIR HFA) 108 (90 Base) MCG/ACT inhaler, Inhale 2 puffs into the lungs every 6 hours for 30 days, Disp: 18 g, Rfl: 0     benzonatate (TESSALON) 100 MG capsule, Take 2 capsules (200 mg) by mouth 3 times daily as needed for cough (Patient not taking: Reported on 2023), Disp: 21  capsule, Rfl: 0     buPROPion (WELLBUTRIN SR) 150 MG 12 hr tablet, , Disp: , Rfl:      LORazepam (ATIVAN) 0.5 MG tablet, Take 1 tablet (0.5 mg) by mouth daily as needed for anxiety (panic), Disp: 20 tablet, Rfl: 0     LORazepam (ATIVAN) 0.5 MG tablet, Take 1 tablet (0.5 mg) by mouth daily as needed for anxiety Try to Limit to once per week, Disp: 10 tablet, Rfl: 0     LORazepam (ATIVAN) 1 MG tablet, Take 1 tablet (1 mg) by mouth once as needed (preprocedure), Disp: 1 tablet, Rfl: 0     sertraline (ZOLOFT) 100 MG tablet, Take 1 tablet (100 mg) by mouth daily, Disp: 90 tablet, Rfl: 3     traZODone (DESYREL) 50 MG tablet, TAKE 1 TABLET(50 MG) BY MOUTH AT BEDTIME, Disp: 30 tablet, Rfl: 1    Allergies   Allergen Reactions     Ragweeds        Family History   Problem Relation Age of Onset     Breast Cancer Mother      Hypertension Mother      Diabetes Father      Cerebrovascular Disease Father      Alzheimer Disease Father      Arthritis Father      Hypertension Father      Breast Cancer Maternal Grandmother 92     Diabetes Maternal Grandfather      Diabetes Paternal Grandfather      Alcohol/Drug Brother      Breast Cancer Paternal Aunt 65     Cancer Paternal Aunt         Endometrial cancer     Schizophrenia Brother         suicide in 1992     Other - See Comments Brother         Multiple Sclerosis     Schizophrenia Maternal Half-Brother      Mental Illness Maternal Half-Brother      Diabetes Other      Substance Abuse Maternal Half-Brother      Substance Abuse Maternal Half-Brother        Social History     Socioeconomic History     Marital status:      Spouse name: Not on file     Number of children: Not on file     Years of education: Not on file     Highest education level: Not on file   Occupational History     Occupation: Artist     Comment: Also works at [employer]   Tobacco Use     Smoking status: Never     Smokeless tobacco: Never   Vaping Use     Vaping status: Never Used   Substance and Sexual  "Activity     Alcohol use: Yes     Alcohol/week: 11.7 standard drinks of alcohol     Comment: cutting back.  was drinking daily.  now drinking about 4 days a week and having 4-6 a day.     Drug use: Not Currently     Sexual activity: Yes     Partners: Male     Birth control/protection: None   Other Topics Concern     Parent/sibling w/ CABG, MI or angioplasty before 65F 55M? No      Service Not Asked     Blood Transfusions Not Asked     Caffeine Concern Not Asked     Occupational Exposure Not Asked     Hobby Hazards Not Asked     Sleep Concern Not Asked     Stress Concern Not Asked     Weight Concern Not Asked     Special Diet Not Asked     Back Care Not Asked     Exercise Yes     Comment: Trying to get into a regular pattern     Bike Helmet Not Asked     Seat Belt Not Asked     Self-Exams Not Asked   Social History Narrative    Single, long term boyfriend.  Works as , but also has \"day job\" at [employer] store.       Social Determinants of Health     Financial Resource Strain: Not on file   Food Insecurity: Not on file   Transportation Needs: Not on file   Physical Activity: Not on file   Stress: Not on file   Social Connections: Not on file   Intimate Partner Violence: Not on file   Housing Stability: Not on file       ROS: 10-Point ROS negative except as noted in HPI    Physical Exam  LMP 2022   Gen: Well-appearing, NAD    Assessment/Plan:  Jeanna Steel is a 57 year old  female here for AUB. Patient referred to clinic for endometrial biopsy. However, upon review of US images, discussed cystic appearance of endometrium with possible polyp. Therefore, would recommend sampling in the OR with hysteroscopy D&C so that polyp could also be removed via morcellation if present. Risks and benefits discussed, and patient would like to proceed with hysteroscopy.      #AUB  - Case request placed for hysteroscopy, D&C. Possible polypectomy with Myosure  - PCP visit within 30 days of surgery " for pre-op clearance.    Return to clinic for post-op visit.    Staffed with Dr. Bhatia.    Karie Jean Baptiste MD  OB/GYN Resident, PGY-2    The Patient was seen in Resident Continuity Clinic by Dr. Jean Baptiste.   I reviewed the history & exam. Assessment and plan were jointly made.   Karie Bhatia MD, MPH

## 2023-04-04 ENCOUNTER — OFFICE VISIT (OUTPATIENT)
Dept: OBGYN | Facility: CLINIC | Age: 58
End: 2023-04-04
Payer: COMMERCIAL

## 2023-04-04 ENCOUNTER — ALLIED HEALTH/NURSE VISIT (OUTPATIENT)
Dept: OBGYN | Facility: CLINIC | Age: 58
End: 2023-04-04
Attending: OBSTETRICS & GYNECOLOGY
Payer: COMMERCIAL

## 2023-04-04 VITALS
TEMPERATURE: 98.6 F | SYSTOLIC BLOOD PRESSURE: 119 MMHG | BODY MASS INDEX: 30.09 KG/M2 | HEART RATE: 82 BPM | DIASTOLIC BLOOD PRESSURE: 79 MMHG | HEIGHT: 67 IN | WEIGHT: 191.7 LBS

## 2023-04-04 DIAGNOSIS — F41.9 ANXIETY DUE TO INVASIVE PROCEDURE: Primary | ICD-10-CM

## 2023-04-04 DIAGNOSIS — N93.9 ABNORMAL UTERINE BLEEDING: Primary | ICD-10-CM

## 2023-04-04 PROCEDURE — 99203 OFFICE O/P NEW LOW 30 MIN: CPT | Mod: GE | Performed by: OBSTETRICS & GYNECOLOGY

## 2023-04-04 RX ORDER — ACETAMINOPHEN 325 MG/1
975 TABLET ORAL ONCE
Status: CANCELLED | OUTPATIENT
Start: 2023-04-04 | End: 2023-04-04

## 2023-04-04 ASSESSMENT — ANXIETY QUESTIONNAIRES
7. FEELING AFRAID AS IF SOMETHING AWFUL MIGHT HAPPEN: NOT AT ALL
GAD7 TOTAL SCORE: 2
2. NOT BEING ABLE TO STOP OR CONTROL WORRYING: NOT AT ALL
5. BEING SO RESTLESS THAT IT IS HARD TO SIT STILL: NOT AT ALL
GAD7 TOTAL SCORE: 2
1. FEELING NERVOUS, ANXIOUS, OR ON EDGE: SEVERAL DAYS
6. BECOMING EASILY ANNOYED OR IRRITABLE: SEVERAL DAYS
3. WORRYING TOO MUCH ABOUT DIFFERENT THINGS: NOT AT ALL

## 2023-04-04 ASSESSMENT — PATIENT HEALTH QUESTIONNAIRE - PHQ9
SUM OF ALL RESPONSES TO PHQ QUESTIONS 1-9: 2
5. POOR APPETITE OR OVEREATING: NOT AT ALL

## 2023-04-04 NOTE — PATIENT INSTRUCTIONS
Thank you for trusting us with your care!     If you need to contact us for questions about:  Symptoms, Scheduling & Medical Questions; Non-urgent (2-3 day response) Anjelica message, Urgent (needing response today) [phone] (if after 3:30pm next day response)   Prescriptions: Please call your Pharmacy   Billing: [facility] [phone] or [facility]:[phone]

## 2023-04-04 NOTE — NURSING NOTE
Patient presents to clinic for consent for EMB prior to taking prescribed ativan. Vitals WNL. PHQ-9/JESSICA-7 completed. Handoff to Dr. Jean Baptiste.

## 2023-04-05 ENCOUNTER — TELEPHONE (OUTPATIENT)
Dept: OBGYN | Facility: CLINIC | Age: 58
End: 2023-04-05
Payer: COMMERCIAL

## 2023-04-06 ENCOUNTER — HOSPITAL ENCOUNTER (OUTPATIENT)
Facility: AMBULATORY SURGERY CENTER | Age: 58
End: 2023-04-06
Attending: OBSTETRICS & GYNECOLOGY | Admitting: OBSTETRICS & GYNECOLOGY
Payer: COMMERCIAL

## 2023-04-06 ENCOUNTER — TELEPHONE (OUTPATIENT)
Dept: OBGYN | Facility: CLINIC | Age: 58
End: 2023-04-06
Payer: COMMERCIAL

## 2023-04-06 DIAGNOSIS — N93.9 ABNORMAL UTERINE BLEEDING: ICD-10-CM

## 2023-04-06 NOTE — TELEPHONE ENCOUNTER
Spoke with patient, scheduled surgery. Patient is aware of date, time, location, prep instructions, need for H&P within 30 days.    Type of surgery: HYSTEROSCOPY, WITH DILATION AND CURETTAGE OF UTERUS USING MORCELLATOR     Location of surgery: [facility]  Date and time of surgery: 04/21/2023 8:15am   Surgeon: Kirit  Pre-Op H&P Date: 04/14/2023  Post-Op Appt Date: 05/09/2023   Packet sent out: Yes  Pre-cert/Authorization completed:  No  Date: 04/06/2023

## 2023-04-14 ENCOUNTER — OFFICE VISIT (OUTPATIENT)
Dept: FAMILY MEDICINE | Facility: CLINIC | Age: 58
End: 2023-04-14
Attending: FAMILY MEDICINE
Payer: COMMERCIAL

## 2023-04-14 VITALS
HEART RATE: 89 BPM | BODY MASS INDEX: 30.29 KG/M2 | WEIGHT: 193 LBS | SYSTOLIC BLOOD PRESSURE: 116 MMHG | HEIGHT: 67 IN | DIASTOLIC BLOOD PRESSURE: 77 MMHG

## 2023-04-14 DIAGNOSIS — N93.9 ABNORMAL UTERINE BLEEDING: ICD-10-CM

## 2023-04-14 DIAGNOSIS — Z01.818 PREOP EXAMINATION: Primary | ICD-10-CM

## 2023-04-14 PROBLEM — R31.29 MICROSCOPIC HEMATURIA: Status: RESOLVED | Noted: 2017-07-12 | Resolved: 2023-04-14

## 2023-04-14 PROCEDURE — G0463 HOSPITAL OUTPT CLINIC VISIT: HCPCS | Performed by: FAMILY MEDICINE

## 2023-04-14 PROCEDURE — 99213 OFFICE O/P EST LOW 20 MIN: CPT | Performed by: FAMILY MEDICINE

## 2023-04-14 RX ORDER — VITAMIN B COMPLEX
1000 TABLET ORAL DAILY
COMMUNITY
End: 2023-04-21

## 2023-04-14 ASSESSMENT — ANXIETY QUESTIONNAIRES
6. BECOMING EASILY ANNOYED OR IRRITABLE: NOT AT ALL
7. FEELING AFRAID AS IF SOMETHING AWFUL MIGHT HAPPEN: NOT AT ALL
4. TROUBLE RELAXING: NOT AT ALL
7. FEELING AFRAID AS IF SOMETHING AWFUL MIGHT HAPPEN: NOT AT ALL
IF YOU CHECKED OFF ANY PROBLEMS ON THIS QUESTIONNAIRE, HOW DIFFICULT HAVE THESE PROBLEMS MADE IT FOR YOU TO DO YOUR WORK, TAKE CARE OF THINGS AT HOME, OR GET ALONG WITH OTHER PEOPLE: NOT DIFFICULT AT ALL
GAD7 TOTAL SCORE: 0
GAD7 TOTAL SCORE: 0
1. FEELING NERVOUS, ANXIOUS, OR ON EDGE: NOT AT ALL
8. IF YOU CHECKED OFF ANY PROBLEMS, HOW DIFFICULT HAVE THESE MADE IT FOR YOU TO DO YOUR WORK, TAKE CARE OF THINGS AT HOME, OR GET ALONG WITH OTHER PEOPLE?: NOT DIFFICULT AT ALL
5. BEING SO RESTLESS THAT IT IS HARD TO SIT STILL: NOT AT ALL
2. NOT BEING ABLE TO STOP OR CONTROL WORRYING: NOT AT ALL
3. WORRYING TOO MUCH ABOUT DIFFERENT THINGS: NOT AT ALL

## 2023-04-14 ASSESSMENT — ENCOUNTER SYMPTOMS
STIFFNESS: 1
ARTHRALGIAS: 1

## 2023-04-14 NOTE — PROGRESS NOTES
[facility] PROFESSIONAL BLDG  3RD FLR,[address]  [address]  [facility] [address]  [facility] [address]  Phone: [phone]  Fax: [phone]  Pre-op Performing Provider: JAMES ORTEGA    PREOPERATIVE EVALUATION:  Today's date: 4/14/2023    Jeanna Steel is a 57 year old female who presents for a preoperative evaluation.    Surgical Information:  Surgery/Procedure: Hysteroscopy with D & C  [place] [facility] [place]  Surgeon: Storm  Surgery Date: 4/21/23   Time of Surgery: 0825  Where patient plans to recover: At home with family  Fax number for surgical facility: Note does not need to be faxed, will be available electronically in Epic.    Assessment & Plan     The proposed surgical procedure is considered LOW risk.    Preop examination  Abnormal uterine bleeding    Risks and Recommendations:  The patient has the following additional risks and recommendations for perioperative complications:   - No identified additional risk factors other than previously addressed    Medication Instructions:  Patient is to take all scheduled medications on the day of surgery  Hold vitamins for 2 weeks prior, no alcohol night before surgery    RECOMMENDATION:  APPROVAL GIVEN to proceed with proposed procedure, without further diagnostic evaluation.6}    Subjective     HPI related to upcoming procedure:    Jeanna will be undergoing hysteroscopy & D&C related to AUB.    1. What is your level of physical activity? Walking 3mi 3x/wk  2. Do you have chest pain when you re physically active? No  3. Do you currently have a cold, bronchitis or symptoms of other respiratory (head and chest) infections? No  4. Do you have a cough, shortness of breath, or wheezing? No    5. Have you ever had anemia or been told to take iron pills? No  6. Have you had any abnormal blood loss such as black, tarry or bloody stools, or abnormal vaginal bleeding? Yes  7. Have you ever had a blood transfusion? No  8. Are  you willing to have a blood transfusion if it is medically needed before, during or after your surgery? Yes  9. Have you ever had a heart attack or stroke? No  10. Have you ever had surgery on your heart or blood vessels, such as a stent, coronary (heart) bypass, or surgery on an artery in the head, neck, heart or legs? No  11. Do you have a history of heart failure? No  12. Do you have sleep apnea, excessive snoring or daytime drowsiness? No    13. Do you or anyone in your family have a history of blood clots? No  14. Do you or anyone in your family have a serious bleeding problem, such as longlasting bleeding after surgeries or cuts? No  15. Have you or anyone in your family ever had problems with anesthesia (sedation for surgery)? No    16. Do you have any artificial heart valves or other implanted medical devices, such as a pacemaker, defibrillator or continuous glucose monitor? No  17. Do you have any artificial joints? No  18. Are you allergic to latex? No  19. Is there any chance that you may be pregnant? No    Preoperative Review of :   reviewed - controlled substances reflected in medication list.    Status of Chronic Conditions:  See problem list for active medical problems.  Problems all longstanding and stable, except as noted/documented.  See ROS for pertinent symptoms related to these conditions.      Review of Systems   Answers for HPI/ROS submitted by the patient on 4/14/2023  JESSICA 7 TOTAL SCORE: 0  General Symptoms: No  Skin Symptoms: No  HENT Symptoms: No  EYE SYMPTOMS: No  HEART SYMPTOMS: No  LUNG SYMPTOMS: No  INTESTINAL SYMPTOMS: No  URINARY SYMPTOMS: No  GYNECOLOGIC SYMPTOMS: No  BREAST SYMPTOMS: No  SKELETAL SYMPTOMS: Yes - thumb pain  BLOOD SYMPTOMS: No  NERVOUS SYSTEM SYMPTOMS: No  MENTAL HEALTH SYMPTOMS: No  Joint stiffness: Yes      Patient Active Problem List   Diagnosis     Major depressive disorder, recurrent episode, moderate (H)     Generalized anxiety disorder     Vitamin D  "deficiency     Recurrent major depressive disorder, in partial remission (H)     Abnormal uterine bleeding       Past Medical History:   Diagnosis Date     Anxiety      Depressive disorder      Infertility, female      Past Surgical History:   Procedure Laterality Date     ARTHROSCOPY ANKLE, REPAIR TENDON Right 2020     BACK SURGERY  2006     COLONOSCOPY N/A 10/3/2022    Procedure: COLONOSCOPY, FLEXIBLE, WITH LESION REMOVAL USING SNARE;  Surgeon: Halle Pillai MD;  Location:  GI     ECTOPIC PREGNANCY SURGERY  2003     EYE SURGERY  1969     GYN SURGERY  2003     Current Outpatient Medications   Medication Sig Dispense Refill     buPROPion (WELLBUTRIN SR) 150 MG 12 hr tablet        LORazepam (ATIVAN) 0.5 MG tablet Take 1 tablet (0.5 mg) by mouth daily as needed for anxiety Try to Limit to once per week 10 tablet 0     Multiple Vitamin (MULTIVITAMIN ADULT PO)        sertraline (ZOLOFT) 100 MG tablet Take 1 tablet (100 mg) by mouth daily 90 tablet 3     traZODone (DESYREL) 50 MG tablet TAKE 1 TABLET(50 MG) BY MOUTH AT BEDTIME 30 tablet 1     Vitamin D3 (CHOLECALCIFEROL) 25 mcg (1000 units) tablet Take 1,000 tablets by mouth daily         Allergies   Allergen Reactions     Ragweeds         Social History     Tobacco Use     Smoking status: Never     Smokeless tobacco: Never   Vaping Use     Vaping status: Never Used   Substance Use Topics     Alcohol use: Yes     Alcohol/week: 11.7 standard drinks of alcohol     Types: 12 Standard drinks or equivalent per week     Comment: on weekends- 6-15 drinks/week   Has cut back on alcohol use.         Objective     /77   Pulse 89   Ht 1.702 m (5' 7\")   Wt 87.5 kg (193 lb)   LMP 08/01/2022   BMI 30.23 kg/m      Physical Exam    GENERAL APPEARANCE: healthy, alert and no distress     EYES: EOMI, PERRL     NECK: no adenopathy, no asymmetry, masses, or scars and thyroid normal to palpation     RESP: lungs clear to auscultation - no rales, rhonchi or wheezes     CV: " regular rates and rhythm, normal S1 S2, no S3 or S4 and no murmur, click or rub     ABDOMEN:  soft, slightly tender in LLQ, no HSM or masses and bowel sounds normal     MS: extremities normal- no gross deformities noted, no evidence of inflammation in joints, FROM in all extremities.     SKIN: no suspicious lesions or rashes. Scratch marks present on left foot, due to patient itching     NEURO: Normal strength and tone, sensory exam grossly normal, mentation intact and speech normal     PSYCH: mentation appears normal. and affect normal/bright     LYMPHATICS: No cervical adenopathy    Recent Labs   Lab Test 09/29/22  1015   A1C 5.4        Diagnostics:  No labs were ordered during this visit.   No EKG required for low risk surgery (cataract, skin procedure, breast biopsy, etc).    Revised Cardiac Risk Index (RCRI):  The patient has the following serious cardiovascular risks for perioperative complications:   - No serious cardiac risks = 0 points     RCRI Interpretation: 0 points: Class I (very low risk - 0.4% complication rate)    Patient was seen with student, MANUELA Sun who was present for learning. I personally assessed, examined and made clinical decisions reflected in the documentation.        Signed Electronically by: Danelle Morel MD  Copy of this evaluation report is provided to requesting physician.

## 2023-04-19 ENCOUNTER — ANESTHESIA EVENT (OUTPATIENT)
Dept: SURGERY | Facility: AMBULATORY SURGERY CENTER | Age: 58
End: 2023-04-19
Payer: COMMERCIAL

## 2023-04-19 RX ORDER — FENTANYL CITRATE 50 UG/ML
25 INJECTION, SOLUTION INTRAMUSCULAR; INTRAVENOUS EVERY 5 MIN PRN
Status: CANCELLED | OUTPATIENT
Start: 2023-04-19

## 2023-04-19 RX ORDER — OXYCODONE HYDROCHLORIDE 5 MG/1
5 TABLET ORAL
Status: CANCELLED | OUTPATIENT
Start: 2023-04-19

## 2023-04-19 RX ORDER — HYDROMORPHONE HYDROCHLORIDE 1 MG/ML
0.2 INJECTION, SOLUTION INTRAMUSCULAR; INTRAVENOUS; SUBCUTANEOUS EVERY 5 MIN PRN
Status: CANCELLED | OUTPATIENT
Start: 2023-04-19

## 2023-04-19 RX ORDER — ONDANSETRON 2 MG/ML
4 INJECTION INTRAMUSCULAR; INTRAVENOUS EVERY 30 MIN PRN
Status: CANCELLED | OUTPATIENT
Start: 2023-04-19

## 2023-04-19 RX ORDER — LABETALOL HYDROCHLORIDE 5 MG/ML
10 INJECTION, SOLUTION INTRAVENOUS
Status: CANCELLED | OUTPATIENT
Start: 2023-04-19

## 2023-04-19 RX ORDER — HYDROMORPHONE HYDROCHLORIDE 1 MG/ML
0.4 INJECTION, SOLUTION INTRAMUSCULAR; INTRAVENOUS; SUBCUTANEOUS EVERY 5 MIN PRN
Status: CANCELLED | OUTPATIENT
Start: 2023-04-19

## 2023-04-19 RX ORDER — FENTANYL CITRATE 50 UG/ML
50 INJECTION, SOLUTION INTRAMUSCULAR; INTRAVENOUS EVERY 5 MIN PRN
Status: CANCELLED | OUTPATIENT
Start: 2023-04-19

## 2023-04-19 RX ORDER — ONDANSETRON 4 MG/1
4 TABLET, ORALLY DISINTEGRATING ORAL EVERY 30 MIN PRN
Status: CANCELLED | OUTPATIENT
Start: 2023-04-19

## 2023-04-21 ENCOUNTER — ANESTHESIA (OUTPATIENT)
Dept: SURGERY | Facility: AMBULATORY SURGERY CENTER | Age: 58
End: 2023-04-21
Payer: COMMERCIAL

## 2023-04-21 ENCOUNTER — HOSPITAL ENCOUNTER (OUTPATIENT)
Facility: AMBULATORY SURGERY CENTER | Age: 58
Discharge: HOME | End: 2023-04-21
Attending: OBSTETRICS & GYNECOLOGY
Payer: COMMERCIAL

## 2023-04-21 ENCOUNTER — TELEPHONE (OUTPATIENT)
Dept: OBGYN | Facility: CLINIC | Age: 58
End: 2023-04-21

## 2023-04-21 VITALS
DIASTOLIC BLOOD PRESSURE: 75 MMHG | HEART RATE: 77 BPM | OXYGEN SATURATION: 92 % | TEMPERATURE: 97 F | WEIGHT: 193 LBS | SYSTOLIC BLOOD PRESSURE: 114 MMHG | HEIGHT: 67 IN | BODY MASS INDEX: 30.29 KG/M2 | RESPIRATION RATE: 16 BRPM

## 2023-04-21 DIAGNOSIS — N93.9 ABNORMAL UTERINE BLEEDING: ICD-10-CM

## 2023-04-21 LAB
HCG UR QL: NEGATIVE
INTERNAL QC OK POCT: NORMAL
POCT KIT EXPIRATION DATE: NORMAL
POCT KIT LOT NUMBER: NORMAL

## 2023-04-21 PROCEDURE — 81025 URINE PREGNANCY TEST: CPT | Performed by: PATHOLOGY

## 2023-04-21 PROCEDURE — 58558 HYSTEROSCOPY BIOPSY: CPT

## 2023-04-21 PROCEDURE — 58558 HYSTEROSCOPY BIOPSY: CPT | Mod: GC | Performed by: OBSTETRICS & GYNECOLOGY

## 2023-04-21 PROCEDURE — 88305 TISSUE EXAM BY PATHOLOGIST: CPT | Mod: 26 | Performed by: PATHOLOGY

## 2023-04-21 PROCEDURE — 88305 TISSUE EXAM BY PATHOLOGIST: CPT | Mod: TC | Performed by: OBSTETRICS & GYNECOLOGY

## 2023-04-21 RX ORDER — ACETAMINOPHEN 325 MG/1
975 TABLET ORAL EVERY 6 HOURS PRN
Qty: 50 TABLET | Refills: 0 | Status: SHIPPED | OUTPATIENT
Start: 2023-04-21 | End: 2024-04-22

## 2023-04-21 RX ORDER — IBUPROFEN 800 MG/1
800 TABLET, FILM COATED ORAL EVERY 6 HOURS PRN
Qty: 30 TABLET | Refills: 0 | Status: SHIPPED | OUTPATIENT
Start: 2023-04-21 | End: 2024-04-22

## 2023-04-21 RX ORDER — ACETAMINOPHEN 325 MG/1
975 TABLET ORAL ONCE
Status: DISCONTINUED | OUTPATIENT
Start: 2023-04-21 | End: 2023-04-25 | Stop reason: HOSPADM

## 2023-04-21 RX ORDER — KETOROLAC TROMETHAMINE 30 MG/ML
INJECTION, SOLUTION INTRAMUSCULAR; INTRAVENOUS PRN
Status: DISCONTINUED | OUTPATIENT
Start: 2023-04-21 | End: 2023-04-21

## 2023-04-21 RX ORDER — ACETAMINOPHEN 325 MG/1
975 TABLET ORAL ONCE
Status: COMPLETED | OUTPATIENT
Start: 2023-04-21 | End: 2023-04-21

## 2023-04-21 RX ORDER — PROPOFOL 10 MG/ML
INJECTION, EMULSION INTRAVENOUS PRN
Status: DISCONTINUED | OUTPATIENT
Start: 2023-04-21 | End: 2023-04-21

## 2023-04-21 RX ORDER — ONDANSETRON 2 MG/ML
INJECTION INTRAMUSCULAR; INTRAVENOUS PRN
Status: DISCONTINUED | OUTPATIENT
Start: 2023-04-21 | End: 2023-04-21

## 2023-04-21 RX ORDER — IBUPROFEN 200 MG
800 TABLET ORAL ONCE
Status: DISCONTINUED | OUTPATIENT
Start: 2023-04-21 | End: 2023-04-25 | Stop reason: HOSPADM

## 2023-04-21 RX ORDER — LIDOCAINE 40 MG/G
CREAM TOPICAL
Status: DISCONTINUED | OUTPATIENT
Start: 2023-04-21 | End: 2023-04-25 | Stop reason: HOSPADM

## 2023-04-21 RX ORDER — DEXAMETHASONE SODIUM PHOSPHATE 4 MG/ML
INJECTION, SOLUTION INTRA-ARTICULAR; INTRALESIONAL; INTRAMUSCULAR; INTRAVENOUS; SOFT TISSUE PRN
Status: DISCONTINUED | OUTPATIENT
Start: 2023-04-21 | End: 2023-04-21

## 2023-04-21 RX ORDER — GABAPENTIN 300 MG/1
300 CAPSULE ORAL
Status: COMPLETED | OUTPATIENT
Start: 2023-04-21 | End: 2023-04-21

## 2023-04-21 RX ORDER — FENTANYL CITRATE 50 UG/ML
INJECTION, SOLUTION INTRAMUSCULAR; INTRAVENOUS PRN
Status: DISCONTINUED | OUTPATIENT
Start: 2023-04-21 | End: 2023-04-21

## 2023-04-21 RX ORDER — PROPOFOL 10 MG/ML
INJECTION, EMULSION INTRAVENOUS CONTINUOUS PRN
Status: DISCONTINUED | OUTPATIENT
Start: 2023-04-21 | End: 2023-04-21

## 2023-04-21 RX ORDER — LIDOCAINE HYDROCHLORIDE 20 MG/ML
INJECTION, SOLUTION INFILTRATION; PERINEURAL PRN
Status: DISCONTINUED | OUTPATIENT
Start: 2023-04-21 | End: 2023-04-21

## 2023-04-21 RX ORDER — SODIUM CHLORIDE, SODIUM LACTATE, POTASSIUM CHLORIDE, CALCIUM CHLORIDE 600; 310; 30; 20 MG/100ML; MG/100ML; MG/100ML; MG/100ML
INJECTION, SOLUTION INTRAVENOUS CONTINUOUS
Status: DISCONTINUED | OUTPATIENT
Start: 2023-04-21 | End: 2023-04-25 | Stop reason: HOSPADM

## 2023-04-21 RX ORDER — LIDOCAINE HYDROCHLORIDE 10 MG/ML
INJECTION, SOLUTION INFILTRATION; PERINEURAL PRN
Status: DISCONTINUED | OUTPATIENT
Start: 2023-04-21 | End: 2023-04-21 | Stop reason: HOSPADM

## 2023-04-21 RX ADMIN — KETOROLAC TROMETHAMINE 15 MG: 30 INJECTION, SOLUTION INTRAMUSCULAR; INTRAVENOUS at 08:21

## 2023-04-21 RX ADMIN — PROPOFOL 150 MCG/KG/MIN: 10 INJECTION, EMULSION INTRAVENOUS at 07:48

## 2023-04-21 RX ADMIN — ONDANSETRON 4 MG: 2 INJECTION INTRAMUSCULAR; INTRAVENOUS at 07:51

## 2023-04-21 RX ADMIN — GABAPENTIN 300 MG: 300 CAPSULE ORAL at 07:13

## 2023-04-21 RX ADMIN — SODIUM CHLORIDE, SODIUM LACTATE, POTASSIUM CHLORIDE, CALCIUM CHLORIDE: 600; 310; 30; 20 INJECTION, SOLUTION INTRAVENOUS at 07:45

## 2023-04-21 RX ADMIN — FENTANYL CITRATE 50 MCG: 50 INJECTION, SOLUTION INTRAMUSCULAR; INTRAVENOUS at 07:51

## 2023-04-21 RX ADMIN — DEXAMETHASONE SODIUM PHOSPHATE 4 MG: 4 INJECTION, SOLUTION INTRA-ARTICULAR; INTRALESIONAL; INTRAMUSCULAR; INTRAVENOUS; SOFT TISSUE at 07:51

## 2023-04-21 RX ADMIN — ACETAMINOPHEN 975 MG: 325 TABLET ORAL at 07:13

## 2023-04-21 RX ADMIN — PROPOFOL 40 MG: 10 INJECTION, EMULSION INTRAVENOUS at 07:48

## 2023-04-21 RX ADMIN — LIDOCAINE HYDROCHLORIDE 60 MG: 20 INJECTION, SOLUTION INFILTRATION; PERINEURAL at 07:48

## 2023-04-21 NOTE — ANESTHESIA CARE TRANSFER NOTE
Patient: Jeanna Steel    Procedure: Procedure(s):  EXAM UNDER ANESTHESIA, HYSTEROSCOPY, W DILATION AND CURETTAGE OF UTERUS USING MORCELLATOR       Diagnosis: Abnormal uterine bleeding [N93.9]  Diagnosis Additional Information: No value filed.    Anesthesia Type:   MAC     Note:    Oropharynx: oropharynx clear of all foreign objects and spontaneously breathing  Level of Consciousness: awake  Oxygen Supplementation: room air    Independent Airway: airway patency satisfactory and stable  Dentition: dentition unchanged  Vital Signs Stable: post-procedure vital signs reviewed and stable  Report to RN Given: handoff report given  Patient transferred to: Phase II    Handoff Report: Identifed the Patient, Identified the Reponsible Provider, Reviewed the pertinent medical history, Discussed the surgical course, Reviewed Intra-OP anesthesia mangement and issues during anesthesia, Set expectations for post-procedure period and Allowed opportunity for questions and acknowledgement of understanding      Vitals:  Vitals Value Taken Time   /65    Temp     Pulse 66    Resp 14    SpO2 98        Electronically Signed By: NATHANIEL oMrton CRNA  April 21, 2023  8:38 AM

## 2023-04-21 NOTE — DISCHARGE INSTRUCTIONS
"[facility]  Home Care Following Anesthesia  For 24 hours after surgery:  Get plenty of rest.  A responsible adult must stay with you for at least 24 hours after you leave the surgery center.  Do not drive or use heavy equipment.  If you have weakness or tingling, don't drive or use heavy equipment until this feeling goes away.   Do not drink alcohol.   Avoid strenuous or risky activities.  Ask for help when climbing stairs.  You may feel lightheaded.  IF so, sit for a few minutes before standing.  Have someone help you get up.   If you have nausea (feel sick to your stomach): Drink only clear liquids such as apple juice, ginger ale, broth or 7-Up.  Rest may also help.  Be sure to drink enough fluids.  Move to a regular diet as you feel able.   You may have a slight fever.  Call the doctor if your fever is over 100 F (37.7 C) (taken under the tongue) or lasts longer than 24 hours.  You may have a dry mouth, a sore throat, muscle aches or trouble sleeping. These should go away after 24 hours.  Do not make important or legal decisions.   It is recommended to avoid smoking.        Today you received a Marcaine or bupivacaine block to numb the nerves near your surgery site.  This is a block using local anesthetic or \"numbing\" medication injected around the nerves to anesthetize or \"numb\" the area supplied by those nerves.  This block is injected into the muscle layer near your surgical site.  The medication may numb the location where you had surgery for 6-18 hours, but may last up to 24 hours.  If your surgical site is an arm or leg you should be careful with your affected limb, since it is possible to injure your limb without being aware of it due to the numbing.  Until full feeling returns, you should guard against bumping or hitting your limb, and avoid extreme hot or cold temperatures on the skin.  As the block wears off, the feeling will return as a tingling or prickly " sensation near your surgical site.  You will experience more discomfort from your incision as the feeling returns.  You may want to take a pain pill (a narcotic or Tylenol if this was prescribed by your surgeon) when you start to experience mild pain before the pain beccomes more severe.  If your pain medications do not control your pain you should notifiy your surgeon.    Tips for taking pain medications  To get the best pain relief possible, remember these points:  Take pain medications as directed, before pain becomes severe.  Pain medication can upset your stomach: taking it with food may help.  Constipation is a common side effect of pain medication. Drink plenty of  fluids.  Eat foods high in fiber. Take a stool softener if recommended by your doctor or pharmacist.  Do not drink alcohol, drive or operate machinery while taking pain medications.  Ask about other ways to control pain, such as with heat, ice or relaxation.    Tylenol/Acetaminophen Consumption  To help encourage the safe use of acetaminophen, the makers of TYLENOL  have lowered the maximum daily dose for single-ingredient Extra Strength TYLENOL  (acetaminophen) products sold in the [place] from 8 pills per day (4,000 mg) to 6 pills per day (3,000 mg). The dosing interval has also changed from 2 pills every 4-6 hours to 2 pills every 6 hours.  If you feel your pain relief is insufficient, you may take Tylenol/Acetaminophen in addition to your narcotic pain medication.   Be careful not to exceed 3,000 mg of Tylenol/Acetaminophen in a 24 hour period from all sources.  If you are taking extra strength Tylenol/acetaminophen (500 mg), the maximum dose is 6 tablets in 24 hours.  If you are taking regular strength acetaminophen (325 mg), the maximum dose is 9 tablets in 24 hours.    Call a doctor for any of the following:  Signs of infection (fever, growing tenderness at the surgery site, a large amount of drainage or bleeding, severe pain, foul-smelling  drainage, redness, swelling).  It has been over 8 to 10 hours since surgery and you are still not able to urinate (pass water).  Headache for over 24 hours.  Numbness, tingling or weakness the day after surgery (if you had spinal anesthesia).  Signs of Covid-19 infection (temperature over 100 degrees, shortness of breath, cough, loss of taste/smell, generalized body aches, persistent headache, chills, sore throat, nausea/vomiting/diarrhea)  Your doctor is:  Dr. Christina Beth, OB/GYN: [phone]                    Or dial [phone] and ask for the resident on call for:  Gynecologic Oncology  For emergency care, call the:  [facility]:  [phone] (TTY for hearing impaired: [phone])

## 2023-04-21 NOTE — ANESTHESIA PREPROCEDURE EVALUATION
Anesthesia Pre-Procedure Evaluation    Patient: Jeanna Steel   MRN: [id number] : 1965        Procedure : Procedure(s):  EXAM UNDER ANESTHESIA, HYSTEROSCOPY, W DILATION AND CURETTAGE OF UTERUS USING MORCELLATOR          Past Medical History:   Diagnosis Date     Anxiety      Depressive disorder      Infertility, female       Past Surgical History:   Procedure Laterality Date     ARTHROSCOPY ANKLE, REPAIR TENDON Right 2020     BACK SURGERY  2006     COLONOSCOPY N/A 10/3/2022    Procedure: COLONOSCOPY, FLEXIBLE, WITH LESION REMOVAL USING SNARE;  Surgeon: Halle Pillai MD;  Location:  GI     ECTOPIC PREGNANCY SURGERY       EYE SURGERY  1969     GYN SURGERY        Allergies   Allergen Reactions     Ragweeds       Social History     Tobacco Use     Smoking status: Never     Smokeless tobacco: Never   Vaping Use     Vaping status: Never Used   Substance Use Topics     Alcohol use: Yes     Alcohol/week: 11.7 standard drinks of alcohol     Types: 12 Standard drinks or equivalent per week     Comment: on weekends- 6-15 drinks/week      Wt Readings from Last 1 Encounters:   23 87.5 kg (193 lb)        Anesthesia Evaluation   Pt has had prior anesthetic.     No history of anesthetic complications       ROS/MED HX  ENT/Pulmonary:  - neg pulmonary ROS     Neurologic:  - neg neurologic ROS     Cardiovascular:  - neg cardiovascular ROS     METS/Exercise Tolerance: >4 METS    Hematologic:  - neg hematologic  ROS     Musculoskeletal:  - neg musculoskeletal ROS     GI/Hepatic:  - neg GI/hepatic ROS     Renal/Genitourinary:  - neg Renal ROS     Endo:  - neg endo ROS     Psychiatric/Substance Use:  - neg psychiatric ROS     Infectious Disease:  - neg infectious disease ROS     Malignancy:  - neg malignancy ROS     Other:            Physical Exam    Airway        Mallampati: I       Respiratory Devices and Support         Dental    unable to assess        Cardiovascular   cardiovascular exam normal           Pulmonary   pulmonary exam normal                OUTSIDE LABS:  CBC:   Lab Results   Component Value Date    WBC 7.5 11/06/2020    WBC 8.5 07/07/2019    HGB 12.2 11/06/2020    HGB 11.7 07/07/2019    HCT 37.5 11/06/2020    HCT 37.8 07/07/2019     11/06/2020     07/07/2019     BMP:   Lab Results   Component Value Date     01/06/2021     07/07/2019    POTASSIUM 4.4 01/06/2021    POTASSIUM 3.8 07/07/2019    CHLORIDE 104 01/06/2021    CHLORIDE 108 07/07/2019    CO2 27 01/06/2021    CO2 25 07/07/2019    BUN 11 01/06/2021    BUN 14 07/07/2019    CR 0.64 01/06/2021    CR 0.64 07/07/2019     (H) 09/29/2022    GLC 86 01/06/2021     COAGS: No results found for: PTT, INR, FIBR  POC:   Lab Results   Component Value Date    HCG Negative 04/21/2023    HCGS Negative 07/12/2017     HEPATIC:   Lab Results   Component Value Date    ALBUMIN 3.8 01/06/2021    PROTTOTAL 7.7 01/06/2021    ALT 47 01/06/2021    AST 25 01/06/2021    GGT 26 05/21/2012    ALKPHOS 70 01/06/2021    BILITOTAL 0.3 01/06/2021     OTHER:   Lab Results   Component Value Date    A1C 5.4 09/29/2022    CANDY 9.3 01/06/2021    LIPASE 184 11/06/2020    TSH 2.82 09/29/2022    CRP <2.9 07/21/2017    SED 9 08/20/2018       Anesthesia Plan    ASA Status:  2      Anesthesia Type: MAC.   Induction: Intravenous, Propofol.   Maintenance: TIVA.        Consents    Anesthesia Plan(s) and associated risks, benefits, and realistic alternatives discussed. Questions answered and patient/representative(s) expressed understanding.    - Discussed:     - Discussed with:  Patient      - Extended Intubation/Ventilatory Support Discussed: No.      - Patient is DNR/DNI Status: No    Use of blood products discussed: No .     Postoperative Care    Pain management: IV analgesics.        Comments:                Mark Love MD

## 2023-04-21 NOTE — TELEPHONE ENCOUNTER
Patient called triage.  Had hysteroscopy today with Dr. Beth.  Is reporting superficial burning in urethra and labia.  Wondering if she should use any kind of cream to treat?     Consulted Dr. Beth in clinic, who stated patient may be sensitive to the soap they used, and suggested a warm bath with plain water or rinsing vulva with plain normal saline.  Discourages use of creams, as they may make things worse.     Returned call to patient and relayed information.  She plans to take a warm bath in plain water.

## 2023-04-21 NOTE — ANESTHESIA POSTPROCEDURE EVALUATION
Patient: Jeanna Steel    Procedure: Procedure(s):  EXAM UNDER ANESTHESIA, HYSTEROSCOPY, W DILATION AND CURETTAGE OF UTERUS USING MORCELLATOR       Anesthesia Type:  MAC    Note:  Disposition: Outpatient   Postop Pain Control: Uneventful            Sign Out: Well controlled pain   PONV: No   Neuro/Psych: Uneventful            Sign Out: Acceptable/Baseline neuro status   Airway/Respiratory: Uneventful            Sign Out: Acceptable/Baseline resp. status   CV/Hemodynamics: Uneventful            Sign Out: Acceptable CV status; No obvious hypovolemia; No obvious fluid overload   Other NRE: NONE   DID A NON-ROUTINE EVENT OCCUR? No           Last vitals:  Vitals Value Taken Time   /75 04/21/23 0857   Temp 36.1  C (97  F) 04/21/23 0857   Pulse 77 04/21/23 0857   Resp 16 04/21/23 0857   SpO2         Electronically Signed By: Mark Love MD  April 21, 2023  12:22 PM

## 2023-04-21 NOTE — OP NOTE
[facility]  Full Operative Note   Date of Service: 4/21/2023  Surgeon:  Christina Beth MD  Assistants:  Caryn Stewart MD - PGY-4      Preop Dx:  Abnormal uterine bleeding, possible intrauterine polyp    Postop Dx:  Same    Procedure:  EUA, hysteroscopy, morcellation of likely endometrial polyp, endometrial sampling using morcellator    Anesthesia:  MAC & Local  EBL:  10 cc  IVF:  500 cc crystalloid   UOP:  0  Drains:  None  Fluid Deficit: 310 cc  Specimens:  Endometrial curettings  Complications:  None apparent    Indications:   Ms. Jeanna Steel is a 57 year old with abnormal uterine bleeding and US findings consistent with possible endometrial polyps. The above stated procedure was recommended. The risks, benefits, and alternatives were discussed with the patient, and she agreed to proceed.     Findings:   EUA: Normal appearing external genitalia. Mobile, small, anteverted uterus. Hysteroscopy: small intracervical polyp. Multiple small polypoid masses near bilateral cornua. Remainder of endometrial lining thick, fluffy, hyperemic but is without masses or gross abnormalities.     Procedure Details:   The patient was brought to the operating room where adequate MAC was administered. Exam under anesthesia revealed the findings noted above. The patient was prepped and draped in the usual sterile fashion. A sterile speculum was placed. 1 cc of 1% lidocaine was injected into the anterior lip of the cervix, which was then grasped with a single tooth tenaculum. A paracervical block was performed with a total of 19 cc of the 1% lidocaine injected at 4 and 8 o'clock in the cervicovaginal junction. The cervix was gently dilated to a 8mm dilator. The hysteroscope was placed, and the uterine cavity was explored. The Myosure was used to remove all polypoid tissue and globally sample the endometrial lining. The Myosure and hysteroscope were removed without difficulty. The tenaculum was removed and  tenaculum sites were noted to be hemostatic after application of silver nitrate. All instruments were removed from the vagina.     The sponge, needle, and instrument counts were correct. The patient tolerated the procedure well and was transferred to recovery in stable condition. Dr. Beth was present and scrubbed for the entirety of the procedure.     Caryn Stewart MD  OB/GYN, PGY-4  4/21/2023, 8:48 AM    Staff:  I was scrubbed and present for entire case and agree w/ above note.    Christina Beth MD

## 2023-04-21 NOTE — BRIEF OP NOTE
[facility]    Brief Operative Note    Pre-operative diagnosis: Abnormal uterine bleeding [N93.9]  Post-operative diagnosis Same as pre-operative diagnosis    Procedure: Procedure(s):  EXAM UNDER ANESTHESIA, HYSTEROSCOPY, W DILATION AND CURETTAGE OF UTERUS USING MORCELLATOR  Surgeon: Surgeon(s) and Role:     * Christina Beth MD - Primary  Anesthesia: Choice   Estimated Blood Loss: 10 ml  IVF: 500 ml  Deficit: 310 ml    Drains: None  Specimens:   ID Type Source Tests Collected by Time Destination   1 : endometrial curettings Curettings Endometrium SURGICAL PATHOLOGY EXAM Christina Beth MD 4/21/2023  8:17 AM      Findings:   multiple endometrial polyps. small intra cervical polyp..  Complications: None.  Implants: * No implants in log *    Caryn Stewart MD

## 2023-05-09 ENCOUNTER — OFFICE VISIT (OUTPATIENT)
Dept: OBGYN | Facility: CLINIC | Age: 58
End: 2023-05-09
Attending: OBSTETRICS & GYNECOLOGY
Payer: COMMERCIAL

## 2023-05-09 VITALS
BODY MASS INDEX: 30.69 KG/M2 | DIASTOLIC BLOOD PRESSURE: 80 MMHG | WEIGHT: 196 LBS | HEART RATE: 75 BPM | SYSTOLIC BLOOD PRESSURE: 122 MMHG

## 2023-05-09 DIAGNOSIS — Z98.890 POSTOPERATIVE STATE: Primary | ICD-10-CM

## 2023-05-09 PROCEDURE — G0463 HOSPITAL OUTPT CLINIC VISIT: HCPCS | Performed by: OBSTETRICS & GYNECOLOGY

## 2023-05-09 PROCEDURE — 99212 OFFICE O/P EST SF 10 MIN: CPT | Performed by: OBSTETRICS & GYNECOLOGY

## 2023-05-09 NOTE — PATIENT INSTRUCTIONS
Thank you for trusting us with your care!     If you need to contact us for questions about:  Symptoms, Scheduling & Medical Questions; Non-urgent (2-3 day response) Anjelica message, Urgent (needing response today) [phone] (if after 3:30pm next day response)   Prescriptions: Please call your Pharmacy   Billing: C[facility]ar [phone] or [facility]:[phone]

## 2023-05-09 NOTE — NURSING NOTE
Chief Complaint   Patient presents with     Surgical Followup     D&C 4/21/2023   Sheridan Carreon LPN

## 2023-05-09 NOTE — LETTER
2023       RE: Jeanna Steel  [address]  [place] [address]     Dear Colleague,    Thank you for referring your patient, Jeanna Steel, to the [facility] [place] at [facility]. Please see a copy of my visit note below.    Gyn Clinic Postoperative Visit Note  2023    S: Jeanna Steel is a 57 year old  here for post-operative visit following Hysteroscopy and Dilitation and Curattage.  She reports feeling well.     O:   /80   Pulse 75   Wt 88.9 kg (196 lb)   LMP 2022   BMI 30.69 kg/m    Exam: NO FURTHER EXAM DONE    Labs:   Hemoglobin   Date Value Ref Range Status   2020 12.2 11.7 - 15.7 g/dL Final   ]    Pathology:   Final Diagnosis   Endometrium, curettage:  - Benign endometrial polyp(s)  - Negative for hyperplasia or atypia       A: 57 year old female Post op s/p Hysteroscopy and Dilitation and Curattage Doing well, no concerns    P:    f/u prn    Christina Beth MD, FACOG  (she/her/hers)    Department of Ob/Gyn/Women's Health  [facility]  [place] [facility]  [address] [place] [address]  [email or web address]  p. [phone]  f. [phone]

## 2023-05-12 NOTE — PROGRESS NOTES
Gyn Clinic Postoperative Visit Note  2023    S: Jeanna Steel is a 57 year old  here for post-operative visit following Hysteroscopy and Dilitation and Curattage.  She reports feeling well.     O:   /80   Pulse 75   Wt 88.9 kg (196 lb)   LMP 2022   BMI 30.69 kg/m    Exam: NO FURTHER EXAM DONE    Labs:   Hemoglobin   Date Value Ref Range Status   2020 12.2 11.7 - 15.7 g/dL Final   ]    Pathology:   Final Diagnosis   Endometrium, curettage:  - Benign endometrial polyp(s)  - Negative for hyperplasia or atypia       A: 57 year old female Post op s/p Hysteroscopy and Dilitation and Curattage Doing well, no concerns    P:    f/u prn    Christina Bteh MD, FACOG  (she/her/hers)    Department of Ob/Gyn/Women's Health  [facility]  [place] [facility]  [address] [place] [address]. [place] [address]  [email or web address]  p. [phone]  f. [phone]

## 2023-05-15 ENCOUNTER — TRANSFERRED RECORDS (OUTPATIENT)
Dept: HEALTH INFORMATION MANAGEMENT | Facility: CLINIC | Age: 58
End: 2023-05-15
Payer: COMMERCIAL

## 2023-05-22 DIAGNOSIS — F33.1 MAJOR DEPRESSIVE DISORDER, RECURRENT EPISODE, MODERATE (H): ICD-10-CM

## 2023-05-22 DIAGNOSIS — F41.1 GENERALIZED ANXIETY DISORDER: ICD-10-CM

## 2023-05-23 RX ORDER — BUPROPION HYDROCHLORIDE 150 MG/1
TABLET, EXTENDED RELEASE ORAL
Qty: 90 TABLET | Refills: 3 | Status: SHIPPED | OUTPATIENT
Start: 2023-05-23 | End: 2024-01-25

## 2023-05-23 RX ORDER — SERTRALINE HYDROCHLORIDE 100 MG/1
TABLET, FILM COATED ORAL
Qty: 90 TABLET | Refills: 3 | Status: SHIPPED | OUTPATIENT
Start: 2023-05-23 | End: 2024-02-26

## 2023-05-23 NOTE — TELEPHONE ENCOUNTER
Routing refill request to provider for review/approval because:  Medication is reported/historical  Kristin SOLIZ RN

## 2023-08-27 DIAGNOSIS — F41.1 GENERALIZED ANXIETY DISORDER: ICD-10-CM

## 2023-08-28 RX ORDER — LORAZEPAM 0.5 MG/1
0.5 TABLET ORAL DAILY PRN
Qty: 20 TABLET | Refills: 0 | Status: SHIPPED | OUTPATIENT
Start: 2023-08-28 | End: 2024-03-01

## 2023-10-09 ENCOUNTER — TRANSFERRED RECORDS (OUTPATIENT)
Dept: HEALTH INFORMATION MANAGEMENT | Facility: CLINIC | Age: 58
End: 2023-10-09
Payer: COMMERCIAL

## 2023-10-23 ENCOUNTER — TRANSFERRED RECORDS (OUTPATIENT)
Dept: HEALTH INFORMATION MANAGEMENT | Facility: CLINIC | Age: 58
End: 2023-10-23
Payer: COMMERCIAL

## 2023-11-11 ENCOUNTER — HEALTH MAINTENANCE LETTER (OUTPATIENT)
Age: 58
End: 2023-11-11

## 2023-11-15 ENCOUNTER — ANCILLARY PROCEDURE (OUTPATIENT)
Dept: MAMMOGRAPHY | Facility: CLINIC | Age: 58
End: 2023-11-15
Attending: FAMILY MEDICINE
Payer: COMMERCIAL

## 2023-11-15 DIAGNOSIS — Z12.31 VISIT FOR SCREENING MAMMOGRAM: ICD-10-CM

## 2023-11-15 PROCEDURE — 77067 SCR MAMMO BI INCL CAD: CPT | Performed by: RADIOLOGY

## 2023-11-15 PROCEDURE — 77063 BREAST TOMOSYNTHESIS BI: CPT | Performed by: RADIOLOGY

## 2024-01-03 ENCOUNTER — NURSE TRIAGE (OUTPATIENT)
Dept: FAMILY MEDICINE | Facility: CLINIC | Age: 59
End: 2024-01-03
Payer: COMMERCIAL

## 2024-01-03 NOTE — TELEPHONE ENCOUNTER
Called patient.  Verbalizes understanding of instructions from SN below.    Anastacia Pizarro RN

## 2024-01-03 NOTE — TELEPHONE ENCOUNTER
Agree with your advice - I recommend decongestant like sudafed twice daily, steam staying hydrated and starting flonase nasal spray    Elevate HOB    His should clear with time    If facial pain or chest pressure pain then should be seen  Thank you!  SN

## 2024-01-03 NOTE — TELEPHONE ENCOUNTER
"Patient calling and states starting on 12/22 she started feeling under the weather and on the 23rd had a severe sore throat but now has a lot of congestion and has been coughing up mostly clear but some green phlegm and is wondering if she needs to be seen or if recommendations from Dr. Tiwari can be given at pt's request, and/or medications to manage symptoms and \"let this tide itself over\".    Patient has been having difficulty sleeping with post nasal drip and cough. Advised her to continue hydrating and using humidifier but pt states she is \"shocked with the amount of mucus that is coming out of her nose nearly continuously.\"     Please advise if should be seen or ok to continue home care and with medications if recommended.    Thanks!  Booker Ahumada RN   [facility]    Reason for Disposition   Cough with cold symptoms (e.g., runny nose, postnasal drip, throat clearing)    Additional Information   Negative: Bluish (or gray) lips or face   Negative: SEVERE difficulty breathing (e.g., struggling for each breath, speaks in single words)   Negative: Rapid onset of cough and has hives   Negative: Coughing started suddenly after medicine, an allergic food or bee sting   Negative: Difficulty breathing after exposure to flames, smoke, or fumes   Negative: Sounds like a life-threatening emergency to the triager   Negative: Previous asthma attacks and this feels like asthma attack   Negative: Dry cough (non-productive; no sputum or minimal clear sputum) and within 14 days of COVID-19 Exposure   Negative: MODERATE difficulty breathing (e.g., speaks in phrases, SOB even at rest, pulse 100-120) and still present when not coughing   Negative: Chest pain present when not coughing   Negative: Passed out (i.e., fainted, collapsed and was not responding)   Negative: MILD difficulty breathing (e.g., minimal/no SOB at rest, SOB with walking, pulse <100) and still present when not coughing   Negative: Coughed up > 1 " tablespoon (15 ml) blood (Exception: Blood-tinged sputum.)   Negative: Fever > 103 F (39.4 C)   Negative: Fever > 101 F (38.3 C) and over 60 years of age   Negative: Fever > 100.0 F (37.8 C) and has diabetes mellitus or a weak immune system (e.g., HIV positive, cancer chemotherapy, organ transplant, splenectomy, chronic steroids)   Negative: Fever > 100.0 F (37.8 C) and bedridden (e.g., CVA, chronic illness, recovering from surgery)   Negative: Increasing ankle swelling   Negative: Wheezing is present   Negative: SEVERE coughing spells (e.g., whooping sound after coughing, vomiting after coughing)   Negative: Coughing up dave-colored (reddish-brown) or blood-tinged sputum   Negative: Fever present > 3 days (72 hours)   Negative: Fever returns after gone for over 24 hours and symptoms worse or not improved   Negative: Using nasal washes and pain medicine > 24 hours and sinus pain persists   Negative: Known COPD or other severe lung disease (i.e., bronchiectasis, cystic fibrosis, lung surgery) and worsening symptoms (i.e., increased sputum purulence or amount, increased breathing difficulty)   Negative: Continuous (nonstop) coughing interferes with work or school and no improvement using cough treatment per Care Advice   Negative: Patient wants to be seen   Negative: Cough has been present for > 3 weeks   Negative: Allergy symptoms are also present (e.g., itchy eyes, clear nasal discharge, postnasal drip)   Negative: Nasal discharge present > 10 days   Negative: Exposure to TB (Tuberculosis)   Negative: Taking an ACE Inhibitor medicine (e.g., benazepril/LOTENSIN, captopril/CAPOTEN, enalapril/VASOTEC, lisinopril/ZESTRIL)    Protocols used: Cough-A-OH

## 2024-01-25 ENCOUNTER — MYC MEDICAL ADVICE (OUTPATIENT)
Dept: FAMILY MEDICINE | Facility: CLINIC | Age: 59
End: 2024-01-25

## 2024-01-25 ENCOUNTER — VIRTUAL VISIT (OUTPATIENT)
Dept: FAMILY MEDICINE | Facility: CLINIC | Age: 59
End: 2024-01-25
Payer: COMMERCIAL

## 2024-01-25 DIAGNOSIS — U07.1 INFECTION DUE TO 2019 NOVEL CORONAVIRUS: Primary | ICD-10-CM

## 2024-01-25 DIAGNOSIS — F33.1 MAJOR DEPRESSIVE DISORDER, RECURRENT EPISODE, MODERATE (H): ICD-10-CM

## 2024-01-25 PROCEDURE — 99213 OFFICE O/P EST LOW 20 MIN: CPT | Mod: 95 | Performed by: FAMILY MEDICINE

## 2024-01-25 NOTE — PROGRESS NOTES
Jeanna is a 58 year old who is being evaluated via a billable video visit.      How would you like to obtain your AVS? MyChart  If the video visit is dropped, the invitation should be resent by: Send to e-mail at: [email or web address]  Will anyone else be joining your video visit? No    Assessment and Plan:  Jeanna was seen today for covid concern.    Diagnoses and all orders for this visit:    Infection due to 2019 novel coronavirus  History clinical presentation are consistent with COVID infection.  Outpatient medication/treatment options were discussed with the patient.  we agreed to proceed with Paxlovid.  We reviewed interactions of possible side effects.  -     nirmatrelvir and ritonavir (PAXLOVID) 300 mg/100 mg therapy pack; Take 3 tablets by mouth 2 times daily for 5 days (Take 2 Nirmatrelvir tablets and 1 Ritonavir tablet twice daily for 5 days)    Major depressive disorder, recurrent episode, moderate (H)  Patient stopped taking Wellbutrin.  Did not feel it was effective.    MEDICATIONS:   Orders Placed This Encounter   Medications    nirmatrelvir and ritonavir (PAXLOVID) 300 mg/100 mg therapy pack     Sig: Take 3 tablets by mouth 2 times daily for 5 days (Take 2 Nirmatrelvir tablets and 1 Ritonavir tablet twice daily for 5 days)     Dispense:  30 tablet     Refill:  0     Date of symptom onset: 1; Risk criteria met: Yes; Weight >40 kg Yes; Renal fxn: normal;  Drug-Drug interactions reviewed & addressed: Yes     Medications Discontinued During This Encounter   Medication Reason    buPROPion (WELLBUTRIN SR) 150 MG 12 hr tablet Stopped by Patient (No AVS)          - Continue other medications without change  No follow-ups on file.             Subjective   Jeanna is a 58 year old, presenting for the following health issues:  Covid Concern (Tested positive 1/24)        1/25/2024    10:23 AM   Additional Questions   Roomed by Tania KIRKPATRICK     History of Present Illness       Reason for visit:  Covid  "positive testShe consumes 2 sweetened beverage(s) daily.She exercises with enough effort to increase her heart rate 9 or less minutes per day.  She exercises with enough effort to increase her heart rate 3 or less days per week.   She is taking medications regularly.     COVID-19 Symptom Review  How many days ago did these symptoms start? Yesterday 1/24 morning    Are any of the following symptoms significant for you?  New or worsening difficulty breathing? No  Worsening cough? Yes, it's a dry cough.   Fever or chills? Yes, the highest temperature was 101.9  Headache: YES- \"massive\"  Sore throat: No  Chest pain: No  Diarrhea: No  Body aches? YES  Fevers 101.9, headache and back pain  What treatments has patient tried? Ibuprofen, Aspirin  Does patient live in a nursing home, group home, or shelter? No  Does patient have a way to get food/medications during quarantined? Yes, I have a friend or family member who can help me.      Review of Systems  Constitutional, HEENT, cardiovascular, pulmonary, gi and gu systems are negative, except as otherwise noted.      Objective    Vitals - Patient Reported  Pain Score: Severe Pain (6)  Pain Loc: Head      Vitals:  No vitals were obtained today due to virtual visit.    Physical Exam   GENERAL: alert and no distress  EYES: Eyes grossly normal to inspection.  No discharge or erythema, or obvious scleral/conjunctival abnormalities.  RESP: No audible wheeze, cough, or visible cyanosis.    SKIN: Visible skin clear. No significant rash, abnormal pigmentation or lesions.  NEURO: Cranial nerves grossly intact.  Mentation and speech appropriate for age.  PSYCH: Appropriate affect, tone, and pace of words          Video-Visit Details    Type of service:  Video Visit   Video Start Time: 10:39 AM  Video End Time:10:53 AM    Originating Location (pt. Location): Home    Distant Location (provider location):  On-site  Platform used for Video Visit: Refugio  Signed Electronically by: Mustapha WAITE" MD Alex

## 2024-01-25 NOTE — PATIENT INSTRUCTIONS
For informational purposes only. Not to replace the advice of your health care provider. Copyright   2022 [facility]. All rights reserved. Clinically reviewed by May Katz, PharmD, BCACP. [facility] [employer] [id number] - REV 06/23.  COVID-19 Outpatient Treatments  Your care team can help you find the best treatments for COVID-19. Talk to a health care provider or refer to the FDA medicine fact sheets below.  Paxlovid (nirmatrelvir and ritonavir): https://www.paxlovid.NewHive/resources  Molnupiravir (Lagevrio): https://www.fda.gov/media/455971/download  Important: We can only prescribe Paxlovid or Molnupiravir when it can be started within 5 days of first having symptoms.  Paxlovid (nimatrelvir and ritonavir)  How it works  Two medicines (nirmatrelvir and ritonavir) are taken together. They stop the virus from growing. Less amount of virus is easier for your body to fight.  Benefits  Lowers risk of a hospital stay or death from COVID-19.  How to take  Medicine comes in a daily container with both medicine tablets. Take by mouth twice daily (once in the morning, once at night) for 5 days.  The number of tablets to take varies by patient.  Don't chew or break capsules. Swallow whole.  When to take  Take it as soon as possible and within 5 days of your first symptoms.  Who can take it  Patients must be 12 years or older weigh at least 88 pounds. Paxlovid is the preferred treatment for pregnant patients.  Possible side effects  Can cause altered sense of taste, diarrhea (loose, watery stools), high blood pressure, muscle aches.  Medicine conflicts  Some medicines may conflict with Paxlovid and may cause serious side effects.  Tell your care team about all the medicines you take, including prescription and over-the-counter medicines, vitamins, and herbal supplements.  Your care team will review your medicines to make sure that you can safely take Paxlovid.  Cautions  Paxlovid is not advised for patients with severe  kidney or liver disease. If you have kidney or liver problems, the dose may need to be changed.  If you're pregnant or breastfeeding, talk to your care team about your options.  If you take hormonal birth control (such as the Pill), then you or your partner should also use a non-hormonal form of birth control (such as a condom). Keep doing this for 1 menstrual cycle after your last dose of Paxlovid.  Molnupiravir (lagevrio)  How it works  Stops the virus from growing. Less amount of virus is easier for your body to fight.  Benefits  Lowers risk of a hospital stay or death from COVID-19.  How to take  Take 4 capsules by mouth every 12 hours (4 in the morning and 4 at night) for 5 days. Don't chew or break capsules. Swallow whole.  When to take  Take as soon as possible and within 5 days of your first symptoms.  Who can take it  Patients must be 18 years or older.   Possible side effects  Diarrhea (loose, watery stools), nausea (feeling sick to your stomach), dizziness, headaches.  Medicine conflicts  Right now, there are no known conflicts with other drugs. But tell your care team about all medicines you take.  Cautions  This medicine is not advised for patients who are pregnant.  If you are someone who could become pregnant, use trusted birth control until 4 days after your last dose of molnupiravir.  If your partner could become pregnant, you should use trusted birth control until 3 months after your last dose of molnupiravir.

## 2024-02-22 ENCOUNTER — LAB REQUISITION (OUTPATIENT)
Dept: LAB | Facility: CLINIC | Age: 59
End: 2024-02-22
Payer: COMMERCIAL

## 2024-02-22 DIAGNOSIS — D48.5 NEOPLASM OF UNCERTAIN BEHAVIOR OF SKIN: ICD-10-CM

## 2024-02-22 PROCEDURE — 88305 TISSUE EXAM BY PATHOLOGIST: CPT | Mod: 26 | Performed by: DERMATOLOGY

## 2024-02-22 PROCEDURE — 88305 TISSUE EXAM BY PATHOLOGIST: CPT | Mod: TC,ORL | Performed by: STUDENT IN AN ORGANIZED HEALTH CARE EDUCATION/TRAINING PROGRAM

## 2024-02-22 PROCEDURE — 88312 SPECIAL STAINS GROUP 1: CPT | Mod: 26 | Performed by: DERMATOLOGY

## 2024-02-25 DIAGNOSIS — F33.1 MAJOR DEPRESSIVE DISORDER, RECURRENT EPISODE, MODERATE (H): ICD-10-CM

## 2024-02-25 DIAGNOSIS — F41.1 GENERALIZED ANXIETY DISORDER: ICD-10-CM

## 2024-02-26 RX ORDER — SERTRALINE HYDROCHLORIDE 100 MG/1
100 TABLET, FILM COATED ORAL DAILY
Qty: 90 TABLET | Refills: 3 | Status: SHIPPED | OUTPATIENT
Start: 2024-02-26

## 2024-02-27 LAB
PATH REPORT.COMMENTS IMP SPEC: NORMAL
PATH REPORT.FINAL DX SPEC: NORMAL
PATH REPORT.GROSS SPEC: NORMAL
PATH REPORT.MICROSCOPIC SPEC OTHER STN: NORMAL
PATH REPORT.RELEVANT HX SPEC: NORMAL

## 2024-02-29 DIAGNOSIS — F41.1 GENERALIZED ANXIETY DISORDER: ICD-10-CM

## 2024-02-29 NOTE — TELEPHONE ENCOUNTER
Medication Question or Refill    Contacts         Type Contact Phone/Fax    02/29/2024 11:47 AM CST Phone (Incoming) Jeanna Steel (Self) [phone] (M)            What medication are you calling about (include dose and sig)?: Ativan    Preferred Pharmacy:   [id number] - [place], [place] - [address]  [address] [facility] [address]  Phone: [phone] Fax: [phone]      Controlled Substance Agreement on file:   CSA -- Patient Level:     [Media Unavailable] Controlled Substance Agreement - Opioid - Scan on 1/18/2019 12:29 PM       Who prescribed the medication?: Karie Queen MD    Do you need a refill? Yes    When did you use the medication last? 2wks ago     Patient offered an appointment? Yes: Reschedule annual per clinic for April. Need refill prior.     Do you have any questions or concerns?  Yes: Pt mentioned she was pre diabetic and also wants to have liver checked. Is wondering if she can have labs prior to annual.       Could we send this information to you in [employer] or would you prefer to receive a phone call?:   Patient would like to be contacted via [employer]

## 2024-03-04 ENCOUNTER — MYC REFILL (OUTPATIENT)
Dept: FAMILY MEDICINE | Facility: CLINIC | Age: 59
End: 2024-03-04
Payer: COMMERCIAL

## 2024-03-04 DIAGNOSIS — F41.1 GENERALIZED ANXIETY DISORDER: ICD-10-CM

## 2024-03-05 RX ORDER — LORAZEPAM 0.5 MG/1
0.5 TABLET ORAL DAILY PRN
Qty: 20 TABLET | Refills: 0 | Status: SHIPPED | OUTPATIENT
Start: 2024-03-05 | End: 2024-08-22

## 2024-03-08 RX ORDER — LORAZEPAM 0.5 MG/1
0.5 TABLET ORAL DAILY PRN
Qty: 6 TABLET | Refills: 0 | Status: SHIPPED | OUTPATIENT
Start: 2024-03-08 | End: 2024-04-22

## 2024-04-22 ENCOUNTER — OFFICE VISIT (OUTPATIENT)
Dept: FAMILY MEDICINE | Facility: CLINIC | Age: 59
End: 2024-04-22
Payer: COMMERCIAL

## 2024-04-22 VITALS
OXYGEN SATURATION: 96 % | BODY MASS INDEX: 30.31 KG/M2 | TEMPERATURE: 97.3 F | DIASTOLIC BLOOD PRESSURE: 73 MMHG | WEIGHT: 188.6 LBS | HEART RATE: 70 BPM | SYSTOLIC BLOOD PRESSURE: 108 MMHG | HEIGHT: 66 IN | RESPIRATION RATE: 16 BRPM

## 2024-04-22 DIAGNOSIS — F51.01 PRIMARY INSOMNIA: ICD-10-CM

## 2024-04-22 DIAGNOSIS — F10.90 ALCOHOL USE DISORDER: ICD-10-CM

## 2024-04-22 DIAGNOSIS — Z00.00 ROUTINE GENERAL MEDICAL EXAMINATION AT A HEALTH CARE FACILITY: Primary | ICD-10-CM

## 2024-04-22 DIAGNOSIS — E78.5 HYPERLIPIDEMIA LDL GOAL <130: ICD-10-CM

## 2024-04-22 DIAGNOSIS — F33.41 RECURRENT MAJOR DEPRESSIVE DISORDER, IN PARTIAL REMISSION (H): ICD-10-CM

## 2024-04-22 LAB
ALBUMIN SERPL BCG-MCNC: 4.7 G/DL (ref 3.5–5.2)
ALP SERPL-CCNC: 81 U/L (ref 40–150)
ALT SERPL W P-5'-P-CCNC: 23 U/L (ref 0–50)
ANION GAP SERPL CALCULATED.3IONS-SCNC: 9 MMOL/L (ref 7–15)
AST SERPL W P-5'-P-CCNC: 20 U/L (ref 0–45)
BILIRUB SERPL-MCNC: 0.3 MG/DL
BUN SERPL-MCNC: 13.6 MG/DL (ref 8–23)
CALCIUM SERPL-MCNC: 9.9 MG/DL (ref 8.6–10)
CHLORIDE SERPL-SCNC: 99 MMOL/L (ref 98–107)
CHOLEST SERPL-MCNC: 304 MG/DL
CREAT SERPL-MCNC: 0.68 MG/DL (ref 0.51–0.95)
DEPRECATED HCO3 PLAS-SCNC: 27 MMOL/L (ref 22–29)
EGFRCR SERPLBLD CKD-EPI 2021: >90 ML/MIN/1.73M2
ERYTHROCYTE [DISTWIDTH] IN BLOOD BY AUTOMATED COUNT: 13.2 % (ref 10–15)
FASTING STATUS PATIENT QL REPORTED: NO
GGT SERPL-CCNC: 30 U/L (ref 5–36)
GLUCOSE SERPL-MCNC: 112 MG/DL (ref 70–99)
HBA1C MFR BLD: 5.5 % (ref 0–5.6)
HCT VFR BLD AUTO: 42.6 % (ref 35–47)
HDLC SERPL-MCNC: 77 MG/DL
HGB BLD-MCNC: 13.7 G/DL (ref 11.7–15.7)
LDLC SERPL CALC-MCNC: 201 MG/DL
MCH RBC QN AUTO: 30.1 PG (ref 26.5–33)
MCHC RBC AUTO-ENTMCNC: 32.2 G/DL (ref 31.5–36.5)
MCV RBC AUTO: 94 FL (ref 78–100)
NONHDLC SERPL-MCNC: 227 MG/DL
PLATELET # BLD AUTO: 293 10E3/UL (ref 150–450)
POTASSIUM SERPL-SCNC: 4.9 MMOL/L (ref 3.4–5.3)
PROT SERPL-MCNC: 7.7 G/DL (ref 6.4–8.3)
RBC # BLD AUTO: 4.55 10E6/UL (ref 3.8–5.2)
SODIUM SERPL-SCNC: 135 MMOL/L (ref 135–145)
TRIGL SERPL-MCNC: 129 MG/DL
TSH SERPL DL<=0.005 MIU/L-ACNC: 3.13 UIU/ML (ref 0.3–4.2)
VIT B12 SERPL-MCNC: 540 PG/ML (ref 232–1245)
VIT D+METAB SERPL-MCNC: 32 NG/ML (ref 20–50)
WBC # BLD AUTO: 5.8 10E3/UL (ref 4–11)

## 2024-04-22 PROCEDURE — 82977 ASSAY OF GGT: CPT | Performed by: FAMILY MEDICINE

## 2024-04-22 PROCEDURE — 84443 ASSAY THYROID STIM HORMONE: CPT | Performed by: FAMILY MEDICINE

## 2024-04-22 PROCEDURE — 99214 OFFICE O/P EST MOD 30 MIN: CPT | Mod: 25 | Performed by: FAMILY MEDICINE

## 2024-04-22 PROCEDURE — 80061 LIPID PANEL: CPT | Performed by: FAMILY MEDICINE

## 2024-04-22 PROCEDURE — 96127 BRIEF EMOTIONAL/BEHAV ASSMT: CPT | Performed by: FAMILY MEDICINE

## 2024-04-22 PROCEDURE — 82306 VITAMIN D 25 HYDROXY: CPT | Performed by: FAMILY MEDICINE

## 2024-04-22 PROCEDURE — 83036 HEMOGLOBIN GLYCOSYLATED A1C: CPT | Performed by: FAMILY MEDICINE

## 2024-04-22 PROCEDURE — 85027 COMPLETE CBC AUTOMATED: CPT | Performed by: FAMILY MEDICINE

## 2024-04-22 PROCEDURE — 80053 COMPREHEN METABOLIC PANEL: CPT | Performed by: FAMILY MEDICINE

## 2024-04-22 PROCEDURE — 36415 COLL VENOUS BLD VENIPUNCTURE: CPT | Performed by: FAMILY MEDICINE

## 2024-04-22 PROCEDURE — 99396 PREV VISIT EST AGE 40-64: CPT | Performed by: FAMILY MEDICINE

## 2024-04-22 PROCEDURE — 82607 VITAMIN B-12: CPT | Performed by: FAMILY MEDICINE

## 2024-04-22 RX ORDER — BUPROPION HYDROCHLORIDE 100 MG/1
100 TABLET, EXTENDED RELEASE ORAL 2 TIMES DAILY
COMMUNITY
End: 2024-04-22

## 2024-04-22 RX ORDER — TRAZODONE HYDROCHLORIDE 50 MG/1
50 TABLET, FILM COATED ORAL AT BEDTIME
Qty: 30 TABLET | Refills: 1 | Status: SHIPPED | OUTPATIENT
Start: 2024-04-22

## 2024-04-22 SDOH — HEALTH STABILITY: PHYSICAL HEALTH: ON AVERAGE, HOW MANY DAYS PER WEEK DO YOU ENGAGE IN MODERATE TO STRENUOUS EXERCISE (LIKE A BRISK WALK)?: 3 DAYS

## 2024-04-22 ASSESSMENT — SOCIAL DETERMINANTS OF HEALTH (SDOH): HOW OFTEN DO YOU GET TOGETHER WITH FRIENDS OR RELATIVES?: TWICE A WEEK

## 2024-04-22 ASSESSMENT — ANXIETY QUESTIONNAIRES
IF YOU CHECKED OFF ANY PROBLEMS ON THIS QUESTIONNAIRE, HOW DIFFICULT HAVE THESE PROBLEMS MADE IT FOR YOU TO DO YOUR WORK, TAKE CARE OF THINGS AT HOME, OR GET ALONG WITH OTHER PEOPLE: NOT DIFFICULT AT ALL
3. WORRYING TOO MUCH ABOUT DIFFERENT THINGS: NOT AT ALL
6. BECOMING EASILY ANNOYED OR IRRITABLE: SEVERAL DAYS
GAD7 TOTAL SCORE: 1
5. BEING SO RESTLESS THAT IT IS HARD TO SIT STILL: NOT AT ALL
7. FEELING AFRAID AS IF SOMETHING AWFUL MIGHT HAPPEN: NOT AT ALL
4. TROUBLE RELAXING: NOT AT ALL
8. IF YOU CHECKED OFF ANY PROBLEMS, HOW DIFFICULT HAVE THESE MADE IT FOR YOU TO DO YOUR WORK, TAKE CARE OF THINGS AT HOME, OR GET ALONG WITH OTHER PEOPLE?: NOT DIFFICULT AT ALL
1. FEELING NERVOUS, ANXIOUS, OR ON EDGE: NOT AT ALL
GAD7 TOTAL SCORE: 1
2. NOT BEING ABLE TO STOP OR CONTROL WORRYING: NOT AT ALL
7. FEELING AFRAID AS IF SOMETHING AWFUL MIGHT HAPPEN: NOT AT ALL

## 2024-04-22 ASSESSMENT — PAIN SCALES - GENERAL: PAINLEVEL: NO PAIN (0)

## 2024-04-22 ASSESSMENT — PATIENT HEALTH QUESTIONNAIRE - PHQ9: SUM OF ALL RESPONSES TO PHQ QUESTIONS 1-9: 3

## 2024-04-22 NOTE — PROGRESS NOTES
"  Assessment & Plan     (Z00.00) Routine general medical examination at a health care facility  (primary encounter diagnosis)  Comment:   Plan: TSH with free T4 reflex, Hemoglobin A1c,         Comprehensive metabolic panel (BMP + Alb, Alk         Phos, ALT, AST, Total. Bili, TP), GGT, CBC with        platelets, Vitamin B12, Vitamin D Deficiency,         Lipid panel reflex to direct LDL Non-fasting,         CANCELED: Lipid panel reflex to direct LDL         Fasting            (F51.01) Primary insomnia  Comment: trial of new medication   Plan: traZODone (DESYREL) 50 MG tablet        Since getting more sober has had difficulty with sleep    (F33.41) Recurrent major depressive disorder, in partial remission (H24)  Comment:   Plan:     (F10.90) Alcohol use disorder  Comment: drinks on weekends trying to linit this ti 3 per day    Plan: TSH with free T4 reflex, Hemoglobin A1c,         Comprehensive metabolic panel (BMP + Alb, Alk         Phos, ALT, AST, Total. Bili, TP), GGT, CBC with        platelets, Vitamin B12, Vitamin D Deficiency,         Lipid panel reflex to direct LDL Non-fasting,         CANCELED: Lipid panel reflex to direct LDL         Fasting            Patient has been advised of split billing requirements and indicates understanding: Yes          BMI  Estimated body mass index is 30.31 kg/m  as calculated from the following:    Height as of this encounter: 1.68 m (5' 6.14\").    Weight as of this encounter: 85.5 kg (188 lb 9.6 oz).   Weight management plan: Discussed healthy diet and exercise guidelines    Counseling  Appropriate preventive services were discussed with this patient, including applicable screening as appropriate for fall prevention, nutrition, physical activity, Tobacco-use cessation, weight loss and cognition.  Checklist reviewing preventive services available has been given to the patient.  Reviewed patient's diet, addressing concerns and/or questions.   She is at risk for lack of exercise " "and has been provided with information to increase physical activity for the benefit of her well-being.   The patient was instructed to see the dentist every 6 months.       See Patient Instructions    Subjective   Jeanna is a 58 year old, presenting for the following health issues:  Lipids, Diabetes, and Physical (Pt is not fasting)        4/22/2024     9:27 AM   Additional Questions   Roomed by AIDEN Howard   Accompanied by n/a     Healthy Habits:     Additional concerns today:  Yes (Pt would like labs for \"Lipids, Fatty liver, Diabetes, Vitamin D\")     (Z00.00) Routine general medical examination at a health care facility  (primary encounter diagnosis)  Comment:   Plan: TSH with free T4 reflex, Hemoglobin A1c,         Comprehensive metabolic panel (BMP + Alb, Alk         Phos, ALT, AST, Total. Bili, TP), GGT, CBC with        platelets, Vitamin B12, Vitamin D Deficiency,         Lipid panel reflex to direct LDL Non-fasting,         CANCELED: Lipid panel reflex to direct LDL         Fasting            (F51.01) Primary insomnia  Comment: Patient reports that she reduced, she is drinking.  Is not drinking during the weekday only drinks on the weekends.  Has reported some insomnia as a result.  Plan: traZODone (DESYREL) 50 MG tablet        Trial of new medication    (F33.41) Recurrent major depressive disorder, in partial remission (H24) feels mood is good is using sertraline and feels that this has been helping some significantly  Comment:   Plan:       4/4/2023     2:15 PM 1/25/2024    10:31 AM 4/22/2024     9:37 AM   PHQ   PHQ-9 Total Score 2 3 3   Q9: Thoughts of better off dead/self-harm past 2 weeks Not at all Not at all Not at all        (F10.90) Alcohol use disorder  Comment: has reduce this significantly at most has 5 drinks at a time but we did discuss reducing this to 3.  Is part of a sobriety group of women and is working on becoming completely sober feels really good.  Has even lost weight " "has more energy.  Plan: TSH with free T4 reflex, Hemoglobin A1c,         Comprehensive metabolic panel (BMP + Alb, Alk         Phos, ALT, AST, Total. Bili, TP), GGT, CBC with        platelets, Vitamin B12, Vitamin D Deficiency,         Lipid panel reflex to direct LDL Non-fasting,         CANCELED: Lipid panel reflex to direct LDL         Fasting                       Review of Systems  Constitutional, HEENT, cardiovascular, pulmonary, gi and gu systems are negative, except as otherwise noted.      Objective    /73 (BP Location: Left arm, Patient Position: Sitting, Cuff Size: Adult Regular)   Pulse 70   Temp 97.3  F (36.3  C) (Temporal)   Resp 16   Ht 1.68 m (5' 6.14\")   Wt 85.5 kg (188 lb 9.6 oz)   LMP 08/01/2022   SpO2 96%   BMI 30.31 kg/m    Body mass index is 30.31 kg/m .  Physical Exam   GENERAL: alert and no distress  EYES: Eyes grossly normal to inspection, PERRL and conjunctivae and sclerae normal  HENT: ear canals and TM's normal, nose and mouth without ulcers or lesions  NECK: no adenopathy, no asymmetry, masses, or scars  RESP: lungs clear to auscultation - no rales, rhonchi or wheezes  BREAST: normal without masses, tenderness or nipple discharge and no palpable axillary masses or adenopathy  CV: regular rate and rhythm, normal S1 S2, no S3 or S4, no murmur, click or rub, no peripheral edema  ABDOMEN: soft, nontender, no hepatosplenomegaly, no masses and bowel sounds normal  MS: no gross musculoskeletal defects noted, no edema  SKIN: no suspicious lesions or rashes  NEURO: Normal strength and tone, mentation intact and speech normal  PSYCH: mentation appears normal, affect normal/bright            Signed Electronically by: Karie Queen MD    "

## 2024-04-22 NOTE — PATIENT INSTRUCTIONS
Preventive Care Advice   This is general advice given by our system to help you stay healthy. However, your care team may have specific advice just for you. Please talk to your care team about your preventive care needs.  Nutrition  Eat 5 or more servings of fruits and vegetables each day.  Try wheat bread, brown rice and whole grain pasta (instead of white bread, rice, and pasta).  Get enough calcium and vitamin D. Check the label on foods and aim for 100% of the RDA (recommended daily allowance).  Lifestyle  Exercise at least 150 minutes each week   (30 minutes a day, 5 days a week).  Do muscle strengthening activities 2 days a week. These help control your weight and prevent disease.  No smoking.  Wear sunscreen to prevent skin cancer.  Have a dental exam and cleaning every 6 months.  Yearly exams  See your health care team every year to talk about:  Any changes in your health.  Any medicines your care team has prescribed.  Preventive care, family planning, and ways to prevent chronic diseases.  Shots (vaccines)   HPV shots (up to age 26), if you've never had them before.  Hepatitis B shots (up to age 59), if you've never had them before.  COVID-19 shot: Get this shot when it's due.  Flu shot: Get a flu shot every year.  Tetanus shot: Get a tetanus shot every 10 years.  Pneumococcal, hepatitis A, and RSV shots: Ask your care team if you need these based on your risk.  Shingles shot (for age 50 and up).  General health tests  Diabetes screening:  Starting at age 35, Get screened for diabetes at least every 3 years.  If you are younger than age 35, ask your care team if you should be screened for diabetes.  Cholesterol test: At age 39, start having a cholesterol test every 5 years, or more often if advised.  Bone density scan (DEXA): At age 50, ask your care team if you should have this scan for osteoporosis (brittle bones).  Hepatitis C: Get tested at least once in your life.  STIs (sexually transmitted  infections)  Before age 24: Ask your care team if you should be screened for STIs.  After age 24: Get screened for STIs if you're at risk. You are at risk for STIs (including HIV) if:  You are sexually active with more than one person.  You don't use condoms every time.  You or a partner was diagnosed with a sexually transmitted infection.  If you are at risk for HIV, ask about PrEP medicine to prevent HIV.  Get tested for HIV at least once in your life, whether you are at risk for HIV or not.  Cancer screening tests  Cervical cancer screening: If you have a cervix, begin getting regular cervical cancer screening tests at age 21. Most people who have regular screenings with normal results can stop after age 65. Talk about this with your provider.  Breast cancer scan (mammogram): If you've ever had breasts, begin having regular mammograms starting at age 40. This is a scan to check for breast cancer.  Colon cancer screening: It is important to start screening for colon cancer at age 45.  Have a colonoscopy test every 10 years (or more often if you're at risk) Or, ask your provider about stool tests like a FIT test every year or Cologuard test every 3 years.  To learn more about your testing options, visit: https://www.[email or web address]/305000.pdf.  For help making a decision, visit: https://bit.ly/hq74175.  Prostate cancer screening test: If you have a prostate and are age 55 to 69, ask your provider if you would benefit from a yearly prostate cancer screening test.  Lung cancer screening: If you are a current or former smoker age 50 to 80, ask your care team if ongoing lung cancer screenings are right for you.  For informational purposes only. Not to replace the advice of your health care provider. Copyright   2023 [employer]. All rights reserved. Clinically reviewed by the [facility] Transitions Program. [employer] 595460 - REV 01/24.    Substance Use Disorder: Care Instructions  Overview     You can  improve your life and health by stopping your use of alcohol or drugs. When you don't drink or use drugs, you may feel and sleep better. You may get along better with your family, friends, and coworkers. There are medicines and programs that can help with substance use disorder.  How can you care for yourself at home?  Here are some ways to help you stay sober and prevent relapse.  If you have been given medicine to help keep you sober or reduce your cravings, be sure to take it exactly as prescribed.  Talk to your doctor about programs that can help you stop using drugs or drinking alcohol.  Do not keep alcohol or drugs in your home.  Plan ahead. Think about what you'll say if other people ask you to drink or use drugs. Try not to spend time with people who drink or use drugs.  Use the time and money spent on drinking or drugs to do something that's important to you.  Preventing a relapse  Have a plan to deal with relapse. Learn to recognize changes in your thinking that lead you to drink or use drugs. Get help before you start to drink or use drugs again.  Try to stay away from situations, friends, or places that may lead you to drink or use drugs.  If you feel the need to drink alcohol or use drugs again, seek help right away. Call a trusted friend or family member. Some people get support from organizations such as Narcotics Anonymous or [employer] or from treatment facilities.  If you relapse, get help as soon as you can. Some people make a plan with another person that outlines what they want that person to do for them if they relapse. The plan usually includes how to handle the relapse and who to notify in case of relapse.  Don't give up. Remember that a relapse doesn't mean that you have failed. Use the experience to learn the triggers that lead you to drink or use drugs. Then quit again. Recovery is a lifelong process. Many people have several relapses before they are able to quit for good.  Follow-up  "care is a nunes part of your treatment and safety. Be sure to make and go to all appointments, and call your doctor if you are having problems. It's also a good idea to know your test results and keep a list of the medicines you take.  When should you call for help?   Call 911  anytime you think you may need emergency care. For example, call if you or someone else:    Has overdosed or has withdrawal signs. Be sure to tell the emergency workers that you are or someone else is using or trying to quit using drugs. Overdose or withdrawal signs may include:  Losing consciousness.  Seizure.  Seeing or hearing things that aren't there (hallucinations).     Is thinking or talking about suicide or harming others.   Where to get help 24 hours a day, 7 days a week   If you or someone you know talks about suicide, self-harm, a mental health crisis, a substance use crisis, or any other kind of emotional distress, get help right away. You can:    Call the Suicide and Crisis Lifeline at 988.     Call 5-076-332-OZGY (1-934.707.1166).     Text HOME to 843539 to access the Crisis Text Line.   Consider saving these numbers in your phone.  Go to [employer] for more information or to chat online.  Call your doctor now or seek immediate medical care if:    You are having withdrawal symptoms. These may include nausea or vomiting, sweating, shakiness, and anxiety.   Watch closely for changes in your health, and be sure to contact your doctor if:    You have a relapse.     You need more help or support to stop.   Where can you learn more?  Go to https://www.healthbttn.net/patiented  Enter H573 in the search box to learn more about \"Substance Use Disorder: Care Instructions.\"  Current as of: November 15, 2023               Content Version: 14.0    3052-7953 [employer].   Care instructions adapted under license by your healthcare professional. If you have questions about a medical condition or this instruction, always ask " your healthcare professional. [employer] disclaims any warranty or liability for your use of this information.

## 2024-04-28 NOTE — RESULT ENCOUNTER NOTE
Hello,    The cholesterol was pretty high higher than it has been in the past.  I think this is because you were not fasting.  Lets recheck this is a fasting level and see with a number show I will place a new order for that fasting just means 8 to 10 hours of not eating anything but you can have black coffee or water with your medications.    The comprehensive panel showed a slightly high blood sugar I like to recheck this as well with a fasting check.  The hemoglobin A1c did not show diabetes however it is a little close to the prediabetic level.  The best thing to do for this is to boost exercise work on weight loss and follow a low-fat low-cholesterol diet.  We can recheck this in 6 months.      The thyroid level was within normal limits.    The GGT which is the alcohol enzyme is normal and the B12 is excellent.    Normal be vitamin D and normal complete blood count.    Karie Queen MD

## 2024-05-17 ENCOUNTER — LAB (OUTPATIENT)
Dept: LAB | Facility: CLINIC | Age: 59
End: 2024-05-17
Payer: COMMERCIAL

## 2024-05-17 ENCOUNTER — MYC MEDICAL ADVICE (OUTPATIENT)
Dept: FAMILY MEDICINE | Facility: CLINIC | Age: 59
End: 2024-05-17

## 2024-05-17 DIAGNOSIS — Z00.00 ROUTINE GENERAL MEDICAL EXAMINATION AT A HEALTH CARE FACILITY: ICD-10-CM

## 2024-05-17 DIAGNOSIS — Z11.4 SCREENING FOR HIV (HUMAN IMMUNODEFICIENCY VIRUS): Primary | ICD-10-CM

## 2024-05-17 DIAGNOSIS — E78.5 HYPERLIPIDEMIA LDL GOAL <100: Primary | ICD-10-CM

## 2024-05-17 LAB
CHOLEST SERPL-MCNC: 342 MG/DL
FASTING STATUS PATIENT QL REPORTED: YES
HDLC SERPL-MCNC: 83 MG/DL
LDLC SERPL CALC-MCNC: 217 MG/DL
NONHDLC SERPL-MCNC: 259 MG/DL
TRIGL SERPL-MCNC: 212 MG/DL

## 2024-05-17 PROCEDURE — 80061 LIPID PANEL: CPT

## 2024-05-17 PROCEDURE — 36415 COLL VENOUS BLD VENIPUNCTURE: CPT

## 2024-05-20 NOTE — TELEPHONE ENCOUNTER
SN,  Please see below MyChart message and advise.  Pende repeat cholesterol if approved.  Thanks,  Kristin WILSON RN

## 2024-05-21 ENCOUNTER — TRANSFERRED RECORDS (OUTPATIENT)
Dept: HEALTH INFORMATION MANAGEMENT | Facility: CLINIC | Age: 59
End: 2024-05-21
Payer: COMMERCIAL

## 2024-05-21 NOTE — TELEPHONE ENCOUNTER
Thank you - please let her know that we should recheck this in about 1 month    Have her try intense exercise, low calories diet a d see what the recheck shows    This is super high - high enough that it is a risk factor for a stroke over time or heart disease.    It also looks like her A1C is creeping towards diabetes    We can recheck this too but this one needs to wait for 3 months    SN

## 2024-05-22 RX ORDER — ATORVASTATIN CALCIUM 20 MG/1
20 TABLET, FILM COATED ORAL DAILY
Qty: 90 TABLET | Refills: 3 | Status: SHIPPED | OUTPATIENT
Start: 2024-05-22

## 2024-05-22 NOTE — RESULT ENCOUNTER NOTE
Hello,    The cholesterol was very high.  It does look like this was a fasting sample is that correct?  If so then we definitely need to start a cholesterol-lowering medication and significantly work on weight loss and low calorie diet with exercise.  I will send in a cholesterol-lowering medication since it does look like your numbers have really risen in the last few years and would like to recheck the cholesterol in 4 to 6 months as a fasting test.  I also like to add on a hemoglobin A1c to make sure there is no diabetes.  The last one you did a month ago was 5.5 but I would like to make sure that this is not trending up.    Karie Queen MD

## 2024-05-28 ENCOUNTER — MYC MEDICAL ADVICE (OUTPATIENT)
Dept: FAMILY MEDICINE | Facility: CLINIC | Age: 59
End: 2024-05-28
Payer: COMMERCIAL

## 2024-05-28 DIAGNOSIS — E78.5 HYPERLIPIDEMIA LDL GOAL <130: Primary | ICD-10-CM

## 2024-05-28 NOTE — TELEPHONE ENCOUNTER
SN,  Please see below MyChart message and advise.  Could schedule for VV next week to discuss medication further, if recommended  Thanks,  Ayde KIRKPATRICK RN

## 2024-05-30 NOTE — TELEPHONE ENCOUNTER
Ok to recheck labs in 3 months    She can set up a VV to talk more about this    Will place orders    SN

## 2024-08-22 ENCOUNTER — MYC REFILL (OUTPATIENT)
Dept: FAMILY MEDICINE | Facility: CLINIC | Age: 59
End: 2024-08-22
Payer: COMMERCIAL

## 2024-08-22 DIAGNOSIS — F41.1 GENERALIZED ANXIETY DISORDER: ICD-10-CM

## 2024-08-22 RX ORDER — LORAZEPAM 0.5 MG/1
0.5 TABLET ORAL DAILY PRN
Qty: 20 TABLET | Refills: 0 | Status: SHIPPED | OUTPATIENT
Start: 2024-08-22

## 2024-09-03 NOTE — PROGRESS NOTES
Progress Note    Client Name: Jeanna Steel  Date: 4/12/2019         Service Type: Individual  Video Visit: No     Session Start Time: 12:00  Session End Time: 12:45     Session Length: 45 min    Session #: 4    Attendees: Client attended alone     Treatment Plan Last Reviewed: 3/15/2019  PHQ-9 / JESSICA-7 : 4/12/2019    DATA  Interactive Complexity: No  Crisis: No       Progress Since Last Session (Related to Symptoms / Goals / Homework):   Symptoms: No change client reports symptoms are stable    Homework: Partially completed      Episode of Care Goals: Satisfactory progress - ACTION (Actively working towards change); Intervened by reinforcing change plan / affirming steps taken     Current / Ongoing Stressors and Concerns:   Ongoing: Client reports increased depression symptoms while caring for elderly parents.              Current: Client reported that she has continued her ability to do things rather than drink when she is feeling overwhelmed. 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 Client reported that she has an upcoming family meeting regarding her parent's health and that she is nervous anticipating conflict. She discussed how her parents don't seem to understand they will need to continue living in assisted living, and that she feels unable to convince them otherwise. Explored how the client can increase her acceptance with her parent's struggling to adjust to reduced independence. Discussed whether it would be beneficial for the client to adjust her expectations for this meeting from convincing her parents to change their minds to communicating next steps for their healthcare.        Treatment Objective(s) Addressed in This Session:   Identify negative self-talk and behaviors: challenge core beliefs, myths, and actions  Improve concentration, focus, and mindfulness in daily activities         Intervention:   DBT:Introduced acceptance exercise, how to reframe expectations of others   Motivational Interviewing    MI Intervention: Expressed Empathy/Understanding, Supported Autonomy, Collaboration, Evocation, Open-ended questions, Reflections: simple and complex, Change talk (evoked) and Reframe     Change Talk Expressed by the Patient: Ability to change Reasons to change Need to change Committment to change    Provider Response to Change Talk: E - Evoked more info from patient about behavior change, A - Affirmed patient's thoughts, decisions, or attempts at behavior change, R - Reflected patient's change talk and S - Summarized patient's change talk statements          ASSESSMENT: Current Emotional / Mental Status (status of significant symptoms):   Risk status (Self / Other harm or suicidal ideation)   Client denies current fears or concerns for personal safety.   Client denies current or recent suicidal ideation or behaviors.   Client denies current or recent homicidal ideation or behaviors.   Client denies current or recent self injurious behavior or ideation.   Client denies other safety concerns.   Client Client reports there has been no change in risk factors since their last session.     Client Client reports there has been no change in protective factors since their last session.     A safety and risk management plan has not been developed at this time, however client was given the after-hours number / 911 should there be a change in any of these risk factors.     Appearance:   Appropriate    Eye Contact:   Good    Psychomotor Behavior: Normal    Attitude:   Cooperative    Orientation:   All   Speech    Rate / Production: Normal     Volume:  Normal    Mood:    Anxious client appeared anxious talking about her parents   Affect:    Appropriate    Thought Content:  Clear    Thought Form:  Coherent  Logical    Insight:    Good      Medication Review:   No changes to current psychiatric  medication(s)     Medication Compliance:   Yes     Changes in Health Issues:   None reported     Chemical Use Review:   Substance Use: decrease in alcohol .  Client reports frequency of use not every day.  Preparatory  Provided encouragement towards sobriety  Provided support and affirmation for steps taken towards sobriety         Tobacco Use: No change in amount of tobacco use since last session.  Contemplation  Provided encouragement to quit   Provided support and affirmation for steps taken towards quiting  client reported she did not smoke during recent social event that she would usually smoke a lot at with her friends    Diagnosis:  1. Major depressive disorder, recurrent episode, moderate (H)    2. Generalized anxiety disorder    3. Adjustment disorder with mixed anxiety and depressed mood        Collateral Reports Completed:   Not Applicable    PLAN: (Client Tasks / Therapist Tasks / Other)  Client will practice acceptance towards her parents, increase empathy for her parents when feeling irritated, identify one small task to complete when feeling overwhelmed and return for therapy in two weeks.        Maite SIMMONS, [facility] 4/12/2019  Note reviewed and clinical supervision by TROY Cohen [facility] 4/23/2019    _____________________________________________________________________    Treatment Plan    Client's Name: Jeanna Steel  YOB: 1965    Date: 3/15/2019    DSM-V Diagnoses: 296.32 (F33.1) Major Depressive Disorder, Recurrent Episode, Moderate _, 300.02 (F41.1) Generalized Anxiety Disorder or Adjustment Disorders  309.28 (F43.23) With mixed anxiety and depressed mood  Psychosocial / Contextual Factors: Client reports increased symptoms of depression and anxiety while caring for her elderly parents  WHODAS: see intake    Referral / Collaboration:  Referral to another professional/service is not indicated at this time..    Anticipated number of session or this episode of care:  8-12      MeasurableTreatment Goal(s) related to diagnosis / functional impairment(s)  Goal 1: Client will reduce alcohol use from 5 standard drinks a day to 2 standard drinks a week in the next three months and client reporting use of three new coping skills to manage stress in the evenings.    I will know I've met my goal when I'm only drinking two drinks and going to bed earlier.      Objective #A (Client Action)    Client will identify at least three consequences of maladaptive drinking.  Status: New - Date: 3/15/2019     Intervention(s)  Therapist will assign homework to identify impact of drinking  provide support.    Objective #B  Client will identify three coping skills to replace drinking .  Status: New - Date: 3/15/2019     Intervention(s)  Therapist will assign homework to practice coping skills  teach coping skills.    Objective #C  Client will identify whether she needs further support to reduce alcohol use.  Status: New - Date: 3/15/2019     Intervention(s)  Therapist will provide support and referrals as needed, education on alcohol use.      Goal 2: Client will reduce symptoms of depression as evidenced by PHQ-9 reducing from 14 to 7 and client reporting decreased hopelessness and helplessness.    I will know I've met my goal when I feel like I have my own life again.      Objective #A (Client Action)    Status: New - Date: 3/15/2019     Client will Increase interest, engagement, and pleasure in doing things  Decrease frequency and intensity of feeling down, depressed, hopeless.    Intervention(s)  Therapist will teach behavioral activation.    Objective #B  Client will Identify negative self-talk and behaviors: challenge core beliefs, myths, and actions.    Status: New - Date: 3/15/2019     Intervention(s)  Therapist will teach CBT skills.    Objective #C  Client will Improve concentration, focus, and mindfulness in daily activities .  Status: New - Date: 3/15/2019     Intervention(s)  Therapist  will teach mindfulness skills.      Goal 3: Client will report increased acceptance towards her decision about having children as evidenced by client reporting use of effective coping skills when feeling distress or regret.    I will know I've met my goal when I can accept .      Objective #A (Client Action)    Status: New - Date: 3/15/2019     Client will use acceptance skills when feeling willful.    Intervention(s)  Therapist will teach acceptance DBT skills.    Objective #B  Client will identify coping skills that reduce distress when grieving.    Status: New - Date: 3/15/2019     Intervention(s)  Therapist will teach coping skills, self-soothing.      Client has reviewed and agreed to the above plan.      Maite SIMMONS, [facility] March 15, 2019  Note reviewed and clinical supervision by TROY Cohen [facility] 3/20/2019    Pt presents to ED ambulatory from home with c/o hives to face and lips x 4 days. Pt reports she was evaluated in ED but reports hives now spreading and more itchy. Pt denies difficulty breathing, speaking, swallowing, no drooling noted. Pt denies new food, soaps or environmental exposure.

## 2024-12-01 NOTE — TELEPHONE ENCOUNTER
Routing refill request to provider for review/approval because:  Pt is overdue for apt- was due in May, seeing psych.  Added in pharm comments to scheduled.  Please authorize if appropriate.  Thanks,  Muna Pitts RN       Return in about 4 weeks (around 5/21/2020) for depression/Anxiety recheck          
Patient requests all Lab, Cardiology, and Radiology Results on their Discharge Instructions

## 2024-12-03 ENCOUNTER — ANCILLARY PROCEDURE (OUTPATIENT)
Dept: MAMMOGRAPHY | Facility: CLINIC | Age: 59
End: 2024-12-03
Attending: FAMILY MEDICINE
Payer: COMMERCIAL

## 2024-12-03 DIAGNOSIS — Z12.31 VISIT FOR SCREENING MAMMOGRAM: ICD-10-CM

## 2024-12-03 PROCEDURE — 77067 SCR MAMMO BI INCL CAD: CPT | Mod: GC

## 2024-12-03 PROCEDURE — 77063 BREAST TOMOSYNTHESIS BI: CPT | Mod: GC

## 2024-12-07 ENCOUNTER — NURSE TRIAGE (OUTPATIENT)
Dept: NURSING | Facility: CLINIC | Age: 59
End: 2024-12-07
Payer: COMMERCIAL

## 2024-12-07 NOTE — TELEPHONE ENCOUNTER
Nurse Triage SBAR    Situation: Medication question    Background: Patient calling. Patient is positive for covid.     Assessment: She states she called yesterday for Paxlovid and that her pharmacy online [facility] states they do not have the order.     Recommendation: Patient is going to call the pharmacy to see if they got the prescription. She will call back if she has any other questions or concerns.     Airam Colmenares RN Nursing Advisor 12/7/2024 12:30 PM     Reason for Disposition    [1] Prescription prescribed recently is not at pharmacy AND [2] triager has access to patient's EMR AND [3] prescription is recorded in the EMR    Protocols used: Medication Question Call-A-

## 2024-12-26 ENCOUNTER — NURSE TRIAGE (OUTPATIENT)
Dept: FAMILY MEDICINE | Facility: CLINIC | Age: 59
End: 2024-12-26
Payer: COMMERCIAL

## 2024-12-26 NOTE — TELEPHONE ENCOUNTER
Patient calling to report ongoing URI symptoms  Started a week ago   Notes nasal congestion/discharge- yellow green in color, productive cough, moderate fatigue  Denies SOB, chest pain, fever  Patient wanting to be seen for potential sinus infection concerns  Advised of disposition  Nurse assisted with scheduling for tomorrow  Patient verbalized understanding and agrees with plan of care.   Will call back with new or worsening symptoms    Kay WAITE RN    Reason for Disposition   Patient wants to be seen    Additional Information   Negative: Bluish (or gray) lips or face   Negative: SEVERE difficulty breathing (e.g., struggling for each breath, speaks in single words)   Negative: Rapid onset of cough and has hives   Negative: Coughing started suddenly after medicine, an allergic food or bee sting   Negative: Difficulty breathing after exposure to flames, smoke, or fumes   Negative: Sounds like a life-threatening emergency to the triager   Negative: Chest pain present when not coughing   Negative: Passed out (e.g., fainted, lost consciousness, blacked out and was not responding)   Negative: MODERATE difficulty breathing (e.g., speaks in phrases, SOB even at rest, pulse 100-120) and still present when not coughing   Negative: Previous asthma attacks and this feels like asthma attack   Negative: Dry cough (non-productive; no sputum or minimal clear sputum) and within 14 days of COVID-19 Exposure   Negative: Patient sounds very sick or weak to the triager   Negative: MILD difficulty breathing (e.g., minimal/no SOB at rest, SOB with walking, pulse <100) and still present when not coughing   Negative: Coughed up > 1 tablespoon (15 ml) blood (Exception: Blood-tinged sputum.)   Negative: Fever > 103 F (39.4 C)   Negative: Fever > 101 F (38.3 C) and over 60 years of age   Negative: Fever > 100 F (37.8 C) and has diabetes mellitus or a weak immune system (e.g., HIV positive, cancer chemotherapy, organ transplant, splenectomy,  chronic steroids)   Negative: Fever > 100 F (37.8 C) and bedridden (e.g., CVA, chronic illness, recovering from surgery)   Negative: Increasing ankle swelling   Negative: Wheezing is present   Negative: SEVERE coughing spells (e.g., whooping sound after coughing, vomiting after coughing)   Negative: Coughing up dave-colored (reddish-brown) or blood-tinged sputum   Negative: Fever present > 3 days (72 hours)   Negative: Fever returns after gone for over 24 hours and symptoms worse or not improved   Negative: Using nasal washes and pain medicine > 24 hours and sinus pain persists   Negative: Known COPD or other severe lung disease (i.e., bronchiectasis, cystic fibrosis, lung surgery) and symptoms getting worse (i.e., increased sputum purulence or amount, increased breathing difficulty)   Negative: Continuous (nonstop) coughing interferes with work or school and no improvement using cough treatment per Care Advice    Protocols used: Cough-A-OH

## 2024-12-27 ENCOUNTER — OFFICE VISIT (OUTPATIENT)
Dept: FAMILY MEDICINE | Facility: CLINIC | Age: 59
End: 2024-12-27
Payer: COMMERCIAL

## 2024-12-27 VITALS
BODY MASS INDEX: 29.19 KG/M2 | SYSTOLIC BLOOD PRESSURE: 126 MMHG | RESPIRATION RATE: 16 BRPM | OXYGEN SATURATION: 95 % | TEMPERATURE: 97.5 F | WEIGHT: 186 LBS | HEART RATE: 69 BPM | DIASTOLIC BLOOD PRESSURE: 81 MMHG | HEIGHT: 67 IN

## 2024-12-27 DIAGNOSIS — J01.11 ACUTE RECURRENT FRONTAL SINUSITIS: Primary | ICD-10-CM

## 2024-12-27 PROCEDURE — 99213 OFFICE O/P EST LOW 20 MIN: CPT | Performed by: PHYSICIAN ASSISTANT

## 2024-12-27 ASSESSMENT — PATIENT HEALTH QUESTIONNAIRE - PHQ9
10. IF YOU CHECKED OFF ANY PROBLEMS, HOW DIFFICULT HAVE THESE PROBLEMS MADE IT FOR YOU TO DO YOUR WORK, TAKE CARE OF THINGS AT HOME, OR GET ALONG WITH OTHER PEOPLE: NOT DIFFICULT AT ALL
SUM OF ALL RESPONSES TO PHQ QUESTIONS 1-9: 1
SUM OF ALL RESPONSES TO PHQ QUESTIONS 1-9: 1

## 2024-12-27 ASSESSMENT — PAIN SCALES - GENERAL: PAINLEVEL_OUTOF10: NO PAIN (0)

## 2024-12-27 NOTE — PROGRESS NOTES
"  Assessment & Plan     Acute recurrent frontal sinusitis  Right at the borderline of concern for bacterial vs viral.  Discussed when and when not to take antibiotic based on symptoms and she does understand.  OTC probiotics while on antibiotic to help limit some potential side effects.   - amoxicillin-clavulanate (AUGMENTIN) 875-125 MG tablet; Take 1 tablet by mouth 2 times daily for 10 days.          BMI  Estimated body mass index is 29.57 kg/m  as calculated from the following:    Height as of this encounter: 1.689 m (5' 6.5\").    Weight as of this encounter: 84.4 kg (186 lb).             Anne-Marie Meeks is a 59 year old, presenting for the following health issues:  URI        12/27/2024     1:20 PM   Additional Questions   Roomed by Dayron HICKMAN     History of Present Illness       Reason for visit:  Checking for sinus infection  Symptom onset:  3-7 days ago  Symptoms include:  Congestion coughing headache  Symptom intensity:  Moderate  Symptom progression:  Improving  Had these symptoms before:  Yes  Has tried/received treatment for these symptoms:  Yes  Previous treatment was successful:  Yes  Prior treatment description:  Antibiotics  What makes it worse:  Feel worst when i wake up  What makes it better:  Blowing my nose   She is taking medications regularly.     Covid 12/06  - symptoms resolved for about 1 week starting on the 12/13    Postnasal drip & head cold symptoms started about 1 week ago    Belief of sinus infection currently  - history of frequent and easy infections  - currently having increased fatigue              Review of Systems  Constitutional, HEENT, cardiovascular, pulmonary, gi and gu systems are negative, except as otherwise noted.      Objective    /81   Pulse 69   Temp 97.5  F (36.4  C) (Temporal)   Resp 16   Ht 1.689 m (5' 6.5\")   Wt 84.4 kg (186 lb)   LMP 08/01/2022 (Within Months)   SpO2 95%   BMI 29.57 kg/m    Body mass index is 29.57 kg/m .  Physical Exam   GENERAL: " alert and no distress  EYES: Eyes grossly normal to inspection  HENT: b/l TM dullness with loss of light reflux nasal mucosa is erythematous and edematous   RESP: lungs clear to auscultation - no rales, rhonchi or wheezes  CV: regular rate and rhythm, normal S1 S2, no S3 or S4, no murmur, click or rub, no peripheral edema   PSYCH: mentation appears normal, affect normal/bright            Signed Electronically by: Mike Ye PA-C

## 2025-01-28 ENCOUNTER — MYC REFILL (OUTPATIENT)
Dept: FAMILY MEDICINE | Facility: CLINIC | Age: 60
End: 2025-01-28
Payer: COMMERCIAL

## 2025-01-28 DIAGNOSIS — F41.1 GENERALIZED ANXIETY DISORDER: ICD-10-CM

## 2025-01-29 DIAGNOSIS — F41.1 GENERALIZED ANXIETY DISORDER: ICD-10-CM

## 2025-01-29 DIAGNOSIS — F33.1 MAJOR DEPRESSIVE DISORDER, RECURRENT EPISODE, MODERATE (H): ICD-10-CM

## 2025-01-29 RX ORDER — LORAZEPAM 0.5 MG/1
0.5 TABLET ORAL DAILY PRN
Qty: 20 TABLET | Refills: 0 | Status: SHIPPED | OUTPATIENT
Start: 2025-01-29

## 2025-01-30 RX ORDER — SERTRALINE HYDROCHLORIDE 100 MG/1
100 TABLET, FILM COATED ORAL DAILY
Qty: 90 TABLET | Refills: 3 | Status: SHIPPED | OUTPATIENT
Start: 2025-01-30

## 2025-01-30 NOTE — TELEPHONE ENCOUNTER
Hello - meds are refilled and they need follow up PHQ and JESSICA please    Thanks    Karie Queen MD

## 2025-05-19 ENCOUNTER — OFFICE VISIT (OUTPATIENT)
Dept: FAMILY MEDICINE | Facility: CLINIC | Age: 60
End: 2025-05-19
Attending: FAMILY MEDICINE
Payer: COMMERCIAL

## 2025-05-19 VITALS
RESPIRATION RATE: 16 BRPM | HEART RATE: 59 BPM | WEIGHT: 184 LBS | TEMPERATURE: 97.8 F | BODY MASS INDEX: 29.57 KG/M2 | SYSTOLIC BLOOD PRESSURE: 118 MMHG | OXYGEN SATURATION: 96 % | HEIGHT: 66 IN | DIASTOLIC BLOOD PRESSURE: 75 MMHG

## 2025-05-19 DIAGNOSIS — Z13.6 SCREENING FOR HEART DISEASE: ICD-10-CM

## 2025-05-19 DIAGNOSIS — F51.01 PRIMARY INSOMNIA: ICD-10-CM

## 2025-05-19 DIAGNOSIS — Z13.1 SCREENING FOR DIABETES MELLITUS: ICD-10-CM

## 2025-05-19 DIAGNOSIS — Z00.00 WELL ADULT EXAM: ICD-10-CM

## 2025-05-19 DIAGNOSIS — F33.1 MAJOR DEPRESSIVE DISORDER, RECURRENT EPISODE, MODERATE (H): ICD-10-CM

## 2025-05-19 DIAGNOSIS — R30.0 DYSURIA: ICD-10-CM

## 2025-05-19 DIAGNOSIS — Z13.820 SCREENING FOR OSTEOPOROSIS: ICD-10-CM

## 2025-05-19 DIAGNOSIS — E55.9 VITAMIN D DEFICIENCY: ICD-10-CM

## 2025-05-19 DIAGNOSIS — Z00.00 WELL ADULT EXAM: Primary | ICD-10-CM

## 2025-05-19 DIAGNOSIS — R30.0 DYSURIA: Primary | ICD-10-CM

## 2025-05-19 DIAGNOSIS — F10.20 ALCOHOL USE DISORDER, MODERATE, DEPENDENCE (H): ICD-10-CM

## 2025-05-19 LAB
ALBUMIN UR-MCNC: NEGATIVE MG/DL
APPEARANCE UR: CLEAR
BACTERIA #/AREA URNS HPF: ABNORMAL /HPF
BILIRUB UR QL STRIP: NEGATIVE
COLOR UR AUTO: YELLOW
EST. AVERAGE GLUCOSE BLD GHB EST-MCNC: 114 MG/DL
GLUCOSE UR STRIP-MCNC: NEGATIVE MG/DL
HBA1C MFR BLD: 5.6 % (ref 0–5.6)
HGB UR QL STRIP: NEGATIVE
HOLD SPECIMEN: NORMAL
KETONES UR STRIP-MCNC: NEGATIVE MG/DL
LEUKOCYTE ESTERASE UR QL STRIP: NEGATIVE
NITRATE UR QL: NEGATIVE
PH UR STRIP: 5.5 [PH] (ref 5–7)
RBC #/AREA URNS AUTO: ABNORMAL /HPF
SP GR UR STRIP: 1.02 (ref 1–1.03)
SQUAMOUS #/AREA URNS AUTO: ABNORMAL /LPF
UROBILINOGEN UR STRIP-ACNC: 0.2 E.U./DL
WBC #/AREA URNS AUTO: ABNORMAL /HPF

## 2025-05-19 PROCEDURE — 3078F DIAST BP <80 MM HG: CPT | Performed by: FAMILY MEDICINE

## 2025-05-19 PROCEDURE — 99213 OFFICE O/P EST LOW 20 MIN: CPT | Mod: 25 | Performed by: FAMILY MEDICINE

## 2025-05-19 PROCEDURE — 36415 COLL VENOUS BLD VENIPUNCTURE: CPT | Performed by: FAMILY MEDICINE

## 2025-05-19 PROCEDURE — 80053 COMPREHEN METABOLIC PANEL: CPT | Performed by: FAMILY MEDICINE

## 2025-05-19 PROCEDURE — 99396 PREV VISIT EST AGE 40-64: CPT | Performed by: FAMILY MEDICINE

## 2025-05-19 PROCEDURE — 82306 VITAMIN D 25 HYDROXY: CPT | Performed by: FAMILY MEDICINE

## 2025-05-19 PROCEDURE — 81001 URINALYSIS AUTO W/SCOPE: CPT | Performed by: FAMILY MEDICINE

## 2025-05-19 PROCEDURE — 3074F SYST BP LT 130 MM HG: CPT | Performed by: FAMILY MEDICINE

## 2025-05-19 PROCEDURE — 83036 HEMOGLOBIN GLYCOSYLATED A1C: CPT | Performed by: FAMILY MEDICINE

## 2025-05-19 RX ORDER — MIRTAZAPINE 7.5 MG/1
7.5 TABLET, FILM COATED ORAL AT BEDTIME
Qty: 15 TABLET | Refills: 0 | Status: SHIPPED | OUTPATIENT
Start: 2025-05-19

## 2025-05-19 SDOH — HEALTH STABILITY: PHYSICAL HEALTH: ON AVERAGE, HOW MANY DAYS PER WEEK DO YOU ENGAGE IN MODERATE TO STRENUOUS EXERCISE (LIKE A BRISK WALK)?: 3 DAYS

## 2025-05-19 SDOH — HEALTH STABILITY: PHYSICAL HEALTH: ON AVERAGE, HOW MANY MINUTES DO YOU ENGAGE IN EXERCISE AT THIS LEVEL?: 40 MIN

## 2025-05-19 ASSESSMENT — SOCIAL DETERMINANTS OF HEALTH (SDOH): HOW OFTEN DO YOU GET TOGETHER WITH FRIENDS OR RELATIVES?: MORE THAN THREE TIMES A WEEK

## 2025-05-19 ASSESSMENT — PATIENT HEALTH QUESTIONNAIRE - PHQ9
SUM OF ALL RESPONSES TO PHQ QUESTIONS 1-9: 0
SUM OF ALL RESPONSES TO PHQ QUESTIONS 1-9: 0

## 2025-05-19 NOTE — PROGRESS NOTES
Preventive Care Visit  [facility]  Karie Queen MD, Family Medicine  May 19, 2025  {Provider  Link to [facility] :067681}    {PROVIDER CHARTING PREFERENCE:964669}    Anne-Marie Meeks is a 59 year old, presenting for the following:  Physical        5/19/2025     3:05 PM   Additional Questions   Roomed by Tracy GALLO MA   Accompanied by self         5/19/2025     3:05 PM   Patient Reported Additional Medications   Patient reports taking the following new medications none          HPI     Has reduced drinking - not drinking daily  Uses ativan PRN for anxiety  Uses this on some evenings when really stressed    Wonders about talk therapy had a good therapist and she changed clinics  Has to deal with a sister in [place] and setting boundaries    Feels zoloft is helpful  Has used unasom  tried    Has used CBD drinks at night  Has tried Kava and this     Concerns:    Wonders about wegovy    Large breasts wonders about breast reduction  Has been told to lose weight          5/19/2025   General Health   How would you rate your overall physical health? Good   Feel stress (tense, anxious, or unable to sleep) To some extent   (!) STRESS CONCERN      5/19/2025   Nutrition   Three or more servings of calcium each day? Yes   Diet: Regular (no restrictions)   How many servings of fruit and vegetables per day? (!) 2-3   How many sweetened beverages each day? 0-1         5/19/2025   Exercise   Days per week of moderate/strenous exercise 3 days   Average minutes spent exercising at this level 40 min         5/19/2025   Social Factors   Frequency of gathering with friends or relatives More than three times a week   Worry food won't last until get money to buy more No   Food not last or not have enough money for food? No   Do you have housing? (Housing is defined as stable permanent housing and does not include staying outside in a car, in a tent, in an abandoned building, in an overnight shelter, or  couch-surfing.) Yes   Are you worried about losing your housing? No   Lack of transportation? No   Unable to get utilities (heat,electricity)? No         2025   Fall Risk   Fallen 2 or more times in the past year? No   Trouble with walking or balance? No          2025   Dental   Dentist two times every year? (!) NO       Today's PHQ-9 Score:       2025    12:15 PM   PHQ-9 SCORE   PHQ-9 Total Score MyChart 0   PHQ-9 Total Score 0        Patient-reported         2025   Substance Use   Alcohol more than 3/day or more than 7/wk No   Do you use any other substances recreationally? (!) CANNABIS PRODUCTS     Social History     Tobacco Use    Smoking status: Former     Current packs/day: 0.00     Types: Cigarettes     Quit date:      Years since quittin.4    Smokeless tobacco: Never    Tobacco comments:     Social smoking for a small period of time   Vaping Use    Vaping status: Never Used   Substance Use Topics    Alcohol use: Yes     Alcohol/week: 11.7 standard drinks of alcohol     Types: 12 Standard drinks or equivalent per week     Comment: on weekends- 6-15 drinks/week    Drug use: Not Currently     {Provider  If there are gaps in the social history shown above, please follow the link to update and then refresh the note Link to Social and Substance History :588033}      12/3/2024   LAST FHS-7 RESULTS   1st degree relative breast or ovarian cancer No   Any relative bilateral breast cancer No   Any male have breast cancer No   Any ONE woman have BOTH breast AND ovarian cancer No   Any woman with breast cancer before 50yrs No   2 or more relatives with breast AND/OR bowel cancer No     {If any of the questions to the FHS7 are answered yes, consider referral for genetic counseling.    Additional indications for genetic referral include personal history of breast or ovarian cancer, genetic mutation in 1st degree relative which increases risk of breast cancer including BRCA1, BRCA2, EMMANUEL, PALB  "2, TP53, CHEK2, PTEN, CDH1, STK11 (per ACS) and/or 1st degree relative with history of pancreatic or high-risk prostate cancer (per NCCN):528472}   {Mammogram Decision Support (Optional):586111}          5/19/2025   One time HIV Screening   Previous HIV test? No         5/19/2025   STI Screening   New sexual partner(s) since last STI/HIV test? No     History of abnormal Pap smear: { :719903}        Latest Ref Rng & Units 1/6/2021     2:20 PM 1/6/2021     2:19 PM 5/2/2016     2:16 PM   PAP / HPV   PAP (Historical)   NIL     HPV 16 DNA NEG^Negative Negative   Negative    HPV 18 DNA NEG^Negative Negative   Negative    Other HR HPV NEG^Negative Negative   Negative      ASCVD Risk   The ASCVD Risk score (Osman LEONARD, et al., 2019) failed to calculate for the following reasons:    The valid total cholesterol range is 130 to 320 mg/dL    {Link to Fracture Risk Assessment Tool (Optional):539711}    {Provider  REQUIRED FOR AWV Use the storyboard to review patient history, after sections have been marked as reviewed, refresh note to capture documentation:628058}   Reviewed and updated as needed this visit by Provider                    {HISTORY OPTIONS (Optional):591251}    {ROS Picklists (Optional):817113}     Objective    Exam  LMP 08/01/2022 (Within Months)    Estimated body mass index is 29.57 kg/m  as calculated from the following:    Height as of 12/27/24: 1.689 m (5' 6.5\").    Weight as of 12/27/24: 84.4 kg (186 lb).    Physical Exam  {Exam Choices (Optional):229742}        Signed Electronically by: Karie Queen MD  {Email feedback regarding this note to [email or web address]   :023161}  Answers submitted by the patient for this visit:  Patient Health Questionnaire (Submitted on 5/19/2025)  PHQ9 TOTAL SCORE: 0    " "calculate the score:      Age: 59 years      Sex: Female      Is Non- : No      Diabetic: No      Tobacco smoker: No      Systolic Blood Pressure: 118 mmHg      Is BP treated: No      HDL Cholesterol: 86 mg/dL      Total Cholesterol: 232 mg/dL           Reviewed and updated as needed this visit by Provider   Tobacco  Allergies  Meds  Problems  Med Hx  Surg Hx  Fam Hx                  Review of Systems  Constitutional, HEENT, cardiovascular, pulmonary, gi and gu systems are negative, except as otherwise noted.     Objective    Exam  /75 (BP Location: Right arm, Patient Position: Sitting, Cuff Size: Adult Regular)   Pulse 59   Temp 97.8  F (36.6  C) (Temporal)   Resp 16   Ht 1.676 m (5' 6\")   Wt 83.5 kg (184 lb)   LMP 08/01/2022 (Within Months)   SpO2 96%   BMI 29.70 kg/m     Estimated body mass index is 29.7 kg/m  as calculated from the following:    Height as of this encounter: 1.676 m (5' 6\").    Weight as of this encounter: 83.5 kg (184 lb).    Physical Exam  GENERAL: alert and no distress  EYES: Eyes grossly normal to inspection, PERRL and conjunctivae and sclerae normal  HENT: ear canals and TM's normal, nose and mouth without ulcers or lesions  NECK: no adenopathy, no asymmetry, masses, or scars  RESP: lungs clear to auscultation - no rales, rhonchi or wheezes  BREAST: normal without masses, tenderness or nipple discharge and no palpable axillary masses or adenopathy  CV: regular rate and rhythm, normal S1 S2, no S3 or S4, no murmur, click or rub, no peripheral edema  ABDOMEN: soft, nontender, no hepatosplenomegaly, no masses and bowel sounds normal  MS: no gross musculoskeletal defects noted, no edema  SKIN: no suspicious lesions or rashes  NEURO: Normal strength and tone, mentation intact and speech normal  PSYCH: mentation appears normal, affect normal/bright        Signed Electronically by: Karie Queen MD    Answers submitted by the patient for this " visit:  Patient Health Questionnaire (Submitted on 5/19/2025)  PHQ9 TOTAL SCORE: 0

## 2025-05-20 ENCOUNTER — PATIENT OUTREACH (OUTPATIENT)
Dept: CARE COORDINATION | Facility: CLINIC | Age: 60
End: 2025-05-20
Payer: COMMERCIAL

## 2025-05-20 ENCOUNTER — LAB (OUTPATIENT)
Dept: LAB | Facility: CLINIC | Age: 60
End: 2025-05-20
Payer: COMMERCIAL

## 2025-05-20 ENCOUNTER — MYC REFILL (OUTPATIENT)
Dept: FAMILY MEDICINE | Facility: CLINIC | Age: 60
End: 2025-05-20

## 2025-05-20 DIAGNOSIS — F41.1 GENERALIZED ANXIETY DISORDER: ICD-10-CM

## 2025-05-20 DIAGNOSIS — Z13.6 SCREENING FOR HEART DISEASE: ICD-10-CM

## 2025-05-20 LAB
ALBUMIN SERPL BCG-MCNC: 4.6 G/DL (ref 3.5–5.2)
ALP SERPL-CCNC: 76 U/L (ref 40–150)
ALT SERPL W P-5'-P-CCNC: 24 U/L (ref 0–50)
ANION GAP SERPL CALCULATED.3IONS-SCNC: 13 MMOL/L (ref 7–15)
AST SERPL W P-5'-P-CCNC: 25 U/L (ref 0–45)
BILIRUB SERPL-MCNC: 0.2 MG/DL
BUN SERPL-MCNC: 18.2 MG/DL (ref 8–23)
CALCIUM SERPL-MCNC: 10 MG/DL (ref 8.8–10.4)
CHLORIDE SERPL-SCNC: 101 MMOL/L (ref 98–107)
CHOLEST SERPL-MCNC: 232 MG/DL
CREAT SERPL-MCNC: 0.69 MG/DL (ref 0.51–0.95)
EGFRCR SERPLBLD CKD-EPI 2021: >90 ML/MIN/1.73M2
FASTING STATUS PATIENT QL REPORTED: YES
GLUCOSE SERPL-MCNC: 94 MG/DL (ref 70–99)
HCO3 SERPL-SCNC: 24 MMOL/L (ref 22–29)
HDLC SERPL-MCNC: 86 MG/DL
LDLC SERPL CALC-MCNC: 108 MG/DL
NONHDLC SERPL-MCNC: 146 MG/DL
POTASSIUM SERPL-SCNC: 4.2 MMOL/L (ref 3.4–5.3)
PROT SERPL-MCNC: 7.2 G/DL (ref 6.4–8.3)
SODIUM SERPL-SCNC: 138 MMOL/L (ref 135–145)
TRIGL SERPL-MCNC: 191 MG/DL
VIT D+METAB SERPL-MCNC: 37 NG/ML (ref 20–50)

## 2025-05-20 PROCEDURE — 36415 COLL VENOUS BLD VENIPUNCTURE: CPT

## 2025-05-20 PROCEDURE — 80061 LIPID PANEL: CPT

## 2025-05-21 ENCOUNTER — ANCILLARY PROCEDURE (OUTPATIENT)
Dept: BONE DENSITY | Facility: CLINIC | Age: 60
End: 2025-05-21
Attending: FAMILY MEDICINE
Payer: COMMERCIAL

## 2025-05-21 DIAGNOSIS — Z13.820 SCREENING FOR OSTEOPOROSIS: ICD-10-CM

## 2025-05-21 PROCEDURE — 77080 DXA BONE DENSITY AXIAL: CPT | Mod: TC | Performed by: RADIOLOGY

## 2025-05-22 ENCOUNTER — PATIENT OUTREACH (OUTPATIENT)
Dept: CARE COORDINATION | Facility: CLINIC | Age: 60
End: 2025-05-22
Payer: COMMERCIAL

## 2025-05-22 RX ORDER — LORAZEPAM 0.5 MG/1
0.5 TABLET ORAL DAILY PRN
Qty: 20 TABLET | Refills: 0 | Status: SHIPPED | OUTPATIENT
Start: 2025-05-22

## 2025-05-25 ENCOUNTER — HEALTH MAINTENANCE LETTER (OUTPATIENT)
Age: 60
End: 2025-05-25

## 2025-05-28 ENCOUNTER — E-VISIT (OUTPATIENT)
Dept: FAMILY MEDICINE | Facility: CLINIC | Age: 60
End: 2025-05-28
Payer: COMMERCIAL

## 2025-05-28 DIAGNOSIS — E66.811 CLASS 1 OBESITY WITHOUT SERIOUS COMORBIDITY WITH BODY MASS INDEX (BMI) OF 30.0 TO 30.9 IN ADULT, UNSPECIFIED OBESITY TYPE: Primary | ICD-10-CM

## 2025-05-29 ENCOUNTER — VIRTUAL VISIT (OUTPATIENT)
Dept: FAMILY MEDICINE | Facility: CLINIC | Age: 60
End: 2025-05-29
Payer: COMMERCIAL

## 2025-05-29 DIAGNOSIS — E66.3 OVERWEIGHT: Primary | ICD-10-CM

## 2025-05-29 DIAGNOSIS — Z13.1 SCREENING FOR DIABETES MELLITUS: ICD-10-CM

## 2025-05-29 NOTE — PROGRESS NOTES
"Jeanna is a 59 year old who is being evaluated via a billable video visit.    How would you like to obtain your AVS? MyChart  If the video visit is dropped, the invitation should be resent by: Text to cell phone: [phone]  Will anyone else be joining your video visit? No      Assessment & Plan     Overweight  Working on weight loss with diet and exercise has already lost some weight  Recheck in 3 months  - Lipid panel reflex to direct LDL Fasting; Future  - Hemoglobin A1c; Future  - Adult Nutrition  Referral; Future    Screening for diabetes mellitus     - Lipid panel reflex to direct LDL Fasting; Future      Repeat labs in 3 months    BMI  Estimated body mass index is 29.7 kg/m  as calculated from the following:    Height as of 5/19/25: 1.676 m (5' 6\").    Weight as of 5/19/25: 83.5 kg (184 lb).   Weight management plan: Discussed healthy diet and exercise guidelines          Anne-Marie Meeks is a 59 year old, presenting for the following health issues:  No chief complaint on file.      Video Start Time:     History of Present Illness       Hyperlipidemia:  She presents for follow up of hyperlipidemia.   She is taking medication to lower cholesterol. She is not having myalgia or other side effects to statin medications.    She eats 2-3 servings of fruits and vegetables daily.She consumes 1 sweetened beverage(s) daily.She exercises with enough effort to increase her heart rate 10 to 19 minutes per day.  She exercises with enough effort to increase her heart rate 3 or less days per week.   She is taking medications regularly.      Interested in weight lifting - working out more  Working on dietary changes increasing fiber  Concerned about overall risk of prediabetes and wonders about use of glucometer or CGM we discussed that these are not covered if someone does not have diabetes or significant fluctuations of blood sugars.  Discussed importance of small frequent meals and working with " nutrition    Patient is working on sobriety has really cut down on alcohol feels much better off of it and is working with a therapist on healthy boundaries this seems to be a resolving issue but she is aware that alcohol increases weight gain as well    Currently not interested in medications I discussed this in an earlier e-visit          Review of Systems  Constitutional, HEENT, cardiovascular, pulmonary, gi and gu systems are negative, except as otherwise noted.      Objective           Vitals:  No vitals were obtained today due to virtual visit.    Physical Exam   GENERAL: alert and no distress  EYES: Eyes grossly normal to inspection.  No discharge or erythema, or obvious scleral/conjunctival abnormalities.  RESP: No audible wheeze, cough, or visible cyanosis.    SKIN: Visible skin clear. No significant rash, abnormal pigmentation or lesions.  NEURO: Cranial nerves grossly intact.  Mentation and speech appropriate for age.  PSYCH: Appropriate affect, tone, and pace of words    Reviewed her recent labs and cholesterol is elevated still necessitating atorvastatin patient is tolerating this well but would like to get off of this.      Video-Visit Details  Joined the call at 5/29/2025, 11:07:07 am.  Left the call at 5/29/2025, 11:21:26 am.  You were on the call for 14 minutes 18 seconds.    Malfunction with vide and we had to finish the visit with telephone the last 7 minutes    Type of service:  Video Visit   Video End Time:  Originating Location (pt. Location): Home    Distant Location (provider location):  On-site  Platform used for Video Visit: [employer]  Signed Electronically by: Karie Queen MD

## 2025-06-02 ENCOUNTER — PATIENT OUTREACH (OUTPATIENT)
Dept: CARE COORDINATION | Facility: CLINIC | Age: 60
End: 2025-06-02
Payer: COMMERCIAL

## 2025-06-16 ENCOUNTER — MYC REFILL (OUTPATIENT)
Dept: FAMILY MEDICINE | Facility: CLINIC | Age: 60
End: 2025-06-16
Payer: COMMERCIAL

## 2025-06-16 DIAGNOSIS — F51.01 PRIMARY INSOMNIA: ICD-10-CM

## 2025-06-16 DIAGNOSIS — E78.5 HYPERLIPIDEMIA LDL GOAL <130: ICD-10-CM

## 2025-06-16 RX ORDER — ATORVASTATIN CALCIUM 20 MG/1
20 TABLET, FILM COATED ORAL DAILY
Qty: 90 TABLET | Refills: 1 | Status: SHIPPED | OUTPATIENT
Start: 2025-06-16

## 2025-06-16 RX ORDER — MIRTAZAPINE 7.5 MG/1
7.5 TABLET, FILM COATED ORAL AT BEDTIME
Qty: 15 TABLET | Refills: 0 | Status: SHIPPED | OUTPATIENT
Start: 2025-06-16 | End: 2025-06-16

## 2025-06-16 RX ORDER — MIRTAZAPINE 7.5 MG/1
TABLET, FILM COATED ORAL
Qty: 15 TABLET | Refills: 0 | OUTPATIENT
Start: 2025-06-16

## 2025-06-16 RX ORDER — MIRTAZAPINE 7.5 MG/1
7.5 TABLET, FILM COATED ORAL AT BEDTIME
Qty: 90 TABLET | Refills: 3 | Status: SHIPPED | OUTPATIENT
Start: 2025-06-16

## 2025-06-16 RX ORDER — ATORVASTATIN CALCIUM 20 MG/1
20 TABLET, FILM COATED ORAL DAILY
Qty: 90 TABLET | Refills: 3 | OUTPATIENT
Start: 2025-06-16

## 2025-06-19 ENCOUNTER — OFFICE VISIT (OUTPATIENT)
Facility: CLINIC | Age: 60
End: 2025-06-19
Attending: FAMILY MEDICINE
Payer: COMMERCIAL

## 2025-06-19 DIAGNOSIS — F43.21 ADJUSTMENT DISORDER WITH DEPRESSED MOOD: Primary | ICD-10-CM

## 2025-06-19 ASSESSMENT — COLUMBIA-SUICIDE SEVERITY RATING SCALE - C-SSRS
6. HAVE YOU EVER DONE ANYTHING, STARTED TO DO ANYTHING, OR PREPARED TO DO ANYTHING TO END YOUR LIFE?: NO
TOTAL  NUMBER OF INTERRUPTED ATTEMPTS SINCE LAST CONTACT: NO
1. SINCE LAST CONTACT, HAVE YOU WISHED YOU WERE DEAD OR WISHED YOU COULD GO TO SLEEP AND NOT WAKE UP?: NO
2. HAVE YOU ACTUALLY HAD ANY THOUGHTS OF KILLING YOURSELF?: NO
SUICIDE, SINCE LAST CONTACT: NO
ATTEMPT SINCE LAST CONTACT: NO
TOTAL  NUMBER OF ABORTED OR SELF INTERRUPTED ATTEMPTS SINCE LAST CONTACT: NO

## 2025-06-19 NOTE — PROGRESS NOTES
"[facility]    Integrated Behavioral Health   Mental Health & Addiction Services      Progress Note - Initial [facility] Visit     Patient Name: Jeanna Steel    Date: June 19, 2025  Service Type: Consult Note   Visit Start Time: 9:32 AM  Visit End Time:  9:53 AM   Attendees: Patient   Service Modality: In-person     [facility] Visit Activities (Refresh list every visit): NEW         DATA:     Interactive Complexity: No   Crisis: No     Assessments completed:     The following assessments were completed by patient for this visit:  PHQ2: No questionnaires on file.  PHQ9:       3/1/2023     2:50 PM 4/4/2023     2:15 PM 1/25/2024    10:31 AM 4/22/2024     9:37 AM 12/27/2024     1:11 PM 5/19/2025    12:15 PM 6/19/2025     9:33 AM   PHQ-9 SCORE   PHQ-9 Total Score MyChart 3 (Minimal depression)  3 (Minimal depression)  1 (Minimal depression) 0 4 (Minimal depression)   PHQ-9 Total Score 3 2 3 3 1  0  4        Patient-reported     PROMIS 10-Global Health (only subscores and total score):       12/27/2021    12:01 PM 1/14/2022    10:01 AM 3/8/2022    10:47 AM 6/19/2025     9:34 AM   PROMIS-10 Scores Only   Global Mental Health Score 15 16 17 13    Global Physical Health Score 16 17 17 18    PROMIS TOTAL - SUBSCORES 31 33 34 31        Patient-reported     [place] Suicide Severity Rating Scale (Lifetime/Recent)      6/24/2019     3:00 PM 2/4/2020    12:02 PM 9/17/2021    10:28 AM 6/19/2025     9:41 AM   [place] Suicide Severity Rating (Lifetime/Recent)   Q1 Wish to be Dead (Lifetime)  Yes  Yes     Comments  thought there was a miscarriage  has had thoughts of \"why am I here\"     Q2 Non-Specific Active Suicidal Thoughts (Lifetime)  Yes  Yes     Non-Specific Active Suicidal Thought Description (Lifetime)  when I was 36       Most Severe Ideation Rating (Lifetime)  5  3    Most Severe Ideation Description (Lifetime)  moment at 36 years old      Frequency (Lifetime)  4  3    Duration (Lifetime)  1  1  "   Controllability (Lifetime)  0  1    Protective Factors  (Lifetime)  2  1    Reasons for Ideation (Lifetime)  5  0    Q1 Wished to be Dead (Past Month) no       Q2 Suicidal Thoughts (Past Month) no       Q6 Suicide Behavior (Lifetime) no       RETIRED: 1. Wish to be Dead (Recent)  No  No     RETIRED: 2. Non-Specific Active Suicidal Thoughts (Recent)  No  No    3. Active Suicidal Ideation with any Methods (Not Plan) Without Intent to Act (Lifetime)  Yes  No    RETIRE: Active Suicidal Ideation with any Methods (Not Plan) Description (Lifetime)  glass broke, thought about using it to cut      RETIRED: 3. Active Suicidal Ideation with any Methods (Not Plan) Without Intent to Act (Recent)  No      RETIRE: 4. Active Suicidal Ideation with Some Intent to Act, Without Specific Plan (Lifetime)  Yes  No    RETIRE: Active Suicidal Ideation with Some Intent to Act, Without Specific Plan Description (Lifetime)  thought about cutting self with glass      4. Active Suicidal Ideation with Some Intent to Act, Without Specific Plan (Recent)  No  No    RETIRE: 5. Active Suicidal Ideation with Specific Plan and Intent (Lifetime)  No  No    RETIRED: 5. Active Suicidal Ideation with Specific Plan and Intent (Recent)  No  No     Frequency (Past Month)   NA    Duration (Past Month)   NA    Controllability (Past Month)   NA    Protective Factors (Past Month)   NA    Actual Attempt (Lifetime)  Yes  No    Actual Attempt Description (Lifetime)  at 36, drank a bottle of wine, broke wine glass, contemplated cutting wrist with broken glass shard      Total Number of Actual Attempts (Lifetime)  1      Actual Attempt (Past 3 Months)  No  No    Has subject engaged in non-suicidal self-injurious behavior? (Lifetime)   No    Has subject engaged in non-suicidal self-injurious behavior? (Past 3 Months)   No    Interrupted Attempts (Lifetime)   No    Interrupted Attempts (Past 3 Months)   No    Aborted or Self-Interrupted Attempt (Lifetime)   No     Aborted or Self-Interrupted Attempt (Past 3 Months)   No    Preparatory Acts or Behavior (Lifetime)   No    Preparatory Acts or Behavior (Past 3 Months)   No    Most Recent Attempt Actual Lethality Code   NA    Most Lethal Attempt Actual Lethality Code   NA    Initial/First Attempt Actual Lethality Code   NA    1. Wish to be Dead (Lifetime)    Y   Wish to be Dead Description (Lifetime)    passive thoughts in her early adulthood   1. Wish to be Dead (Past 1 Month)    N   2. Non-Specific Active Suicidal Thoughts (Lifetime)    Y   Non-Specific Active Suicidal Thought Description (Lifetime)    passive thoughts in her early adulthood   2. Non-Specific Active Suicidal Thoughts (Past 1 Month)    N   3. Active Suicidal Ideation with any Methods (Not Plan) Without Intent to Act (Lifetime)    N   4. Active Suicidal Ideation with Some Intent to Act, Without Specific Plan (Lifetime)    N   5. Active Suicidal Ideation with Specific Plan and Intent (Lifetime)    N   Most Severe Ideation Rating (Lifetime)    5   Description of Most Severe Ideation (Past 1 Month)    NA   Frequency (Lifetime)    3   Duration (Lifetime)    1   Controllability (Lifetime)    1   Controllability (Past 1 Month)    0   Deterrents (Lifetime)    1   Deterrents (Past 1 Month)    0   Reasons for Ideation (Lifetime)    0   Reasons for Ideation (Past 1 Month)    0   Actual Attempt (Lifetime)    N   Has subject engaged in non-suicidal self-injurious behavior? (Lifetime)    N   Interrupted Attempts (Lifetime)    N   Aborted or Self-Interrupted Attempt (Lifetime)    N   Preparatory Acts or Behavior (Lifetime)    N   Calculated C-SSRS Risk Score (Lifetime/Recent)    No Risk Indicated       Data saved with a previous flowsheet row definition     [place] Suicide Severity Rating Scale (Short Version)      7/7/2019     3:31 PM 9/26/2019     5:47 PM 12/24/2019    10:59 AM 6/3/2020    12:06 PM 10/3/2022     7:24 AM 4/21/2023     6:55 AM 6/19/2025     9:42 AM    [place] Suicide Severity Rating (Short Version)   Over the past 2 weeks have you felt down, depressed, or hopeless? no no no no no no    Over the past 2 weeks have you had thoughts of killing yourself? no no no no no no    Have you ever attempted to kill yourself? no no no no no no    1. Wish to be Dead (Since Last Contact)       N   2. Non-Specific Active Suicidal Thoughts (Since Last Contact)       N   Actual Attempt (Since Last Contact)       N   Has subject engaged in non-suicidal self-injurious behavior? (Since Last Contact)       N   Interrupted Attempts (Since Last Contact)       N   Aborted or Self-Interrupted Attempt (Since Last Contact)       N   Preparatory Acts or Behavior (Since Last Contact)       N   Suicide (Since Last Contact)       N   Calculated C-SSRS Risk Score (Since Last Contact)       No Risk Indicated        Referral:   Patient was referred to [facility] by primary care provider.    Reason for referral: clarify behavioral health diagnosis and determine behavioral health treatment options.      [facility] introduced self and role. Discussed informed consent and limits to confidentiality.     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  Patient reports that she currently takes care of her parents - father passed away in 2022 and her mom has Alzheimer and that's very difficult, even more with the responsibility about the estate of her parent's.   Patient disclosed she is having issues with her sister in law and a cousin - lack of boundaries - just with her sister in law she struggles with rages, drinking, and an impact of self esteem.   Patient discussed that she she wants to continue to not drinking and work on boundaries with an outpatient provider with no bridging care because the exam room didn't feel comfortable.        Therapeutic  Interventions:  Psycho-education: Provided psycho-education about patient's behavioral health condition and symptoms. Explained and reviewed treatment options.    Response to treatment interventions:   Patient was receptive to interventions utilized.  Patient was engaged in the therapy process.      Safety Issues and Plan for Safety and Risk Management:     Patient has had a history of suicidal ideation: occasional passive thoughts in early adulthood   Patient denies current fears or concerns for personal safety.   Patient denies current or recent suicidal ideation or behaviors.   Patient denies current or recent homicidal ideation or behaviors.   Patient denies current or recent self injurious behavior or ideation.   Patient denies other safety concerns.   Recommended that patient call 911 or go to the local ED should there be a change in any of these risk factors   Patient reports there are no firearms in the house.       ASSESSMENT:   Mental Status:     Appearance:   Appropriate    Eye Contact:   Fair    Psychomotor Behavior: Normal    Attitude:   Cooperative    Orientation:   All   Speech Rate / Production: Normal/ Responsive   Volume:   Soft    Mood:    Sad    Affect:    Worrisome    Thought Content:  Clear    Thought Form:  Coherent  Logical    Insight:    Fair         Diagnostic Criteria:   Adjustment Disorder  A. The development of emotional or behavioral symptoms in response to an identifiable stressor(s) occurring within 3 months of the onset of the stressor(s)  B. These symptoms or behaviors are clinically significant, as evidenced by one or both of the following:       - Significant impairment in social, occupational, or other important areas of functioning  C. The stress-related disturbance does not meet criteria for another disorder & is not not an exacerbation of another mental disorder  D. The symptoms do not represent normal bereavement  E. Once the stressor or its consequences have terminated, the  symptoms do not persist for more than an additional 6 months       * Adjustment Disorder with Depressed Mood: The predominant manifestations are symptoms such as low mood, tearfulness, or feelings of hopelessness        DSM5 Diagnoses: (Sustained by DSM5 Criteria Listed Above)     Diagnoses: Adjustment Disorders  309.0 (F43.21) With depressed mood     Psychosocial / Contextual Factors: Relationship Concerns and Interpersonal Concerns       Collateral Reports Completed:   Not Applicable        PLAN: (Homework, other):     1. Patient was provided:  recommendation to follow through on referrals     2. Provider recommended the following referrals: [facility] Transfer to [facility] or [facility] for a female/ male provider for JESSICA for in person services. No Bridge Care per patient. .        3. Suicide Risk and Safety Concerns were assessed for Jeanna Steel    Safety Plan:   Patient denied any current/recent/lifetime history of suicidal ideation and/or behaviors. Recommended that patient call 911 or go to the local ED should there be a change in any of these risk factors       Kandy Wilburn [facility]   June 19, 2025  Answers submitted by the patient for this visit:  Patient Health Questionnaire (Submitted on 6/19/2025)  If you checked off any problems, how difficult have these problems made it for you to do your work, take care of things at home, or get along with other people?: Somewhat difficult  PHQ9 TOTAL SCORE: 4

## 2025-08-20 ENCOUNTER — VIRTUAL VISIT (OUTPATIENT)
Dept: FAMILY MEDICINE | Facility: CLINIC | Age: 60
End: 2025-08-20
Payer: COMMERCIAL

## 2025-08-20 DIAGNOSIS — E66.811 CLASS 1 OBESITY WITHOUT SERIOUS COMORBIDITY WITH BODY MASS INDEX (BMI) OF 30.0 TO 30.9 IN ADULT, UNSPECIFIED OBESITY TYPE: Primary | ICD-10-CM

## 2025-08-20 PROCEDURE — 98006 SYNCH AUDIO-VIDEO EST MOD 30: CPT | Performed by: FAMILY MEDICINE

## 2025-08-20 ASSESSMENT — ANXIETY QUESTIONNAIRES
GAD7 TOTAL SCORE: 0
IF YOU CHECKED OFF ANY PROBLEMS ON THIS QUESTIONNAIRE, HOW DIFFICULT HAVE THESE PROBLEMS MADE IT FOR YOU TO DO YOUR WORK, TAKE CARE OF THINGS AT HOME, OR GET ALONG WITH OTHER PEOPLE: NOT DIFFICULT AT ALL
7. FEELING AFRAID AS IF SOMETHING AWFUL MIGHT HAPPEN: NOT AT ALL
3. WORRYING TOO MUCH ABOUT DIFFERENT THINGS: NOT AT ALL
2. NOT BEING ABLE TO STOP OR CONTROL WORRYING: NOT AT ALL
5. BEING SO RESTLESS THAT IT IS HARD TO SIT STILL: NOT AT ALL
7. FEELING AFRAID AS IF SOMETHING AWFUL MIGHT HAPPEN: NOT AT ALL
8. IF YOU CHECKED OFF ANY PROBLEMS, HOW DIFFICULT HAVE THESE MADE IT FOR YOU TO DO YOUR WORK, TAKE CARE OF THINGS AT HOME, OR GET ALONG WITH OTHER PEOPLE?: NOT DIFFICULT AT ALL
GAD7 TOTAL SCORE: 0
1. FEELING NERVOUS, ANXIOUS, OR ON EDGE: NOT AT ALL
4. TROUBLE RELAXING: NOT AT ALL
GAD7 TOTAL SCORE: 0
6. BECOMING EASILY ANNOYED OR IRRITABLE: NOT AT ALL

## 2025-08-21 ENCOUNTER — LAB (OUTPATIENT)
Dept: LAB | Facility: CLINIC | Age: 60
End: 2025-08-21
Payer: COMMERCIAL

## 2025-08-21 DIAGNOSIS — Z13.1 SCREENING FOR DIABETES MELLITUS: ICD-10-CM

## 2025-08-21 DIAGNOSIS — E66.3 OVERWEIGHT: ICD-10-CM

## 2025-08-21 DIAGNOSIS — Z11.4 SCREENING FOR HIV (HUMAN IMMUNODEFICIENCY VIRUS): Primary | ICD-10-CM

## 2025-08-21 LAB
CHOLEST SERPL-MCNC: 256 MG/DL
EST. AVERAGE GLUCOSE BLD GHB EST-MCNC: 114 MG/DL
FASTING STATUS PATIENT QL REPORTED: YES
HBA1C MFR BLD: 5.6 % (ref 0–5.6)
HDLC SERPL-MCNC: 108 MG/DL
HIV 1+2 AB+HIV1 P24 AG SERPL QL IA: NONREACTIVE
LDLC SERPL CALC-MCNC: 117 MG/DL
NONHDLC SERPL-MCNC: 148 MG/DL
TRIGL SERPL-MCNC: 157 MG/DL

## 2025-08-24 ENCOUNTER — MYC MEDICAL ADVICE (OUTPATIENT)
Dept: FAMILY MEDICINE | Facility: CLINIC | Age: 60
End: 2025-08-24
Payer: COMMERCIAL

## 2025-08-24 DIAGNOSIS — E66.811 CLASS 1 OBESITY WITHOUT SERIOUS COMORBIDITY WITH BODY MASS INDEX (BMI) OF 30.0 TO 30.9 IN ADULT, UNSPECIFIED OBESITY TYPE: Primary | ICD-10-CM

## 2025-08-25 ENCOUNTER — TELEPHONE (OUTPATIENT)
Dept: FAMILY MEDICINE | Facility: CLINIC | Age: 60
End: 2025-08-25
Payer: COMMERCIAL

## (undated) DEVICE — Device

## (undated) DEVICE — PREP CHLORHEXIDINE 4% 4OZ (HIBICLENS) 57504

## (undated) DEVICE — LINEN TOWEL PACK X5 5464

## (undated) DEVICE — SEAL SET MYOSURE ROD LENS SCOPE SINGLE USE 40-902

## (undated) DEVICE — SOL NACL 0.9% IRRIG 3000ML BAG 2B7477

## (undated) DEVICE — SOL NACL 0.9% IRRIG 500ML BOTTLE 2F7123

## (undated) DEVICE — SOLIDIFIER (USE FOR UP TO 1500CC) MSOLID1500

## (undated) DEVICE — PAD CHUX UNDERPAD 30X30"

## (undated) DEVICE — SOL WATER IRRIG 500ML BOTTLE 2F7113

## (undated) DEVICE — DRAPE UNDER BUTTOCK 8483

## (undated) DEVICE — GLOVE PROTEXIS BLUE W/NEU-THERA 7.0  2D73EB70

## (undated) DEVICE — TUBING SYS AQUILEX BLUE INFLOW AQL-110 YLW OUTFLOW AQL-111

## (undated) DEVICE — GLOVE PROTEXIS W/NEU-THERA 6.5  2D73TE65

## (undated) DEVICE — TUBING SYS AQUILEX YELLOW OUTFLOW AQL-111

## (undated) DEVICE — JELLY LUBRICATING SURGILUBE 2OZ TUBE

## (undated) RX ORDER — ONDANSETRON 2 MG/ML
INJECTION INTRAMUSCULAR; INTRAVENOUS
Status: DISPENSED
Start: 2023-04-21

## (undated) RX ORDER — FENTANYL CITRATE 50 UG/ML
INJECTION, SOLUTION INTRAMUSCULAR; INTRAVENOUS
Status: DISPENSED
Start: 2023-04-21

## (undated) RX ORDER — ACETAMINOPHEN 325 MG/1
TABLET ORAL
Status: DISPENSED
Start: 2023-04-21

## (undated) RX ORDER — FENTANYL CITRATE 50 UG/ML
INJECTION, SOLUTION INTRAMUSCULAR; INTRAVENOUS
Status: DISPENSED
Start: 2022-10-03

## (undated) RX ORDER — KETOROLAC TROMETHAMINE 30 MG/ML
INJECTION, SOLUTION INTRAMUSCULAR; INTRAVENOUS
Status: DISPENSED
Start: 2023-04-21

## (undated) RX ORDER — DEXAMETHASONE SODIUM PHOSPHATE 4 MG/ML
INJECTION, SOLUTION INTRA-ARTICULAR; INTRALESIONAL; INTRAMUSCULAR; INTRAVENOUS; SOFT TISSUE
Status: DISPENSED
Start: 2023-04-21

## (undated) RX ORDER — LIDOCAINE HYDROCHLORIDE 10 MG/ML
INJECTION, SOLUTION EPIDURAL; INFILTRATION; INTRACAUDAL; PERINEURAL
Status: DISPENSED
Start: 2023-04-21

## (undated) RX ORDER — PROPOFOL 10 MG/ML
INJECTION, EMULSION INTRAVENOUS
Status: DISPENSED
Start: 2023-04-21

## (undated) RX ORDER — GLYCOPYRROLATE 0.2 MG/ML
INJECTION INTRAMUSCULAR; INTRAVENOUS
Status: DISPENSED
Start: 2023-04-21

## (undated) RX ORDER — GABAPENTIN 300 MG/1
CAPSULE ORAL
Status: DISPENSED
Start: 2023-04-21